# Patient Record
Sex: MALE | Race: WHITE | NOT HISPANIC OR LATINO | URBAN - METROPOLITAN AREA
[De-identification: names, ages, dates, MRNs, and addresses within clinical notes are randomized per-mention and may not be internally consistent; named-entity substitution may affect disease eponyms.]

---

## 2015-03-12 RX ORDER — DIGOXIN 250 MCG
3 TABLET ORAL
Qty: 0 | Refills: 0 | COMMUNITY
Start: 2015-03-12

## 2017-04-04 ENCOUNTER — INPATIENT (INPATIENT)
Facility: HOSPITAL | Age: 82
LOS: 1 days | Discharge: HOME | DRG: 872 | End: 2017-04-06
Attending: INTERNAL MEDICINE | Admitting: INTERNAL MEDICINE
Payer: MEDICARE

## 2017-04-04 VITALS
SYSTOLIC BLOOD PRESSURE: 162 MMHG | RESPIRATION RATE: 16 BRPM | OXYGEN SATURATION: 98 % | HEART RATE: 110 BPM | DIASTOLIC BLOOD PRESSURE: 82 MMHG

## 2017-04-04 DIAGNOSIS — J10.1 INFLUENZA DUE TO OTHER IDENTIFIED INFLUENZA VIRUS WITH OTHER RESPIRATORY MANIFESTATIONS: ICD-10-CM

## 2017-04-04 DIAGNOSIS — Z96.653 PRESENCE OF ARTIFICIAL KNEE JOINT, BILATERAL: Chronic | ICD-10-CM

## 2017-04-04 LAB
ALBUMIN SERPL ELPH-MCNC: 4.3 G/DL — SIGNIFICANT CHANGE UP (ref 3.3–5)
ALP SERPL-CCNC: 80 U/L — SIGNIFICANT CHANGE UP (ref 40–120)
ALT FLD-CCNC: 16 U/L RC — SIGNIFICANT CHANGE UP (ref 10–45)
ANION GAP SERPL CALC-SCNC: 16 MMOL/L — SIGNIFICANT CHANGE UP (ref 5–17)
APPEARANCE UR: CLEAR — SIGNIFICANT CHANGE UP
APTT BLD: 40.9 SEC — HIGH (ref 27.5–37.4)
AST SERPL-CCNC: 18 U/L — SIGNIFICANT CHANGE UP (ref 10–40)
BASOPHILS # BLD AUTO: 0 K/UL — SIGNIFICANT CHANGE UP (ref 0–0.2)
BASOPHILS NFR BLD AUTO: 0.1 % — SIGNIFICANT CHANGE UP (ref 0–2)
BILIRUB SERPL-MCNC: 0.7 MG/DL — SIGNIFICANT CHANGE UP (ref 0.2–1.2)
BILIRUB UR-MCNC: NEGATIVE — SIGNIFICANT CHANGE UP
BUN SERPL-MCNC: 19 MG/DL — SIGNIFICANT CHANGE UP (ref 7–23)
CALCIUM SERPL-MCNC: 9.1 MG/DL — SIGNIFICANT CHANGE UP (ref 8.4–10.5)
CHLORIDE SERPL-SCNC: 98 MMOL/L — SIGNIFICANT CHANGE UP (ref 96–108)
CO2 SERPL-SCNC: 21 MMOL/L — LOW (ref 22–31)
COLOR SPEC: YELLOW — SIGNIFICANT CHANGE UP
CREAT SERPL-MCNC: 1.04 MG/DL — SIGNIFICANT CHANGE UP (ref 0.5–1.3)
DIFF PNL FLD: ABNORMAL
EOSINOPHIL # BLD AUTO: 0.1 K/UL — SIGNIFICANT CHANGE UP (ref 0–0.5)
EOSINOPHIL NFR BLD AUTO: 0.9 % — SIGNIFICANT CHANGE UP (ref 0–6)
FLUBV RNA SPEC QL NAA+PROBE: DETECTED
GAS PNL BLDV: SIGNIFICANT CHANGE UP
GLUCOSE SERPL-MCNC: 144 MG/DL — HIGH (ref 70–99)
GLUCOSE UR QL: 300
HCT VFR BLD CALC: 44.2 % — SIGNIFICANT CHANGE UP (ref 39–50)
HGB BLD-MCNC: 14.7 G/DL — SIGNIFICANT CHANGE UP (ref 13–17)
INR BLD: 2.54 RATIO — HIGH (ref 0.88–1.16)
KETONES UR-MCNC: NEGATIVE — SIGNIFICANT CHANGE UP
LEUKOCYTE ESTERASE UR-ACNC: NEGATIVE — SIGNIFICANT CHANGE UP
LYMPHOCYTES # BLD AUTO: 0.9 K/UL — LOW (ref 1–3.3)
LYMPHOCYTES # BLD AUTO: 8 % — LOW (ref 13–44)
MCHC RBC-ENTMCNC: 29 PG — SIGNIFICANT CHANGE UP (ref 27–34)
MCHC RBC-ENTMCNC: 33.1 GM/DL — SIGNIFICANT CHANGE UP (ref 32–36)
MCV RBC AUTO: 87.6 FL — SIGNIFICANT CHANGE UP (ref 80–100)
MONOCYTES # BLD AUTO: 1.4 K/UL — HIGH (ref 0–0.9)
MONOCYTES NFR BLD AUTO: 12.6 % — SIGNIFICANT CHANGE UP (ref 2–14)
NEUTROPHILS # BLD AUTO: 8.5 K/UL — HIGH (ref 1.8–7.4)
NEUTROPHILS NFR BLD AUTO: 78.4 % — HIGH (ref 43–77)
NITRITE UR-MCNC: NEGATIVE — SIGNIFICANT CHANGE UP
PH UR: 6.5 — SIGNIFICANT CHANGE UP (ref 4.8–8)
PLATELET # BLD AUTO: 203 K/UL — SIGNIFICANT CHANGE UP (ref 150–400)
POTASSIUM SERPL-MCNC: 4.6 MMOL/L — SIGNIFICANT CHANGE UP (ref 3.5–5.3)
POTASSIUM SERPL-SCNC: 4.6 MMOL/L — SIGNIFICANT CHANGE UP (ref 3.5–5.3)
PROT SERPL-MCNC: 7.1 G/DL — SIGNIFICANT CHANGE UP (ref 6–8.3)
PROT UR-MCNC: 100 MG/DL
PROTHROM AB SERPL-ACNC: 28.2 SEC — HIGH (ref 9.8–12.7)
RAPID RVP RESULT: DETECTED
RBC # BLD: 5.05 M/UL — SIGNIFICANT CHANGE UP (ref 4.2–5.8)
RBC # FLD: 13 % — SIGNIFICANT CHANGE UP (ref 10.3–14.5)
SODIUM SERPL-SCNC: 135 MMOL/L — SIGNIFICANT CHANGE UP (ref 135–145)
SP GR SPEC: 1.02 — SIGNIFICANT CHANGE UP (ref 1.01–1.02)
UROBILINOGEN FLD QL: 1
WBC # BLD: 10.9 K/UL — HIGH (ref 3.8–10.5)
WBC # FLD AUTO: 10.9 K/UL — HIGH (ref 3.8–10.5)

## 2017-04-04 PROCEDURE — 99285 EMERGENCY DEPT VISIT HI MDM: CPT | Mod: 25,GC

## 2017-04-04 PROCEDURE — 93010 ELECTROCARDIOGRAM REPORT: CPT

## 2017-04-04 PROCEDURE — 99223 1ST HOSP IP/OBS HIGH 75: CPT

## 2017-04-04 PROCEDURE — 71010: CPT | Mod: 26

## 2017-04-04 RX ORDER — SODIUM CHLORIDE 9 MG/ML
1000 INJECTION INTRAMUSCULAR; INTRAVENOUS; SUBCUTANEOUS ONCE
Qty: 0 | Refills: 0 | Status: COMPLETED | OUTPATIENT
Start: 2017-04-04 | End: 2017-04-04

## 2017-04-04 RX ORDER — SODIUM CHLORIDE 9 MG/ML
3 INJECTION INTRAMUSCULAR; INTRAVENOUS; SUBCUTANEOUS ONCE
Qty: 0 | Refills: 0 | Status: COMPLETED | OUTPATIENT
Start: 2017-04-04 | End: 2017-04-04

## 2017-04-04 RX ORDER — ACETAMINOPHEN 500 MG
975 TABLET ORAL ONCE
Qty: 0 | Refills: 0 | Status: COMPLETED | OUTPATIENT
Start: 2017-04-04 | End: 2017-04-04

## 2017-04-04 RX ADMIN — Medication 30 MILLIGRAM(S): at 20:42

## 2017-04-04 RX ADMIN — SODIUM CHLORIDE 3 MILLILITER(S): 9 INJECTION INTRAMUSCULAR; INTRAVENOUS; SUBCUTANEOUS at 18:33

## 2017-04-04 RX ADMIN — SODIUM CHLORIDE 666.67 MILLILITER(S): 9 INJECTION INTRAMUSCULAR; INTRAVENOUS; SUBCUTANEOUS at 18:33

## 2017-04-04 RX ADMIN — Medication 975 MILLIGRAM(S): at 18:33

## 2017-04-04 NOTE — H&P ADULT. - PSYCHIATRIC
not applicable not examined negative Affect and characteristics of appearance, verbalizations, behaviors are appropriate

## 2017-04-04 NOTE — H&P ADULT. - PROBLEM SELECTOR PLAN 2
- fever, tachy, tachypneic  - s/p NS 1L  - blood and ur cx pending  - cont tamiflu and levaquin empirically  - hold off CT scans for now

## 2017-04-04 NOTE — H&P ADULT. - HISTORY OF PRESENT ILLNESS
89M c hx AF on coumadin, syncope S/P PPM, HTN, HLD, DM2, and macular degeneration, pw chills, coughing, myalgia, fever for 2 days.    Pt is normally very functional.        Pt states that his chest pain started last night at rest when he was sleeping. Pt reports that the pain was not severe, just mild in nature, and he did not need to take any medications to relieve the pain. Pt does not report any exertional or pleuritic component, and is unable to identify any palliating or provoking features. Pt woke up this morning with similar 4/10 chest pain and was concerned since he never experienced chest pain in the past. Pt admits that his chest pain fluctuated in intensity, but never reached more than a 4/10 in severity. Pt had a nuclear stress test several years ago, which was reportedly normal. Pt denies fever, chills, headache, dizziness, visual deficits, pleuritic chest pain, shortness of breath, palpitations, abdominal pain, N/V/D/C, hematochezia, melena, dysuria, hematuria, LOC, syncope, peripheral edema, weight gain/loss. Upon arrival to ED, EKG: V paced at 62 bpm, TWI II, III, AVF. CE x1: Negative. CT angio chest, abdomen, pelvis: No aortic dissection. INR: 4.26. 89M c hx AF on coumadin, syncope S/P PPM, HTN, HLD, DM2, and macular degeneration, pw chills, coughing, myalgia, fever for 2 days.    Pt is normally very functional. >4mets, drives, lives with wife. States that for the past 2 days, having chills, mildly productive cough, myalgia and fevers at home. Denies CP, abd pain, N/V, diarrhea.    VS: Tm 102, P 110, /82, R 22, 98%RA  In the ED, received tamiflu, NS 1L, tylenol.        Pt states that his chest pain started last night at rest when he was sleeping. Pt reports that the pain was not severe, just mild in nature, and he did not need to take any medications to relieve the pain. Pt does not report any exertional or pleuritic component, and is unable to identify any palliating or provoking features. Pt woke up this morning with similar 4/10 chest pain and was concerned since he never experienced chest pain in the past. Pt admits that his chest pain fluctuated in intensity, but never reached more than a 4/10 in severity. Pt had a nuclear stress test several years ago, which was reportedly normal. Pt denies fever, chills, headache, dizziness, visual deficits, pleuritic chest pain, shortness of breath, palpitations, abdominal pain, N/V/D/C, hematochezia, melena, dysuria, hematuria, LOC, syncope, peripheral edema, weight gain/loss. Upon arrival to ED, EKG: V paced at 62 bpm, TWI II, III, AVF. CE x1: Negative. CT angio chest, abdomen, pelvis: No aortic dissection. INR: 4.26. 89M c hx AF on coumadin, syncope S/P PPM, HTN, HLD, DM2, and macular degeneration, pw chills, coughing, myalgia, fever for 2 days.    Pt is normally very functional. >4mets, drives, lives with wife. States that for the past 2 days, having chills, mildly productive cough, myalgia and fevers at home. Denies CP, abd pain, N/V, diarrhea.    VS: Tm 102, P 110, /82, R 22, 98%RA  In the ED, received tamiflu, NS 1L, tylenol.

## 2017-04-04 NOTE — H&P ADULT. - NEGATIVE NEUROLOGICAL SYMPTOMS
no generalized seizures/no headache/no weakness/no confusion/no focal seizures/no loss of consciousness/no hemiparesis/no paresthesias/no transient paralysis/no difficulty walking/no vertigo/no tremors/no loss of sensation/no syncope

## 2017-04-04 NOTE — H&P ADULT. - NEUROLOGICAL DETAILS
responds to verbal commands/normal strength/alert and oriented x 3/cranial nerves intact/sensation intact/responds to pain

## 2017-04-04 NOTE — ED PROVIDER NOTE - ATTENDING CONTRIBUTION TO CARE
I have seen and evaluated this patient with the resident.   I agree with the findings  unless other wise stated.  I have made appropriate changes in documentations where needed, After my face to face bedside evaluation, I am further  noting: Fever likely- undifferentiated: 1) IV Access/IVF/LABS/UA/Cultures 2) CXR 3) IV Abx, held for possible viral illness 4) Admit

## 2017-04-04 NOTE — H&P ADULT. - PROBLEM SELECTOR PLAN 1
- cont tamiflu bid for 5 days  - start levaquin for 5 days for empiric therapy  - supportive care, helen

## 2017-04-04 NOTE — H&P ADULT. - NEGATIVE CARDIOVASCULAR SYMPTOMS
no claudication/no paroxysmal nocturnal dyspnea/no orthopnea/no dyspnea on exertion/no palpitations/no peripheral edema no orthopnea/no palpitations/no peripheral edema/no paroxysmal nocturnal dyspnea/no claudication/no chest pain

## 2017-04-04 NOTE — H&P ADULT. - RS GEN PE MLT RESP DETAILS PC
no wheezes/no rhonchi/no rales/clear to auscultation bilaterally/breath sounds equal/no intercostal retractions/respirations non-labored/no chest wall tenderness/good air movement/airway patent breath sounds equal/good air movement/no intercostal retractions/no chest wall tenderness/rhonchi/airway patent/wheezes

## 2017-04-04 NOTE — ED PROVIDER NOTE - PMH
AF (atrial fibrillation)    BPH (benign prostatic hypertrophy)    DM (diabetes mellitus)    HLD (hyperlipidemia)    HTN (hypertension)    Macular degeneration    Pacemaker

## 2017-04-04 NOTE — H&P ADULT. - NEGATIVE RESPIRATORY AND THORAX SYMPTOMS
no pleuritic chest pain/no dyspnea/no cough/no hemoptysis/no wheezing no hemoptysis/no pleuritic chest pain

## 2017-04-04 NOTE — ED PROVIDER NOTE - OBJECTIVE STATEMENT
hx from pt, daughter and chart,   89yo M hx of afib on coumadin and digoxin, s/p ppm, htn, hld, dmii, bib daughter for chills and cough since yesterday. Pt found to have fever in ED. Denies n/v/dysuria, abd pain. +congestion.   no change in vision, no throat pain, no chest pain, no abdominal pain, no nausea/vomiting,  no dysuria, no joint pain, no rashes, no focal numbness or weakness, no known mental health issues     Dr. Jackman (PCP)  Dr. Denys uSe (cardiologist)

## 2017-04-04 NOTE — ED PROVIDER NOTE - MEDICAL DECISION MAKING DETAILS
Fever likely- undifferentiated: 1) IV Access/IVF/LABS/UA/Cultures 2) CXR 3) IV Abx, held for possible viral illness 4) Admit

## 2017-04-04 NOTE — ED ADULT NURSE REASSESSMENT NOTE - NS ED NURSE REASSESS COMMENT FT1
received report from MOE Mckeon. patient resting comfortably in bed. IV "fell out." VSS. in acute pain or distress.

## 2017-04-04 NOTE — H&P ADULT. - NEGATIVE GASTROINTESTINAL SYMPTOMS
no diarrhea/no hematochezia/no melena/no nausea/no abdominal pain/no change in bowel habits/no vomiting/no constipation/no flatulence

## 2017-04-04 NOTE — ED PROVIDER NOTE - PHYSICAL EXAMINATION
AAOX3, NAD, NCAT, EOMI, PERRL, MMM, Neck Supple, RRR, left rhonchi at base. exam limited by respiratory effort no increased wob. ABD S/NT/ND +BS, No CVAT, EXT warm, well perfused, No Edema, Distal Pulses Intact, No Rash,  MAEx4, Sensation to soft touch grossly intact, normal strength/tone.

## 2017-04-04 NOTE — H&P ADULT. - LAB RESULTS AND INTERPRETATION
Personally reviewed labs. Wbc 10.9, INR 2.54. Glucosuria and microscopic hematuria. otherwise grossly wnl.

## 2017-04-04 NOTE — H&P ADULT. - NEGATIVE GENERAL SYMPTOMS
no sweating/no chills/no malaise/no weight gain/no weight loss/no fever/no anorexia/no fatigue no weight loss/no weight gain/no sweating/no malaise/no anorexia/no fatigue

## 2017-04-04 NOTE — H&P ADULT. - NEGATIVE OPHTHALMOLOGIC SYMPTOMS
no loss of vision R/no blurred vision R/no pain L/no blurred vision L/no diplopia/no pain R/no loss of vision L/no photophobia

## 2017-04-04 NOTE — ED ADULT NURSE NOTE - OBJECTIVE STATEMENT
89 Y/O M via EMS presenting with fever as of today as per daughter. Daughter states the pt's wife noticed that he was "off". Daughter went to his house and noted that he was lethargic and weak. She states he has a cough. Pt denies chest pain, sob, n ,v ,abd pain, dyusria, numbness, tingling. Pt is aaox4. Gross neuro intact. Lungs clear throughout bilat. Productive cough. S1 and S2 heard. Abd soft, nontender, nondistended. + bs in all 4 quadrants. Denies urinary s&s. Skin w.d.i. Safety and comfort measures maintained. Pt and family made aware of droplet precautions and wearing a mask. Family understands and verbalizes teachings.

## 2017-04-04 NOTE — ED ADULT NURSE NOTE - CHPI ED SYMPTOMS NEG
no decreased eating/drinking/no tingling/no chills/no pain/no dizziness/no vomiting/no weakness/no nausea

## 2017-04-04 NOTE — H&P ADULT. - ASSESSMENT
89M c hx AF on coumadin, syncope S/P PPM, HTN, HLD, DM2, and macular degeneration, pw sepsis 2/2 influenza B.

## 2017-04-04 NOTE — H&P ADULT. - PROBLEM SELECTOR PLAN 5
- f/u PMD for repeat u/a within 1 week after discharge  - if persistent, may need urology referral and eval

## 2017-04-05 DIAGNOSIS — J10.1 INFLUENZA DUE TO OTHER IDENTIFIED INFLUENZA VIRUS WITH OTHER RESPIRATORY MANIFESTATIONS: ICD-10-CM

## 2017-04-05 DIAGNOSIS — I48.91 UNSPECIFIED ATRIAL FIBRILLATION: ICD-10-CM

## 2017-04-05 DIAGNOSIS — R31.29 OTHER MICROSCOPIC HEMATURIA: ICD-10-CM

## 2017-04-05 DIAGNOSIS — I10 ESSENTIAL (PRIMARY) HYPERTENSION: ICD-10-CM

## 2017-04-05 DIAGNOSIS — A41.9 SEPSIS, UNSPECIFIED ORGANISM: ICD-10-CM

## 2017-04-05 LAB
ALBUMIN SERPL ELPH-MCNC: 3.2 G/DL — LOW (ref 3.3–5)
ALP SERPL-CCNC: 61 U/L — SIGNIFICANT CHANGE UP (ref 40–120)
ALT FLD-CCNC: 12 U/L RC — SIGNIFICANT CHANGE UP (ref 10–45)
ANION GAP SERPL CALC-SCNC: 12 MMOL/L — SIGNIFICANT CHANGE UP (ref 5–17)
APTT BLD: 38.4 SEC — HIGH (ref 27.5–37.4)
AST SERPL-CCNC: 19 U/L — SIGNIFICANT CHANGE UP (ref 10–40)
BASOPHILS # BLD AUTO: 0 K/UL — SIGNIFICANT CHANGE UP (ref 0–0.2)
BASOPHILS NFR BLD AUTO: 0 % — SIGNIFICANT CHANGE UP (ref 0–2)
BILIRUB SERPL-MCNC: 0.5 MG/DL — SIGNIFICANT CHANGE UP (ref 0.2–1.2)
BUN SERPL-MCNC: 23 MG/DL — SIGNIFICANT CHANGE UP (ref 7–23)
CALCIUM SERPL-MCNC: 8.1 MG/DL — LOW (ref 8.4–10.5)
CHLORIDE SERPL-SCNC: 102 MMOL/L — SIGNIFICANT CHANGE UP (ref 96–108)
CHOLEST SERPL-MCNC: 61 MG/DL — SIGNIFICANT CHANGE UP (ref 10–199)
CK SERPL-CCNC: 102 U/L — SIGNIFICANT CHANGE UP (ref 30–200)
CO2 SERPL-SCNC: 22 MMOL/L — SIGNIFICANT CHANGE UP (ref 22–31)
CREAT SERPL-MCNC: 0.96 MG/DL — SIGNIFICANT CHANGE UP (ref 0.5–1.3)
CULTURE RESULTS: NO GROWTH — SIGNIFICANT CHANGE UP
DIGOXIN SERPL-MCNC: 0.6 NG/ML — LOW (ref 0.8–2)
EOSINOPHIL # BLD AUTO: 0.1 K/UL — SIGNIFICANT CHANGE UP (ref 0–0.5)
EOSINOPHIL NFR BLD AUTO: 1 % — SIGNIFICANT CHANGE UP (ref 0–6)
GLUCOSE SERPL-MCNC: 132 MG/DL — HIGH (ref 70–99)
HBA1C BLD-MCNC: 7.3 % — HIGH (ref 4–5.6)
HCT VFR BLD CALC: 38.6 % — LOW (ref 39–50)
HDLC SERPL-MCNC: 25 MG/DL — LOW (ref 40–125)
HGB BLD-MCNC: 13.1 G/DL — SIGNIFICANT CHANGE UP (ref 13–17)
INR BLD: 2.6 RATIO — HIGH (ref 0.88–1.16)
LACTATE SERPL-SCNC: 0.9 MMOL/L — SIGNIFICANT CHANGE UP (ref 0.7–2)
LIPID PNL WITH DIRECT LDL SERPL: 16 MG/DL — SIGNIFICANT CHANGE UP
LYMPHOCYTES # BLD AUTO: 0.8 K/UL — LOW (ref 1–3.3)
LYMPHOCYTES # BLD AUTO: 12.2 % — LOW (ref 13–44)
MAGNESIUM SERPL-MCNC: 1.9 MG/DL — SIGNIFICANT CHANGE UP (ref 1.6–2.6)
MCHC RBC-ENTMCNC: 30.2 PG — SIGNIFICANT CHANGE UP (ref 27–34)
MCHC RBC-ENTMCNC: 34 GM/DL — SIGNIFICANT CHANGE UP (ref 32–36)
MCV RBC AUTO: 88.8 FL — SIGNIFICANT CHANGE UP (ref 80–100)
MONOCYTES # BLD AUTO: 1.3 K/UL — HIGH (ref 0–0.9)
MONOCYTES NFR BLD AUTO: 18.9 % — HIGH (ref 2–14)
NEUTROPHILS # BLD AUTO: 4.6 K/UL — SIGNIFICANT CHANGE UP (ref 1.8–7.4)
NEUTROPHILS NFR BLD AUTO: 67.9 % — SIGNIFICANT CHANGE UP (ref 43–77)
PHOSPHATE SERPL-MCNC: 2.7 MG/DL — SIGNIFICANT CHANGE UP (ref 2.5–4.5)
PLATELET # BLD AUTO: 153 K/UL — SIGNIFICANT CHANGE UP (ref 150–400)
POTASSIUM SERPL-MCNC: 4.3 MMOL/L — SIGNIFICANT CHANGE UP (ref 3.5–5.3)
POTASSIUM SERPL-SCNC: 4.3 MMOL/L — SIGNIFICANT CHANGE UP (ref 3.5–5.3)
PROT SERPL-MCNC: 5.5 G/DL — LOW (ref 6–8.3)
PROTHROM AB SERPL-ACNC: 28.9 SEC — HIGH (ref 9.8–12.7)
RBC # BLD: 4.34 M/UL — SIGNIFICANT CHANGE UP (ref 4.2–5.8)
RBC # FLD: 13 % — SIGNIFICANT CHANGE UP (ref 10.3–14.5)
SODIUM SERPL-SCNC: 136 MMOL/L — SIGNIFICANT CHANGE UP (ref 135–145)
SPECIMEN SOURCE: SIGNIFICANT CHANGE UP
TOTAL CHOLESTEROL/HDL RATIO MEASUREMENT: 2.4 RATIO — LOW (ref 3.4–9.6)
TRIGL SERPL-MCNC: 100 MG/DL — SIGNIFICANT CHANGE UP (ref 10–149)
TROPONIN T SERPL-MCNC: <0.01 NG/ML — SIGNIFICANT CHANGE UP (ref 0–0.06)
WBC # BLD: 6.8 K/UL — SIGNIFICANT CHANGE UP (ref 3.8–10.5)
WBC # FLD AUTO: 6.8 K/UL — SIGNIFICANT CHANGE UP (ref 3.8–10.5)

## 2017-04-05 PROCEDURE — 99232 SBSQ HOSP IP/OBS MODERATE 35: CPT

## 2017-04-05 RX ORDER — SODIUM CHLORIDE 9 MG/ML
1000 INJECTION INTRAMUSCULAR; INTRAVENOUS; SUBCUTANEOUS
Qty: 0 | Refills: 0 | Status: DISCONTINUED | OUTPATIENT
Start: 2017-04-05 | End: 2017-04-05

## 2017-04-05 RX ORDER — LACTOBACILLUS ACIDOPHILUS 100MM CELL
1 CAPSULE ORAL
Qty: 0 | Refills: 0 | Status: DISCONTINUED | OUTPATIENT
Start: 2017-04-05 | End: 2017-04-06

## 2017-04-05 RX ORDER — DIGOXIN 250 MCG
0.12 TABLET ORAL DAILY
Qty: 0 | Refills: 0 | Status: DISCONTINUED | OUTPATIENT
Start: 2017-04-04 | End: 2017-04-06

## 2017-04-05 RX ORDER — DOCUSATE SODIUM 100 MG
100 CAPSULE ORAL THREE TIMES A DAY
Qty: 0 | Refills: 0 | Status: DISCONTINUED | OUTPATIENT
Start: 2017-04-05 | End: 2017-04-06

## 2017-04-05 RX ORDER — SENNA PLUS 8.6 MG/1
2 TABLET ORAL AT BEDTIME
Qty: 0 | Refills: 0 | Status: DISCONTINUED | OUTPATIENT
Start: 2017-04-05 | End: 2017-04-06

## 2017-04-05 RX ORDER — DOXAZOSIN MESYLATE 4 MG
2 TABLET ORAL AT BEDTIME
Qty: 0 | Refills: 0 | Status: DISCONTINUED | OUTPATIENT
Start: 2017-04-05 | End: 2017-04-06

## 2017-04-05 RX ORDER — METOPROLOL TARTRATE 50 MG
50 TABLET ORAL
Qty: 0 | Refills: 0 | Status: DISCONTINUED | OUTPATIENT
Start: 2017-04-04 | End: 2017-04-06

## 2017-04-05 RX ORDER — WARFARIN SODIUM 2.5 MG/1
5 TABLET ORAL ONCE
Qty: 0 | Refills: 0 | Status: COMPLETED | OUTPATIENT
Start: 2017-04-05 | End: 2017-04-05

## 2017-04-05 RX ORDER — ATORVASTATIN CALCIUM 80 MG/1
40 TABLET, FILM COATED ORAL AT BEDTIME
Qty: 0 | Refills: 0 | Status: DISCONTINUED | OUTPATIENT
Start: 2017-04-04 | End: 2017-04-06

## 2017-04-05 RX ORDER — FOLIC ACID 0.8 MG
1 TABLET ORAL DAILY
Qty: 0 | Refills: 0 | Status: DISCONTINUED | OUTPATIENT
Start: 2017-04-05 | End: 2017-04-06

## 2017-04-05 RX ORDER — HYDRALAZINE HCL 50 MG
10 TABLET ORAL EVERY 6 HOURS
Qty: 0 | Refills: 0 | Status: DISCONTINUED | OUTPATIENT
Start: 2017-04-05 | End: 2017-04-06

## 2017-04-05 RX ORDER — IPRATROPIUM/ALBUTEROL SULFATE 18-103MCG
3 AEROSOL WITH ADAPTER (GRAM) INHALATION EVERY 6 HOURS
Qty: 0 | Refills: 0 | Status: DISCONTINUED | OUTPATIENT
Start: 2017-04-05 | End: 2017-04-06

## 2017-04-05 RX ORDER — SODIUM CHLORIDE 9 MG/ML
1000 INJECTION INTRAMUSCULAR; INTRAVENOUS; SUBCUTANEOUS
Qty: 0 | Refills: 0 | Status: DISCONTINUED | OUTPATIENT
Start: 2017-04-05 | End: 2017-04-06

## 2017-04-05 RX ADMIN — ATORVASTATIN CALCIUM 40 MILLIGRAM(S): 80 TABLET, FILM COATED ORAL at 21:08

## 2017-04-05 RX ADMIN — Medication 3 MILLILITER(S): at 16:57

## 2017-04-05 RX ADMIN — Medication 3 MILLILITER(S): at 23:51

## 2017-04-05 RX ADMIN — Medication 3 MILLILITER(S): at 11:24

## 2017-04-05 RX ADMIN — SODIUM CHLORIDE 75 MILLILITER(S): 9 INJECTION INTRAMUSCULAR; INTRAVENOUS; SUBCUTANEOUS at 23:52

## 2017-04-05 RX ADMIN — WARFARIN SODIUM 5 MILLIGRAM(S): 2.5 TABLET ORAL at 21:09

## 2017-04-05 RX ADMIN — Medication 50 MILLIGRAM(S): at 05:50

## 2017-04-05 RX ADMIN — Medication 1 MILLIGRAM(S): at 11:24

## 2017-04-05 RX ADMIN — Medication 2 MILLIGRAM(S): at 01:56

## 2017-04-05 RX ADMIN — Medication 1 TABLET(S): at 16:57

## 2017-04-05 RX ADMIN — Medication 75 MILLIGRAM(S): at 16:58

## 2017-04-05 RX ADMIN — WARFARIN SODIUM 5 MILLIGRAM(S): 2.5 TABLET ORAL at 01:10

## 2017-04-05 RX ADMIN — Medication 0.12 MILLIGRAM(S): at 05:50

## 2017-04-05 RX ADMIN — SODIUM CHLORIDE 50 MILLILITER(S): 9 INJECTION INTRAMUSCULAR; INTRAVENOUS; SUBCUTANEOUS at 02:00

## 2017-04-05 RX ADMIN — Medication 3 MILLILITER(S): at 01:10

## 2017-04-05 RX ADMIN — Medication 100 MILLIGRAM(S): at 05:55

## 2017-04-05 RX ADMIN — SODIUM CHLORIDE 75 MILLILITER(S): 9 INJECTION INTRAMUSCULAR; INTRAVENOUS; SUBCUTANEOUS at 21:09

## 2017-04-05 RX ADMIN — Medication 1 TABLET(S): at 11:24

## 2017-04-05 RX ADMIN — Medication 75 MILLIGRAM(S): at 05:50

## 2017-04-05 RX ADMIN — SODIUM CHLORIDE 75 MILLILITER(S): 9 INJECTION INTRAMUSCULAR; INTRAVENOUS; SUBCUTANEOUS at 16:57

## 2017-04-05 RX ADMIN — Medication 50 MILLIGRAM(S): at 16:58

## 2017-04-05 RX ADMIN — Medication 100 MILLIGRAM(S): at 21:09

## 2017-04-05 RX ADMIN — Medication 3 MILLILITER(S): at 05:50

## 2017-04-05 RX ADMIN — Medication 1 TABLET(S): at 10:31

## 2017-04-05 RX ADMIN — Medication 2 MILLIGRAM(S): at 21:09

## 2017-04-06 ENCOUNTER — TRANSCRIPTION ENCOUNTER (OUTPATIENT)
Age: 82
End: 2017-04-06

## 2017-04-06 VITALS
RESPIRATION RATE: 20 BRPM | SYSTOLIC BLOOD PRESSURE: 133 MMHG | OXYGEN SATURATION: 94 % | HEART RATE: 72 BPM | DIASTOLIC BLOOD PRESSURE: 68 MMHG | TEMPERATURE: 99 F

## 2017-04-06 LAB
ANION GAP SERPL CALC-SCNC: 11 MMOL/L — SIGNIFICANT CHANGE UP (ref 5–17)
BUN SERPL-MCNC: 18 MG/DL — SIGNIFICANT CHANGE UP (ref 7–23)
CALCIUM SERPL-MCNC: 7.9 MG/DL — LOW (ref 8.4–10.5)
CHLORIDE SERPL-SCNC: 100 MMOL/L — SIGNIFICANT CHANGE UP (ref 96–108)
CO2 SERPL-SCNC: 23 MMOL/L — SIGNIFICANT CHANGE UP (ref 22–31)
CREAT SERPL-MCNC: 0.9 MG/DL — SIGNIFICANT CHANGE UP (ref 0.5–1.3)
GLUCOSE SERPL-MCNC: 126 MG/DL — HIGH (ref 70–99)
HCT VFR BLD CALC: 37.8 % — LOW (ref 39–50)
HGB BLD-MCNC: 12.3 G/DL — LOW (ref 13–17)
INR BLD: 2.31 RATIO — HIGH (ref 0.88–1.16)
MCHC RBC-ENTMCNC: 28.5 PG — SIGNIFICANT CHANGE UP (ref 27–34)
MCHC RBC-ENTMCNC: 32.5 GM/DL — SIGNIFICANT CHANGE UP (ref 32–36)
MCV RBC AUTO: 87.7 FL — SIGNIFICANT CHANGE UP (ref 80–100)
PLATELET # BLD AUTO: 154 K/UL — SIGNIFICANT CHANGE UP (ref 150–400)
POTASSIUM SERPL-MCNC: 3.9 MMOL/L — SIGNIFICANT CHANGE UP (ref 3.5–5.3)
POTASSIUM SERPL-SCNC: 3.9 MMOL/L — SIGNIFICANT CHANGE UP (ref 3.5–5.3)
PROTHROM AB SERPL-ACNC: 26.5 SEC — HIGH (ref 10–13.1)
RBC # BLD: 4.31 M/UL — SIGNIFICANT CHANGE UP (ref 4.2–5.8)
RBC # FLD: 14 % — SIGNIFICANT CHANGE UP (ref 10.3–14.5)
SODIUM SERPL-SCNC: 134 MMOL/L — LOW (ref 135–145)
WBC # BLD: 6.94 K/UL — SIGNIFICANT CHANGE UP (ref 3.8–10.5)
WBC # FLD AUTO: 6.94 K/UL — SIGNIFICANT CHANGE UP (ref 3.8–10.5)

## 2017-04-06 PROCEDURE — 83036 HEMOGLOBIN GLYCOSYLATED A1C: CPT

## 2017-04-06 PROCEDURE — 83605 ASSAY OF LACTIC ACID: CPT

## 2017-04-06 PROCEDURE — 71045 X-RAY EXAM CHEST 1 VIEW: CPT

## 2017-04-06 PROCEDURE — 85730 THROMBOPLASTIN TIME PARTIAL: CPT

## 2017-04-06 PROCEDURE — 81001 URINALYSIS AUTO W/SCOPE: CPT

## 2017-04-06 PROCEDURE — 85014 HEMATOCRIT: CPT

## 2017-04-06 PROCEDURE — 82947 ASSAY GLUCOSE BLOOD QUANT: CPT

## 2017-04-06 PROCEDURE — 87633 RESP VIRUS 12-25 TARGETS: CPT

## 2017-04-06 PROCEDURE — 99285 EMERGENCY DEPT VISIT HI MDM: CPT | Mod: 25

## 2017-04-06 PROCEDURE — 99239 HOSP IP/OBS DSCHRG MGMT >30: CPT

## 2017-04-06 PROCEDURE — 82550 ASSAY OF CK (CPK): CPT

## 2017-04-06 PROCEDURE — 93005 ELECTROCARDIOGRAM TRACING: CPT

## 2017-04-06 PROCEDURE — 94640 AIRWAY INHALATION TREATMENT: CPT

## 2017-04-06 PROCEDURE — 87798 DETECT AGENT NOS DNA AMP: CPT

## 2017-04-06 PROCEDURE — 84132 ASSAY OF SERUM POTASSIUM: CPT

## 2017-04-06 PROCEDURE — 82330 ASSAY OF CALCIUM: CPT

## 2017-04-06 PROCEDURE — 97162 PT EVAL MOD COMPLEX 30 MIN: CPT

## 2017-04-06 PROCEDURE — 83735 ASSAY OF MAGNESIUM: CPT

## 2017-04-06 PROCEDURE — 80048 BASIC METABOLIC PNL TOTAL CA: CPT

## 2017-04-06 PROCEDURE — 80061 LIPID PANEL: CPT

## 2017-04-06 PROCEDURE — 85027 COMPLETE CBC AUTOMATED: CPT

## 2017-04-06 PROCEDURE — 85610 PROTHROMBIN TIME: CPT

## 2017-04-06 PROCEDURE — 87086 URINE CULTURE/COLONY COUNT: CPT

## 2017-04-06 PROCEDURE — 84295 ASSAY OF SERUM SODIUM: CPT

## 2017-04-06 PROCEDURE — 80162 ASSAY OF DIGOXIN TOTAL: CPT

## 2017-04-06 PROCEDURE — 87581 M.PNEUMON DNA AMP PROBE: CPT

## 2017-04-06 PROCEDURE — 87040 BLOOD CULTURE FOR BACTERIA: CPT

## 2017-04-06 PROCEDURE — 80053 COMPREHEN METABOLIC PANEL: CPT

## 2017-04-06 PROCEDURE — 82435 ASSAY OF BLOOD CHLORIDE: CPT

## 2017-04-06 PROCEDURE — 87486 CHLMYD PNEUM DNA AMP PROBE: CPT

## 2017-04-06 PROCEDURE — 84100 ASSAY OF PHOSPHORUS: CPT

## 2017-04-06 PROCEDURE — 82565 ASSAY OF CREATININE: CPT

## 2017-04-06 PROCEDURE — 82803 BLOOD GASES ANY COMBINATION: CPT

## 2017-04-06 PROCEDURE — 84484 ASSAY OF TROPONIN QUANT: CPT

## 2017-04-06 RX ORDER — METOPROLOL TARTRATE 50 MG
1 TABLET ORAL
Qty: 60 | Refills: 0 | OUTPATIENT
Start: 2017-04-06 | End: 2017-05-06

## 2017-04-06 RX ORDER — CIPROFLOXACIN LACTATE 400MG/40ML
1 VIAL (ML) INTRAVENOUS
Qty: 3 | Refills: 0
Start: 2017-04-06 | End: 2017-04-09

## 2017-04-06 RX ORDER — DIGOXIN 250 MCG
2 TABLET ORAL
Qty: 0 | Refills: 0 | DISCHARGE
Start: 2017-04-06

## 2017-04-06 RX ORDER — METOPROLOL TARTRATE 50 MG
1 TABLET ORAL
Qty: 30 | Refills: 0
Start: 2017-04-06 | End: 2017-05-06

## 2017-04-06 RX ADMIN — Medication 1 MILLIGRAM(S): at 11:15

## 2017-04-06 RX ADMIN — Medication 3 MILLILITER(S): at 11:44

## 2017-04-06 RX ADMIN — Medication 0.12 MILLIGRAM(S): at 05:07

## 2017-04-06 RX ADMIN — Medication 1 TABLET(S): at 11:19

## 2017-04-06 RX ADMIN — Medication 100 MILLIGRAM(S): at 05:07

## 2017-04-06 RX ADMIN — SODIUM CHLORIDE 75 MILLILITER(S): 9 INJECTION INTRAMUSCULAR; INTRAVENOUS; SUBCUTANEOUS at 05:08

## 2017-04-06 RX ADMIN — Medication 50 MILLIGRAM(S): at 05:07

## 2017-04-06 RX ADMIN — Medication 1 TABLET(S): at 08:38

## 2017-04-06 RX ADMIN — Medication 3 MILLILITER(S): at 05:07

## 2017-04-06 RX ADMIN — Medication 75 MILLIGRAM(S): at 05:07

## 2017-04-06 NOTE — DISCHARGE NOTE ADULT - PATIENT PORTAL LINK FT
“You can access the [facility] Patient Portal, offered by [facility], by registering with the following website: [email or web address]”

## 2017-04-06 NOTE — DISCHARGE NOTE ADULT - HOME CARE AGENCY
[place] [phone] A nurse will visit the day after discharge to assess your need for home  Physical therapy and reinforce DM teaching.

## 2017-04-06 NOTE — DISCHARGE NOTE ADULT - HOSPITAL COURSE
*******Attending to complete***** 89M c hx AF on coumadin pw chills, coughing, myalgia, fever for 2 days.    Fever and sepsis present on admission. Influenza B positive. Started on Tamiflu. Levaquin added for likely superimposed bacterial bronchitis. IV fluids given for dehydration.     Symptoms improved. D/carina on above meds.     Diagnoses: Influenza B; Bacterial bronchitis; Sepsis; Dehydration; Chronic atrial fibrillation; Therapeutic drug monitoring; HTN 89M c hx AF on coumadin pw chills, coughing, myalgia, fever for 2 days.    Fever and sepsis present on admission. Influenza B positive. Started on Tamiflu. Levaquin added for likely superimposed bacterial bronchitis. IV fluids given for dehydration.     Symptoms improved. D/carina on above meds.     Diagnoses: Influenza B; Bacterial bronchitis; Sepsis due to influenza; Dehydration; Chronic atrial fibrillation; Therapeutic drug monitoring; HTN

## 2017-04-06 NOTE — DISCHARGE NOTE ADULT - PLAN OF CARE
HgA1C this admission 7.3  4/5/17   Make sure you get your HgA1c checked every three months.  If you take oral diabetes medications, check your blood glucose two times a day.  If you take insulin, check your blood glucose before meals and at bedtime.  It's important not to skip any meals.  Keep a log of your blood glucose results and always take it with you to your doctor appointments.  Keep a list of your current medications including injectables and over the counter medications and bring this medication list with you to all your doctor appointments.  If you have not seen your ophthalmologist this year call for appointment.  Check your feet daily for redness, sores, or openings. Do not self treat. If no improvement in two days call your primary care physician for an appointment.  Low blood sugar (hypoglycemia) is a blood sugar below 70mg/dl. Check your blood sugar if you feel signs/symptoms of hypoglycemia. If your blood sugar is below 70 take 15 grams of carbohydrates (ex 4 oz of apple juice, 3-4 glucose tablets, or 4-6 oz of regular soda) wait 15 minutes and repeat blood sugar to make sure it comes up above 70.  If your blood sugar is above 70 and you are due for a meal, have a meal.  If you are not due for a meal have a snack.  This snack helps keeps your blood sugar at a safe range. Follow with your Cardiologist  INR monitoring and Coumadin dosing   Atrial fibrillation is the most common heart rhythm problem.  The condition puts you at risk for has stroke and heart attack  It helps if you control your blood pressure, not drink more than 1-2 alcohol drinks per day, cut down on caffeine, getting treatment for over active thyroid gland, and get regular exercise  Call your doctor if you feel your heart racing or beating unusually, chest tightness or pain, lightheaded, faint, shortness of breath especially with exercise  It is important to take your heart medication as prescribed  You may be on anticoagulation which is very important to take as directed - you may need blood work to monitor drug levels continue coumadin therapy as prescribed today 4/6/17 INR 2.31 Low salt diet  Activity as tolerated.  Take all medication as prescribed.  Follow up with your medical doctor for routine blood pressure monitoring at your next visit.  Notify your doctor if you have any of the following symptoms:   Dizziness, Lightheadedness, Blurry vision, Headache, Chest pain, Shortness of breath symptoms improved: continue medications as prescribed Follow-up with your primary care provider next week--call for appointment Stable: continue medication as prescribed

## 2017-04-06 NOTE — DISCHARGE NOTE ADULT - MEDICATION SUMMARY - MEDICATIONS TO CHANGE
I will SWITCH the dose or number of times a day I take the medications listed below when I get home from the hospital:  None I will SWITCH the dose or number of times a day I take the medications listed below when I get home from the hospital:    metoprolol succinate 100 mg oral tablet, extended release  -- 1 tab(s) by mouth once a day    digoxin 125 mcg (0.125 mg) oral tablet  -- 3 tab(s) by mouth once a day

## 2017-04-06 NOTE — DISCHARGE NOTE ADULT - ADDITIONAL INSTRUCTIONS
Follow-up with your Cardiologist Dr. Villalpando for further  monitoring.  Follow-up with your Primary care provider ---call for appointment

## 2017-04-06 NOTE — PHYSICAL THERAPY INITIAL EVALUATION ADULT - LEVEL OF INDEPENDENCE: GAIT, REHAB EVAL
20ft with no AD with close supervision, unsteadiness note. Supervision for 50ftx2 with RW, more steady (no signs of LOB/buckling)/supervision

## 2017-04-06 NOTE — PHYSICAL THERAPY INITIAL EVALUATION ADULT - PERTINENT HX OF CURRENT PROBLEM, REHAB EVAL
89yoM, pmhx below. P/w chills, coughing, myalgia, fever for 2 days. Denies CP, abd pain, N/V, diarrhea. CXR low lung volumes. Pt found to have the flu.

## 2017-04-06 NOTE — DISCHARGE NOTE ADULT - CARE PLAN
Principal Discharge DX:	Influenza B  Goal:	symptoms improved: continue medications as prescribed  Instructions for follow-up, activity and diet:	Follow-up with your primary care provider next week--call for appointment  Secondary Diagnosis:	DM (diabetes mellitus)  Instructions for follow-up, activity and diet:	HgA1C this admission 7.3  4/5/17   Make sure you get your HgA1c checked every three months.  If you take oral diabetes medications, check your blood glucose two times a day.  If you take insulin, check your blood glucose before meals and at bedtime.  It's important not to skip any meals.  Keep a log of your blood glucose results and always take it with you to your doctor appointments.  Keep a list of your current medications including injectables and over the counter medications and bring this medication list with you to all your doctor appointments.  If you have not seen your ophthalmologist this year call for appointment.  Check your feet daily for redness, sores, or openings. Do not self treat. If no improvement in two days call your primary care physician for an appointment.  Low blood sugar (hypoglycemia) is a blood sugar below 70mg/dl. Check your blood sugar if you feel signs/symptoms of hypoglycemia. If your blood sugar is below 70 take 15 grams of carbohydrates (ex 4 oz of apple juice, 3-4 glucose tablets, or 4-6 oz of regular soda) wait 15 minutes and repeat blood sugar to make sure it comes up above 70.  If your blood sugar is above 70 and you are due for a meal, have a meal.  If you are not due for a meal have a snack.  This snack helps keeps your blood sugar at a safe range.  Secondary Diagnosis:	AF (atrial fibrillation)  Goal:	continue coumadin therapy as prescribed today 4/6/17 INR 2.31  Instructions for follow-up, activity and diet:	Follow with your Cardiologist  INR monitoring and Coumadin dosing   Atrial fibrillation is the most common heart rhythm problem.  The condition puts you at risk for has stroke and heart attack  It helps if you control your blood pressure, not drink more than 1-2 alcohol drinks per day, cut down on caffeine, getting treatment for over active thyroid gland, and get regular exercise  Call your doctor if you feel your heart racing or beating unusually, chest tightness or pain, lightheaded, faint, shortness of breath especially with exercise  It is important to take your heart medication as prescribed  You may be on anticoagulation which is very important to take as directed - you may need blood work to monitor drug levels  Secondary Diagnosis:	HTN (hypertension)  Goal:	Stable: continue medication as prescribed  Instructions for follow-up, activity and diet:	Low salt diet  Activity as tolerated.  Take all medication as prescribed.  Follow up with your medical doctor for routine blood pressure monitoring at your next visit.  Notify your doctor if you have any of the following symptoms:   Dizziness, Lightheadedness, Blurry vision, Headache, Chest pain, Shortness of breath

## 2017-04-06 NOTE — PHYSICAL THERAPY INITIAL EVALUATION ADULT - ADDITIONAL COMMENTS
Pt lives with wife in an apt, no steps to enter building, + elevator access. PTA, pt independent with all ADLs and functional activities with no use of assistive device. Wife available to assist when needed. Pt was driving PTA.

## 2017-04-06 NOTE — DISCHARGE NOTE ADULT - MEDICATION SUMMARY - MEDICATIONS TO TAKE
I will START or STAY ON the medications listed below when I get home from the hospital:    enalapril 5 mg oral tablet  -- 1 tab(s) by mouth once a day  -- Indication: For htn    Rapaflo 4 mg oral capsule  -- 1 cap(s) by mouth once a day  -- Indication: For bph     digoxin 125 mcg (0.125 mg) oral tablet  -- 2 tab(s) by mouth once a day  -- Indication: For Afib    Coumadin 5 mg oral tablet  -- 1 tab(s) by mouth once a day  -- Indication: For Afib     metFORMIN 500 mg oral tablet  -- 1 tab(s) by mouth 2 times a day  -- Indication: For dm2     Januvia 25 mg oral tablet  -- tab(s) by mouth once a day  -- Indication: For dm2    fenofibrate 145 mg oral tablet  -- 1 tab(s) by mouth once a day  -- Indication: For hyperlipidemia     Zetia 10 mg oral tablet  -- 1 tab(s) by mouth once a day  -- Indication: For hyperlipidemia     Crestor 10 mg oral tablet  -- 1 tab(s) by mouth once a day (at bedtime)  -- Indication: For hyperlipidemia     oseltamivir 75 mg oral capsule  -- 1 cap(s) by mouth 2 times a day  -- Indication: For Influenza B    metoprolol tartrate 50 mg oral tablet  -- 1 tab(s) by mouth 2 times a day  -- Indication: For htn    Flax Seed Oil oral capsule  --  by mouth once a day  -- Indication: For Supplement     Levaquin 500 mg oral tablet  -- 1 tab(s) by mouth once a day  -- Avoid prolonged or excessive exposure to direct and/or artificial sunlight while taking this medication.  Do not take dairy products, antacids, or iron preparations within one hour of this medication.  Finish all this medication unless otherwise directed by prescriber.  May cause drowsiness or dizziness.  Medication should be taken with plenty of water.    -- Indication: For Influenza B    Myrbetriq 25 mg oral tablet, extended release  -- 1 tab(s) by mouth once a day  -- Indication: For overactive bladder     PreserVision oral tablet  -- 1 tab(s) by mouth once a day  -- Indication: For Macular degeneration     folic acid 1 mg oral tablet  -- 1 tab(s) by mouth once a day  -- Indication: For Supplement I will START or STAY ON the medications listed below when I get home from the hospital:    enalapril 5 mg oral tablet  -- 1 tab(s) by mouth once a day  -- Indication: For htn    Rapaflo 4 mg oral capsule  -- 1 cap(s) by mouth once a day  -- Indication: For bph     digoxin 125 mcg (0.125 mg) oral tablet  -- 2 tab(s) by mouth once a day  -- Indication: For Afib    Coumadin 5 mg oral tablet  -- 1 tab(s) by mouth once a day  -- Indication: For Afib     metFORMIN 500 mg oral tablet  -- 1 tab(s) by mouth 2 times a day  -- Indication: For dm2     Januvia 25 mg oral tablet  -- tab(s) by mouth once a day  -- Indication: For dm2    fenofibrate 145 mg oral tablet  -- 1 tab(s) by mouth once a day  -- Indication: For hyperlipidemia     Zetia 10 mg oral tablet  -- 1 tab(s) by mouth once a day  -- Indication: For hyperlipidemia     Crestor 10 mg oral tablet  -- 1 tab(s) by mouth once a day (at bedtime)  -- Indication: For hyperlipidemia     oseltamivir 75 mg oral capsule  -- 1 cap(s) by mouth 2 times a day  -- Indication: For Influenza B    Metoprolol Succinate ER 50 mg oral tablet, extended release  -- 1 tab(s) by mouth once a day  -- It is very important that you take or use this exactly as directed.  Do not skip doses or discontinue unless directed by your doctor.  May cause drowsiness.  Alcohol may intensify this effect.  Use care when operating dangerous machinery.  Some non-prescription drugs may aggravate your condition.  Read all labels carefully.  If a warning appears, check with your doctor before taking.  Swallow whole.  Do not crush.  Take with food or milk.  This drug may impair the ability to drive or operate machinery.  Use care until you become familiar with its effects.    -- Indication: For HTN (hypertension)    Flax Seed Oil oral capsule  --  by mouth once a day  -- Indication: For Supplement     Levaquin 500 mg oral tablet  -- 1 tab(s) by mouth once a day  -- Avoid prolonged or excessive exposure to direct and/or artificial sunlight while taking this medication.  Do not take dairy products, antacids, or iron preparations within one hour of this medication.  Finish all this medication unless otherwise directed by prescriber.  May cause drowsiness or dizziness.  Medication should be taken with plenty of water.    -- Indication: For Influenza B    Myrbetriq 25 mg oral tablet, extended release  -- 1 tab(s) by mouth once a day  -- Indication: For overactive bladder     PreserVision oral tablet  -- 1 tab(s) by mouth once a day  -- Indication: For Macular degeneration     folic acid 1 mg oral tablet  -- 1 tab(s) by mouth once a day  -- Indication: For Supplement

## 2017-04-10 LAB
CULTURE RESULTS: SIGNIFICANT CHANGE UP
CULTURE RESULTS: SIGNIFICANT CHANGE UP
SPECIMEN SOURCE: SIGNIFICANT CHANGE UP
SPECIMEN SOURCE: SIGNIFICANT CHANGE UP

## 2017-04-27 NOTE — DISCHARGE NOTE ADULT - NS AS DC VTE INSTRUCTION
The patient is a 22y Female complaining of abdominal pain. Coumadin/Warfarin - Follow-up monitoring.../Coumadin/Warfarin - Compliance.../Coumadin/Warfarin - Dietary Advice.../Coumadin/Warfarin - Potential for adverse drug reactions and interactions

## 2018-08-15 NOTE — H&P ADULT. - WHEEZES
Date of Service: 8/15/2018    PROCEDURE: Esophagogastroduodenoscopy    PREPROCEDURE DIAGNOSIS:   Dysphagia, weight loss, abdominal pain    POSTPROCEDURE DIAGNOSIS:   Small fundic gland polyps, small hiatal hernia otherwise normal upper GI endoscopy    ANESTHESIA:   Monitored anesthesia care     Scope Events:   Scope In: 0910        Scope Out: 0914              BRIEF CLINICAL HISTORY AND OPERATIVE PROCEDURE:     Chava Jewell is a 79 year old male with history of abdominal pain, dysphagia especially to liquids and weight loss here for an upper GI endoscopy for further assessment.    Operative procedure, diagnostic and therapeutic limitations, sedation, risks, benefits, alternatives were explained to the patient and informed consent was obtained. Possibility of missing a neoplastic process was explained as well.     Esophagus was intubated and the scope was advanced examining the esophageal mucosa, esophageal mucosa appeared unremarkable. Squamocolumnar junction was normal and was noted approximately around 38 cm. Hiatus was noted at 40 cm. There were a few fundic gland polyps seen otherwise gastric mucosa appeared unremarkable. Mucosa in the bulb, second portion the duodenum was normal. No endoscopic evidence for celiac sprue. Retroflexion the stomach revealed normal angularis, gastric cardia and fundus. A small hiatal hernia was seen. Scope was straightened and withdrawn back.    The patient tolerated the procedure well. No immediate postprocedural complications.     DISPOSITION:   To recover per routine postprocedure protocol.     RECOMMENDATIONS:  Follow up in the office in 6-8 weeks  Given patient's dysphagia, difficulty with swallowing liquids will obtain a video swallow study along with speech therapy.    Repeat examination as needed    Tatyana Christensen MD, LINH GRAJEDA       lower R/lower L

## 2019-02-11 ENCOUNTER — EMERGENCY (EMERGENCY)
Facility: HOSPITAL | Age: 84
LOS: 1 days | End: 2019-02-11
Attending: EMERGENCY MEDICINE
Payer: MEDICARE

## 2019-02-11 VITALS
HEIGHT: 68 IN | SYSTOLIC BLOOD PRESSURE: 164 MMHG | RESPIRATION RATE: 16 BRPM | WEIGHT: 169.98 LBS | TEMPERATURE: 98 F | DIASTOLIC BLOOD PRESSURE: 73 MMHG | OXYGEN SATURATION: 96 % | HEART RATE: 118 BPM

## 2019-02-11 VITALS
DIASTOLIC BLOOD PRESSURE: 83 MMHG | TEMPERATURE: 99 F | OXYGEN SATURATION: 98 % | SYSTOLIC BLOOD PRESSURE: 130 MMHG | RESPIRATION RATE: 17 BRPM | HEART RATE: 94 BPM

## 2019-02-11 DIAGNOSIS — Z96.653 PRESENCE OF ARTIFICIAL KNEE JOINT, BILATERAL: Chronic | ICD-10-CM

## 2019-02-11 LAB
ALBUMIN SERPL ELPH-MCNC: 4.4 G/DL — SIGNIFICANT CHANGE UP (ref 3.3–5)
ALP SERPL-CCNC: 39 U/L — LOW (ref 40–120)
ALT FLD-CCNC: 9 U/L — LOW (ref 10–45)
ANION GAP SERPL CALC-SCNC: 13 MMOL/L — SIGNIFICANT CHANGE UP (ref 5–17)
APTT BLD: 39.7 SEC — HIGH (ref 27.5–36.3)
AST SERPL-CCNC: 12 U/L — SIGNIFICANT CHANGE UP (ref 10–40)
BASOPHILS # BLD AUTO: 0 K/UL — SIGNIFICANT CHANGE UP (ref 0–0.2)
BASOPHILS NFR BLD AUTO: 0 % — SIGNIFICANT CHANGE UP (ref 0–2)
BILIRUB SERPL-MCNC: 0.4 MG/DL — SIGNIFICANT CHANGE UP (ref 0.2–1.2)
BUN SERPL-MCNC: 27 MG/DL — HIGH (ref 7–23)
CALCIUM SERPL-MCNC: 9.1 MG/DL — SIGNIFICANT CHANGE UP (ref 8.4–10.5)
CHLORIDE SERPL-SCNC: 102 MMOL/L — SIGNIFICANT CHANGE UP (ref 96–108)
CO2 SERPL-SCNC: 23 MMOL/L — SIGNIFICANT CHANGE UP (ref 22–31)
CREAT SERPL-MCNC: 1.17 MG/DL — SIGNIFICANT CHANGE UP (ref 0.5–1.3)
EOSINOPHIL # BLD AUTO: 0.1 K/UL — SIGNIFICANT CHANGE UP (ref 0–0.5)
EOSINOPHIL NFR BLD AUTO: 0.6 % — SIGNIFICANT CHANGE UP (ref 0–6)
GLUCOSE SERPL-MCNC: 163 MG/DL — HIGH (ref 70–99)
HCT VFR BLD CALC: 46.4 % — SIGNIFICANT CHANGE UP (ref 39–50)
HGB BLD-MCNC: 15.7 G/DL — SIGNIFICANT CHANGE UP (ref 13–17)
INR BLD: 3.37 RATIO — HIGH (ref 0.88–1.16)
LYMPHOCYTES # BLD AUTO: 0.6 K/UL — LOW (ref 1–3.3)
LYMPHOCYTES # BLD AUTO: 5.6 % — LOW (ref 13–44)
MCHC RBC-ENTMCNC: 29.5 PG — SIGNIFICANT CHANGE UP (ref 27–34)
MCHC RBC-ENTMCNC: 33.7 GM/DL — SIGNIFICANT CHANGE UP (ref 32–36)
MCV RBC AUTO: 87.3 FL — SIGNIFICANT CHANGE UP (ref 80–100)
MONOCYTES # BLD AUTO: 0.7 K/UL — SIGNIFICANT CHANGE UP (ref 0–0.9)
MONOCYTES NFR BLD AUTO: 6.4 % — SIGNIFICANT CHANGE UP (ref 2–14)
NEUTROPHILS # BLD AUTO: 9.8 K/UL — HIGH (ref 1.8–7.4)
NEUTROPHILS NFR BLD AUTO: 87.4 % — HIGH (ref 43–77)
PLATELET # BLD AUTO: 205 K/UL — SIGNIFICANT CHANGE UP (ref 150–400)
POTASSIUM SERPL-MCNC: 4.6 MMOL/L — SIGNIFICANT CHANGE UP (ref 3.5–5.3)
POTASSIUM SERPL-SCNC: 4.6 MMOL/L — SIGNIFICANT CHANGE UP (ref 3.5–5.3)
PROT SERPL-MCNC: 6.7 G/DL — SIGNIFICANT CHANGE UP (ref 6–8.3)
PROTHROM AB SERPL-ACNC: 39.9 SEC — HIGH (ref 10–12.9)
RBC # BLD: 5.31 M/UL — SIGNIFICANT CHANGE UP (ref 4.2–5.8)
RBC # FLD: 13.1 % — SIGNIFICANT CHANGE UP (ref 10.3–14.5)
SODIUM SERPL-SCNC: 138 MMOL/L — SIGNIFICANT CHANGE UP (ref 135–145)
WBC # BLD: 11.2 K/UL — HIGH (ref 3.8–10.5)
WBC # FLD AUTO: 11.2 K/UL — HIGH (ref 3.8–10.5)

## 2019-02-11 PROCEDURE — 99284 EMERGENCY DEPT VISIT MOD MDM: CPT | Mod: 25

## 2019-02-11 PROCEDURE — 80053 COMPREHEN METABOLIC PANEL: CPT

## 2019-02-11 PROCEDURE — 74176 CT ABD & PELVIS W/O CONTRAST: CPT

## 2019-02-11 PROCEDURE — 70450 CT HEAD/BRAIN W/O DYE: CPT

## 2019-02-11 PROCEDURE — 85027 COMPLETE CBC AUTOMATED: CPT

## 2019-02-11 PROCEDURE — 85730 THROMBOPLASTIN TIME PARTIAL: CPT

## 2019-02-11 PROCEDURE — 71250 CT THORAX DX C-: CPT

## 2019-02-11 PROCEDURE — 99284 EMERGENCY DEPT VISIT MOD MDM: CPT | Mod: GC

## 2019-02-11 PROCEDURE — 71250 CT THORAX DX C-: CPT | Mod: 26

## 2019-02-11 PROCEDURE — 85610 PROTHROMBIN TIME: CPT

## 2019-02-11 PROCEDURE — 72125 CT NECK SPINE W/O DYE: CPT | Mod: 26

## 2019-02-11 PROCEDURE — 70450 CT HEAD/BRAIN W/O DYE: CPT | Mod: 26

## 2019-02-11 PROCEDURE — 74176 CT ABD & PELVIS W/O CONTRAST: CPT | Mod: 26

## 2019-02-11 PROCEDURE — 72125 CT NECK SPINE W/O DYE: CPT

## 2019-02-11 NOTE — ED STATDOCS - MEDICAL DECISION MAKING DETAILS
89 yo male sp mechanical fall today with head injury, on coumadin, also with right flank pain and right chest wall pain since fall. Labs, cts ordered, send to main ED for further management.

## 2019-02-11 NOTE — ED STATDOCS - OBJECTIVE STATEMENT
89 y/o male with pmhx of AF, BPM, DM, HLD, and HTN c/o head injury s/p fall today. +right-sided back pain, chills s/p fall, and HA. Pt is on coumadin. Pt states he was getting up from the dining table, lost his balance and fell over. Pt remembers falling. Denies LOC, weakness, numbness, tingling, or abd pain. Allergy to procainamide.

## 2019-02-12 NOTE — ED PROVIDER NOTE - PHYSICAL EXAMINATION
tenderness on palpation of the right sided chest wall, no erythema, no ecchymosis , normal breath sounds bilaterally

## 2019-02-12 NOTE — ED ADULT NURSE NOTE - OBJECTIVE STATEMENT
89 y/o male A&Ox4, PMH AF, BPM, DM, HLD, and HTN, presents to ED s/p fall. Pt states he fell upon getting up from dining room table. Pt states he fell on to his right side, hit head and c/o pain, denies LOC. Upon further assessment, airway patent and intact, denies chest pain/SOB. ABD soft nontender, denies n/v/d. Denies blood in urine and/or stool. Skin intact. Peripheral pulses strong and normal baseline sensation present x4. Safety and comfort measures maintained.

## 2019-02-12 NOTE — ED PROVIDER NOTE - OBJECTIVE STATEMENT
89yo M w/ pmhx of IRVING quinonez on Coumadin, present s/p mechanical fall today, (+) head injury. Pt stumbled getting off the chair. denies any LOC, no nausea, vomiting, lightheadedness, headache. Pt is c/o right sided lower chest wall pain. Denies any other symptoms.

## 2019-02-12 NOTE — ED PROVIDER NOTE - PROGRESS NOTE DETAILS
CT was remarkable for right 10th lateral rib fracture, no ptx. Pt is satting at 98% on room air. Pt is able to ambulate, will be discharged with incentive spirometer

## 2019-02-12 NOTE — ED ADULT NURSE NOTE - NSIMPLEMENTINTERV_GEN_ALL_ED
Implemented All Fall with Harm Risk Interventions:  [place] to call system. Call bell, personal items and telephone within reach. Instruct patient to call for assistance. Room bathroom lighting operational. Non-slip footwear when patient is off stretcher. Physically safe environment: no spills, clutter or unnecessary equipment. Stretcher in lowest position, wheels locked, appropriate side rails in place. Provide visual cue, wrist band, yellow gown, etc. Monitor gait and stability. Monitor for mental status changes and reorient to person, place, and time. Review medications for side effects contributing to fall risk. Reinforce activity limits and safety measures with patient and family. Provide visual clues: red socks.

## 2019-12-02 ENCOUNTER — INPATIENT (INPATIENT)
Facility: HOSPITAL | Age: 84
LOS: 1 days | Discharge: HOME | DRG: 603 | End: 2019-12-04
Attending: INTERNAL MEDICINE | Admitting: INTERNAL MEDICINE
Payer: MEDICARE

## 2019-12-02 VITALS
HEIGHT: 68 IN | SYSTOLIC BLOOD PRESSURE: 124 MMHG | WEIGHT: 169.98 LBS | OXYGEN SATURATION: 94 % | HEART RATE: 88 BPM | DIASTOLIC BLOOD PRESSURE: 70 MMHG | TEMPERATURE: 98 F | RESPIRATION RATE: 18 BRPM

## 2019-12-02 DIAGNOSIS — L03.113 CELLULITIS OF RIGHT UPPER LIMB: ICD-10-CM

## 2019-12-02 DIAGNOSIS — Z96.653 PRESENCE OF ARTIFICIAL KNEE JOINT, BILATERAL: Chronic | ICD-10-CM

## 2019-12-02 LAB
ALBUMIN SERPL ELPH-MCNC: 4.4 G/DL — SIGNIFICANT CHANGE UP (ref 3.3–5)
ALP SERPL-CCNC: 44 U/L — SIGNIFICANT CHANGE UP (ref 40–120)
ALT FLD-CCNC: 10 U/L — SIGNIFICANT CHANGE UP (ref 10–45)
ANION GAP SERPL CALC-SCNC: 13 MMOL/L — SIGNIFICANT CHANGE UP (ref 5–17)
APTT BLD: 40.5 SEC — HIGH (ref 27.5–36.3)
AST SERPL-CCNC: 16 U/L — SIGNIFICANT CHANGE UP (ref 10–40)
BASOPHILS # BLD AUTO: 0 K/UL — SIGNIFICANT CHANGE UP (ref 0–0.2)
BASOPHILS NFR BLD AUTO: 0 % — SIGNIFICANT CHANGE UP (ref 0–2)
BILIRUB SERPL-MCNC: 0.6 MG/DL — SIGNIFICANT CHANGE UP (ref 0.2–1.2)
BUN SERPL-MCNC: 28 MG/DL — HIGH (ref 7–23)
CALCIUM SERPL-MCNC: 9.7 MG/DL — SIGNIFICANT CHANGE UP (ref 8.4–10.5)
CHLORIDE SERPL-SCNC: 100 MMOL/L — SIGNIFICANT CHANGE UP (ref 96–108)
CO2 SERPL-SCNC: 23 MMOL/L — SIGNIFICANT CHANGE UP (ref 22–31)
CREAT SERPL-MCNC: 1.23 MG/DL — SIGNIFICANT CHANGE UP (ref 0.5–1.3)
EOSINOPHIL # BLD AUTO: 0 K/UL — SIGNIFICANT CHANGE UP (ref 0–0.5)
EOSINOPHIL NFR BLD AUTO: 0 % — SIGNIFICANT CHANGE UP (ref 0–6)
GLUCOSE BLDC GLUCOMTR-MCNC: 176 MG/DL — HIGH (ref 70–99)
GLUCOSE SERPL-MCNC: 155 MG/DL — HIGH (ref 70–99)
HCT VFR BLD CALC: 44.7 % — SIGNIFICANT CHANGE UP (ref 39–50)
HGB BLD-MCNC: 14.9 G/DL — SIGNIFICANT CHANGE UP (ref 13–17)
INR BLD: 1.93 RATIO — HIGH (ref 0.88–1.16)
LYMPHOCYTES # BLD AUTO: 2.6 K/UL — SIGNIFICANT CHANGE UP (ref 1–3.3)
LYMPHOCYTES # BLD AUTO: 22.6 % — SIGNIFICANT CHANGE UP (ref 13–44)
MCHC RBC-ENTMCNC: 29.3 PG — SIGNIFICANT CHANGE UP (ref 27–34)
MCHC RBC-ENTMCNC: 33.3 GM/DL — SIGNIFICANT CHANGE UP (ref 32–36)
MCV RBC AUTO: 87.8 FL — SIGNIFICANT CHANGE UP (ref 80–100)
MONOCYTES # BLD AUTO: 2.3 K/UL — HIGH (ref 0–0.9)
MONOCYTES NFR BLD AUTO: 20 % — HIGH (ref 2–14)
NEUTROPHILS # BLD AUTO: 6.61 K/UL — SIGNIFICANT CHANGE UP (ref 1.8–7.4)
NEUTROPHILS NFR BLD AUTO: 57.4 % — SIGNIFICANT CHANGE UP (ref 43–77)
PLATELET # BLD AUTO: 219 K/UL — SIGNIFICANT CHANGE UP (ref 150–400)
POTASSIUM SERPL-MCNC: 4.5 MMOL/L — SIGNIFICANT CHANGE UP (ref 3.5–5.3)
POTASSIUM SERPL-SCNC: 4.5 MMOL/L — SIGNIFICANT CHANGE UP (ref 3.5–5.3)
PROT SERPL-MCNC: 7.5 G/DL — SIGNIFICANT CHANGE UP (ref 6–8.3)
PROTHROM AB SERPL-ACNC: 22.7 SEC — HIGH (ref 10–12.9)
RBC # BLD: 5.09 M/UL — SIGNIFICANT CHANGE UP (ref 4.2–5.8)
RBC # FLD: 13.5 % — SIGNIFICANT CHANGE UP (ref 10.3–14.5)
SODIUM SERPL-SCNC: 136 MMOL/L — SIGNIFICANT CHANGE UP (ref 135–145)
WBC # BLD: 11.52 K/UL — HIGH (ref 3.8–10.5)
WBC # FLD AUTO: 11.52 K/UL — HIGH (ref 3.8–10.5)

## 2019-12-02 PROCEDURE — 71045 X-RAY EXAM CHEST 1 VIEW: CPT | Mod: 26

## 2019-12-02 PROCEDURE — 76377 3D RENDER W/INTRP POSTPROCES: CPT | Mod: 26

## 2019-12-02 PROCEDURE — 72192 CT PELVIS W/O DYE: CPT | Mod: 26

## 2019-12-02 PROCEDURE — 70450 CT HEAD/BRAIN W/O DYE: CPT | Mod: 26

## 2019-12-02 PROCEDURE — 99284 EMERGENCY DEPT VISIT MOD MDM: CPT | Mod: GC

## 2019-12-02 PROCEDURE — 72170 X-RAY EXAM OF PELVIS: CPT | Mod: 26

## 2019-12-02 PROCEDURE — 72125 CT NECK SPINE W/O DYE: CPT | Mod: 26

## 2019-12-02 RX ORDER — APIXABAN 2.5 MG/1
5 TABLET, FILM COATED ORAL
Refills: 0 | Status: DISCONTINUED | OUTPATIENT
Start: 2019-12-02 | End: 2019-12-04

## 2019-12-02 RX ORDER — METOPROLOL TARTRATE 50 MG
50 TABLET ORAL DAILY
Refills: 0 | Status: DISCONTINUED | OUTPATIENT
Start: 2019-12-02 | End: 2019-12-04

## 2019-12-02 RX ORDER — SODIUM CHLORIDE 9 MG/ML
1000 INJECTION, SOLUTION INTRAVENOUS
Refills: 0 | Status: DISCONTINUED | OUTPATIENT
Start: 2019-12-02 | End: 2019-12-04

## 2019-12-02 RX ORDER — DEXTROSE 50 % IN WATER 50 %
25 SYRINGE (ML) INTRAVENOUS ONCE
Refills: 0 | Status: DISCONTINUED | OUTPATIENT
Start: 2019-12-02 | End: 2019-12-04

## 2019-12-02 RX ORDER — FOLIC ACID 0.8 MG
1 TABLET ORAL DAILY
Refills: 0 | Status: DISCONTINUED | OUTPATIENT
Start: 2019-12-02 | End: 2019-12-04

## 2019-12-02 RX ORDER — WARFARIN SODIUM 2.5 MG/1
1 TABLET ORAL
Qty: 0 | Refills: 0 | DISCHARGE

## 2019-12-02 RX ORDER — GLUCAGON INJECTION, SOLUTION 0.5 MG/.1ML
1 INJECTION, SOLUTION SUBCUTANEOUS ONCE
Refills: 0 | Status: DISCONTINUED | OUTPATIENT
Start: 2019-12-02 | End: 2019-12-04

## 2019-12-02 RX ORDER — CEFAZOLIN SODIUM 1 G
1000 VIAL (EA) INJECTION EVERY 8 HOURS
Refills: 0 | Status: DISCONTINUED | OUTPATIENT
Start: 2019-12-02 | End: 2019-12-04

## 2019-12-02 RX ORDER — DEXTROSE 50 % IN WATER 50 %
12.5 SYRINGE (ML) INTRAVENOUS ONCE
Refills: 0 | Status: DISCONTINUED | OUTPATIENT
Start: 2019-12-02 | End: 2019-12-04

## 2019-12-02 RX ORDER — DEXTROSE 50 % IN WATER 50 %
15 SYRINGE (ML) INTRAVENOUS ONCE
Refills: 0 | Status: DISCONTINUED | OUTPATIENT
Start: 2019-12-02 | End: 2019-12-04

## 2019-12-02 RX ORDER — INSULIN LISPRO 100/ML
VIAL (ML) SUBCUTANEOUS
Refills: 0 | Status: DISCONTINUED | OUTPATIENT
Start: 2019-12-02 | End: 2019-12-04

## 2019-12-02 RX ORDER — ATORVASTATIN CALCIUM 80 MG/1
80 TABLET, FILM COATED ORAL AT BEDTIME
Refills: 0 | Status: DISCONTINUED | OUTPATIENT
Start: 2019-12-02 | End: 2019-12-04

## 2019-12-02 RX ORDER — DIGOXIN 250 MCG
0.12 TABLET ORAL DAILY
Refills: 0 | Status: DISCONTINUED | OUTPATIENT
Start: 2019-12-02 | End: 2019-12-04

## 2019-12-02 RX ORDER — MORPHINE SULFATE 50 MG/1
4 CAPSULE, EXTENDED RELEASE ORAL ONCE
Refills: 0 | Status: DISCONTINUED | OUTPATIENT
Start: 2019-12-02 | End: 2019-12-02

## 2019-12-02 RX ORDER — TETANUS TOXOID, REDUCED DIPHTHERIA TOXOID AND ACELLULAR PERTUSSIS VACCINE, ADSORBED 5; 2.5; 8; 8; 2.5 [IU]/.5ML; [IU]/.5ML; UG/.5ML; UG/.5ML; UG/.5ML
0.5 SUSPENSION INTRAMUSCULAR ONCE
Refills: 0 | Status: COMPLETED | OUTPATIENT
Start: 2019-12-02 | End: 2019-12-02

## 2019-12-02 RX ORDER — SITAGLIPTIN 50 MG/1
0 TABLET, FILM COATED ORAL
Qty: 0 | Refills: 0 | DISCHARGE

## 2019-12-02 RX ORDER — FENOFIBRATE,MICRONIZED 130 MG
145 CAPSULE ORAL DAILY
Refills: 0 | Status: DISCONTINUED | OUTPATIENT
Start: 2019-12-02 | End: 2019-12-04

## 2019-12-02 RX ORDER — LATANOPROST 0.05 MG/ML
1 SOLUTION/ DROPS OPHTHALMIC; TOPICAL AT BEDTIME
Refills: 0 | Status: DISCONTINUED | OUTPATIENT
Start: 2019-12-02 | End: 2019-12-04

## 2019-12-02 RX ADMIN — Medication 100 MILLIGRAM(S): at 22:59

## 2019-12-02 RX ADMIN — TETANUS TOXOID, REDUCED DIPHTHERIA TOXOID AND ACELLULAR PERTUSSIS VACCINE, ADSORBED 0.5 MILLILITER(S): 5; 2.5; 8; 8; 2.5 SUSPENSION INTRAMUSCULAR at 13:48

## 2019-12-02 RX ADMIN — LATANOPROST 1 DROP(S): 0.05 SOLUTION/ DROPS OPHTHALMIC; TOPICAL at 23:00

## 2019-12-02 RX ADMIN — ATORVASTATIN CALCIUM 80 MILLIGRAM(S): 80 TABLET, FILM COATED ORAL at 22:59

## 2019-12-02 RX ADMIN — Medication 900 MILLIGRAM(S): at 14:30

## 2019-12-02 RX ADMIN — APIXABAN 5 MILLIGRAM(S): 2.5 TABLET, FILM COATED ORAL at 22:59

## 2019-12-02 RX ADMIN — Medication 100 MILLIGRAM(S): at 13:47

## 2019-12-02 NOTE — H&P ADULT - HISTORY OF PRESENT ILLNESS
89M w/ afib on eliquis, BPH, DM, HLD, HTN presents s/p fall yesterday. States he got up from a chair and fell landing on his butt. Denies fevers, chills, LOC, dizziness, numbness, weakness. States he has severe pain in the R hip/coccyx. States he has been able to stand. Denies chest pain, SOB, palpitations. 89M w/ afib on eliquis, BPH, DM, HLD, HTN presents s/p fall yesterday. States he got up from a chair and fell landing on his butt. Denies fevers, chills, LOC, dizziness, numbness, weakness. States he has severe pain in the R hip/coccyx. States he has been able to stand. Denies chest pain, SOB, palpitations. Ptn also c/p redness on right arm w pain prior to the fall and still ongoing, denies trauma to the arm , denies insect bite/animal bit/scratch

## 2019-12-02 NOTE — H&P ADULT - NSHPPHYSICALEXAM_GEN_ALL_CORE
T(F): 98.3 (12-02-19 @ 20:13), Max: 98.3 (12-02-19 @ 17:04)  HR: 100 (12-02-19 @ 20:13) (79 - 100)  BP: 128/76 (12-02-19 @ 20:13) (124/70 - 173/86)  RR: 18 (12-02-19 @ 20:13) (18 - 18)  SpO2: 96% (12-02-19 @ 20:13) (94% - 98%)    GENERAL: NAD, well-developed  HEAD:  Atraumatic, Normocephalic  EYES: EOMI, PERRLA, conjunctiva and sclera clear  NECK: Supple, No JVD  CHEST/LUNG: Clear to auscultation bilaterally; No wheeze  HEART: Regular rate and rhythm; No murmurs, rubs, or gallops  ABDOMEN: Soft, Nontender, Nondistended; Bowel sounds present  EXTREMITIES:  2+ Peripheral Pulses, No clubbing, cyanosis, or edema  PSYCH: AAOx3  NEUROLOGY: non-focal  SKIN: No rashes or lesions, abrasion right buttock, erythema/edema/warmth RUE at antecubital area

## 2019-12-02 NOTE — ED PROVIDER NOTE - PHYSICAL EXAMINATION
CONSTITUTIONAL: NAD, awake, alert  HEAD: Normocephalic; atraumatic  ENMT: External appears normal, MMM  NECK: no tenderness, FROM  CARD: Normal Sl, S2; no audible murmurs  RESP: normal wob, lungs ctab  ABD: soft, non-distended; non-tender  MSK: + area of erythema and edema to medial R forearm, normal ROM in all four extremities, + TTP to R low back and R gluteal area, + abrasion   SKIN: Warm, dry, + erythema to R medial elbow  NEURO: aaox3, moving all extremities spontaneously, strength and sensation grossly intact

## 2019-12-02 NOTE — CHART NOTE - NSCHARTNOTEFT_GEN_A_CORE
CHERY AMEZCUA  89y Male  Event Summary:  Consulted Ortho Resident-Rajni, regarding CT findings of nondisplaced fracture of the far posterior ilium bilaterally, no acute orthopedic intervention, recommends WBAT when working with PT.    Nathaniel Saintus [facility]  #[phone]

## 2019-12-02 NOTE — ED PROVIDER NOTE - OBJECTIVE STATEMENT
89M w/ afib on eliquis, BPH, DM, HLD, HTN presents s/p fall yesterday. States he got up from a chair and fell landing on his butt. Denies fevers, chills, LOC, dizziness, numbness, weakness. States he has severe pain in the R hip/coccyx. States he has been able to stand. Denies chest pain, SOB, palpitations.

## 2019-12-02 NOTE — ED ADULT TRIAGE NOTE - CHIEF COMPLAINT QUOTE
unwitnessed fall yesterday, states "tripped over a chair". states "I did not hit my head". was able to get up after fall and went to bed. reports coccyx pain. also c/o R elbow swelling x 3 days. on eliquiz.

## 2019-12-02 NOTE — ED ADULT NURSE NOTE - OBJECTIVE STATEMENT
1300 89 yr old WM brought to ER by aide for further eval and tx of pain coocyx. MAEx4. No LOC. Fall risk precautions maintained. A&Ox4 1300 89 yr old WM brought to ER by aide for further eval and tx of pain coocyx. MAEx4. No LOC. Fall risk precautions maintained. A&Ox4.s/p tripped and fell while bending over yesterday. A&Ox4. color pink. skin W&D. lungs clear. abd soft. Right arm, red, swollen, hot to touch. Denies fever or chills. Full ROM. Pain in lower back at coccyx when lifting legs.

## 2019-12-02 NOTE — ED PROVIDER NOTE - NS ED ROS FT
General: denies fever, chills  HENT: denies nasal congestion, rhinorrhea  CV: denies chest pain, palpitations  Resp: denies difficulty breathing, cough  Abdominal: denies nausea, vomiting, abdominal pain  MSK: denies muscle aches, leg swelling  Neuro: denies headaches, numbness, tingling, + back pain  Skin: denies rashes, bruises

## 2019-12-02 NOTE — H&P ADULT - NSICDXPASTMEDICALHX_GEN_ALL_CORE_FT
PAST MEDICAL HISTORY:  AF (atrial fibrillation)     BPH (benign prostatic hypertrophy)     DM (diabetes mellitus)     HLD (hyperlipidemia)     HTN (hypertension)     Macular degeneration     Pacemaker

## 2019-12-02 NOTE — H&P ADULT - ASSESSMENT
89M w/ afib on eliquis, BPH, DM, HLD, HTN presents s/p mechanical fall yesterday, also notes to have RUE redness w pain, started prior to the fall  CT of pelvis shows bilateral nondisplaced illium Fx, pain well controlled  ptn also w questionable allergi rxn vs cellulitis to RUE  will cont ABx , start ANCEF, ID consult ordered  cont outptn meds  PT eval

## 2019-12-02 NOTE — ED PROVIDER NOTE - CLINICAL SUMMARY MEDICAL DECISION MAKING FREE TEXT BOX
joon rt arm redness and swelluing from of elbow concern for cellulitis - need iv abx,, pt fell 2/2 weakness no focal deicit co l hip and pelvis pain no shortening or ext rotation hit head denies loc ox3 - gcs 15 - with distracting injury will ct head and cspine and likely admit for cellulitis

## 2019-12-03 ENCOUNTER — TRANSCRIPTION ENCOUNTER (OUTPATIENT)
Age: 84
End: 2019-12-03

## 2019-12-03 LAB
APPEARANCE UR: CLEAR — SIGNIFICANT CHANGE UP
BILIRUB UR-MCNC: NEGATIVE — SIGNIFICANT CHANGE UP
COLOR SPEC: YELLOW — SIGNIFICANT CHANGE UP
DIFF PNL FLD: NEGATIVE — SIGNIFICANT CHANGE UP
GLUCOSE BLDC GLUCOMTR-MCNC: 112 MG/DL — HIGH (ref 70–99)
GLUCOSE BLDC GLUCOMTR-MCNC: 166 MG/DL — HIGH (ref 70–99)
GLUCOSE BLDC GLUCOMTR-MCNC: 178 MG/DL — HIGH (ref 70–99)
GLUCOSE BLDC GLUCOMTR-MCNC: 217 MG/DL — HIGH (ref 70–99)
GLUCOSE UR QL: ABNORMAL
HBA1C BLD-MCNC: 6.2 % — HIGH (ref 4–5.6)
KETONES UR-MCNC: NEGATIVE — SIGNIFICANT CHANGE UP
LEUKOCYTE ESTERASE UR-ACNC: NEGATIVE — SIGNIFICANT CHANGE UP
NITRITE UR-MCNC: NEGATIVE — SIGNIFICANT CHANGE UP
PH UR: 6 — SIGNIFICANT CHANGE UP (ref 5–8)
PROT UR-MCNC: SIGNIFICANT CHANGE UP
SP GR SPEC: 1.02 — SIGNIFICANT CHANGE UP (ref 1.01–1.02)
UROBILINOGEN FLD QL: SIGNIFICANT CHANGE UP

## 2019-12-03 RX ADMIN — Medication 145 MILLIGRAM(S): at 11:36

## 2019-12-03 RX ADMIN — Medication 100 MILLIGRAM(S): at 05:15

## 2019-12-03 RX ADMIN — Medication 2: at 13:06

## 2019-12-03 RX ADMIN — ATORVASTATIN CALCIUM 80 MILLIGRAM(S): 80 TABLET, FILM COATED ORAL at 21:49

## 2019-12-03 RX ADMIN — APIXABAN 5 MILLIGRAM(S): 2.5 TABLET, FILM COATED ORAL at 21:49

## 2019-12-03 RX ADMIN — Medication 100 MILLIGRAM(S): at 13:10

## 2019-12-03 RX ADMIN — APIXABAN 5 MILLIGRAM(S): 2.5 TABLET, FILM COATED ORAL at 11:35

## 2019-12-03 RX ADMIN — LATANOPROST 1 DROP(S): 0.05 SOLUTION/ DROPS OPHTHALMIC; TOPICAL at 21:49

## 2019-12-03 RX ADMIN — Medication 50 MILLIGRAM(S): at 05:15

## 2019-12-03 RX ADMIN — Medication 1 MILLIGRAM(S): at 11:36

## 2019-12-03 RX ADMIN — Medication 1: at 17:29

## 2019-12-03 RX ADMIN — Medication 0.12 MILLIGRAM(S): at 05:15

## 2019-12-03 RX ADMIN — Medication 5 MILLIGRAM(S): at 05:15

## 2019-12-03 RX ADMIN — Medication 100 MILLIGRAM(S): at 21:48

## 2019-12-03 NOTE — CONSULT NOTE ADULT - ASSESSMENT
89M w/ afib on eliquis, BPH, DM, HLD, HTN presents s/p fall yesterday. States he got up from a chair and fell landing on his buttocks. Denies fevers, chills, LOC, dizziness, numbness, weakness. States he has severe pain in the R hip/coccyx. States he has been able to stand. Denies chest pain, SOB, palpitations. Ptn also c/p redness on right arm w pain prior to the fall and still ongoing, denies trauma to the arm , denies insect bite/animal bit/scratch (02 Dec 2019 19:35)    ER vss, afebrile.  WBC 11.5.  UA (-) nit/LE.  Cxr clear.  s/p CT head, pelvis, cervical spine.  Pt started on cefazolin for cellulitis of RUE.  ID consult called for further abx managment.     RUE cellulitis:    - Pt without fever or sepsis/sirs presentation.  No open skin wounds.  Pt states redness and pain have improved during hospitalization.  Pt denies fever/chills/rigors.  Agree with continuing cefazolin to cover strep/staph.      - Monitor temp curve, WBC.  Monitor for clinical response.      - If pt spikes fever, send blood cx x 2.         Will follow,    Abbi Avalos  [phone]

## 2019-12-03 NOTE — DISCHARGE NOTE NURSING/CASE MANAGEMENT/SOCIAL WORK - PATIENT PORTAL LINK FT
You can access the FollowMyHealth Patient Portal offered by [facility] by registering at the following website: [email or web address]. By joining [email or web address] FollowMyHealth portal, you will also be able to view your health information using other applications (apps) compatible with our system.

## 2019-12-03 NOTE — PHARMACOTHERAPY INTERVENTION NOTE - COMMENTS
Counseled patient about inpatient medication indication, direction, and possible side effects. Informed patient of formulary changes (Rosuvastatin to Atorvastatin), antibiotic use for infection, and other changes made to medications (holding of diabetic and other medications) . Answered patient's questions about medications. Inpatient medication list provided.    Keeley Buck, MarshaD  PGY1 Pharmacy Practice Resident  [phone]

## 2019-12-03 NOTE — CONSULT NOTE ADULT - SUBJECTIVE AND OBJECTIVE BOX
Patient is a 89y old  Male who presents with a chief complaint of fall, pelvic fractures (02 Dec 2019 19:35)      HPI:    89M w/ afib on eliquis, BPH, DM, HLD, HTN presents s/p fall yesterday. States he got up from a chair and fell landing on his buttocks. Denies fevers, chills, LOC, dizziness, numbness, weakness. States he has severe pain in the R hip/coccyx. States he has been able to stand. Denies chest pain, SOB, palpitations. Ptn also c/p redness on right arm w pain prior to the fall and still ongoing, denies trauma to the arm , denies insect bite/animal bit/scratch (02 Dec 2019 19:35)    ER vss, afebrile.  WBC 11.5.  UA (-) nit/LE.  Cxr clear.  s/p CT head, pelvis, cervical spine.  Pt started on cefazolin for cellulitis of RUE.  ID consult called for further abx managment.     REVIEW OF SYSTEMS:    CONSTITUTIONAL: No fever, weight loss, or fatigue  EYES: No eye pain, visual disturbances, or discharge  ENMT:  No sore throat  NECK: No pain or stiffness  RESPIRATORY: No cough, wheezing, chills or hemoptysis; No shortness of breath  CARDIOVASCULAR: No chest pain, palpitations, dizziness, or leg swelling  GASTROINTESTINAL: No abdominal or epigastric pain. No nausea, vomiting, or hematemesis; No diarrhea or constipation. No melena or hematochezia.  GENITOURINARY: No dysuria, frequency, hematuria, or incontinence  NEUROLOGICAL: No headaches, memory loss, loss of strength, numbness, or tremors  SKIN: No itching, burning, rashes, or lesions   LYMPH NODES: No enlarged glands  MUSCULOSKELETAL: No joint pain or swelling; No muscle, back, or extremity pain      PAST MEDICAL & SURGICAL HISTORY:  BPH (benign prostatic hypertrophy)  Macular degeneration  DM (diabetes mellitus)  HTN (hypertension)  AF (atrial fibrillation)  Pacemaker  HLD (hyperlipidemia)  S/P knee replacement, bilateral      Allergies    procainamide (Other (Severe))    Intolerances        FAMILY HISTORY:  No pertinent family history in first degree relatives      SOCIAL HISTORY:        MEDICATIONS  (STANDING):  apixaban 5 milliGRAM(s) Oral two times a day  atorvastatin 80 milliGRAM(s) Oral at bedtime  ceFAZolin   IVPB 1000 milliGRAM(s) IV Intermittent every 8 hours  dextrose 5%. 1000 milliLiter(s) (50 mL/Hr) IV Continuous <Continuous>  dextrose 50% Injectable 12.5 Gram(s) IV Push once  dextrose 50% Injectable 25 Gram(s) IV Push once  dextrose 50% Injectable 25 Gram(s) IV Push once  digoxin     Tablet 0.125 milliGRAM(s) Oral daily  enalapril 5 milliGRAM(s) Oral daily  fenofibrate Tablet 145 milliGRAM(s) Oral daily  folic acid 1 milliGRAM(s) Oral daily  insulin lispro (HumaLOG) corrective regimen sliding scale   SubCutaneous three times a day before meals  latanoprost 0.005% Ophthalmic Solution 1 Drop(s) Both EYES at bedtime  metoprolol succinate ER 50 milliGRAM(s) Oral daily    MEDICATIONS  (PRN):  dextrose 40% Gel 15 Gram(s) Oral once PRN Blood Glucose LESS THAN 70 milliGRAM(s)/deciliter  glucagon  Injectable 1 milliGRAM(s) IntraMuscular once PRN Glucose LESS THAN 70 milligrams/deciliter      Vital Signs Last 24 Hrs  T(C): 36.4 (03 Dec 2019 11:43), Max: 37.3 (03 Dec 2019 04:16)  T(F): 97.6 (03 Dec 2019 11:43), Max: 99.1 (03 Dec 2019 04:16)  HR: 88 (03 Dec 2019 11:43) (79 - 100)  BP: 132/69 (03 Dec 2019 11:43) (125/68 - 173/86)  BP(mean): --  RR: 17 (03 Dec 2019 11:43) (17 - 18)  SpO2: 96% (03 Dec 2019 11:43) (95% - 98%)    PHYSICAL EXAM:    GENERAL: NAD, well-groomed  HEAD:  Atraumatic, Normocephalic  EYES: EOMI, PERRLA, conjunctiva and sclera clear  ENMT: No tonsillar erythema, exudates, or enlargement; Moist mucous membranes  NECK: Supple, No JVD  CHEST/LUNG: Clear to percussion bilaterally; No rales, rhonchi, wheezing, or rubs  HEART: Regular rate and rhythm; No murmurs, rubs, or gallops  ABDOMEN: Soft, Nontender, Nondistended; Bowel sounds present  EXTREMITIES:  2+ Peripheral Pulses, No clubbing, cyanosis, or edema  LYMPH: No lymphadenopathy noted  SKIN: No rashes or lesions    LABS:  CBC Full  -  ( 02 Dec 2019 13:51 )  WBC Count : 11.52 K/uL  RBC Count : 5.09 M/uL  Hemoglobin : 14.9 g/dL  Hematocrit : 44.7 %  Platelet Count - Automated : 219 K/uL  Mean Cell Volume : 87.8 fl  Mean Cell Hemoglobin : 29.3 pg  Mean Cell Hemoglobin Concentration : 33.3 gm/dL  Auto Neutrophil # : 6.61 K/uL  Auto Lymphocyte # : 2.60 K/uL  Auto Monocyte # : 2.30 K/uL  Auto Eosinophil # : 0.00 K/uL  Auto Basophil # : 0.00 K/uL  Auto Neutrophil % : 57.4 %  Auto Lymphocyte % : 22.6 %  Auto Monocyte % : 20.0 %  Auto Eosinophil % : 0.0 %  Auto Basophil % : 0.0 %          136  |  100  |  28<H>  ----------------------------<  155<H>  4.5   |  23  |  1.23    Ca    9.7      02 Dec 2019 13:51    TPro  7.5  /  Alb  4.4  /  TBili  0.6  /  DBili  x   /  AST  16  /  ALT  10  /  AlkPhos  44  1202      LIVER FUNCTIONS - ( 02 Dec 2019 13:51 )  Alb: 4.4 g/dL / Pro: 7.5 g/dL / ALK PHOS: 44 U/L / ALT: 10 U/L / AST: 16 U/L / GGT: x                               MICROBIOLOGY:        Urinalysis Basic - ( 03 Dec 2019 08:51 )    Color: Yellow / Appearance: Clear / S.022 / pH: x  Gluc: x / Ketone: Negative  / Bili: Negative / Urobili: <2 mg/dL   Blood: x / Protein: Trace / Nitrite: Negative   Leuk Esterase: Negative / RBC: x / WBC x   Sq Epi: x / Non Sq Epi: x / Bacteria: x                  RADIOLOGY:      < from: CT Pelvis No Cont (19 @ 14:41) >    IMPRESSION:    Nondisplaced fracture of the far posterior ilium bilaterally.     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    Notable prostate enlargement with mild bladder distention.    < end of copied text >            < from: CT Pelvis No Cont (19 @ 14:41) >    EXAM:  CT PELVIS ONLY                          EXAM:  CT 3D RECONSTRUCT W LOLITA                            PROCEDURE DATE:  2019            INTERPRETATION:  CT pelvis without contrast    Clinical indications: Posterior pelvic pain, fall    Comparison: Plain film from 2019    Technique: Contiguous axial images through the pelvis without contrast.   Sagittal and coronal 2-D reconstructions. Additional 3-D reconstructions   by the technologist at an independent workstation.    Findings: There are nondisplaced fractures of the far posterior ilium   bilaterally (series 301 image 88). There is moderate degenerative   narrowing of both hip joints. Slight outward convexity at the anterior   femoral head neck junction and both hips with rounded lucent lesions at   both locations consistent with cam-type femoral acetabular impingement.   There are also prominent geodes within the superior left femoral head and   lateral left acetabular margin. Mild degeneration of bilateral SI joints.   Advanced multilevel disc disease in the imaged portion of the lower   lumbar spine.    Bowel is normal caliber with numerous sigmoid diverticula. Moderate   atherosclerosis of the abdominal aorta and iliac vessels. Prostate is   noticeably enlarged measuring 5.4 x 6.8 x 7 cm, with mild bladder   distention. Fat-containing left inguinal hernia noted.      IMPRESSION:    Nondisplaced fracture of the far posterior ilium bilaterally.     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    Notable prostate enlargement with mild bladder distention.    < end of copied text >        < from: CT Cervical Spine No Cont (19 @ 14:39) >  IMPRESSION:     CT brain:  Similar mild ventricular dilatation which may be due to age-appropriate   atrophy. Normal pressure hydrocephalus is not excluded.  No acute intracranial hemorrhage or displaced calvarial fracture. No   large scalp hematoma.    CT cervical spine:  No acute fracture to maxillofacial. No prevertebral soft tissue swelling.   Degenerative changes.    < end of copied text >          < from: Xray Chest 1 View AP/PA (19 @ 14:01) >  Impression:    The heart is normal in size. The lungs appear to be clear. Calcified   aortic knob. A pacer is in good position. No pleural effusion.   Degenerative changes of the thoracic spine.    < end of copied text > Patient is a 89y old  Male who presents with a chief complaint of fall, pelvic fractures (02 Dec 2019 19:35)      HPI:    89M w/ afib on eliquis, BPH, DM, HLD, HTN presents s/p fall yesterday. States he got up from a chair and fell landing on his buttocks. Denies fevers, chills, LOC, dizziness, numbness, weakness. States he has severe pain in the R hip/coccyx. States he has been able to stand. Denies chest pain, SOB, palpitations. Ptn also c/p redness on right arm w pain prior to the fall and still ongoing, denies trauma to the arm , denies insect bite/animal bit/scratch (02 Dec 2019 19:35)    ER vss, afebrile.  WBC 11.5.  UA (-) nit/LE.  Cxr clear.  s/p CT head, pelvis, cervical spine.  Pt started on cefazolin for cellulitis of RUE.  ID consult called for further abx managment.     REVIEW OF SYSTEMS:    CONSTITUTIONAL: No fever, weight loss, or fatigue  EYES: No eye pain, visual disturbances, or discharge  ENMT:  No sore throat  NECK: No pain or stiffness  RESPIRATORY: No cough, wheezing, chills or hemoptysis; No shortness of breath  CARDIOVASCULAR: No chest pain, palpitations, dizziness, or leg swelling  GASTROINTESTINAL: No abdominal or epigastric pain. No nausea, vomiting, or hematemesis; No diarrhea or constipation. No melena or hematochezia.  GENITOURINARY: No dysuria, frequency, hematuria, or incontinence  NEUROLOGICAL: No headaches, memory loss, loss of strength, numbness, or tremors  SKIN: No itching, burning, rashes, or lesions   LYMPH NODES: No enlarged glands  MUSCULOSKELETAL: No joint pain or swelling; No muscle, back, or extremity pain      PAST MEDICAL & SURGICAL HISTORY:  BPH (benign prostatic hypertrophy)  Macular degeneration  DM (diabetes mellitus)  HTN (hypertension)  AF (atrial fibrillation)  Pacemaker  HLD (hyperlipidemia)  S/P knee replacement, bilateral      Allergies    procainamide (Other (Severe))    Intolerances        FAMILY HISTORY:  No pertinent family history in first degree relatives      SOCIAL HISTORY:    No reported smoking, ivdu, etoh.  Has aide during the day    MEDICATIONS  (STANDING):  apixaban 5 milliGRAM(s) Oral two times a day  atorvastatin 80 milliGRAM(s) Oral at bedtime  ceFAZolin   IVPB 1000 milliGRAM(s) IV Intermittent every 8 hours  dextrose 5%. 1000 milliLiter(s) (50 mL/Hr) IV Continuous <Continuous>  dextrose 50% Injectable 12.5 Gram(s) IV Push once  dextrose 50% Injectable 25 Gram(s) IV Push once  dextrose 50% Injectable 25 Gram(s) IV Push once  digoxin     Tablet 0.125 milliGRAM(s) Oral daily  enalapril 5 milliGRAM(s) Oral daily  fenofibrate Tablet 145 milliGRAM(s) Oral daily  folic acid 1 milliGRAM(s) Oral daily  insulin lispro (HumaLOG) corrective regimen sliding scale   SubCutaneous three times a day before meals  latanoprost 0.005% Ophthalmic Solution 1 Drop(s) Both EYES at bedtime  metoprolol succinate ER 50 milliGRAM(s) Oral daily    MEDICATIONS  (PRN):  dextrose 40% Gel 15 Gram(s) Oral once PRN Blood Glucose LESS THAN 70 milliGRAM(s)/deciliter  glucagon  Injectable 1 milliGRAM(s) IntraMuscular once PRN Glucose LESS THAN 70 milligrams/deciliter      Vital Signs Last 24 Hrs  T(C): 36.4 (03 Dec 2019 11:43), Max: 37.3 (03 Dec 2019 04:16)  T(F): 97.6 (03 Dec 2019 11:43), Max: 99.1 (03 Dec 2019 04:16)  HR: 88 (03 Dec 2019 11:43) (79 - 100)  BP: 132/69 (03 Dec 2019 11:43) (125/68 - 173/86)  BP(mean): --  RR: 17 (03 Dec 2019 11:43) (17 - 18)  SpO2: 96% (03 Dec 2019 11:43) (95% - 98%)    PHYSICAL EXAM:    GENERAL: NAD, well-groomed  HEAD:  Atraumatic, Normocephalic  EYES: EOMI, PERRLA, conjunctiva and sclera clear  ENMT: No tonsillar erythema, exudates, or enlargement; Moist mucous membranes  NECK: Supple, No JVD  CHEST/LUNG: Clear to percussion bilaterally; No rales, rhonchi, wheezing, or rubs  HEART: Regular rate and rhythm; No murmurs, rubs, or gallops  ABDOMEN: Soft, Nontender, Nondistended; Bowel sounds present  EXTREMITIES:  2+ Peripheral Pulses, No clubbing, cyanosis, or edema  LYMPH: No lymphadenopathy noted  SKIN: RUE inner brachial area with faint erythema.  No TTP, no open wounds    LABS:  CBC Full  -  ( 02 Dec 2019 13:51 )  WBC Count : 11.52 K/uL  RBC Count : 5.09 M/uL  Hemoglobin : 14.9 g/dL  Hematocrit : 44.7 %  Platelet Count - Automated : 219 K/uL  Mean Cell Volume : 87.8 fl  Mean Cell Hemoglobin : 29.3 pg  Mean Cell Hemoglobin Concentration : 33.3 gm/dL  Auto Neutrophil # : 6.61 K/uL  Auto Lymphocyte # : 2.60 K/uL  Auto Monocyte # : 2.30 K/uL  Auto Eosinophil # : 0.00 K/uL  Auto Basophil # : 0.00 K/uL  Auto Neutrophil % : 57.4 %  Auto Lymphocyte % : 22.6 %  Auto Monocyte % : 20.0 %  Auto Eosinophil % : 0.0 %  Auto Basophil % : 0.0 %      12    136  |  100  |  28<H>  ----------------------------<  155<H>  4.5   |  23  |  1.23    Ca    9.7      02 Dec 2019 13:51    TPro  7.5  /  Alb  4.4  /  TBili  0.6  /  DBili  x   /  AST  16  /  ALT  10  /  AlkPhos  44  12-02      LIVER FUNCTIONS - ( 02 Dec 2019 13:51 )  Alb: 4.4 g/dL / Pro: 7.5 g/dL / ALK PHOS: 44 U/L / ALT: 10 U/L / AST: 16 U/L / GGT: x                               MICROBIOLOGY:        Urinalysis Basic - ( 03 Dec 2019 08:51 )    Color: Yellow / Appearance: Clear / S.022 / pH: x  Gluc: x / Ketone: Negative  / Bili: Negative / Urobili: <2 mg/dL   Blood: x / Protein: Trace / Nitrite: Negative   Leuk Esterase: Negative / RBC: x / WBC x   Sq Epi: x / Non Sq Epi: x / Bacteria: x                  RADIOLOGY:      < from: CT Pelvis No Cont (19 @ 14:41) >    IMPRESSION:    Nondisplaced fracture of the far posterior ilium bilaterally.     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    Notable prostate enlargement with mild bladder distention.    < end of copied text >            < from: CT Pelvis No Cont (19 @ 14:41) >    EXAM:  CT PELVIS ONLY                          EXAM:  CT 3D RECONSTRUCT W LOLITA                            PROCEDURE DATE:  2019            INTERPRETATION:  CT pelvis without contrast    Clinical indications: Posterior pelvic pain, fall    Comparison: Plain film from 2019    Technique: Contiguous axial images through the pelvis without contrast.   Sagittal and coronal 2-D reconstructions. Additional 3-D reconstructions   by the technologist at an independent workstation.    Findings: There are nondisplaced fractures of the far posterior ilium   bilaterally (series 301 image 88). There is moderate degenerative   narrowing of both hip joints. Slight outward convexity at the anterior   femoral head neck junction and both hips with rounded lucent lesions at   both locations consistent with cam-type femoral acetabular impingement.   There are also prominent geodes within the superior left femoral head and   lateral left acetabular margin. Mild degeneration of bilateral SI joints.   Advanced multilevel disc disease in the imaged portion of the lower   lumbar spine.    Bowel is normal caliber with numerous sigmoid diverticula. Moderate   atherosclerosis of the abdominal aorta and iliac vessels. Prostate is   noticeably enlarged measuring 5.4 x 6.8 x 7 cm, with mild bladder   distention. Fat-containing left inguinal hernia noted.      IMPRESSION:    Nondisplaced fracture of the far posterior ilium bilaterally.     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    Notable prostate enlargement with mild bladder distention.    < end of copied text >        < from: CT Cervical Spine No Cont (19 @ 14:39) >  IMPRESSION:     CT brain:  Similar mild ventricular dilatation which may be due to age-appropriate   atrophy. Normal pressure hydrocephalus is not excluded.  No acute intracranial hemorrhage or displaced calvarial fracture. No   large scalp hematoma.    CT cervical spine:  No acute fracture to maxillofacial. No prevertebral soft tissue swelling.   Degenerative changes.    < end of copied text >          < from: Xray Chest 1 View AP/PA (19 @ 14:01) >  Impression:    The heart is normal in size. The lungs appear to be clear. Calcified   aortic knob. A pacer is in good position. No pleural effusion.   Degenerative changes of the thoracic spine.    < end of copied text >

## 2019-12-03 NOTE — PROGRESS NOTE ADULT - SUBJECTIVE AND OBJECTIVE BOX
Patient is a 89y old  Male who presents with a chief complaint of fall, pelvic fractures (03 Dec 2019 12:47)      SUBJECTIVE / OVERNIGHT EVENTS: no new c/o, ambulated alone w walker, still awaiting PT eval, denies pain, RUE cellulitis is improving     MEDICATIONS  (STANDING):  apixaban 5 milliGRAM(s) Oral two times a day  atorvastatin 80 milliGRAM(s) Oral at bedtime  ceFAZolin   IVPB 1000 milliGRAM(s) IV Intermittent every 8 hours  dextrose 5%. 1000 milliLiter(s) (50 mL/Hr) IV Continuous <Continuous>  dextrose 50% Injectable 12.5 Gram(s) IV Push once  dextrose 50% Injectable 25 Gram(s) IV Push once  dextrose 50% Injectable 25 Gram(s) IV Push once  digoxin     Tablet 0.125 milliGRAM(s) Oral daily  enalapril 5 milliGRAM(s) Oral daily  fenofibrate Tablet 145 milliGRAM(s) Oral daily  folic acid 1 milliGRAM(s) Oral daily  insulin lispro (HumaLOG) corrective regimen sliding scale   SubCutaneous three times a day before meals  latanoprost 0.005% Ophthalmic Solution 1 Drop(s) Both EYES at bedtime  metoprolol succinate ER 50 milliGRAM(s) Oral daily    MEDICATIONS  (PRN):  dextrose 40% Gel 15 Gram(s) Oral once PRN Blood Glucose LESS THAN 70 milliGRAM(s)/deciliter  glucagon  Injectable 1 milliGRAM(s) IntraMuscular once PRN Glucose LESS THAN 70 milligrams/deciliter      Vital Signs Last 24 Hrs  T(F): 98.7 (19 @ 20:26), Max: 99.1 (19 @ 04:16)  HR: 94 (19 @ 20:26) (85 - 94)  BP: 165/75 (19 @ 20:26) (125/68 - 170/83)  RR: 18 (19 @ 20:26) (17 - 18)  SpO2: 95% (19 @ 20:26) (95% - 96%)  Telemetry:   CAPILLARY BLOOD GLUCOSE      POCT Blood Glucose.: 178 mg/dL (03 Dec 2019 21:39)  POCT Blood Glucose.: 166 mg/dL (03 Dec 2019 17:17)  POCT Blood Glucose.: 217 mg/dL (03 Dec 2019 12:36)  POCT Blood Glucose.: 112 mg/dL (03 Dec 2019 08:27)    I&O's Summary    02 Dec 2019 07:01  -  03 Dec 2019 07:00  --------------------------------------------------------  IN: 390 mL / OUT: 300 mL / NET: 90 mL    03 Dec 2019 07:01  -  03 Dec 2019 22:35  --------------------------------------------------------  IN: 530 mL / OUT: 500 mL / NET: 30 mL        PHYSICAL EXAM:  GENERAL: NAD, well-developed  HEAD:  Atraumatic, Normocephalic  EYES: EOMI, PERRLA, conjunctiva and sclera clear  NECK: Supple, No JVD  CHEST/LUNG: Clear to auscultation bilaterally; No wheeze  HEART: Regular rate and rhythm; No murmurs, rubs, or gallops  ABDOMEN: Soft, Nontender, Nondistended; Bowel sounds present  EXTREMITIES:  2+ Peripheral Pulses, No clubbing, cyanosis, or edema  PSYCH: AAOx3  NEUROLOGY: non-focal  SKIN: No rashes or lesions    LABS:                        14.9   11.52 )-----------( 219      ( 02 Dec 2019 13:51 )             44.7     12-    136  |  100  |  28<H>  ----------------------------<  155<H>  4.5   |  23  |  1.23    Ca    9.7      02 Dec 2019 13:51    TPro  7.5  /  Alb  4.4  /  TBili  0.6  /  DBili  x   /  AST  16  /  ALT  10  /  AlkPhos  44  12-02    PT/INR - ( 02 Dec 2019 13:51 )   PT: 22.7 sec;   INR: 1.93 ratio         PTT - ( 02 Dec 2019 13:51 )  PTT:40.5 sec      Urinalysis Basic - ( 03 Dec 2019 08:51 )    Color: Yellow / Appearance: Clear / S.022 / pH: x  Gluc: x / Ketone: Negative  / Bili: Negative / Urobili: <2 mg/dL   Blood: x / Protein: Trace / Nitrite: Negative   Leuk Esterase: Negative / RBC: x / WBC x   Sq Epi: x / Non Sq Epi: x / Bacteria: x        RADIOLOGY & ADDITIONAL TESTS:    Imaging Personally Reviewed:    Consultant(s) Notes Reviewed:      Care Discussed with Consultants/Other Providers:

## 2019-12-03 NOTE — DISCHARGE NOTE NURSING/CASE MANAGEMENT/SOCIAL WORK - NSPROEXTENSIONSOFSELF_GEN_A_NUR
eyeglasses/pair of brown shoes, wedding band, cellphone/dentures pair of brown shoes, wedding band, cellphone/dentures/eyeglasses/none

## 2019-12-03 NOTE — DISCHARGE NOTE NURSING/CASE MANAGEMENT/SOCIAL WORK - NSDCPEWEB_GEN_ALL_CORE
[facility] for Tobacco Control website --- [email or web address] --- [email or web address]

## 2019-12-04 ENCOUNTER — TRANSCRIPTION ENCOUNTER (OUTPATIENT)
Age: 84
End: 2019-12-04

## 2019-12-04 VITALS
HEART RATE: 90 BPM | DIASTOLIC BLOOD PRESSURE: 78 MMHG | OXYGEN SATURATION: 95 % | SYSTOLIC BLOOD PRESSURE: 150 MMHG | RESPIRATION RATE: 18 BRPM

## 2019-12-04 LAB
ANION GAP SERPL CALC-SCNC: 10 MMOL/L — SIGNIFICANT CHANGE UP (ref 5–17)
BASOPHILS # BLD AUTO: 0.06 K/UL — SIGNIFICANT CHANGE UP (ref 0–0.2)
BASOPHILS NFR BLD AUTO: 0.6 % — SIGNIFICANT CHANGE UP (ref 0–2)
BUN SERPL-MCNC: 26 MG/DL — HIGH (ref 7–23)
CALCIUM SERPL-MCNC: 8.9 MG/DL — SIGNIFICANT CHANGE UP (ref 8.4–10.5)
CHLORIDE SERPL-SCNC: 101 MMOL/L — SIGNIFICANT CHANGE UP (ref 96–108)
CO2 SERPL-SCNC: 24 MMOL/L — SIGNIFICANT CHANGE UP (ref 22–31)
CREAT SERPL-MCNC: 1.11 MG/DL — SIGNIFICANT CHANGE UP (ref 0.5–1.3)
EOSINOPHIL # BLD AUTO: 0.27 K/UL — SIGNIFICANT CHANGE UP (ref 0–0.5)
EOSINOPHIL NFR BLD AUTO: 2.6 % — SIGNIFICANT CHANGE UP (ref 0–6)
GLUCOSE BLDC GLUCOMTR-MCNC: 131 MG/DL — HIGH (ref 70–99)
GLUCOSE SERPL-MCNC: 139 MG/DL — HIGH (ref 70–99)
HCT VFR BLD CALC: 42.6 % — SIGNIFICANT CHANGE UP (ref 39–50)
HGB BLD-MCNC: 14.2 G/DL — SIGNIFICANT CHANGE UP (ref 13–17)
IMM GRANULOCYTES NFR BLD AUTO: 0.9 % — SIGNIFICANT CHANGE UP (ref 0–1.5)
LYMPHOCYTES # BLD AUTO: 1.84 K/UL — SIGNIFICANT CHANGE UP (ref 1–3.3)
LYMPHOCYTES # BLD AUTO: 17.8 % — SIGNIFICANT CHANGE UP (ref 13–44)
MCHC RBC-ENTMCNC: 29.5 PG — SIGNIFICANT CHANGE UP (ref 27–34)
MCHC RBC-ENTMCNC: 33.3 GM/DL — SIGNIFICANT CHANGE UP (ref 32–36)
MCV RBC AUTO: 88.4 FL — SIGNIFICANT CHANGE UP (ref 80–100)
MONOCYTES # BLD AUTO: 1.53 K/UL — HIGH (ref 0–0.9)
MONOCYTES NFR BLD AUTO: 14.8 % — HIGH (ref 2–14)
NEUTROPHILS # BLD AUTO: 6.55 K/UL — SIGNIFICANT CHANGE UP (ref 1.8–7.4)
NEUTROPHILS NFR BLD AUTO: 63.3 % — SIGNIFICANT CHANGE UP (ref 43–77)
PLATELET # BLD AUTO: 245 K/UL — SIGNIFICANT CHANGE UP (ref 150–400)
POTASSIUM SERPL-MCNC: 4.1 MMOL/L — SIGNIFICANT CHANGE UP (ref 3.5–5.3)
POTASSIUM SERPL-SCNC: 4.1 MMOL/L — SIGNIFICANT CHANGE UP (ref 3.5–5.3)
RBC # BLD: 4.82 M/UL — SIGNIFICANT CHANGE UP (ref 4.2–5.8)
RBC # FLD: 13.7 % — SIGNIFICANT CHANGE UP (ref 10.3–14.5)
SODIUM SERPL-SCNC: 135 MMOL/L — SIGNIFICANT CHANGE UP (ref 135–145)
WBC # BLD: 10.34 K/UL — SIGNIFICANT CHANGE UP (ref 3.8–10.5)
WBC # FLD AUTO: 10.34 K/UL — SIGNIFICANT CHANGE UP (ref 3.8–10.5)

## 2019-12-04 PROCEDURE — 90471 IMMUNIZATION ADMIN: CPT

## 2019-12-04 PROCEDURE — 72125 CT NECK SPINE W/O DYE: CPT

## 2019-12-04 PROCEDURE — 80048 BASIC METABOLIC PNL TOTAL CA: CPT

## 2019-12-04 PROCEDURE — 85730 THROMBOPLASTIN TIME PARTIAL: CPT

## 2019-12-04 PROCEDURE — 96365 THER/PROPH/DIAG IV INF INIT: CPT

## 2019-12-04 PROCEDURE — 85027 COMPLETE CBC AUTOMATED: CPT

## 2019-12-04 PROCEDURE — 81003 URINALYSIS AUTO W/O SCOPE: CPT

## 2019-12-04 PROCEDURE — 83036 HEMOGLOBIN GLYCOSYLATED A1C: CPT

## 2019-12-04 PROCEDURE — 99285 EMERGENCY DEPT VISIT HI MDM: CPT | Mod: 25

## 2019-12-04 PROCEDURE — 80053 COMPREHEN METABOLIC PANEL: CPT

## 2019-12-04 PROCEDURE — 71045 X-RAY EXAM CHEST 1 VIEW: CPT

## 2019-12-04 PROCEDURE — 72192 CT PELVIS W/O DYE: CPT

## 2019-12-04 PROCEDURE — 85610 PROTHROMBIN TIME: CPT

## 2019-12-04 PROCEDURE — 72170 X-RAY EXAM OF PELVIS: CPT

## 2019-12-04 PROCEDURE — 90715 TDAP VACCINE 7 YRS/> IM: CPT

## 2019-12-04 PROCEDURE — 76377 3D RENDER W/INTRP POSTPROCES: CPT

## 2019-12-04 PROCEDURE — 82962 GLUCOSE BLOOD TEST: CPT

## 2019-12-04 PROCEDURE — 70450 CT HEAD/BRAIN W/O DYE: CPT

## 2019-12-04 RX ORDER — CEPHALEXIN 500 MG
1 CAPSULE ORAL
Qty: 20 | Refills: 0
Start: 2019-12-04 | End: 2019-12-08

## 2019-12-04 RX ADMIN — APIXABAN 5 MILLIGRAM(S): 2.5 TABLET, FILM COATED ORAL at 10:10

## 2019-12-04 RX ADMIN — Medication 5 MILLIGRAM(S): at 06:23

## 2019-12-04 RX ADMIN — Medication 100 MILLIGRAM(S): at 06:23

## 2019-12-04 RX ADMIN — Medication 50 MILLIGRAM(S): at 06:23

## 2019-12-04 RX ADMIN — Medication 0.12 MILLIGRAM(S): at 06:23

## 2019-12-04 NOTE — PHYSICAL THERAPY INITIAL EVALUATION ADULT - GENERAL OBSERVATIONS, REHAB EVAL
pt received in bed nad top rails up and 1 siderail + bed alarm active ; private HHA present at b/s who is with pt at home ; R ue mild pink resolving cellulitis , pink patch on ball of L foot blanchable and following amb reassess and no longer there julissa montoya

## 2019-12-04 NOTE — PHYSICAL THERAPY INITIAL EVALUATION ADULT - GAIT DISTANCE, PT EVAL
70 ft with steady gait with rolling walker supervision  with stand rest periods x 2  , pt deconditioned ; amb w/o AD with cg to min of 1 intermittent min unsteady pt and HHA Osha understood to have pt use RW when amb

## 2019-12-04 NOTE — DISCHARGE NOTE PROVIDER - CARE PROVIDER_API CALL
Artis Monterroso)  Cardiovascular Disease; Internal Medicine  [address] [place] [address]  Phone: [phone]  Fax: [phone]  Follow Up Time: 2 weeks

## 2019-12-04 NOTE — PHYSICAL THERAPY INITIAL EVALUATION ADULT - IMPAIRMENTS CONTRIBUTING TO GAIT DEVIATIONS, PT EVAL
impaired balance/decreased endurance , intermittent min unsteady but frequent w/o AD and assist , pt steady gait with rolling walker with close supervision endurance improving/impaired postural control/decreased strength

## 2019-12-04 NOTE — DISCHARGE NOTE PROVIDER - HOSPITAL COURSE
89M w/ afib on eliquis, BPH, DM, HLD, HTN presents s/p mechanical fall yesterday, also notes to have RUE redness w pain which started prior to the fall    CT of pelvis shows bilateral nondisplaced illium Fx, pain well controlled. No acute orthopedic intervention. Cellulitis treated with Ancef per ID with improvement will do Keflex PO x5 more days.     Physical therapy recommendign home PT.        Paln and medications discussed with Dr. Crandall and pt cleared for DC home

## 2019-12-04 NOTE — PHYSICAL THERAPY INITIAL EVALUATION ADULT - ADDITIONAL COMMENTS
lives in [facility] alone has a private HHA 10 am to 7 pm daily + elevator , amb no AD PTA , dtr Mary ID as caregiver [phone] lives in apt alone has a private HHA 10 am to 7 pm daily + elevator , amb no AD PTA , guzman Hernandez ID as caregiver [phone]; pt private HHA present for session and understood all ; pt agreeable to increasing HHA hrs if need ; pt has a gym in building and works with  2 days a week tues / thurs for 30 min each time;pt owns a cane , w/c , rolling walker , std walker , grab bars in shower ; PT called Guzman Hernandez and left message re d/c planning and left number for PT DEPT if need

## 2019-12-04 NOTE — PHYSICAL THERAPY INITIAL EVALUATION ADULT - GAIT DEVIATIONS NOTED, PT EVAL
decreased step length/increased time in double stance/decreased saumya/decreased stride length/decreased weight-shifting ability

## 2019-12-04 NOTE — PHYSICAL THERAPY INITIAL EVALUATION ADULT - REFERRING PHYSICIAN, REHAB EVAL
Alicia Stewart MD ORDER PT EVAL AND TREAT ;WBAT Alicia Stewart MD ORDER PT EVAL AND TREAT ;WBAT le's per Ortho

## 2019-12-04 NOTE — DISCHARGE NOTE PROVIDER - NSDCMRMEDTOKEN_GEN_ALL_CORE_FT
Crestor 20 mg oral tablet: 1 tab(s) orally once a day  digoxin 125 mcg (0.125 mg) oral tablet: 1 tab(s) orally once a day  Eliquis 5 mg oral tablet: 1 tab(s) orally 2 times a day  enalapril 5 mg oral tablet: 1 tab(s) orally once a day  fenofibrate 145 mg oral tablet: 1 tab(s) orally once a day  Flax Seed Oil oral capsule:  orally once a day  folic acid 1 mg oral tablet: 1 tab(s) orally once a day  Januvia 50 mg oral tablet: 1 tab(s) orally once a day  Keflex 500 mg oral capsule: 1 cap(s) orally every 6 hours   Lumigan 0.01% ophthalmic solution: 1 drop(s) to both eyes once a day (in the evening)  metFORMIN 750 mg oral tablet, extended release: 1 tab(s) orally 2 times a day  Metoprolol Succinate ER 50 mg oral tablet, extended release: 1 tab(s) orally once a day  Myrbetriq 25 mg oral tablet, extended release: 1 tab(s) orally once a day  Rapaflo 8 mg oral capsule: 1 cap(s) orally once a day  sildenafil 50 mg oral tablet: 1 tab(s) orally once a day, As Needed

## 2019-12-04 NOTE — DISCHARGE NOTE PROVIDER - NSDCCPCAREPLAN_GEN_ALL_CORE_FT
PRINCIPAL DISCHARGE DIAGNOSIS  Diagnosis: Fall at home  Assessment and Plan of Treatment: CT of pelvis shows bilateral nondisplaced illium fracture  No orthopedic intervention  Pain well controlled  Physical therapy      SECONDARY DISCHARGE DIAGNOSES  Diagnosis: Cellulitis of right upper extremity  Assessment and Plan of Treatment: Complete antibiotics as prescribed.  Right arm swelling and redness improved

## 2019-12-04 NOTE — PHYSICAL THERAPY INITIAL EVALUATION ADULT - SIT-TO-STAND BALANCE
fair minus/to fair w/o AD cg of 1; at RW close supervision steady vc for proper hand [placment with transfer pt self correct

## 2019-12-04 NOTE — PHYSICAL THERAPY INITIAL EVALUATION ADULT - ACTIVE RANGE OF MOTION EXAMINATION, REHAB EVAL
bilateral upper extremity Active ROM was WFL (within functional limits)/bilateral  lower extremity Active ROM was WFL (within functional limits)/except L shoulder arom to 90 degrees limited by arthritis per pt

## 2019-12-04 NOTE — PHYSICAL THERAPY INITIAL EVALUATION ADULT - DISCHARGE DISPOSITION, PT EVAL
home w/ assist/home w/ home PT/spoke with pt kevinr Mary who called PT back and plan explained and agreeabe to as well ; Home PT to increase fxl mobility , strength, balance, endurance , fall prevention education ; initially pt is going to have 24/7 HHA and then resume 10 am to 7 pm pvt HHA when ready

## 2019-12-04 NOTE — PHYSICAL THERAPY INITIAL EVALUATION ADULT - ASSISTIVE DEVICE FOR TRANSFER: SIT/STAND, REHAB EVAL
tried x 2 w/o AD need close supervision , at rolling walker supervision x 2 steady ; pt understood and HHA for pt to transfer at [facility]

## 2019-12-04 NOTE — PHYSICAL THERAPY INITIAL EVALUATION ADULT - PERTINENT HX OF CURRENT PROBLEM, REHAB EVAL
89M w/ afib on eliquis, BPH, DM, HLD, HTN presents s/p fall yesterday. States he got up from a chair and fell landing on his butt. Denies fevers, chills, LOC, dizziness, numbness, weakness. States he has severe pain in the R hip/coccyx. States he has been able to stand. Denies chest pain, SOB, palpitations. Ptn also c/p redness on right arm w pain prior to the fall and still ongoing, denies trauma to the arm , denies insect bite/animal bit/scratch

## 2019-12-04 NOTE — PHYSICAL THERAPY INITIAL EVALUATION ADULT - PRECAUTIONS/LIMITATIONS, REHAB EVAL
fall precautions/CT brain:Similar mild ventricular dilatation which may be due to age-appropriate atrophy. Normal pressure hydrocephalus is not excluded.No acute intracranial hemorrhage or displaced calvarial fracture. No large scalp hematoma.CT cervical spine:No acute fracture to maxillofacial. No prevertebral soft tissue swelling. fall precautions/CT brain:Similar mild ventricular dilatation which may be due to age-appropriate atrophy. Normal pressure hydrocephalus is not excluded.No acute intracranial hemorrhage or displaced calvarial fracture. No large scalp hematoma.CT cervical spine:No acute fracture to maxillofacial. No prevertebral soft tissue swelling.; CT PELVIS : non dis[placed fx far posterior Ilium B, OA B Hips fall precautions/CT brain: Similar mild ventricular dilatation which may be due to age-appropriate atrophy. Normal pressure hydrocephalus is not excluded.No acute intracranial hemorrhage or displaced calvarial fracture. No large scalp hematoma.CT cervical spine:No acute fracture to maxillofacial. No prevertebral soft tissue swelling.; CT PELVIS : non dis[placed fx far posterior Ilium B, OA B Hips;skin intact except mild pink RUE (cellulitis resolving ) fall precautions/CT brain: Similar mild ventricular dilatation which may be due to age-appropriate atrophy. Normal pressure hydrocephalus is not excluded.No acute intracranial hemorrhage or displaced calvarial fracture. No large scalp hematoma.CT cervical spine:No acute fracture to maxillofacial. No prevertebral soft tissue swelling.; CT PELVIS : non dis[placed fx far posterior Ilium B, OA B Hips;skin intact except mild pink RUE (cellulitis resolving )/vision precautions

## 2019-12-11 NOTE — DISCHARGE NOTE PROVIDER - NS AS DC PROVIDER CONTACT Y/N MULTI
Comment: Patient glucose level had been uncontrolled. Patient instructed to return to his PCP to monitor glucose levels. Detail Level: Simple Yes

## 2020-01-03 ENCOUNTER — OUTPATIENT (OUTPATIENT)
Dept: OUTPATIENT SERVICES | Facility: HOSPITAL | Age: 85
LOS: 1 days | End: 2020-01-03
Payer: MEDICARE

## 2020-01-03 ENCOUNTER — APPOINTMENT (OUTPATIENT)
Dept: ULTRASOUND IMAGING | Facility: CLINIC | Age: 85
End: 2020-01-03
Payer: MEDICARE

## 2020-01-03 DIAGNOSIS — E66.3 OVERWEIGHT: ICD-10-CM

## 2020-01-03 DIAGNOSIS — R14.0 ABDOMINAL DISTENSION (GASEOUS): ICD-10-CM

## 2020-01-03 DIAGNOSIS — Z96.653 PRESENCE OF ARTIFICIAL KNEE JOINT, BILATERAL: Chronic | ICD-10-CM

## 2020-01-03 PROCEDURE — 76700 US EXAM ABDOM COMPLETE: CPT | Mod: 26

## 2020-01-03 PROCEDURE — 76700 US EXAM ABDOM COMPLETE: CPT

## 2020-03-09 NOTE — ED ADULT NURSE NOTE - CHIEF COMPLAINT
"Pt LVM stating that she's having swelling in her feet and legs. C/o left arm tingling.   #[phone]      Called pt and she denied CP, states her left arm is numb and tingly. State \"it's like worms are crawling up and down it.\" States that she's had swelling in her feet and legs x 2 weeks, states her PCP started her on Furosemide 20mg PRN, states she's taken it twice.     I advised pt to take rx PRN, like directed, so she should take it today if she's swelling. Stated that if she takes it and the swelling doesn't go down, to call us. Also advised her to check her weight in the am, after urinating, before eating or drinking anything to see about fluid retention overnight. Pt stated that the lasix keeps her up all night urinating. I advised pt to take it in the am, so she's ot up at night urinating, she verbalized understanding.   " The patient is a 89y Male complaining of fever.

## 2020-09-24 ENCOUNTER — APPOINTMENT (OUTPATIENT)
Dept: ORTHOPEDIC SURGERY | Facility: CLINIC | Age: 85
End: 2020-09-24
Payer: MEDICARE

## 2020-09-24 VITALS — TEMPERATURE: 98.3 F

## 2020-09-24 VITALS — DIASTOLIC BLOOD PRESSURE: 38 MMHG | HEART RATE: 74 BPM | SYSTOLIC BLOOD PRESSURE: 135 MMHG

## 2020-09-24 DIAGNOSIS — M47.816 SPONDYLOSIS W/OUT MYELOPATHY OR RADICULOPATHY, LUMBAR REGION: ICD-10-CM

## 2020-09-24 DIAGNOSIS — M19.012 PRIMARY OSTEOARTHRITIS, LEFT SHOULDER: ICD-10-CM

## 2020-09-24 DIAGNOSIS — S79.911A UNSPECIFIED INJURY OF RIGHT HIP, INITIAL ENCOUNTER: ICD-10-CM

## 2020-09-24 PROCEDURE — 72100 X-RAY EXAM L-S SPINE 2/3 VWS: CPT

## 2020-09-24 PROCEDURE — 73030 X-RAY EXAM OF SHOULDER: CPT | Mod: LT

## 2020-09-24 PROCEDURE — 73502 X-RAY EXAM HIP UNI 2-3 VIEWS: CPT | Mod: RT

## 2020-09-24 PROCEDURE — 20610 DRAIN/INJ JOINT/BURSA W/O US: CPT | Mod: LT

## 2020-09-24 PROCEDURE — 99204 OFFICE O/P NEW MOD 45 MIN: CPT | Mod: 25

## 2020-09-24 RX ORDER — METOPROLOL SUCCINATE 50 MG/1
50 TABLET, EXTENDED RELEASE ORAL
Qty: 90 | Refills: 0 | Status: ACTIVE | COMMUNITY
Start: 2019-08-05

## 2020-09-24 RX ORDER — DIGOXIN 125 UG/1
125 TABLET ORAL
Qty: 90 | Refills: 0 | Status: ACTIVE | COMMUNITY
Start: 2020-06-18

## 2020-09-24 RX ORDER — METFORMIN ER 750 MG 750 MG/1
750 TABLET ORAL
Qty: 180 | Refills: 0 | Status: ACTIVE | COMMUNITY
Start: 2020-03-02

## 2020-09-24 RX ORDER — ROSUVASTATIN CALCIUM 20 MG/1
20 TABLET, FILM COATED ORAL
Qty: 90 | Refills: 0 | Status: ACTIVE | COMMUNITY
Start: 2020-09-10

## 2020-09-24 RX ORDER — ENALAPRIL MALEATE 5 MG/1
5 TABLET ORAL
Qty: 90 | Refills: 0 | Status: ACTIVE | COMMUNITY
Start: 2020-02-07

## 2020-09-24 RX ORDER — BRINZOLAMIDE 10 MG/ML
1 SUSPENSION/ DROPS OPHTHALMIC
Qty: 10 | Refills: 0 | Status: ACTIVE | COMMUNITY
Start: 2020-04-15

## 2020-09-24 RX ORDER — APIXABAN 5 MG/1
5 TABLET, FILM COATED ORAL
Qty: 180 | Refills: 0 | Status: ACTIVE | COMMUNITY
Start: 2020-09-10

## 2020-09-24 RX ORDER — BIMATOPROST 0.1 MG/ML
0.01 SOLUTION/ DROPS OPHTHALMIC
Qty: 8 | Refills: 0 | Status: ACTIVE | COMMUNITY
Start: 2020-04-15

## 2020-09-24 NOTE — PHYSICAL EXAM
[Slightly Antalgic] : slightly antalgic [Cane] : ambulates with cane [LE] : Sensory: Intact in bilateral lower extremities [Knee] : patellar 2+ and symmetric bilaterally [DP] : dorsalis pedis 2+ and symmetric bilaterally [PT] : posterior tibial 2+ and symmetric bilaterally [Rad] : radial 2+ and symmetric bilaterally [Normal] : Alert and in no acute distress [Poor Appearance] : well-appearing [Acute Distress] : not in acute distress [Obese] : not obese [de-identified] : The patient has no respiratory distress. Mood and affect are normal. The patient is alert and oriented to person, place and time.\par Examination of the cervical spine demonstrates no tenderness, no deformity and no muscle spasm. Cervical spine rotation is 60° to the right, 60° to the left, 75° of extension and 45° of flexion. Neurologic exam of the upper extremities reveals intact sensation to light touch. Motor function is 5 over 5 in all groups. Deep tendon reflexes are 2+ and equal at the biceps, triceps and brachioradialis.\par Examination of the left shoulder demonstrates no deformity. The skin is intact. There is no erythema. There is tenderness anteriorly. Impingement sign is positive There is no instability. Drop arm test is negative. Empty can test is negative. Liftoff test is negative. Evangeline test is negative.  He has elevation of 80 degrees, external rotation of 30 degrees and has difficulty reaching behind his back with the left upper extremity.  The elbow is stable and nontender.  There is no deformity.  The skin is intact.  There is no lymphedema.\par The patient has an area of ecchymosis at the lumbosacral junction.  This is nontender.  There is no pain with rotation of the hips.  There is minimal pain with lumbar flexion.  The lower extremity skin is intact.  There is no lymphedema. [de-identified] : AP and lateral x-rays of the lumbar spine demonstrate moderate to severe degenerative changes.  He has vascular calcifications in the abdomen.  AP and lateral x-rays of the right hip with AP of the pelvis demonstrate no fracture or dislocation.  There are mild degenerative changes of both hips.  AP, transscapular and axillary x-rays of the left shoulder demonstrate severe glenohumeral arthritis with bony fragmentation.

## 2020-09-24 NOTE — DISCUSSION/SUMMARY
[de-identified] : The patient has sustained a contusion to his lumbar and pelvic region.  He has no pain and has no fracture on x-ray.  He has severe osteoarthritis of the left shoulder.  I have discussed the pathology, natural history and treatment options with him.  He has opted to receive a steroid injection.\par Informed consent is obtained. Site and procedure were confirmed with the patient.  Following a sterile prep with alcohol an injection is placed into the subacromial space and glenohumeral joint of the left shoulder. The injection consists of 1 cc of Depo-Medrol and 8 cc of 1% Xylocaine. The injection was well tolerated. Instructions were given.\par He will return as needed.

## 2020-09-24 NOTE — HISTORY OF PRESENT ILLNESS
[de-identified] : 89 year old male brought in by his HHA presents with chronic left shoulder pain. Denies injury or trauma. His aide reports that he has difficulty using his arm for ADLs. He reports limited ROM. He takes Tylenol as needed which helps to an extent. \par He also requests evaluation of his lower back and right hip. He had two falls last week landing on his buttocks. He complains of ecchymosis over the right lower back. He does not have any pain today. Denies history of prior lower back or hip pain. \par

## 2020-10-06 ENCOUNTER — INPATIENT (INPATIENT)
Facility: HOSPITAL | Age: 85
LOS: 3 days | Discharge: HOME | DRG: 871 | End: 2020-10-10
Attending: INTERNAL MEDICINE | Admitting: INTERNAL MEDICINE
Payer: MEDICARE

## 2020-10-06 VITALS
OXYGEN SATURATION: 94 % | SYSTOLIC BLOOD PRESSURE: 177 MMHG | TEMPERATURE: 99 F | DIASTOLIC BLOOD PRESSURE: 81 MMHG | WEIGHT: 110.01 LBS | RESPIRATION RATE: 24 BRPM | HEIGHT: 68 IN | HEART RATE: 123 BPM

## 2020-10-06 DIAGNOSIS — N39.0 URINARY TRACT INFECTION, SITE NOT SPECIFIED: ICD-10-CM

## 2020-10-06 DIAGNOSIS — Z96.653 PRESENCE OF ARTIFICIAL KNEE JOINT, BILATERAL: Chronic | ICD-10-CM

## 2020-10-06 LAB
ALBUMIN SERPL ELPH-MCNC: 4.4 G/DL — SIGNIFICANT CHANGE UP (ref 3.3–5)
ALP SERPL-CCNC: 50 U/L — SIGNIFICANT CHANGE UP (ref 40–120)
ALT FLD-CCNC: 10 U/L — SIGNIFICANT CHANGE UP (ref 10–45)
ANION GAP SERPL CALC-SCNC: 14 MMOL/L — SIGNIFICANT CHANGE UP (ref 5–17)
APPEARANCE UR: ABNORMAL
APTT BLD: 36.9 SEC — HIGH (ref 27.5–35.5)
AST SERPL-CCNC: 16 U/L — SIGNIFICANT CHANGE UP (ref 10–40)
BACTERIA # UR AUTO: ABNORMAL
BASE EXCESS BLDV CALC-SCNC: -1.2 MMOL/L — SIGNIFICANT CHANGE UP (ref -2–2)
BASE EXCESS BLDV CALC-SCNC: 0 MMOL/L — SIGNIFICANT CHANGE UP (ref -2–2)
BASOPHILS # BLD AUTO: 0 K/UL — SIGNIFICANT CHANGE UP (ref 0–0.2)
BASOPHILS NFR BLD AUTO: 0 % — SIGNIFICANT CHANGE UP (ref 0–2)
BILIRUB SERPL-MCNC: 0.7 MG/DL — SIGNIFICANT CHANGE UP (ref 0.2–1.2)
BILIRUB UR-MCNC: NEGATIVE — SIGNIFICANT CHANGE UP
BUN SERPL-MCNC: 20 MG/DL — SIGNIFICANT CHANGE UP (ref 7–23)
CA-I SERPL-SCNC: 1.21 MMOL/L — SIGNIFICANT CHANGE UP (ref 1.12–1.3)
CA-I SERPL-SCNC: 1.26 MMOL/L — SIGNIFICANT CHANGE UP (ref 1.12–1.3)
CALCIUM SERPL-MCNC: 9.7 MG/DL — SIGNIFICANT CHANGE UP (ref 8.4–10.5)
CHLORIDE BLDV-SCNC: 102 MMOL/L — SIGNIFICANT CHANGE UP (ref 96–108)
CHLORIDE BLDV-SCNC: 104 MMOL/L — SIGNIFICANT CHANGE UP (ref 96–108)
CHLORIDE SERPL-SCNC: 99 MMOL/L — SIGNIFICANT CHANGE UP (ref 96–108)
CO2 BLDV-SCNC: 23 MMOL/L — SIGNIFICANT CHANGE UP (ref 22–30)
CO2 BLDV-SCNC: 25 MMOL/L — SIGNIFICANT CHANGE UP (ref 22–30)
CO2 SERPL-SCNC: 20 MMOL/L — LOW (ref 22–31)
COLOR SPEC: YELLOW — SIGNIFICANT CHANGE UP
CREAT SERPL-MCNC: 1.08 MG/DL — SIGNIFICANT CHANGE UP (ref 0.5–1.3)
DIFF PNL FLD: ABNORMAL
EOSINOPHIL # BLD AUTO: 0 K/UL — SIGNIFICANT CHANGE UP (ref 0–0.5)
EOSINOPHIL NFR BLD AUTO: 0 % — SIGNIFICANT CHANGE UP (ref 0–6)
EPI CELLS # UR: 0 /HPF — SIGNIFICANT CHANGE UP
GAS PNL BLDV: 134 MMOL/L — LOW (ref 135–145)
GAS PNL BLDV: 136 MMOL/L — SIGNIFICANT CHANGE UP (ref 135–145)
GAS PNL BLDV: SIGNIFICANT CHANGE UP
GLUCOSE BLDV-MCNC: 184 MG/DL — HIGH (ref 70–99)
GLUCOSE BLDV-MCNC: 214 MG/DL — HIGH (ref 70–99)
GLUCOSE SERPL-MCNC: 221 MG/DL — HIGH (ref 70–99)
GLUCOSE UR QL: ABNORMAL
HCO3 BLDV-SCNC: 22 MMOL/L — SIGNIFICANT CHANGE UP (ref 21–29)
HCO3 BLDV-SCNC: 24 MMOL/L — SIGNIFICANT CHANGE UP (ref 21–29)
HCT VFR BLD CALC: 41 % — SIGNIFICANT CHANGE UP (ref 39–50)
HCT VFR BLDA CALC: 40 % — SIGNIFICANT CHANGE UP (ref 39–50)
HCT VFR BLDA CALC: 42 % — SIGNIFICANT CHANGE UP (ref 39–50)
HGB BLD CALC-MCNC: 12.9 G/DL — LOW (ref 13–17)
HGB BLD CALC-MCNC: 13.8 G/DL — SIGNIFICANT CHANGE UP (ref 13–17)
HGB BLD-MCNC: 13 G/DL — SIGNIFICANT CHANGE UP (ref 13–17)
HYALINE CASTS # UR AUTO: 2 /LPF — SIGNIFICANT CHANGE UP (ref 0–2)
INR BLD: 1.75 RATIO — HIGH (ref 0.88–1.16)
KETONES UR-MCNC: SIGNIFICANT CHANGE UP
LACTATE BLDV-MCNC: 1.6 MMOL/L — SIGNIFICANT CHANGE UP (ref 0.7–2)
LACTATE BLDV-MCNC: 2.2 MMOL/L — HIGH (ref 0.7–2)
LEUKOCYTE ESTERASE UR-ACNC: ABNORMAL
LYMPHOCYTES # BLD AUTO: 0.19 K/UL — LOW (ref 1–3.3)
LYMPHOCYTES # BLD AUTO: 0.9 % — LOW (ref 13–44)
MANUAL SMEAR VERIFICATION: SIGNIFICANT CHANGE UP
MCHC RBC-ENTMCNC: 27.8 PG — SIGNIFICANT CHANGE UP (ref 27–34)
MCHC RBC-ENTMCNC: 31.7 GM/DL — LOW (ref 32–36)
MCV RBC AUTO: 87.8 FL — SIGNIFICANT CHANGE UP (ref 80–100)
MONOCYTES # BLD AUTO: 1.63 K/UL — HIGH (ref 0–0.9)
MONOCYTES NFR BLD AUTO: 7.8 % — SIGNIFICANT CHANGE UP (ref 2–14)
NEUTROPHILS # BLD AUTO: 19.11 K/UL — HIGH (ref 1.8–7.4)
NEUTROPHILS NFR BLD AUTO: 87 % — HIGH (ref 43–77)
NEUTS BAND # BLD: 4.3 % — SIGNIFICANT CHANGE UP (ref 0–8)
NITRITE UR-MCNC: POSITIVE
PCO2 BLDV: 33 MMHG — LOW (ref 35–50)
PCO2 BLDV: 37 MMHG — SIGNIFICANT CHANGE UP (ref 35–50)
PH BLDV: 7.42 — SIGNIFICANT CHANGE UP (ref 7.35–7.45)
PH BLDV: 7.44 — SIGNIFICANT CHANGE UP (ref 7.35–7.45)
PH UR: 7 — SIGNIFICANT CHANGE UP (ref 5–8)
PLAT MORPH BLD: NORMAL — SIGNIFICANT CHANGE UP
PLATELET # BLD AUTO: 286 K/UL — SIGNIFICANT CHANGE UP (ref 150–400)
PO2 BLDV: 41 MMHG — SIGNIFICANT CHANGE UP (ref 25–45)
PO2 BLDV: 50 MMHG — HIGH (ref 25–45)
POTASSIUM BLDV-SCNC: 4 MMOL/L — SIGNIFICANT CHANGE UP (ref 3.5–5.3)
POTASSIUM BLDV-SCNC: 4.1 MMOL/L — SIGNIFICANT CHANGE UP (ref 3.5–5.3)
POTASSIUM SERPL-MCNC: 4.2 MMOL/L — SIGNIFICANT CHANGE UP (ref 3.5–5.3)
POTASSIUM SERPL-SCNC: 4.2 MMOL/L — SIGNIFICANT CHANGE UP (ref 3.5–5.3)
PROT SERPL-MCNC: 7.2 G/DL — SIGNIFICANT CHANGE UP (ref 6–8.3)
PROT UR-MCNC: ABNORMAL
PROTHROM AB SERPL-ACNC: 20.4 SEC — HIGH (ref 10.6–13.6)
RAPID RVP RESULT: SIGNIFICANT CHANGE UP
RBC # BLD: 4.67 M/UL — SIGNIFICANT CHANGE UP (ref 4.2–5.8)
RBC # FLD: 14.2 % — SIGNIFICANT CHANGE UP (ref 10.3–14.5)
RBC BLD AUTO: SIGNIFICANT CHANGE UP
RBC CASTS # UR COMP ASSIST: 16 /HPF — HIGH (ref 0–4)
SAO2 % BLDV: 74 % — SIGNIFICANT CHANGE UP (ref 67–88)
SAO2 % BLDV: 84 % — SIGNIFICANT CHANGE UP (ref 67–88)
SARS-COV-2 RNA SPEC QL NAA+PROBE: SIGNIFICANT CHANGE UP
SODIUM SERPL-SCNC: 133 MMOL/L — LOW (ref 135–145)
SP GR SPEC: 1.03 — HIGH (ref 1.01–1.02)
UROBILINOGEN FLD QL: NEGATIVE — SIGNIFICANT CHANGE UP
WBC # BLD: 20.93 K/UL — HIGH (ref 3.8–10.5)
WBC # FLD AUTO: 20.93 K/UL — HIGH (ref 3.8–10.5)
WBC UR QL: 201 /HPF — HIGH (ref 0–5)

## 2020-10-06 PROCEDURE — 93010 ELECTROCARDIOGRAM REPORT: CPT

## 2020-10-06 PROCEDURE — 71045 X-RAY EXAM CHEST 1 VIEW: CPT | Mod: 26

## 2020-10-06 PROCEDURE — 99285 EMERGENCY DEPT VISIT HI MDM: CPT

## 2020-10-06 PROCEDURE — 99223 1ST HOSP IP/OBS HIGH 75: CPT

## 2020-10-06 RX ORDER — CEFTRIAXONE 500 MG/1
1000 INJECTION, POWDER, FOR SOLUTION INTRAMUSCULAR; INTRAVENOUS ONCE
Refills: 0 | Status: COMPLETED | OUTPATIENT
Start: 2020-10-06 | End: 2020-10-06

## 2020-10-06 RX ORDER — SODIUM CHLORIDE 9 MG/ML
1550 INJECTION, SOLUTION INTRAVENOUS ONCE
Refills: 0 | Status: COMPLETED | OUTPATIENT
Start: 2020-10-06 | End: 2020-10-06

## 2020-10-06 RX ORDER — PIPERACILLIN AND TAZOBACTAM 4; .5 G/20ML; G/20ML
3.38 INJECTION, POWDER, LYOPHILIZED, FOR SOLUTION INTRAVENOUS ONCE
Refills: 0 | Status: COMPLETED | OUTPATIENT
Start: 2020-10-06 | End: 2020-10-06

## 2020-10-06 RX ORDER — PIPERACILLIN AND TAZOBACTAM 4; .5 G/20ML; G/20ML
3.38 INJECTION, POWDER, LYOPHILIZED, FOR SOLUTION INTRAVENOUS EVERY 8 HOURS
Refills: 0 | Status: DISCONTINUED | OUTPATIENT
Start: 2020-10-06 | End: 2020-10-10

## 2020-10-06 RX ORDER — ACETAMINOPHEN 500 MG
650 TABLET ORAL ONCE
Refills: 0 | Status: COMPLETED | OUTPATIENT
Start: 2020-10-06 | End: 2020-10-06

## 2020-10-06 RX ADMIN — Medication 650 MILLIGRAM(S): at 23:59

## 2020-10-06 RX ADMIN — SODIUM CHLORIDE 1550 MILLILITER(S): 9 INJECTION, SOLUTION INTRAVENOUS at 22:05

## 2020-10-06 RX ADMIN — Medication 650 MILLIGRAM(S): at 20:42

## 2020-10-06 RX ADMIN — SODIUM CHLORIDE 1550 MILLILITER(S): 9 INJECTION, SOLUTION INTRAVENOUS at 20:42

## 2020-10-06 RX ADMIN — CEFTRIAXONE 100 MILLIGRAM(S): 500 INJECTION, POWDER, FOR SOLUTION INTRAMUSCULAR; INTRAVENOUS at 21:53

## 2020-10-06 RX ADMIN — PIPERACILLIN AND TAZOBACTAM 200 GRAM(S): 4; .5 INJECTION, POWDER, LYOPHILIZED, FOR SOLUTION INTRAVENOUS at 23:58

## 2020-10-06 NOTE — H&P ADULT - PROBLEM SELECTOR PLAN 1
- UA+ w/ sepsis  - zosyn until urine/blood culture return with organism  - IV fluid given in ED appears sufficient at time of medicine exam. will need further dosing of IV fluid based upon SBP/clinical exam  - lactic acidosis resolving with treatment as above

## 2020-10-06 NOTE — H&P ADULT - PROBLEM SELECTOR PLAN 4
cxr does not demonstrate lobar opacification  suspect drive from urosepsis process  treatment as above  NC as needed

## 2020-10-06 NOTE — ED PROVIDER NOTE - ATTENDING CONTRIBUTION TO CARE
Attending MD Geronimo:   I personally have seen and examined this patient.  ACP, Resident, medical student note reviewed and agree on plan of care and except where noted.     89y M PMH BPH, DM, HTN, Afib, HLD, bilateral knee replacements presents for fever, chills x 2 days. Found to have UTI at urgent care, started on antibiotic but not sure of name. This evening was sitting on toilet, too weak to get up. Has been taking tylenol for fever.    On exam patient is well appearing, tachycardic, tachypneic, tachy irregular blowing murmur, lungs clear, abdomen soft, ntnd skin warm and well perfused, palpable peripheral pulses, no peripheral pitting edema, A+Ox2, fluid speech, following commands, [employer] [place].    Differential includes but is not limited to pyelo vs other source of sepsis. Will obtain labs, ekg, cxr, urine, start fluids and antibiotics, admit.

## 2020-10-06 NOTE — H&P ADULT - PROBLEM SELECTOR PLAN 3
c/w digoxin only for now given sepsis  - rate improved significantly with treatment of sepsis. If sustained afib w/ rvr again would have to at that time determine if safe to use metoprolol over this initial part of hospitalization. as patient improves with antibiotics it will be reasonable to restart lower dosing of metoprolol tartrate and titrate up as tolerated  - will dose reduce home eliquis dosing given age and body weight

## 2020-10-06 NOTE — ED PROVIDER NOTE - NS ED ROS FT
Constitutional: +fever +chills  Eyes: No visual changes  CV: No chest pain or lower extremity edema  Resp: No SOB no cough  GI: No abd pain. No nausea or vomiting.   : No dysuria, hematuria.   MSK: +chronic L shoulder pain   Skin: No rash  Psych: No complaints   Neuro: No headache. No numbness or tingling. No weakness.  +UTI

## 2020-10-06 NOTE — ED ADULT TRIAGE NOTE - BP NONINVASIVE SYSTOLIC (MM HG)
Relevant Problems   No relevant active problems       Anesthetic History   No history of anesthetic complications            Review of Systems / Medical History  Patient summary reviewed and pertinent labs reviewed    Pulmonary  Within defined limits                 Neuro/Psych   Within defined limits           Cardiovascular  Within defined limits                Exercise tolerance: >4 METS     GI/Hepatic/Renal     GERD      Liver disease     Endo/Other    Diabetes         Other Findings              Physical Exam    Airway  Mallampati: II  TM Distance: 4 - 6 cm  Neck ROM: normal range of motion   Mouth opening: Normal     Cardiovascular  Regular rate and rhythm,  S1 and S2 normal,  no murmur, click, rub, or gallop  Rhythm: regular  Rate: normal         Dental    Dentition: Lower dentition intact and Upper dentition intact     Pulmonary  Breath sounds clear to auscultation               Abdominal  GI exam deferred       Other Findings            Anesthetic Plan    ASA: 3  Anesthesia type: MAC          Induction: Intravenous  Anesthetic plan and risks discussed with: Patient
[phone]

## 2020-10-06 NOTE — H&P ADULT - PROBLEM SELECTOR PLAN 6
insulin sliding scale with finger stick monitoring while inpatient. will need daily titration of insulin  - hold outpatient oral medications

## 2020-10-06 NOTE — H&P ADULT - HISTORY OF PRESENT ILLNESS
chart reviewed, urosepsis  full h& p to follow History collected from the chart and from the patient's daughter Ms. Mary Jade given patient's mental status    89M w/ dementia (family reports memory loss), afib on eliquis, HTN, DM2, HLD present to [facility] for evaluation of fever and UTI. The patient is reported to be found to have fever this morning and upon evaluation by urgent care he was diagnosed with UTI and given bactrim which he took 1 dose in late evening but demonstrated continued fatigue, fever, chills, and general sick appearance. The patient cannot remember why he came to the hospital and therefore there is limited history. Medication rec obtained from Ms. Jade. The patient is noted to live alone and has HHA who assists with adls. The patient is reported to at baseline have short-term memory problems but no formal evaluation for dementia reported.    In the ED, , 177/84, 120, 25 93%RA  s/p acetaminophen 650x1, [id number](21:53), LR 1.55Lx1

## 2020-10-06 NOTE — ED ADULT NURSE NOTE - OBJECTIVE STATEMENT
Pt 89 y/o male, AxOx3, presents to ED from home via EMS for weakness. EMS states that pt has been complaining of fevers and chills x 2 days, seen at urgent care today and dx w/ UTI placed on PO abx- unknown which one. Pt arrived on 2L NC by EMS, found to have RA oxygen saturation >92%. Pt is well appearing, speaking full sentences without difficulty. Breathing spontaneous and unlabored. Pt 89 y/o male, AxOx3, presents to ED from home via EMS for weakness. EMS states that pt has been complaining of fevers and chills x 2 days, seen at urgent care today and dx w/ UTI placed on PO abx- unknown which one. Pt arrived on 2L NC by EMS, found to have RA oxygen saturation >92%. Pt is well appearing, speaking full sentences without difficulty. Breathing spontaneous and tachypneic. Upon assessment, abdomen distended and nontender, +strong peripheral pulses, moving all extremities without difficulty, lungs clear, blanchable redness to sacral area, moderate bruising noted to right hip from fall 3 weeks ago as per ASAF Painter (obtained from family). .Pt denies pain at this time. Pt placed on continuous pulse ox and cardiac monitor, afib noted. Code sepsis called. Pt straight cathed using sterile technique.  2 RNs present.  Pt tolerated procedure well. 250cc of Sterile specimen collected. urine dark and cloudy. UA and Culture sent. Pt denies CP, SOB, HA, vision changes, n/v/d, fevers chills, abdominal pain. Safety and comfort measures initiated- bed placed in lowest position and side rails raised. Pt oriented to call bell system.

## 2020-10-06 NOTE — ED PROVIDER NOTE - CARE PLAN
Principal Discharge DX:	UTI (urinary tract infection)  Secondary Diagnosis:	Sepsis  Secondary Diagnosis:	Hypoxia

## 2020-10-06 NOTE — ED PROVIDER NOTE - PHYSICAL EXAMINATION
GEN: Well Appearing, Nontoxic, NAD  HEENT: NC/AT, Symm Facies. EOMI, no midline cervical tenderness   CV: No JVD/Bruits or stridor;  +S1S2, RRR w/o m/g/r  RESP: CTAB w/o w/r/r  ABD: Soft, nt/nd, +BS. No guarding/rebound. No RUQ tender  EXT/MSK: No lower extremity edema or calf tenderness.   L SHOULDER: Pain with movement, nontender to palpation, per aide chronic otherwise FROM of extremities   SKIN:+ Right hip pain with bruising, nontender, able to roll himself over in bed without pain, per aide from fall 3 weeks ago (had outpatient followup)  Neuro: No focal deficits, AOX2 with normal speech

## 2020-10-06 NOTE — H&P ADULT - ASSESSMENT
89M w/ dementia (family reports memory loss), afib on eliquis, HTN, DM2, HLD present to [facility] for evaluation of fever and UTI admit for severe sepsis w/ respiratory failure due to acute cystitis.

## 2020-10-06 NOTE — ED ADULT TRIAGE NOTE - CHIEF COMPLAINT QUOTE
fever/chills and weakness diagnosed with UTI this am. Per EMS, pt was 90% on RA placed on 2L NC with improvement to 94%

## 2020-10-06 NOTE — H&P ADULT - ATTENDING COMMENTS
Patient assigned to me by night hospitalist in charge for management and care for patient for this evening only. Care to be resumed by day hospitalist Dr. Stewart at 08:00 in the morning and thereafter.     Dominick Vincent MD  Medicine Attending  Department of Hospital Medicine  pager: [phone] (available from 20:00 to 08:00)

## 2020-10-06 NOTE — H&P ADULT - PROBLEM SELECTOR PLAN 9
suspect represents strain from afib w/ rvr  no cp reported by patient  repeat troponin in am suspect represents strain from afib w/ rvr  no cp reported by patient  delta troponin not c/w acute myocardial infarction

## 2020-10-06 NOTE — ED PROVIDER NOTE - PROGRESS NOTE DETAILS
Trop 55, will repeat 3 hours post draw. Patient with no complaints of chest pain. O2 sat low 90s. Placed on 2L NC. Dr. Geronimo spoke with Dr. Monterroso, states to admit patient to Dr. Crandall.- Madina Romo PA-C Pt accepted by Dr. Crandall. - Madina Romo PA-C

## 2020-10-06 NOTE — ED PROVIDER NOTE - OBJECTIVE STATEMENT
89 y/o M with PMH BPH, DM, HTN, Afib, HLD, b/l knee replacements presents to ED via EMS for 2 day of chills and fever. Per aid, patient had chills last night, leading into this morning. Went to  and was told patient had a UTI and was started on abx, unknown medication. States that this evening patient was unable to ambulate at baseline. Sat on toilet and was unable to get up from a seated position.  10am and 3pm received 500mg Tylenol. Patient currently has no complaints. Patient A+O to self and place, unsure of the year and month.  Per aid, at baseline sometimes forgetful but no acute changes in the last week. Fell after trying to  a peanut three weeks ago and has bruising to right hip. When to outpatient orthopedic, had an xray and was told it was a bruise. At baseline patient with R shoulder pain s/p multiple injections and being followed outpatient.   Aid Abril [phone].  Per daughter, Mary [phone]. Patient on Eliquis. Allergic to Procainamide.  PCP/Cardiologist: Artis Monterroso 89 y/o M with PMH BPH, DM, HTN, Afib, HLD, b/l knee replacements presents to ED via EMS for 2 day of chills and fever. Per aid, patient had chills last night, leading into this morning. Went to  and was told patient had a UTI and was started on abx, unknown medication. States that this evening patient was unable to ambulate at baseline. Sat on toilet and was unable to get up from a seated position.  10am and 3pm received 500mg Tylenol. Patient currently has no complaints. Patient A+O to self and place, unsure of the year and month.  Per aid, at baseline sometimes forgetful but no acute changes in the last week. Fell after trying to  a peanut three weeks ago and has bruising to right hip. When to outpatient orthopedic, had an xray and was told it was a bruise. At baseline patient with L shoulder pain s/p multiple injections and being followed outpatient.   Aid Abril [phone].  Per daughter, Mary [phone]. Patient on Eliquis. Allergic to Procainamide.  PCP/Cardiologist: Artis Monterroso

## 2020-10-06 NOTE — H&P ADULT - NSHPREVIEWOFSYSTEMS_GEN_ALL_CORE
likely limited as family reports that patient has baseline cognitive impairment however he answers as follows  CONSTITUTIONAL: No fever, no chills  EYES: No eye pain, no acute blindness  Mouth: no pain in mouth, no cuts  RESPIRATORY: No cough, No sob  CARDIOVASCULAR: No CP, no palpitations  GASTROINTESTINAL: no abdominal pain, no n/v/d  GENITOURINARY: No dysuria, no hematuria  Heme: No easy bruising, no swelling of neck  NEUROLOGICAL: No seizure, No acute paralysis  SKIN: No itching, no rashes  MUSCULOSKELETAL: No acute joint pain, no joint swelling

## 2020-10-06 NOTE — H&P ADULT - NSHPPHYSICALEXAM_GEN_ALL_CORE
Vital Signs Last 24 Hrs  T(C): 38.1 (06 Oct 2020 23:50), Max: 38.9 (06 Oct 2020 20:00)  T(F): 100.5 (06 Oct 2020 23:50), Max: 102 (06 Oct 2020 20:00)  HR: 79 (06 Oct 2020 23:50) (79 - 123)  BP: 140/84 (06 Oct 2020 23:50) (129/55 - 177/84)  BP(mean): 78 (06 Oct 2020 22:54) (70 - 98)  RR: 23 (06 Oct 2020 23:50) (23 - 35)  SpO2: 95% (06 Oct 2020 23:50) (93% - 96%) on 2LNC    GENERAL: NAD, well-developed  HEAD:  Atraumatic, Normocephalic  EYES: EOMI, PERRL, conjunctiva and sclera clear  Mouth: MMM, no lesions  NECK: Supple, no appreciable masses  Lung: normal work of breathing, cta b/l  Chest: S1&S2+, rrr, no m/r/g appreciated  ABDOMEN: bs+, soft, nt, nd, no appreciable masses  : No patel catheter, no CVA tenderness  EXTREMITIES:  radial pulse present b/l, PT present b/l, no pitting edema present  Neuro: A&Ox3 (name, hospital, year is 2020; but does not know why he came to hospital), no flaccid paralysis in extremities appreciated  SKIN: warm and dry, no visible purulence in exposed areas

## 2020-10-06 NOTE — H&P ADULT - PROBLEM SELECTOR PLAN 5
likely has dementia at baseline per family  A&Ox3 at time of medicine exam but cannot detail course prior to hospitalization

## 2020-10-06 NOTE — ED ADULT NURSE REASSESSMENT NOTE - NS ED NURSE REASSESS COMMENT FT1
Pt RTM tele, maintained on cont cardiac monitor and pulse ox. Pt on 2L NC due to drop in oxygen saturation to 90% while sleeping. Pt updated on plan of care and awaiting bed assignment. No complaints at this time. Safety and comfort measures initiated- bed placed in lowest position and side rails raised. Pt oriented to call bell system.

## 2020-10-06 NOTE — H&P ADULT - NSHPLABSRESULTS_GEN_ALL_CORE
Personally reviewed available labs, imaging and ekg  CBC Full  -  ( 06 Oct 2020 20:59 )  WBC Count : 20.93 K/uL  RBC Count : 4.67 M/uL  Hemoglobin : 13.0 g/dL  Hematocrit : 41.0 %  Platelet Count - Automated : 286 K/uL  Mean Cell Volume : 87.8 fl  Mean Cell Hemoglobin : 27.8 pg  Mean Cell Hemoglobin Concentration : 31.7 gm/dL  Auto Neutrophil # : 19.11 K/uL  Auto Lymphocyte # : 0.19 K/uL  Auto Monocyte # : 1.63 K/uL  Auto Eosinophil # : 0.00 K/uL  Auto Basophil # : 0.00 K/uL  Auto Neutrophil % : 87.0 %  Auto Lymphocyte % : 0.9 %  Auto Monocyte % : 7.8 %  Auto Eosinophil % : 0.0 %  Auto Basophil % : 0.0 %  10-06  133<L>  |  99  |  20  ----------------------------<  221<H>  4.2   |  20<L>  |  1.08  Ca    9.7      06 Oct 2020 20:59  TPro  7.2  /  Alb  4.4  /  TBili  0.7  /  DBili  x   /  AST  16  /  ALT  10  /  AlkPhos  50  10-06  PT/INR - ( 06 Oct 2020 20:59 )   PT: 20.4 sec;   INR: 1.75 ratio    PTT - ( 06 Oct 2020 20:59 )  PTT:36.9 sec  Urinalysis (10.06.20 @ 20:59)    pH Urine: 7.0    Glucose Qualitative, Urine: 100 mg/dL    Blood, Urine: Small    Color: Yellow    Urine Appearance: Slightly Turbid    Bilirubin: Negative    Ketone - Urine: Trace    Specific Gravity: 1.032    Protein, Urine: 300 mg/dL    Urobilinogen: Negative    Nitrite: Positive    Leukocyte Esterase Concentration: Large  Troponin T, High Sensitivity Result: 55: Rapid upward or downward changes in high-sensitivity troponin levels  suggest acute myocardial injury. Renal impairment may cause sustained  troponin elevations.  Normal: <6 - 14 ng/L  Indeterminate: 15-51 ng/L  Elevated: > 51 ng/L  See http://labs/test/[email or web address] on the [facility] intranet for more  information ng/L (10.06.20 @ 20:59)  Serum Pro-Brain Natriuretic Peptide: 2001 pg/mL (10.06.20 @ 20:59)  Imaging:  CXR demonstrates left-sided PPM, no lobar opacification present    EKG: afib with rvr 111, no ramon c/w stemi appreciated Personally reviewed available labs, imaging and ekg  CBC Full  -  ( 06 Oct 2020 20:59 )  WBC Count : 20.93 K/uL  RBC Count : 4.67 M/uL  Hemoglobin : 13.0 g/dL  Hematocrit : 41.0 %  Platelet Count - Automated : 286 K/uL  Mean Cell Volume : 87.8 fl  Mean Cell Hemoglobin : 27.8 pg  Mean Cell Hemoglobin Concentration : 31.7 gm/dL  Auto Neutrophil # : 19.11 K/uL  Auto Lymphocyte # : 0.19 K/uL  Auto Monocyte # : 1.63 K/uL  Auto Eosinophil # : 0.00 K/uL  Auto Basophil # : 0.00 K/uL  Auto Neutrophil % : 87.0 %  Auto Lymphocyte % : 0.9 %  Auto Monocyte % : 7.8 %  Auto Eosinophil % : 0.0 %  Auto Basophil % : 0.0 %  10-06  133<L>  |  99  |  20  ----------------------------<  221<H>  4.2   |  20<L>  |  1.08  Ca    9.7      06 Oct 2020 20:59  TPro  7.2  /  Alb  4.4  /  TBili  0.7  /  DBili  x   /  AST  16  /  ALT  10  /  AlkPhos  50  10-06  PT/INR - ( 06 Oct 2020 20:59 )   PT: 20.4 sec;   INR: 1.75 ratio    PTT - ( 06 Oct 2020 20:59 )  PTT:36.9 sec  Urinalysis (10.06.20 @ 20:59)    pH Urine: 7.0    Glucose Qualitative, Urine: 100 mg/dL    Blood, Urine: Small    Color: Yellow    Urine Appearance: Slightly Turbid    Bilirubin: Negative    Ketone - Urine: Trace    Specific Gravity: 1.032    Protein, Urine: 300 mg/dL    Urobilinogen: Negative    Nitrite: Positive    Leukocyte Esterase Concentration: Large  Troponin T, High Sensitivity Result: 55: Rapid upward or downward changes in high-sensitivity troponin levels  suggest acute myocardial injury. Renal impairment may cause sustained  troponin elevations.  Normal: <6 - 14 ng/L  Indeterminate: 15-51 ng/L  Elevated: > 51 ng/L  See http://labs/test/[email or web address] on the [facility] intranet for more  information ng/L (10.06.20 @ 20:59)  Troponin T, High Sensitivity Result: 59: Rapid upward or downward changes in high-sensitivity troponin levels  suggest acute myocardial injury. Renal impairment may cause sustained  troponin elevations.  Normal: <6 - 14 ng/L  Indeterminate: 15-51 ng/L  Elevated: > 51 ng/L  See http://labs/test/[email or web address] on the [facility] intranet for more  information ng/L (10.07.20 @ 00:11)    Serum Pro-Brain Natriuretic Peptide: 2001 pg/mL (10.06.20 @ 20:59)  Imaging:  CXR demonstrates left-sided PPM, no lobar opacification present    EKG: afib with rvr 111, no ramon c/w stemi appreciated

## 2020-10-06 NOTE — H&P ADULT - PROBLEM SELECTOR PLAN 2
- leukocytosis, tachycardia, fever, respiratory failure/lactic acidosis now improving  - driven by sepsis process rather than suspect pulmonary pathology  - treatment as above  - hold antihypertensives and av lavelle blocker ovenright

## 2020-10-07 DIAGNOSIS — N30.00 ACUTE CYSTITIS WITHOUT HEMATURIA: ICD-10-CM

## 2020-10-07 DIAGNOSIS — E11.65 TYPE 2 DIABETES MELLITUS WITH HYPERGLYCEMIA: ICD-10-CM

## 2020-10-07 DIAGNOSIS — J96.01 ACUTE RESPIRATORY FAILURE WITH HYPOXIA: ICD-10-CM

## 2020-10-07 DIAGNOSIS — A41.9 SEPSIS, UNSPECIFIED ORGANISM: ICD-10-CM

## 2020-10-07 DIAGNOSIS — R77.8 OTHER SPECIFIED ABNORMALITIES OF PLASMA PROTEINS: ICD-10-CM

## 2020-10-07 DIAGNOSIS — E87.1 HYPO-OSMOLALITY AND HYPONATREMIA: ICD-10-CM

## 2020-10-07 DIAGNOSIS — Z29.9 ENCOUNTER FOR PROPHYLACTIC MEASURES, UNSPECIFIED: ICD-10-CM

## 2020-10-07 DIAGNOSIS — I48.91 UNSPECIFIED ATRIAL FIBRILLATION: ICD-10-CM

## 2020-10-07 DIAGNOSIS — R41.89 OTHER SYMPTOMS AND SIGNS INVOLVING COGNITIVE FUNCTIONS AND AWARENESS: ICD-10-CM

## 2020-10-07 DIAGNOSIS — I10 ESSENTIAL (PRIMARY) HYPERTENSION: ICD-10-CM

## 2020-10-07 LAB
A1C WITH ESTIMATED AVERAGE GLUCOSE RESULT: 6.2 % — HIGH (ref 4–5.6)
ALBUMIN SERPL ELPH-MCNC: 4.2 G/DL — SIGNIFICANT CHANGE UP (ref 3.3–5)
ALP SERPL-CCNC: 50 U/L — SIGNIFICANT CHANGE UP (ref 40–120)
ALT FLD-CCNC: 12 U/L — SIGNIFICANT CHANGE UP (ref 10–45)
ANION GAP SERPL CALC-SCNC: 13 MMOL/L — SIGNIFICANT CHANGE UP (ref 5–17)
APTT BLD: 39.5 SEC — HIGH (ref 27.5–35.5)
AST SERPL-CCNC: 23 U/L — SIGNIFICANT CHANGE UP (ref 10–40)
BASOPHILS # BLD AUTO: 0.06 K/UL — SIGNIFICANT CHANGE UP (ref 0–0.2)
BASOPHILS NFR BLD AUTO: 0.3 % — SIGNIFICANT CHANGE UP (ref 0–2)
BILIRUB SERPL-MCNC: 0.6 MG/DL — SIGNIFICANT CHANGE UP (ref 0.2–1.2)
BLD GP AB SCN SERPL QL: NEGATIVE — SIGNIFICANT CHANGE UP
BUN SERPL-MCNC: 16 MG/DL — SIGNIFICANT CHANGE UP (ref 7–23)
CALCIUM SERPL-MCNC: 9.4 MG/DL — SIGNIFICANT CHANGE UP (ref 8.4–10.5)
CHLORIDE SERPL-SCNC: 101 MMOL/L — SIGNIFICANT CHANGE UP (ref 96–108)
CO2 SERPL-SCNC: 21 MMOL/L — LOW (ref 22–31)
CREAT SERPL-MCNC: 0.95 MG/DL — SIGNIFICANT CHANGE UP (ref 0.5–1.3)
DIGOXIN SERPL-MCNC: 0.6 NG/ML — LOW (ref 0.8–2)
EOSINOPHIL # BLD AUTO: 0.03 K/UL — SIGNIFICANT CHANGE UP (ref 0–0.5)
EOSINOPHIL NFR BLD AUTO: 0.1 % — SIGNIFICANT CHANGE UP (ref 0–6)
ESTIMATED AVERAGE GLUCOSE: 131 MG/DL — HIGH (ref 68–114)
GLUCOSE BLDC GLUCOMTR-MCNC: 124 MG/DL — HIGH (ref 70–99)
GLUCOSE BLDC GLUCOMTR-MCNC: 137 MG/DL — HIGH (ref 70–99)
GLUCOSE BLDC GLUCOMTR-MCNC: 148 MG/DL — HIGH (ref 70–99)
GLUCOSE BLDC GLUCOMTR-MCNC: 164 MG/DL — HIGH (ref 70–99)
GLUCOSE BLDC GLUCOMTR-MCNC: 177 MG/DL — HIGH (ref 70–99)
GLUCOSE BLDC GLUCOMTR-MCNC: 202 MG/DL — HIGH (ref 70–99)
GLUCOSE SERPL-MCNC: 151 MG/DL — HIGH (ref 70–99)
HCT VFR BLD CALC: 44.3 % — SIGNIFICANT CHANGE UP (ref 39–50)
HGB BLD-MCNC: 13.9 G/DL — SIGNIFICANT CHANGE UP (ref 13–17)
IMM GRANULOCYTES NFR BLD AUTO: 1 % — SIGNIFICANT CHANGE UP (ref 0–1.5)
INR BLD: 1.57 RATIO — HIGH (ref 0.88–1.16)
LACTATE SERPL-SCNC: 2.6 MMOL/L — HIGH (ref 0.7–2)
LYMPHOCYTES # BLD AUTO: 1.88 K/UL — SIGNIFICANT CHANGE UP (ref 1–3.3)
LYMPHOCYTES # BLD AUTO: 8.2 % — LOW (ref 13–44)
MAGNESIUM SERPL-MCNC: 1.8 MG/DL — SIGNIFICANT CHANGE UP (ref 1.6–2.6)
MCHC RBC-ENTMCNC: 28.3 PG — SIGNIFICANT CHANGE UP (ref 27–34)
MCHC RBC-ENTMCNC: 31.4 GM/DL — LOW (ref 32–36)
MCV RBC AUTO: 90.2 FL — SIGNIFICANT CHANGE UP (ref 80–100)
MONOCYTES # BLD AUTO: 2.35 K/UL — HIGH (ref 0–0.9)
MONOCYTES NFR BLD AUTO: 10.2 % — SIGNIFICANT CHANGE UP (ref 2–14)
NEUTROPHILS # BLD AUTO: 18.51 K/UL — HIGH (ref 1.8–7.4)
NEUTROPHILS NFR BLD AUTO: 80.2 % — HIGH (ref 43–77)
NRBC # BLD: 0 /100 WBCS — SIGNIFICANT CHANGE UP (ref 0–0)
PHOSPHATE SERPL-MCNC: 3 MG/DL — SIGNIFICANT CHANGE UP (ref 2.5–4.5)
PLATELET # BLD AUTO: 263 K/UL — SIGNIFICANT CHANGE UP (ref 150–400)
POTASSIUM SERPL-MCNC: 3.9 MMOL/L — SIGNIFICANT CHANGE UP (ref 3.5–5.3)
POTASSIUM SERPL-SCNC: 3.9 MMOL/L — SIGNIFICANT CHANGE UP (ref 3.5–5.3)
PROT SERPL-MCNC: 7 G/DL — SIGNIFICANT CHANGE UP (ref 6–8.3)
PROTHROM AB SERPL-ACNC: 18.4 SEC — HIGH (ref 10.6–13.6)
RBC # BLD: 4.91 M/UL — SIGNIFICANT CHANGE UP (ref 4.2–5.8)
RBC # FLD: 14.4 % — SIGNIFICANT CHANGE UP (ref 10.3–14.5)
RH IG SCN BLD-IMP: POSITIVE — SIGNIFICANT CHANGE UP
SARS-COV-2 IGG SERPL QL IA: NEGATIVE — SIGNIFICANT CHANGE UP
SARS-COV-2 IGM SERPL IA-ACNC: 0.08 INDEX — SIGNIFICANT CHANGE UP
SODIUM SERPL-SCNC: 135 MMOL/L — SIGNIFICANT CHANGE UP (ref 135–145)
TROPONIN T, HIGH SENSITIVITY RESULT: 59 NG/L — HIGH (ref 0–51)
WBC # BLD: 23.06 K/UL — HIGH (ref 3.8–10.5)
WBC # FLD AUTO: 23.06 K/UL — HIGH (ref 3.8–10.5)

## 2020-10-07 PROCEDURE — 74177 CT ABD & PELVIS W/CONTRAST: CPT | Mod: 26

## 2020-10-07 RX ORDER — ACETAMINOPHEN 500 MG
1000 TABLET ORAL ONCE
Refills: 0 | Status: COMPLETED | OUTPATIENT
Start: 2020-10-07 | End: 2020-10-07

## 2020-10-07 RX ORDER — IPRATROPIUM/ALBUTEROL SULFATE 18-103MCG
3 AEROSOL WITH ADAPTER (GRAM) INHALATION ONCE
Refills: 0 | Status: COMPLETED | OUTPATIENT
Start: 2020-10-07 | End: 2020-10-07

## 2020-10-07 RX ORDER — DEXTROSE 50 % IN WATER 50 %
12.5 SYRINGE (ML) INTRAVENOUS ONCE
Refills: 0 | Status: DISCONTINUED | OUTPATIENT
Start: 2020-10-07 | End: 2020-10-10

## 2020-10-07 RX ORDER — HALOPERIDOL DECANOATE 100 MG/ML
1 INJECTION INTRAMUSCULAR ONCE
Refills: 0 | Status: COMPLETED | OUTPATIENT
Start: 2020-10-07 | End: 2020-10-08

## 2020-10-07 RX ORDER — DIGOXIN 250 MCG
1 TABLET ORAL
Qty: 0 | Refills: 0 | DISCHARGE

## 2020-10-07 RX ORDER — ATORVASTATIN CALCIUM 80 MG/1
80 TABLET, FILM COATED ORAL AT BEDTIME
Refills: 0 | Status: DISCONTINUED | OUTPATIENT
Start: 2020-10-07 | End: 2020-10-10

## 2020-10-07 RX ORDER — DEXTROSE 50 % IN WATER 50 %
25 SYRINGE (ML) INTRAVENOUS ONCE
Refills: 0 | Status: DISCONTINUED | OUTPATIENT
Start: 2020-10-07 | End: 2020-10-10

## 2020-10-07 RX ORDER — DIGOXIN 250 MCG
0.12 TABLET ORAL DAILY
Refills: 0 | Status: DISCONTINUED | OUTPATIENT
Start: 2020-10-07 | End: 2020-10-10

## 2020-10-07 RX ORDER — DEXTROSE 50 % IN WATER 50 %
25 SYRINGE (ML) INTRAVENOUS ONCE
Refills: 0 | Status: DISCONTINUED | OUTPATIENT
Start: 2020-10-07 | End: 2020-10-07

## 2020-10-07 RX ORDER — FENOFIBRATE,MICRONIZED 130 MG
1 CAPSULE ORAL
Qty: 0 | Refills: 0 | DISCHARGE

## 2020-10-07 RX ORDER — APIXABAN 2.5 MG/1
2.5 TABLET, FILM COATED ORAL EVERY 12 HOURS
Refills: 0 | Status: DISCONTINUED | OUTPATIENT
Start: 2020-10-07 | End: 2020-10-07

## 2020-10-07 RX ORDER — INSULIN LISPRO 100/ML
VIAL (ML) SUBCUTANEOUS AT BEDTIME
Refills: 0 | Status: DISCONTINUED | OUTPATIENT
Start: 2020-10-07 | End: 2020-10-07

## 2020-10-07 RX ORDER — GLUCAGON INJECTION, SOLUTION 0.5 MG/.1ML
1 INJECTION, SOLUTION SUBCUTANEOUS ONCE
Refills: 0 | Status: DISCONTINUED | OUTPATIENT
Start: 2020-10-07 | End: 2020-10-10

## 2020-10-07 RX ORDER — BIMATOPROST 0.3 MG/ML
1 SOLUTION/ DROPS OPHTHALMIC
Qty: 0 | Refills: 0 | DISCHARGE

## 2020-10-07 RX ORDER — SODIUM CHLORIDE 9 MG/ML
1000 INJECTION, SOLUTION INTRAVENOUS
Refills: 0 | Status: DISCONTINUED | OUTPATIENT
Start: 2020-10-07 | End: 2020-10-07

## 2020-10-07 RX ORDER — DEXTROSE 50 % IN WATER 50 %
12.5 SYRINGE (ML) INTRAVENOUS ONCE
Refills: 0 | Status: DISCONTINUED | OUTPATIENT
Start: 2020-10-07 | End: 2020-10-07

## 2020-10-07 RX ORDER — DEXTROSE 50 % IN WATER 50 %
15 SYRINGE (ML) INTRAVENOUS ONCE
Refills: 0 | Status: DISCONTINUED | OUTPATIENT
Start: 2020-10-07 | End: 2020-10-07

## 2020-10-07 RX ORDER — FENOFIBRATE,MICRONIZED 130 MG
145 CAPSULE ORAL DAILY
Refills: 0 | Status: DISCONTINUED | OUTPATIENT
Start: 2020-10-07 | End: 2020-10-10

## 2020-10-07 RX ORDER — APIXABAN 2.5 MG/1
1 TABLET, FILM COATED ORAL
Qty: 0 | Refills: 0 | DISCHARGE

## 2020-10-07 RX ORDER — SODIUM CHLORIDE 9 MG/ML
500 INJECTION, SOLUTION INTRAVENOUS ONCE
Refills: 0 | Status: COMPLETED | OUTPATIENT
Start: 2020-10-07 | End: 2020-10-07

## 2020-10-07 RX ORDER — DEXTROSE 50 % IN WATER 50 %
15 SYRINGE (ML) INTRAVENOUS ONCE
Refills: 0 | Status: DISCONTINUED | OUTPATIENT
Start: 2020-10-07 | End: 2020-10-10

## 2020-10-07 RX ORDER — INSULIN LISPRO 100/ML
VIAL (ML) SUBCUTANEOUS EVERY 6 HOURS
Refills: 0 | Status: DISCONTINUED | OUTPATIENT
Start: 2020-10-07 | End: 2020-10-10

## 2020-10-07 RX ORDER — METFORMIN HYDROCHLORIDE 850 MG/1
1 TABLET ORAL
Qty: 0 | Refills: 0 | DISCHARGE

## 2020-10-07 RX ORDER — LATANOPROST 0.05 MG/ML
1 SOLUTION/ DROPS OPHTHALMIC; TOPICAL AT BEDTIME
Refills: 0 | Status: DISCONTINUED | OUTPATIENT
Start: 2020-10-07 | End: 2020-10-10

## 2020-10-07 RX ORDER — ROSUVASTATIN CALCIUM 5 MG/1
1 TABLET ORAL
Qty: 0 | Refills: 0 | DISCHARGE

## 2020-10-07 RX ORDER — GLUCAGON INJECTION, SOLUTION 0.5 MG/.1ML
1 INJECTION, SOLUTION SUBCUTANEOUS ONCE
Refills: 0 | Status: DISCONTINUED | OUTPATIENT
Start: 2020-10-07 | End: 2020-10-07

## 2020-10-07 RX ORDER — SODIUM CHLORIDE 9 MG/ML
1000 INJECTION, SOLUTION INTRAVENOUS ONCE
Refills: 0 | Status: COMPLETED | OUTPATIENT
Start: 2020-10-07 | End: 2020-10-07

## 2020-10-07 RX ORDER — METOPROLOL TARTRATE 50 MG
25 TABLET ORAL
Refills: 0 | Status: DISCONTINUED | OUTPATIENT
Start: 2020-10-07 | End: 2020-10-08

## 2020-10-07 RX ORDER — MIRABEGRON 50 MG/1
1 TABLET, EXTENDED RELEASE ORAL
Qty: 0 | Refills: 0 | DISCHARGE

## 2020-10-07 RX ORDER — METOPROLOL TARTRATE 50 MG
5 TABLET ORAL ONCE
Refills: 0 | Status: COMPLETED | OUTPATIENT
Start: 2020-10-07 | End: 2020-10-07

## 2020-10-07 RX ORDER — SITAGLIPTIN 50 MG/1
1 TABLET, FILM COATED ORAL
Qty: 0 | Refills: 0 | DISCHARGE

## 2020-10-07 RX ORDER — SODIUM CHLORIDE 9 MG/ML
1000 INJECTION, SOLUTION INTRAVENOUS
Refills: 0 | Status: COMPLETED | OUTPATIENT
Start: 2020-10-07 | End: 2020-10-07

## 2020-10-07 RX ORDER — INSULIN LISPRO 100/ML
VIAL (ML) SUBCUTANEOUS
Refills: 0 | Status: DISCONTINUED | OUTPATIENT
Start: 2020-10-07 | End: 2020-10-07

## 2020-10-07 RX ORDER — SILODOSIN 4 MG/1
1 CAPSULE ORAL
Qty: 0 | Refills: 0 | DISCHARGE

## 2020-10-07 RX ORDER — SODIUM CHLORIDE 9 MG/ML
1000 INJECTION, SOLUTION INTRAVENOUS
Refills: 0 | Status: DISCONTINUED | OUTPATIENT
Start: 2020-10-07 | End: 2020-10-10

## 2020-10-07 RX ADMIN — Medication 145 MILLIGRAM(S): at 21:41

## 2020-10-07 RX ADMIN — Medication 400 MILLIGRAM(S): at 02:12

## 2020-10-07 RX ADMIN — Medication 25 MILLIGRAM(S): at 13:04

## 2020-10-07 RX ADMIN — Medication 1: at 14:13

## 2020-10-07 RX ADMIN — SODIUM CHLORIDE 1500 MILLILITER(S): 9 INJECTION, SOLUTION INTRAVENOUS at 07:06

## 2020-10-07 RX ADMIN — Medication 3 MILLILITER(S): at 02:40

## 2020-10-07 RX ADMIN — PIPERACILLIN AND TAZOBACTAM 25 GRAM(S): 4; .5 INJECTION, POWDER, LYOPHILIZED, FOR SOLUTION INTRAVENOUS at 21:41

## 2020-10-07 RX ADMIN — PIPERACILLIN AND TAZOBACTAM 25 GRAM(S): 4; .5 INJECTION, POWDER, LYOPHILIZED, FOR SOLUTION INTRAVENOUS at 16:41

## 2020-10-07 RX ADMIN — Medication 3 MILLILITER(S): at 06:45

## 2020-10-07 RX ADMIN — ATORVASTATIN CALCIUM 80 MILLIGRAM(S): 80 TABLET, FILM COATED ORAL at 21:41

## 2020-10-07 RX ADMIN — LATANOPROST 1 DROP(S): 0.05 SOLUTION/ DROPS OPHTHALMIC; TOPICAL at 21:41

## 2020-10-07 RX ADMIN — Medication 0.12 MILLIGRAM(S): at 07:05

## 2020-10-07 RX ADMIN — SODIUM CHLORIDE 100 MILLILITER(S): 9 INJECTION, SOLUTION INTRAVENOUS at 18:36

## 2020-10-07 RX ADMIN — Medication 3 MILLILITER(S): at 02:12

## 2020-10-07 RX ADMIN — Medication 5 MILLIGRAM(S): at 18:28

## 2020-10-07 RX ADMIN — PIPERACILLIN AND TAZOBACTAM 25 GRAM(S): 4; .5 INJECTION, POWDER, LYOPHILIZED, FOR SOLUTION INTRAVENOUS at 07:06

## 2020-10-07 NOTE — CHART NOTE - NSCHARTNOTEFT_GEN_A_CORE
Walter Monterroso  Episodic Note  Notified  by RN that patient noted with hematuria . Straight cath earlier for urine retention. Also reported with  fever 101.5 F and audible expiratory wheezing.   Patient seen and evaluated at bedside in mild distress due to  fever and chills. Alert and oriented x2      For use in patients that have 2 sepsis criteria and new organ dysfunction   •	New or increased oxygen requirement  •	Creatinine >2mg/dL  •	Bilirubin>2mg/dL  •	Platelet <100,00/mm3  •	INR >1.5, PTT>60  •	Lactate >2    If patient persistent hypotension (SBP<90) or any lactate >4 then provider evaluation (Physician/PA/NP) within 30 minutes of bolus completion is required.    Vital Signs Last 24 Hrs  T(C): 38.6 (07 Oct 2020 01:54), Max: 38.9 (06 Oct 2020 20:00)  T(F): 101.5 (07 Oct 2020 01:54), Max: 102 (06 Oct 2020 20:00)  HR: 102 (07 Oct 2020 01:54) (79 - 123)  BP: 140/84 (06 Oct 2020 23:50) (129/55 - 177/84)  BP(mean): 78 (06 Oct 2020 22:54) (70 - 98)  RR: 25 (07 Oct 2020 01:54) (23 - 35)  SpO2: 95% (07 Oct 2020 01:54) (93% - 96%)  		  LUNGS:  [  ]Clear bilaterally [x  ] Wheeze [  ] Rhonchi [  ] Rales [  ] Crackles; Other:  HEART: Irregular Irregular [  ] No murmur[  ]  Normal S1S2[ x ] Tachycardia;  Other:  CAPILLARY REFiLL:  	Fingers: [ x ] less than 2 seconds [  ] more than 2 seconds                                           Toes: [  ]  less than 2 seconds [  ] more than 2 seconds   PERIPHERAL PULSES:  Radial: [  x] Palpable  [  ]  non-palpable                                         Dorsalis Pedis: [  ] Palpable  [  ] non-palpable                                         Posterior Tibial: [  ] Palpable  [  ] non-palpable                                          Other:  SKIN:   [  ]  Diaphoretic  [  ]  mottling  [  ]  Cold extremities  [ x ]  Warm [ x ]  Dry                      Other:    BEDSIDE ULTRASOUND FINDINGS (IF APPLICABLE):    Labs:  06 Oct 2020 20:59    133    |  99     |  20     ----------------------------<  221    4.2     |  20     |  1.08     Ca    9.7        06 Oct 2020 20:59    TPro  7.2    /  Alb  4.4    /  TBili  0.7    /  DBili  x      /  AST  16     /  ALT  10     /  AlkPhos  50     06 Oct 2020 20:59                          13.0   20.93 )-----------( 286      ( 06 Oct 2020 20:59 )             41.0     PT/INR - ( 06 Oct 2020 20:59 )   PT: 20.4 sec;   INR: 1.75 ratio         PTT - ( 06 Oct 2020 20:59 )  PTT:36.9 sec  Lactate: 2.2 > 1.6    HPI- 89M w/ dementia (family reports memory loss), afib on eliquis, HTN, DM2, HLD present to [facility]  for evaluation of fever and UTI admit for severe sepsis w/ respiratory failure due to acute cystitis.  Now with Fever / Wheezing and mild hematuria after straight catheterization    > Will give Tylenol IV x1 now  >Duoneb for wheezing  > Oxygen as needed   > Monitor hematuria. likely from trauma after catheterization  >CBC, BMP in am  >Bladder scan   >Continue current Antibiotics  > Follow-up with Attending     Walter Monterroso, ANP-C  Department of Medicine  [id number]

## 2020-10-07 NOTE — PROVIDER CONTACT NOTE (OTHER) - ACTION/TREATMENT ORDERED:
LEONA Batres aware, NP to notify RN of meds to be ordered, cont to monitor Pt, give meds as ordered
LEONA Batres aware, Pt rec'd lopressor with good result, Pt will get again tonight, okay to send to 6
NP Gisel aware, okay to send to 6T, NP to put in a PRN Lopressor, send Pt to 6T at this time, cont to monitor
NP aware. pt pending CT of abdomen at this time. Continue to monitor patient for distress or new changes
NP to bedside to assess patient. IV Tylenol 1G & Deuonebx1 given. will continue to monitor and reassess patient.

## 2020-10-07 NOTE — PROGRESS NOTE ADULT - PROBLEM/PLAN-10
Last office visit: 05/02/19    Upcoming scheduled visit: none    Last refill given: 09/06/18 for 3 month supply with 3 refills    Medication refill request approved per protocol    DISPLAY PLAN FREE TEXT

## 2020-10-07 NOTE — CONSULT NOTE ADULT - ASSESSMENT
89M w/ dementia (family reports memory loss), afib on eliquis, HTN, DM2, HLD present to [facility] for evaluation of fever and UTI. The patient is reported to be found to have fever this morning and upon evaluation by urgent care he was diagnosed with UTI and given bactrim which he took 1 dose in late evening but demonstrated continued fatigue, fever, chills, and general sick appearance. The patient cannot remember why he came to the hospital and therefore there is limited history. Medication rec obtained from Ms. Jade. The patient is noted to live alone and has HHA who assists with adls. The patient is reported to at baseline have short-term memory problems but no formal evaluation for dementia reported.    In the ED, VS: T 102, 177/84, 120, 25 93%RA  s/p acetaminophen 650x1, [id number](21:53), LR 1.55Lx1 (06 Oct 2020 23:12)    WBC 23 <-- 20.  Lact 2.6.  UA (+) nit/large LE.  Cov-19 pcr, RVP with SARS-COV (-), Cov-19 IgG ab (-).  CTap no hydronephrosis, (+) enlarged prostate, mild diverticulitis of cecum with secondary involvement of appendix, cholelithiasis.  Cxr shows clear lungs.      Pt given dose of rocephin in the ER.  Now on zosyn.  ID Consult called for further abx managment.     Sepsis:    - Pt febrile, tachycardic, elevated lactate, leukocytosis.  Cont hemodynamic monitoring, IVF hydration as needed, monitor pulse ox, supp O2 as required, check repeat lactate.    - Agree with broad spectrum abx, source  could be from UTI/prostatitis vs diverticulitis.    - Cont zosyn for /GI coverage.    - Check ua, ucx, blood cx x 2      Mild diverticulitis:    - Mild wall thickening of sigmoid colon, possible involvement of cecum and secondary involvement of appendix.  Pt with some RLQ to lower abd TTP.  c/o bloating.    - Cont zosyn.  Check repeat lactate.      - GI and Surgical eval called by Primary      UTI:    - UA (+) nit/LE.  Enlarged prostate seen on imaging.  check urine cx.    - Cont zosyn      will follow,    Abbi Avalos  [phone]

## 2020-10-07 NOTE — CHART NOTE - NSCHARTNOTEFT_GEN_A_CORE
Called by RN to see patient for concern of distended abdomen.  Patient seen and evaluated at bedside , abdomen firm, and distended with faint bowel sounds. No tenderness  or c/o pain on palpation  Patient is a poor historian and cannot recall last bowel movement. Bladder scan after voiding 454 ml.  Patient catheterized with 660 ml urine.    Vital Signs Last 24 Hrs  T(C): 37.3 (07 Oct 2020 04:25), Max: 38.9 (06 Oct 2020 20:00)  T(F): 99.2 (07 Oct 2020 04:25), Max: 102.1 (07 Oct 2020 02:50)  HR: 100 (07 Oct 2020 03:10) (79 - 123)  BP: 128/71 (07 Oct 2020 03:10) (128/71 - 177/84)  BP(mean): 78 (06 Oct 2020 22:54) (70 - 98)  RR: 23 (07 Oct 2020 03:10) (23 - 35)  SpO2: 97% (07 Oct 2020 03:10) (93% - 97%)    Impression: Distended Abdomen R/o SBO    Will obtain CT abdomen to r/o bowel obstruction  Discussed above findings with Dr Vincent who is in agreement of the above plan    Walter Monterroso ANP-C  Department of Medicine  [id number] Called by RN to see patient for concern of distended abdomen.  Patient seen and evaluated at bedside , abdomen firm, and distended with faint bowel sounds. No tenderness  or c/o pain on palpation. No nausea, vomiting or diarrhea .   Patient is a poor historian and cannot recall last bowel movement. Bladder scan after voiding 454 ml.  Patient catheterized with 660 ml urine.    Vital Signs Last 24 Hrs  T(C): 37.3 (07 Oct 2020 04:25), Max: 38.9 (06 Oct 2020 20:00)  T(F): 99.2 (07 Oct 2020 04:25), Max: 102.1 (07 Oct 2020 02:50)  HR: 100 (07 Oct 2020 03:10) (79 - 123)  BP: 128/71 (07 Oct 2020 03:10) (128/71 - 177/84)  BP(mean): 78 (06 Oct 2020 22:54) (70 - 98)  RR: 23 (07 Oct 2020 03:10) (23 - 35)  SpO2: 97% (07 Oct 2020 03:10) (93% - 97%)    Impression: Distended Abdomen R/o SBO    Will obtain CT abdomen to r/o bowel obstruction  Discussed above findings with Dr Vincent who is in agreement of the above plan    MOISES Avalos-C  Department of Medicine  [id number] Called by RN to see patient for concern of distended abdomen.  Patient seen and evaluated at bedside , abdomen firm, and distended with faint bowel sounds. No tenderness  or c/o pain on palpation. No nausea, vomiting or diarrhea .   Patient is a poor historian and cannot recall last bowel movement. Bladder scan after voiding 454 ml.  Patient catheterized with 660 ml urine.    Vital Signs Last 24 Hrs  T(C): 37.3 (07 Oct 2020 04:25), Max: 38.9 (06 Oct 2020 20:00)  T(F): 99.2 (07 Oct 2020 04:25), Max: 102.1 (07 Oct 2020 02:50)  HR: 100 (07 Oct 2020 03:10) (79 - 123)  BP: 128/71 (07 Oct 2020 03:10) (128/71 - 177/84)  BP(mean): 78 (06 Oct 2020 22:54) (70 - 98)  RR: 23 (07 Oct 2020 03:10) (23 - 35)  SpO2: 97% (07 Oct 2020 03:10) (93% - 97%)    Impression: Distended Abdomen R/o SBO    Will obtain CT abdomen/ pelvis STAT  to r/o bowel obstruction  Discussed above findings with Dr Vincent who is in agreement of the above plan    MOISES Avalos-C  Department of Medicine  [id number] Called by RN to see patient for concern of distended abdomen.  Patient seen and evaluated at bedside , abdomen firm, and distended with faint bowel sounds,  without rebound tenderness, tympanic to percussion.   No nausea, vomiting or diarrhea .   Patient is a poor historian and cannot recall last bowel movement. Bladder scan after voiding 454 ml.  Patient catheterized with 660 ml urine.    Vital Signs Last 24 Hrs  T(C): 37.3 (07 Oct 2020 04:25), Max: 38.9 (06 Oct 2020 20:00)  T(F): 99.2 (07 Oct 2020 04:25), Max: 102.1 (07 Oct 2020 02:50)  HR: 100 (07 Oct 2020 03:10) (79 - 123)  BP: 128/71 (07 Oct 2020 03:10) (128/71 - 177/84)  BP(mean): 78 (06 Oct 2020 22:54) (70 - 98)  RR: 23 (07 Oct 2020 03:10) (23 - 35)  SpO2: 97% (07 Oct 2020 03:10) (93% - 97%)    Impression: Distended Abdomen R/o SBO    Will obtain CT abdomen/ pelvis STAT  to r/o bowel obstruction  Discussed above findings with Dr Vincent who is in agreement of the above plan  Hold Eliquis  Cont with Micha Monterroso, ANP-C  Department of Medicine  [phone]

## 2020-10-07 NOTE — PROVIDER CONTACT NOTE (OTHER) - ASSESSMENT
Pt afib on tele, HR 120s, Pt agitated/restless, attempting to get oob, RN asking if stable to send to 6T

## 2020-10-07 NOTE — PROGRESS NOTE ADULT - PROBLEM SELECTOR PLAN 1
- UA+ w/ sepsis  - zosyn until urine/blood culture return with organism  - IV fluid given in ED appears sufficient at time of medicine exam. will need further dosing of IV fluid based upon SBP/clinical exam  - lactic acidosis resolving with treatment as above  -s/p straight cath 2/2 urinary retention, rpt bladder scan at 5 am, if >200 cc, will place indwelling patel

## 2020-10-07 NOTE — PROGRESS NOTE ADULT - PROBLEM SELECTOR PLAN 2
- leukocytosis, tachycardia, fever, respiratory failure/lactic acidosis now improving  - driven by sepsis process rather than suspect pulmonary pathology  - treatment as above  - hold antihypertensives and av lavelle blocker overnight

## 2020-10-07 NOTE — PROGRESS NOTE ADULT - PROBLEM SELECTOR PLAN 9
suspect represents strain from afib w/ rvr  no cp reported by patient  delta troponin not c/w acute myocardial infarction

## 2020-10-07 NOTE — CONSULT NOTE ADULT - ASSESSMENT
Echo 3/12/15: mild MR, mild AS, grossly nl LV sys fx ,mild pulm htn      a/p  89M w/ dementia (family reports memory loss), afib on eliquis, HTN, DM2, HLD present to [facility] for evaluation of fever and UTI.    1. Chronic Afib with RVR   - secondary to infection/ sepsis   - now rate controlled, c/w lopressor 25 mg BID, digoxin 0.125 mg daily  - Echo 3/12/15: mild MR, mild AS, grossly nl LV sys fx ,mild pulm htn      a/p  89M w/ dementia (family reports memory loss), afib on eliquis, mild AS, HTN, DM2, HLD present with  fever and UTI/ sepsis     1. Chronic Afib with RVR   - secondary to infection/ sepsis   - now rate controlled, c/w lopressor 25 mg BID, digoxin 0.125 mg daily  - ChadsVac =3. Resume oral a/c   - check echo to eval AS/ LV fx   -cv stable, no decomp CHF on exam     2. Mild AS  -check echo    3. UTI/ Sepsis/  diverticulitis:  -presenting febrile, tachycardic, elevated lactate, leukocytosis  -f/u cultures   -ID f/u- abx    -CT abd noted. -management per med, GI eval?     4. HTN   -elevated bp   lopressor resumed today.  trend bp     dvt ppx

## 2020-10-07 NOTE — CONSULT NOTE ADULT - SUBJECTIVE AND OBJECTIVE BOX
HPI:  89M w/ dementia (family reports memory loss), afib on eliquis, HTN, DM2, HLD present to [facility] for evaluation of fever and UTI. The patient is reported to be found to have fever this morning and upon evaluation by urgent care he was diagnosed with UTI and given bactrim which he took 1 dose in late evening but demonstrated continued fatigue, fever, chills, and general sick appearance. The patient cannot remember why he came to the hospital and therefore there is limited history. Medication rec obtained from MsChantel Jade. The patient is noted to live alone and has HHA who assists with adls. The patient is reported to at baseline have short-term memory problems but no formal evaluation for dementia reported.    Found to have colonic diverticulosis w/ secondary involvement of appendix. Incidental finding. Colorectal surgery consulted for management.     PAST MEDICAL & SURGICAL HISTORY:  BPH (benign prostatic hypertrophy)    Macular degeneration    DM (diabetes mellitus)    HTN (hypertension)    AF (atrial fibrillation)    Pacemaker    HLD (hyperlipidemia)    S/P knee replacement, bilateral        MEDICATIONS  (STANDING):  atorvastatin 80 milliGRAM(s) Oral at bedtime  dextrose 5%. 1000 milliLiter(s) (50 mL/Hr) IV Continuous <Continuous>  dextrose 50% Injectable 12.5 Gram(s) IV Push once  dextrose 50% Injectable 25 Gram(s) IV Push once  dextrose 50% Injectable 25 Gram(s) IV Push once  digoxin     Tablet 0.125 milliGRAM(s) Oral daily  fenofibrate Tablet 145 milliGRAM(s) Oral daily  insulin lispro (HumaLOG) corrective regimen sliding scale   SubCutaneous every 6 hours  lactated ringers. 1000 milliLiter(s) (100 mL/Hr) IV Continuous <Continuous>  latanoprost 0.005% Ophthalmic Solution 1 Drop(s) Both EYES at bedtime  metoprolol tartrate 25 milliGRAM(s) Oral two times a day  piperacillin/tazobactam IVPB.. 3.375 Gram(s) IV Intermittent every 8 hours    MEDICATIONS  (PRN):  dextrose 40% Gel 15 Gram(s) Oral once PRN Blood Glucose LESS THAN 70 milliGRAM(s)/deciliter  glucagon  Injectable 1 milliGRAM(s) IntraMuscular once PRN Glucose LESS THAN 70 milligrams/deciliter      Allergies    procainamide (Other (Severe))    Intolerances        SOCIAL HISTORY:  Never smoker  Denies drinking  Lives at home with HHA.    FAMILY HISTORY:  No pertinent family history in first degree relatives        Physical Exam:  General: NAD, resting comfortably, A&Ox3  HEENT: NC/AT, EOMI, normal hearing,  neck supple  Pulmonary: On 2L O2  Cardiovascular: NSR, no evidence of cyanosis/edema  Abdominal: Soft, ND, tender in lower quadrants  Extremities: WWP. Normal Strength. Full ROM.     Vital Signs Last 24 Hrs  T(C): 37.3 (07 Oct 2020 11:47), Max: 38.9 (06 Oct 2020 20:00)  T(F): 99.1 (07 Oct 2020 11:47), Max: 102.1 (07 Oct 2020 02:50)  HR: 129 (07 Oct 2020 11:47) (79 - 139)  BP: 174/76 (07 Oct 2020 11:47) (128/71 - 183/79)  BP(mean): 78 (06 Oct 2020 22:54) (70 - 98)  RR: 22 (07 Oct 2020 07:41) (22 - 35)  SpO2: 96% (07 Oct 2020 07:41) (93% - 99%)    I&O's Summary    06 Oct 2020 07:01  -  07 Oct 2020 07:00  --------------------------------------------------------  IN: 0 mL / OUT: 966 mL / NET: -966 mL        LABS:                        13.9   23.06 )-----------( 263      ( 07 Oct 2020 06:52 )             44.3     10-07    135  |  101  |  16  ----------------------------<  151<H>  3.9   |  21<L>  |  0.95    Ca    9.4      07 Oct 2020 06:52  Phos  3.0     10-  Mg     1.8     10-    TPro  7.0  /  Alb  4.2  /  TBili  0.6  /  DBili  x   /  AST  23  /  ALT  12  /  AlkPhos  50  10-07    PT/INR - ( 07 Oct 2020 06:52 )   PT: 18.4 sec;   INR: 1.57 ratio         PTT - ( 07 Oct 2020 06:52 )  PTT:39.5 sec  Urinalysis Basic - ( 06 Oct 2020 20:59 )    Color: Yellow / Appearance: Slightly Turbid / S.032 / pH: x  Gluc: x / Ketone: Trace  / Bili: Negative / Urobili: Negative   Blood: x / Protein: 300 mg/dL / Nitrite: Positive   Leuk Esterase: Large / RBC: 16 /hpf /  /HPF   Sq Epi: x / Non Sq Epi: 0 /hpf / Bacteria: Many      CAPILLARY BLOOD GLUCOSE      POCT Blood Glucose.: 164 mg/dL (07 Oct 2020 13:55)  POCT Blood Glucose.: 202 mg/dL (07 Oct 2020 08:36)  POCT Blood Glucose.: 137 mg/dL (07 Oct 2020 06:36)    LIVER FUNCTIONS - ( 07 Oct 2020 06:52 )  Alb: 4.2 g/dL / Pro: 7.0 g/dL / ALK PHOS: 50 U/L / ALT: 12 U/L / AST: 23 U/L / GGT: x             RADIOLOGY & ADDITIONAL STUDIES:  < from: CT Abdomen and Pelvis w/ IV Cont (10.07.20 @ 06:56) >  IMPRESSION:  Colonic diverticulosis, particularly involving the sigmoid colon. There is mild wall thickening of the mid sigmoid colon with associated mild inflammatory changes and fluid, suggestive of mild diverticulitis. This process is inseparable from and may involve the base of the cecum. The proximal appendix is at the upper limits of normal in diameter proximally, and is in close proximity to the mild inflammatory changes involving the sigmoid colon, suggesting secondary involvement of the appendix.    Enlarged prostategland.    Cholelithiasis.  < end of copied text >

## 2020-10-07 NOTE — CONSULT NOTE ADULT - SUBJECTIVE AND OBJECTIVE BOX
CARDIOLOGY CONSULT - Dr. Siddiqi         HPI:  History collected from the chart and from aid given patient's mental status    89M w/ dementia (family reports memory loss), afib on eliquis, HTN, DM2, HLD present to [facility] for evaluation of fever and UTI. The patient is reported to be found to have fever this morning and upon evaluation by urgent care he was diagnosed with UTI and given bactrim which he took 1 dose in late evening but demonstrated continued fatigue, fever, chills, and general sick appearance. The patient cannot remember why he came to the hospital and therefore there is limited history. In the ED, pt noted to be in afib with RVR. lopressor 25 mg PO BID resumed in ED. recieved first dose now rate controlled   Echo 3/12/15: mild MR, mild AS, grossly nl LV sys fx ,mild pulm htn    ROS otherwise negative     PAST MEDICAL & SURGICAL HISTORY:  BPH (benign prostatic hypertrophy)    Macular degeneration    DM (diabetes mellitus)    HTN (hypertension)    AF (atrial fibrillation)    Pacemaker    HLD (hyperlipidemia)    S/P knee replacement, bilateral            PREVIOUS DIAGNOSTIC TESTING:    [ ] Echocardiogram:  Echo 3/12/15: mild MR, mild AS, grossly nl LV sys fx ,mild pulm htn    [ ]  Catheterization:    [ ] Stress Test:  	    MEDICATIONS:  Home Medications:  Crestor 20 mg oral tablet: 1 tab(s) orally once a day (07 Oct 2020 00:38)  digoxin 125 mcg (0.125 mg) oral tablet: 1 tab(s) orally once a day (07 Oct 2020 00:38)  Eliquis 5 mg oral tablet: 1 tab(s) orally 2 times a day (07 Oct 2020 00:38)  enalapril 5 mg oral tablet: 1 tab(s) orally once a day (07 Oct 2020 00:38)  fenofibrate 145 mg oral tablet: 1 tab(s) orally once a day (07 Oct 2020 00:38)  Januvia 50 mg oral tablet: 1 tab(s) orally once a day (07 Oct 2020 00:38)  latanoprost 0.005% ophthalmic solution: 1 drop(s) to each affected eye once a day (in the evening) (07 Oct 2020 00:38)  metFORMIN 750 mg oral tablet, extended release: 1 tab(s) orally once a day (07 Oct 2020 00:38)  metoprolol succinate 50 mg oral tablet, extended release: 1 tab(s) orally once a day (07 Oct 2020 00:38)  Myrbetriq 25 mg oral tablet, extended release: 1 tab(s) orally once a day (07 Oct 2020 00:38)  silodosin 8 mg oral capsule: 1 cap(s) orally once a day (07 Oct 2020 00:38)      MEDICATIONS  (STANDING):  atorvastatin 80 milliGRAM(s) Oral at bedtime  dextrose 5%. 1000 milliLiter(s) (50 mL/Hr) IV Continuous <Continuous>  dextrose 50% Injectable 12.5 Gram(s) IV Push once  dextrose 50% Injectable 25 Gram(s) IV Push once  dextrose 50% Injectable 25 Gram(s) IV Push once  digoxin     Tablet 0.125 milliGRAM(s) Oral daily  fenofibrate Tablet 145 milliGRAM(s) Oral daily  insulin lispro (HumaLOG) corrective regimen sliding scale   SubCutaneous every 6 hours  lactated ringers. 1000 milliLiter(s) (100 mL/Hr) IV Continuous <Continuous>  latanoprost 0.005% Ophthalmic Solution 1 Drop(s) Both EYES at bedtime  metoprolol tartrate 25 milliGRAM(s) Oral two times a day  piperacillin/tazobactam IVPB.. 3.375 Gram(s) IV Intermittent every 8 hours      FAMILY HISTORY:  No pertinent family history in first degree relatives        SOCIAL HISTORY:    [ x] Non-smoker  [ ] Smoker  [ ] Alcohol    Allergies    procainamide (Other (Severe))    Intolerances    	    REVIEW OF SYSTEMS:  CONSTITUTIONAL: see hpi   EYES: No eye pain, visual disturbances, or discharge  ENMT:  No difficulty hearing, tinnitus, vertigo; No sinus or throat pain  NECK: No pain or stiffness  RESPIRATORY: No cough, wheezing, chills or hemoptysis; No Shortness of Breath  CARDIOVASCULAR: No chest pain, palpitations, passing out, dizziness, or leg swelling  GASTROINTESTINAL: No abdominal or epigastric pain. No nausea, vomiting, or hematemesis; No diarrhea or constipation. No melena or hematochezia.  GENITOURINARY: No dysuria, frequency, hematuria, or incontinence  NEUROLOGICAL: No headaches, memory loss, loss of strength, numbness, or tremors  SKIN: No itching, burning, rashes, or lesions   	    [x ] All others negative	  [ ] Unable to obtain    PHYSICAL EXAM:  T(C): 37.3 (10-07-20 @ 11:47), Max: 38.9 (10-06-20 @ 20:00)  HR: 129 (10-07-20 @ 11:47) (79 - 139)  BP: 174/76 (10-07-20 @ 11:47) (128/71 - 183/79)  RR: 22 (10-07-20 @ 07:41) (22 - 35)  SpO2: 96% (10-07-20 @ 07:41) (93% - 99%)  Wt(kg): --  I&O's Summary    06 Oct 2020 07:01  -  07 Oct 2020 07:00  --------------------------------------------------------  IN: 0 mL / OUT: 966 mL / NET: -966 mL        Appearance: Normal	  Psychiatry: A & O x 2, Mood & affect appropriate  HEENT:   Normal oral mucosa, PERRL, EOMI	  Lymphatic: No lymphadenopathy  Cardiovascular: Normal S1 S2, irregular + murmur   Respiratory: Lungs clear to auscultation	  Gastrointestinal:  Soft, Non-tender, + BS	  Skin: No rashes, No ecchymoses, No cyanosis	  Neurologic: Non-focal  Extremities: Normal range of motion, No clubbing, cyanosis or edema  Vascular: Peripheral pulses palpable 2+ bilaterally    TELEMETRY: afib hr 80s 	    ECG:  Afib with RVR hr 111 bpm,non specific st abnl 	  RADIOLOGY:    Xray Chest 1 View- PORTABLE-Urgent (10.06.20 @ 20:25) >  FINDINGS:  Left-sided pacemaker with lead terminating in the right ventricle.  The lungs are clear.  Heart size is normal.  Degenerative changes of the bilateral shoulders.      IMPRESSION:    Clear lungs.        CT Abdomen and Pelvis w/ IV Cont (10.07.20 @ 06:56) >  IMPRESSION:  Colonic diverticulosis, particularly involving the sigmoid colon. There is mild wall thickening of the mid sigmoid colon with associated mild inflammatory changes and fluid, suggestive of mild diverticulitis. This process is inseparable from and may involve the base of the cecum. The proximal appendix is at the upper limits of normal in diameter proximally, and is in close proximity to the mild inflammatory changes involving the sigmoid colon, suggesting secondary involvement of the appendix.    Enlarged prostategland.    Cholelithiasis.        < end of copied text >    OTHER: 	  	  LABS:	 	    CARDIAC MARKERS:  Troponin T, High Sensitivity Result: 59 ng/L (10-07 @ 00:11)  Troponin T, High Sensitivity Result: 55 ng/L (10-06 @ 20:59)                                  13.9   23.06 )-----------( 263      ( 07 Oct 2020 06:52 )             44.3     10-07    135  |  101  |  16  ----------------------------<  151<H>  3.9   |  21<L>  |  0.95    Ca    9.4      07 Oct 2020 06:52  Phos  3.0     10-07  Mg     1.8     10-07    TPro  7.0  /  Alb  4.2  /  TBili  0.6  /  DBili  x   /  AST  23  /  ALT  12  /  AlkPhos  50  10-07    PT/INR - ( 07 Oct 2020 06:52 )   PT: 18.4 sec;   INR: 1.57 ratio         PTT - ( 07 Oct 2020 06:52 )  PTT:39.5 sec  proBNP: Serum Pro-Brain Natriuretic Peptide: 2001 pg/mL (10-06 @ 20:59)    Lipid Profile:   HgA1c:   TSH:

## 2020-10-07 NOTE — PROGRESS NOTE ADULT - ASSESSMENT
89M w/ dementia (family reports memory loss), afib on eliquis, HTN, DM2, HLD present to [facility] for evaluation of fever and UTI admit for severe sepsis w/ respiratory failure due to acute cystitis.

## 2020-10-07 NOTE — CONSULT NOTE ADULT - SUBJECTIVE AND OBJECTIVE BOX
Patient is a 89y old  Male who presents with a chief complaint of 89M p/w fever and UTI (06 Oct 2020 23:12)      HPI:    History collected from the chart and from the patient's daughter Ms. Mary Jade given patient's mental status    89M w/ dementia (family reports memory loss), afib on eliquis, HTN, DM2, HLD present to [facility] for evaluation of fever and UTI. The patient is reported to be found to have fever this morning and upon evaluation by urgent care he was diagnosed with UTI and given bactrim which he took 1 dose in late evening but demonstrated continued fatigue, fever, chills, and general sick appearance. The patient cannot remember why he came to the hospital and therefore there is limited history. Medication rec obtained from Ms. Jade. The patient is noted to live alone and has HHA who assists with adls. The patient is reported to at baseline have short-term memory problems but no formal evaluation for dementia reported.    In the ED, VS: T 102, 177/84, 120, 25 93%RA  s/p acetaminophen 650x1, [id number](21:53), LR 1.55Lx1 (06 Oct 2020 23:12)    WBC 23 <-- 20.  Lact 2.6.  UA (+) nit/large LE.  Cov-19 pcr, RVP with SARS-COV (-), Cov-19 IgG ab (-).  CTap no hydronephrosis, (+) enlarged prostate, mild diverticulitis of cecum with secondary involvement of appendix, cholelithiasis.  Cxr shows clear lungs.      Pt given dose of rocephin in the ER.  Now on zosyn.  ID Consult called for further abx managment.       REVIEW OF SYSTEMS:    CONSTITUTIONAL: No fever, weight loss, or fatigue  EYES: No eye pain, visual disturbances, or discharge  ENMT:  No sore throat  NECK: No pain or stiffness  RESPIRATORY: No cough, wheezing, chills or hemoptysis; No shortness of breath  CARDIOVASCULAR: No chest pain, palpitations, dizziness, or leg swelling  GASTROINTESTINAL: No abdominal or epigastric pain. No nausea, vomiting, or hematemesis; No diarrhea or constipation. No melena or hematochezia.  GENITOURINARY: No dysuria, frequency, hematuria, or incontinence  NEUROLOGICAL: No headaches, memory loss, loss of strength, numbness, or tremors  SKIN: No itching, burning, rashes, or lesions   LYMPH NODES: No enlarged glands  MUSCULOSKELETAL: No joint pain or swelling; No muscle, back, or extremity pain      PAST MEDICAL & SURGICAL HISTORY:  BPH (benign prostatic hypertrophy)    Macular degeneration    DM (diabetes mellitus)    HTN (hypertension)    AF (atrial fibrillation)    Pacemaker    HLD (hyperlipidemia)    S/P knee replacement, bilateral        Allergies    procainamide (Other (Severe))    Intolerances        FAMILY HISTORY:  No pertinent family history in first degree relatives        SOCIAL HISTORY:  former smoker (quit 30 yr prior, rare social use reported), no alcohol, no drug. lives alone in [facility]. daughter lives in [place]      MEDICATIONS  (STANDING):  atorvastatin 80 milliGRAM(s) Oral at bedtime  dextrose 5%. 1000 milliLiter(s) (50 mL/Hr) IV Continuous <Continuous>  dextrose 50% Injectable 12.5 Gram(s) IV Push once  dextrose 50% Injectable 25 Gram(s) IV Push once  dextrose 50% Injectable 25 Gram(s) IV Push once  digoxin     Tablet 0.125 milliGRAM(s) Oral daily  fenofibrate Tablet 145 milliGRAM(s) Oral daily  insulin lispro (HumaLOG) corrective regimen sliding scale   SubCutaneous every 6 hours  lactated ringers. 1000 milliLiter(s) (100 mL/Hr) IV Continuous <Continuous>  latanoprost 0.005% Ophthalmic Solution 1 Drop(s) Both EYES at bedtime  piperacillin/tazobactam IVPB.. 3.375 Gram(s) IV Intermittent every 8 hours    MEDICATIONS  (PRN):  dextrose 40% Gel 15 Gram(s) Oral once PRN Blood Glucose LESS THAN 70 milliGRAM(s)/deciliter  glucagon  Injectable 1 milliGRAM(s) IntraMuscular once PRN Glucose LESS THAN 70 milligrams/deciliter      Vital Signs Last 24 Hrs  T(C): 37.3 (07 Oct 2020 11:47), Max: 38.9 (06 Oct 2020 20:00)  T(F): 99.1 (07 Oct 2020 11:47), Max: 102.1 (07 Oct 2020 02:50)  HR: 129 (07 Oct 2020 11:47) (79 - 139)  BP: 174/76 (07 Oct 2020 11:47) (128/71 - 183/79)  BP(mean): 78 (06 Oct 2020 22:54) (70 - 98)  RR: 22 (07 Oct 2020 07:41) (22 - 35)  SpO2: 96% (07 Oct 2020 07:41) (93% - 99%)    PHYSICAL EXAM:    GENERAL: NAD, well-groomed  HEAD:  Atraumatic, Normocephalic  EYES: EOMI, PERRLA, conjunctiva and sclera clear  ENMT: No tonsillar erythema, exudates, or enlargement; Moist mucous membranes  NECK: Supple, No JVD  CHEST/LUNG: Clear to percussion bilaterally; No rales, rhonchi, wheezing, or rubs  HEART: Regular rate and rhythm; No murmurs, rubs, or gallops  ABDOMEN: Soft, Nontender, Nondistended; Bowel sounds present  EXTREMITIES:  2+ Peripheral Pulses, No clubbing, cyanosis, or edema  LYMPH: No lymphadenopathy noted  SKIN: No rashes or lesions    LABS:  CBC Full  -  ( 07 Oct 2020 06:52 )  WBC Count : 23.06 K/uL  RBC Count : 4.91 M/uL  Hemoglobin : 13.9 g/dL  Hematocrit : 44.3 %  Platelet Count - Automated : 263 K/uL  Mean Cell Volume : 90.2 fl  Mean Cell Hemoglobin : 28.3 pg  Mean Cell Hemoglobin Concentration : 31.4 gm/dL  Auto Neutrophil # : 18.51 K/uL  Auto Lymphocyte # : 1.88 K/uL  Auto Monocyte # : 2.35 K/uL  Auto Eosinophil # : 0.03 K/uL  Auto Basophil # : 0.06 K/uL  Auto Neutrophil % : 80.2 %  Auto Lymphocyte % : 8.2 %  Auto Monocyte % : 10.2 %  Auto Eosinophil % : 0.1 %  Auto Basophil % : 0.3 %      10-07    135  |  101  |  16  ----------------------------<  151<H>  3.9   |  21<L>  |  0.95    Ca    9.4      07 Oct 2020 06:52  Phos  3.0     10-  Mg     1.8     10-    TPro  7.0  /  Alb  4.2  /  TBili  0.6  /  DBili  x   /  AST  23  /  ALT  12  /  AlkPhos  50  10-07      LIVER FUNCTIONS - ( 07 Oct 2020 06:52 )  Alb: 4.2 g/dL / Pro: 7.0 g/dL / ALK PHOS: 50 U/L / ALT: 12 U/L / AST: 23 U/L / GGT: x                               MICROBIOLOGY:        Urinalysis Basic - ( 06 Oct 2020 20:59 )    Color: Yellow / Appearance: Slightly Turbid / S.032 / pH: x  Gluc: x / Ketone: Trace  / Bili: Negative / Urobili: Negative   Blood: x / Protein: 300 mg/dL / Nitrite: Positive   Leuk Esterase: Large / RBC: 16 /hpf /  /HPF   Sq Epi: x / Non Sq Epi: 0 /hpf / Bacteria: Many        COVID-19 Antibody - for prior infection screening (10.07.20 @ 03:46)   COVID-19 IgG Antibody Index: 0.08: Roche ECLIA Total AB (FREDDIE)   NOTE: This result index represents a total antibody measurement, which   includes IgG, IgA, and IgM   Negative <= 0.99 Index   Positive >= 1.00 Index Index   COVID-19 IgG Antibody Interpretation: Negative: This test has not been reviewed by the FDA by the standard review   process. It has been authorized for emergency use by the FDA. [employer] has validated this test to be accurate.   Negative results do not rule out SARS-CoV-2 infection, particularly in   those who have been in recent contact with the virus. Follow-up testing   with a molecular diagnostic test should be considered to rule out   infection in these individuals.   Results from antibody testing should not be used as thesole basis to   diagnose or exclude SARS-CoV-2 infection, or to inform infection status.   Positive results may rarely be due to past or present infection with   non-SARS-CoV-2 coronavirus strains, such as coronavirus HKU1, NL63, OC43,   or 229E. [employer], through extensive validation   testing, has confirmed that this risk is minimal with this test.       Respiratory Viral Panel + COVID-19 by REGAN (10.06.20 @ 20:59)   Rapid RVP Result: [employer]   SARS-CoV-2: NotDete: This Respiratory Panel uses polymerase chain reaction (PCR) to detect for   adenovirus; coronavirus (HKU1, NL63, 229E, OC43); human metapneumovirus   (hMPV); human enterovirus/rhinovirus (Entero/RV); influenza A; influenza   A/H1; influenza A/H3; influenza A/H1-2009; influenza B; parainfluenza   viruses 1, 2, 3, 4; respiratory syncytial virus; Mycoplasma pneumoniae;   Chlamydophila pneumoniae; and SARS-CoV-2.       RADIOLOGY:    < from: CT Abdomen and Pelvis w/ IV Cont (10.07.20 @ 06:56) >  EXAM:  CT ABDOMEN AND PELVIS IC                            PROCEDURE DATE:  10/07/2020            INTERPRETATION:  CLINICAL INFORMATION: Sepsis due to cystitis with abdominal distention.    COMPARISON: Prior abdominal CT dated 2019. Correlation is also made with abdominal ultrasound dated 1/3/2020.    PROCEDURE:  CT of the Abdomen and Pelvis was performed with intravenous contrast.  Intravenous contrast: 90 ml Omnipaque 350. 10 ml discarded.  Oral contrast: None.  Sagittal and coronal reformats were performed.    Study is limited secondary to abundant streak artifact from the patient's arms at his sides.    FINDINGS:  LOWER CHEST: Pacemaker. Peripheral partially imaged right lower lobe opacity, slightly decreased since 2019.    LIVER: Within normal limits.  BILE DUCTS: Normal caliber.  GALLBLADDER: Cholelithiasis.  SPLEEN: Within normal limits.  PANCREAS: Diffusely atrophic.  ADRENALS: Within normal limits.  KIDNEYS/URETERS: No hydronephrosis. Subcentimeter hypodense foci in the right kidney, too small to characterize.    BLADDER: Within normal limits.  REPRODUCTIVE ORGANS: Prostate gland is enlarged, measuring 7 x 6.2 cm..    BOWEL: No bowel obstruction. Colonic diverticulosis, particularly involving the sigmoid colon.There is mild wall thickening of the mid sigmoid colon with adjacent mild inflammatory changes and trace fluid (602:50 and 2:69). This process is inseparable from and may involve the base of the cecum (2:69). In addition, the proximal appendix is at the upper limits of normal in caliber, measuring 0.8 cm (602:50). The distal appendix is normal in caliber.. Small hiatal hernia.  PERITONEUM: Trace free fluid in the right lower quadrant. Less than 1 cm mesenteric lymph nodes particularly in the right lower quadrant.  VESSELS: Diffuse atherosclerotic calcification.  RETROPERITONEUM/LYMPH NODES: No lymphadenopathy.  ABDOMINAL WALL: Fat-containing left inguinal hernia.  BONES: Degenerative changes in the spine and hips.    IMPRESSION:  Colonic diverticulosis, particularly involving the sigmoid colon. There is mild wall thickening of the mid sigmoid colon with associated mild inflammatory changes and fluid, suggestive of mild diverticulitis. This process is inseparable from and may involve the base of the cecum. The proximal appendix is at the upper limits of normal in diameter proximally, and is in close proximity to the mild inflammatory changes involving the sigmoid colon, suggesting secondary involvement of the appendix.    Enlarged prostategland.    Cholelithiasis.    < end of copied text >          < from: Xray Chest 1 View- PORTABLE-Urgent (10.06.20 @ 20:25) >    FINDINGS:  Left-sided pacemaker with lead terminating in the right ventricle.  The lungs are clear.  Heart size is normal.  Degenerative changes of the bilateral shoulders.      IMPRESSION:    Clear lungs.    < end of copied text >             Patient is a 89y old  Male who presents with a chief complaint of 89M p/w fever and UTI (06 Oct 2020 23:12)      HPI:    History collected from the chart and from the patient's daughter Ms. Mary Jade given patient's mental status    89M w/ dementia (family reports memory loss), afib on eliquis, HTN, DM2, HLD present to [facility] for evaluation of fever and UTI. The patient is reported to be found to have fever this morning and upon evaluation by urgent care he was diagnosed with UTI and given bactrim which he took 1 dose in late evening but demonstrated continued fatigue, fever, chills, and general sick appearance. The patient cannot remember why he came to the hospital and therefore there is limited history. Medication rec obtained from Ms. Jade. The patient is noted to live alone and has HHA who assists with adls. The patient is reported to at baseline have short-term memory problems but no formal evaluation for dementia reported.    In the ED, VS: T 102, 177/84, 120, 25 93%RA  s/p acetaminophen 650x1, [id number](21:53), LR 1.55Lx1 (06 Oct 2020 23:12)    WBC 23 <-- 20.  Lact 2.6.  UA (+) nit/large LE.  Cov-19 pcr, RVP with SARS-COV (-), Cov-19 IgG ab (-).  CTap no hydronephrosis, (+) enlarged prostate, mild diverticulitis of cecum with secondary involvement of appendix, cholelithiasis.  Cxr shows clear lungs.      Pt given dose of rocephin in the ER.  Now on zosyn.  ID Consult called for further abx managment.       REVIEW OF SYSTEMS:    CONSTITUTIONAL: No fever, weight loss, or fatigue  EYES: No eye pain, visual disturbances, or discharge  ENMT:  No sore throat  NECK: No pain or stiffness  RESPIRATORY: No cough, wheezing, chills or hemoptysis; No shortness of breath  CARDIOVASCULAR: No chest pain, palpitations, dizziness, or leg swelling  GASTROINTESTINAL: No abdominal or epigastric pain. No nausea, vomiting, or hematemesis; No diarrhea or constipation. No melena or hematochezia.  GENITOURINARY: No dysuria, frequency, hematuria, or incontinence  NEUROLOGICAL: No headaches, memory loss, loss of strength, numbness, or tremors  SKIN: No itching, burning, rashes, or lesions   LYMPH NODES: No enlarged glands  MUSCULOSKELETAL: No joint pain or swelling; No muscle, back, or extremity pain      PAST MEDICAL & SURGICAL HISTORY:  BPH (benign prostatic hypertrophy)    Macular degeneration    DM (diabetes mellitus)    HTN (hypertension)    AF (atrial fibrillation)    Pacemaker    HLD (hyperlipidemia)    S/P knee replacement, bilateral        Allergies    procainamide (Other (Severe))    Intolerances        FAMILY HISTORY:  No pertinent family history in first degree relatives        SOCIAL HISTORY:  former smoker (quit 30 yr prior, rare social use reported), no alcohol, no drug. lives alone in [facility]. daughter lives in [place]      MEDICATIONS  (STANDING):  atorvastatin 80 milliGRAM(s) Oral at bedtime  dextrose 5%. 1000 milliLiter(s) (50 mL/Hr) IV Continuous <Continuous>  dextrose 50% Injectable 12.5 Gram(s) IV Push once  dextrose 50% Injectable 25 Gram(s) IV Push once  dextrose 50% Injectable 25 Gram(s) IV Push once  digoxin     Tablet 0.125 milliGRAM(s) Oral daily  fenofibrate Tablet 145 milliGRAM(s) Oral daily  insulin lispro (HumaLOG) corrective regimen sliding scale   SubCutaneous every 6 hours  lactated ringers. 1000 milliLiter(s) (100 mL/Hr) IV Continuous <Continuous>  latanoprost 0.005% Ophthalmic Solution 1 Drop(s) Both EYES at bedtime  piperacillin/tazobactam IVPB.. 3.375 Gram(s) IV Intermittent every 8 hours    MEDICATIONS  (PRN):  dextrose 40% Gel 15 Gram(s) Oral once PRN Blood Glucose LESS THAN 70 milliGRAM(s)/deciliter  glucagon  Injectable 1 milliGRAM(s) IntraMuscular once PRN Glucose LESS THAN 70 milligrams/deciliter      Vital Signs Last 24 Hrs  T(C): 37.3 (07 Oct 2020 11:47), Max: 38.9 (06 Oct 2020 20:00)  T(F): 99.1 (07 Oct 2020 11:47), Max: 102.1 (07 Oct 2020 02:50)  HR: 129 (07 Oct 2020 11:47) (79 - 139)  BP: 174/76 (07 Oct 2020 11:47) (128/71 - 183/79)  BP(mean): 78 (06 Oct 2020 22:54) (70 - 98)  RR: 22 (07 Oct 2020 07:41) (22 - 35)  SpO2: 96% (07 Oct 2020 07:41) (93% - 99%)    PHYSICAL EXAM:    GENERAL: NAD, well-groomed  HEAD:  Atraumatic, Normocephalic  EYES: EOMI, PERRLA, conjunctiva and sclera clear  ENMT: No tonsillar erythema, exudates, or enlargement; Moist mucous membranes  NECK: Supple, No JVD  CHEST/LUNG: Clear to percussion bilaterally; No rales, rhonchi, wheezing, or rubs  HEART: Regular rate and rhythm; No murmurs, rubs, or gallops  ABDOMEN: + RLQ ttp.  Abd bloated  EXTREMITIES:  2+ Peripheral Pulses, No clubbing, cyanosis, or edema  LYMPH: No lymphadenopathy noted  SKIN: No rashes or lesions    LABS:  CBC Full  -  ( 07 Oct 2020 06:52 )  WBC Count : 23.06 K/uL  RBC Count : 4.91 M/uL  Hemoglobin : 13.9 g/dL  Hematocrit : 44.3 %  Platelet Count - Automated : 263 K/uL  Mean Cell Volume : 90.2 fl  Mean Cell Hemoglobin : 28.3 pg  Mean Cell Hemoglobin Concentration : 31.4 gm/dL  Auto Neutrophil # : 18.51 K/uL  Auto Lymphocyte # : 1.88 K/uL  Auto Monocyte # : 2.35 K/uL  Auto Eosinophil # : 0.03 K/uL  Auto Basophil # : 0.06 K/uL  Auto Neutrophil % : 80.2 %  Auto Lymphocyte % : 8.2 %  Auto Monocyte % : 10.2 %  Auto Eosinophil % : 0.1 %  Auto Basophil % : 0.3 %      10-07    135  |  101  |  16  ----------------------------<  151<H>  3.9   |  21<L>  |  0.95    Ca    9.4      07 Oct 2020 06:52  Phos  3.0     10  Mg     1.8     10-07    TPro  7.0  /  Alb  4.2  /  TBili  0.6  /  DBili  x   /  AST  23  /  ALT  12  /  AlkPhos  50  10      LIVER FUNCTIONS - ( 07 Oct 2020 06:52 )  Alb: 4.2 g/dL / Pro: 7.0 g/dL / ALK PHOS: 50 U/L / ALT: 12 U/L / AST: 23 U/L / GGT: x                               MICROBIOLOGY:        Urinalysis Basic - ( 06 Oct 2020 20:59 )    Color: Yellow / Appearance: Slightly Turbid / S.032 / pH: x  Gluc: x / Ketone: Trace  / Bili: Negative / Urobili: Negative   Blood: x / Protein: 300 mg/dL / Nitrite: Positive   Leuk Esterase: Large / RBC: 16 /hpf /  /HPF   Sq Epi: x / Non Sq Epi: 0 /hpf / Bacteria: Many        COVID-19 Antibody - for prior infection screening (10.07.20 @ 03:46)   COVID-19 IgG Antibody Index: 0.08: Roche ECLIA Total AB (FREDDIE)   NOTE: This result index represents a total antibody measurement, which   includes IgG, IgA, and IgM   Negative <= 0.99 Index   Positive >= 1.00 Index Index   COVID-19 IgG Antibody Interpretation: Negative: This test has not been reviewed by the FDA by the standard review   process. It has been authorized for emergency use by the FDA. [employer] has validated this test to be accurate.   Negative results do not rule out SARS-CoV-2 infection, particularly in   those who have been in recent contact with the virus. Follow-up testing   with a molecular diagnostic test should be considered to rule out   infection in these individuals.   Results from antibody testing should not be used as thesole basis to   diagnose or exclude SARS-CoV-2 infection, or to inform infection status.   Positive results may rarely be due to past or present infection with   non-SARS-CoV-2 coronavirus strains, such as coronavirus HKU1, NL63, OC43,   or 229E. [employer], through extensive validation   testing, has confirmed that this risk is minimal with this test.       Respiratory Viral Panel + COVID-19 by REGAN (10.06.20 @ 20:59)   [facility]apid [facility]VP [facility]: [facility]   SARS-CoV-2: NotDete: This Respiratory Panel uses polymerase chain reaction (PCR) to detect for   adenovirus; coronavirus (HKU1, NL63, 229E, OC43); human metapneumovirus   (hMPV); human enterovirus/rhinovirus (Entero/RV); influenza A; influenza   A/H1; influenza A/H3; influenza A/H1-2009; influenza B; parainfluenza   viruses 1, 2, 3, 4; respiratory syncytial virus; Mycoplasma pneumoniae;   Chlamydophila pneumoniae; and SARS-CoV-2.       RADIOLOGY:    < from: CT Abdomen and Pelvis w/ IV Cont (10.07.20 @ 06:56) >  EXAM:  CT ABDOMEN AND PELVIS IC                            PROCEDURE DATE:  10/07/2020            INTERPRETATION:  CLINICAL INFORMATION: Sepsis due to cystitis with abdominal distention.    COMPARISON: Prior abdominal CT dated 2019. Correlation is also made with abdominal ultrasound dated 1/3/2020.    PROCEDURE:  CT of the Abdomen and Pelvis was performed with intravenous contrast.  Intravenous contrast: 90 ml Omnipaque 350. 10 ml discarded.  Oral contrast: None.  Sagittal and coronal reformats were performed.    Study is limited secondary to abundant streak artifact from the patient's arms at his sides.    FINDINGS:  LOWER CHEST: Pacemaker. Peripheral partially imaged right lower lobe opacity, slightly decreased since 2019.    LIVER: Within normal limits.  BILE DUCTS: Normal caliber.  GALLBLADDER: Cholelithiasis.  SPLEEN: Within normal limits.  PANCREAS: Diffusely atrophic.  ADRENALS: Within normal limits.  KIDNEYS/URETERS: No hydronephrosis. Subcentimeter hypodense foci in the right kidney, too small to characterize.    BLADDER: Within normal limits.  REPRODUCTIVE ORGANS: Prostate gland is enlarged, measuring 7 x 6.2 cm..    BOWEL: No bowel obstruction. Colonic diverticulosis, particularly involving the sigmoid colon.There is mild wall thickening of the mid sigmoid colon with adjacent mild inflammatory changes and trace fluid (602:50 and 2:69). This process is inseparable from and may involve the base of the cecum (2:69). In addition, the proximal appendix is at the upper limits of normal in caliber, measuring 0.8 cm (602:50). The distal appendix is normal in caliber.. Small hiatal hernia.  PERITONEUM: Trace free fluid in the right lower quadrant. Less than 1 cm mesenteric lymph nodes particularly in the right lower quadrant.  VESSELS: Diffuse atherosclerotic calcification.  RETROPERITONEUM/LYMPH NODES: No lymphadenopathy.  ABDOMINAL WALL: Fat-containing left inguinal hernia.  BONES: Degenerative changes in the spine and hips.    IMPRESSION:  Colonic diverticulosis, particularly involving the sigmoid colon. There is mild wall thickening of the mid sigmoid colon with associated mild inflammatory changes and fluid, suggestive of mild diverticulitis. This process is inseparable from and may involve the base of the cecum. The proximal appendix is at the upper limits of normal in diameter proximally, and is in close proximity to the mild inflammatory changes involving the sigmoid colon, suggesting secondary involvement of the appendix.    Enlarged prostategland.    Cholelithiasis.    < end of copied text >          < from: Xray Chest 1 View- PORTABLE-Urgent (10.06.20 @ 20:25) >    FINDINGS:  Left-sided pacemaker with lead terminating in the right ventricle.  The lungs are clear.  Heart size is normal.  Degenerative changes of the bilateral shoulders.      IMPRESSION:    Clear lungs.    < end of copied text >

## 2020-10-07 NOTE — CHART NOTE - NSCHARTNOTEFT_GEN_A_CORE
Medicine Attending Chart Update    - called by LEONA Najera informing that patient developed distended abdomen on examination this morning. This finding is new from time of admission exam.  - On my examination, the patient does not appear distressed, abdominal exam demonstrates new distention which is tense but not rigid and without rebound tenderness, tympanic to percussion.  - CT a/p with IV co ordered to r/o acute GI pathology- concern for development for bowel obstruction vs ileus. I discussed with LEONA najera the tentative plan for CT scan (I discussed with radiology techs to expedite). Pending scan results LEONA Najera or following ACP(currently soon change of shift) will call surgical consult.  - hold ac for now given above. continue with casper.      Dominick Vincent MD  Medicine Attending  Department of Hospital Medicine  pager: [phone] (available from 20:00 to 08:00)

## 2020-10-07 NOTE — CONSULT NOTE ADULT - SUBJECTIVE AND OBJECTIVE BOX
Patient is a 89y Male     Patient is a 89y old  Male who presents with a chief complaint of 89M p/w fever and UTI (07 Oct 2020 17:23)      HPI:  History collected from the chart and from the patient's daughter Ms. Mary Jade given patient's mental status    89M w/ dementia (family reports memory loss), afib on eliquis, HTN, DM2, HLD present to [facility] [place] of fever and UTI. The patient is reported to be found to have fever this morning and upon evaluation by urgent care he was diagnosed with UTI and given bactrim which he took 1 dose in late evening but demonstrated continued fatigue, fever, chills, and general sick appearance. The patient cannot remember why he came to the hospital and therefore there is limited history. Medication rec obtained from Ms. Jade. The patient is noted to live alone and has HHA who assists with adls. The patient is reported to at baseline have short-term memory problems but no formal evaluation for dementia reported.    In the ED, , 177/84, 120, 25 93%RA  s/p acetaminophen 650x1, [id number](21:53), LR 1.55Lx1 (06 Oct 2020 23:12)      PAST MEDICAL & SURGICAL HISTORY:  BPH (benign prostatic hypertrophy)    Macular degeneration    DM (diabetes mellitus)    HTN (hypertension)    AF (atrial fibrillation)    Pacemaker    HLD (hyperlipidemia)    S/P knee replacement, bilateral        MEDICATIONS  (STANDING):  atorvastatin 80 milliGRAM(s) Oral at bedtime  dextrose 5%. 1000 milliLiter(s) (50 mL/Hr) IV Continuous <Continuous>  dextrose 50% Injectable 12.5 Gram(s) IV Push once  dextrose 50% Injectable 25 Gram(s) IV Push once  dextrose 50% Injectable 25 Gram(s) IV Push once  digoxin     Tablet 0.125 milliGRAM(s) Oral daily  fenofibrate Tablet 145 milliGRAM(s) Oral daily  insulin lispro (HumaLOG) corrective regimen sliding scale   SubCutaneous every 6 hours  latanoprost 0.005% Ophthalmic Solution 1 Drop(s) Both EYES at bedtime  metoprolol tartrate 25 milliGRAM(s) Oral two times a day  piperacillin/tazobactam IVPB.. 3.375 Gram(s) IV Intermittent every 8 hours      Allergies    procainamide (Other (Severe))    Intolerances        SOCIAL HISTORY:  Denies ETOh,Smoking,     FAMILY HISTORY:  No pertinent family history in first degree relatives        REVIEW OF SYSTEMS:    CONSTITUTIONAL: No weakness, fevers or chills  EYES/ENT: No visual changes;  No vertigo or throat pain   NECK: No pain or stiffness  RESPIRATORY: No cough, wheezing, hemoptysis; No shortness of breath  CARDIOVASCULAR: No chest pain or palpitations  GASTROINTESTINAL: No abdominal or epigastric pain. No nausea, vomiting, or hematemesis; No diarrhea or constipation. No melena or hematochezia.  GENITOURINARY: No dysuria, frequency or hematuria  NEUROLOGICAL: No numbness or weakness  SKIN: No itching, burning, rashes, or lesions   All other review of systems is negative unless indicated above.    VITAL:  T(C): , Max: 38.9 (10-06-20 @ 20:00)  T(F): , Max: 102.1 (10-07-20 @ 02:50)  HR: 139 (10-07-20 @ 18:20)  BP: 159/84 (10-07-20 @ 18:20)  BP(mean): 78 (10-06-20 @ 22:54)  RR: 22 (10-07-20 @ 17:27)  SpO2: 94% (10-07-20 @ 17:27)  Wt(kg): --    I and O's:    10-06 @ 07:01  -  10-07 @ 07:00  --------------------------------------------------------  IN: 0 mL / OUT: 966 mL / NET: -966 mL      Height (cm): 172.7 (10-06 @ 19:50)  Weight (kg): 49.9 (10-06 @ 19:50)  BMI (kg/m2): 16.7 (10-06 @ 19:50)  BSA (m2): 1.59 (10-06 @ 19:50)    PHYSICAL EXAM:    Constitutional: NAD  HEENT: PERRLA,   Neck: No JVD  Respiratory: CTA B/L  Cardiovascular: S1 and S2  Gastrointestinal: BS+, soft, NT/ND  Extremities: No peripheral edema  Neurological: A/O x 3, no focal deficits  Psychiatric: Normal mood, normal affect  : No Palmer  Skin: No rashes  Access: Not applicable  Back: No CVA tenderness    LABS:                        13.9   23.06 )-----------( 263      ( 07 Oct 2020 06:52 )             44.3     10-07    135  |  101  |  16  ----------------------------<  151<H>  3.9   |  21<L>  |  0.95    Ca    9.4      07 Oct 2020 06:52  Phos  3.0     10-07  Mg     1.8     10-07    TPro  7.0  /  Alb  4.2  /  TBili  0.6  /  DBili  x   /  AST  23  /  ALT  12  /  AlkPhos  50  10-07          RADIOLOGY & ADDITIONAL STUDIES:

## 2020-10-07 NOTE — PROGRESS NOTE ADULT - SUBJECTIVE AND OBJECTIVE BOX
Patient is a 89y old  Male who presents with a chief complaint of 89M p/w fever and UTI (07 Oct 2020 19:04)      SUBJECTIVE / OVERNIGHT EVENTS: ptn denies pain/N/V, s/p urinary retention, straight cath done at 7:30 pm w 900 cc out.     MEDICATIONS  (STANDING):  atorvastatin 80 milliGRAM(s) Oral at bedtime  dextrose 5%. 1000 milliLiter(s) (50 mL/Hr) IV Continuous <Continuous>  dextrose 50% Injectable 12.5 Gram(s) IV Push once  dextrose 50% Injectable 25 Gram(s) IV Push once  dextrose 50% Injectable 25 Gram(s) IV Push once  digoxin     Tablet 0.125 milliGRAM(s) Oral daily  fenofibrate Tablet 145 milliGRAM(s) Oral daily  insulin lispro (HumaLOG) corrective regimen sliding scale   SubCutaneous every 6 hours  latanoprost 0.005% Ophthalmic Solution 1 Drop(s) Both EYES at bedtime  metoprolol tartrate 25 milliGRAM(s) Oral two times a day  piperacillin/tazobactam IVPB.. 3.375 Gram(s) IV Intermittent every 8 hours    MEDICATIONS  (PRN):  dextrose 40% Gel 15 Gram(s) Oral once PRN Blood Glucose LESS THAN 70 milliGRAM(s)/deciliter  glucagon  Injectable 1 milliGRAM(s) IntraMuscular once PRN Glucose LESS THAN 70 milligrams/deciliter      Vital Signs Last 24 Hrs  T(F): 98.2 (10-07-20 @ 17:27), Max: 102.1 (10-07-20 @ 02:50)  HR: 139 (10-07-20 @ 18:20) (79 - 139)  BP: 159/84 (10-07-20 @ 18:20) (128/71 - 188/96)  RR: 22 (10-07-20 @ 17:27) (22 - 28)  SpO2: 94% (10-07-20 @ 17:27) (94% - 99%)  Telemetry:   CAPILLARY BLOOD GLUCOSE      POCT Blood Glucose.: 148 mg/dL (07 Oct 2020 18:44)  POCT Blood Glucose.: 164 mg/dL (07 Oct 2020 13:55)  POCT Blood Glucose.: 202 mg/dL (07 Oct 2020 08:36)  POCT Blood Glucose.: 137 mg/dL (07 Oct 2020 06:36)    I&O's Summary    06 Oct 2020 07:01  -  07 Oct 2020 07:00  --------------------------------------------------------  IN: 0 mL / OUT: 966 mL / NET: -966 mL        PHYSICAL EXAM:  GENERAL: NAD, well-developed  HEAD:  Atraumatic, Normocephalic  EYES: EOMI, PERRLA, conjunctiva and sclera clear  NECK: Supple, No JVD  CHEST/LUNG: Clear to auscultation bilaterally; No wheeze  HEART: Regular rate and rhythm; No murmurs, rubs, or gallops  ABDOMEN: Soft, Nontender, Nondistended; Bowel sounds present  EXTREMITIES:  2+ Peripheral Pulses, No clubbing, cyanosis, or edema  PSYCH: AAOx3  NEUROLOGY: non-focal  SKIN: No rashes or lesions    LABS:                        13.9   23.06 )-----------( 263      ( 07 Oct 2020 06:52 )             44.3     10-07    135  |  101  |  16  ----------------------------<  151<H>  3.9   |  21<L>  |  0.95    Ca    9.4      07 Oct 2020 06:52  Phos  3.0     10-07  Mg     1.8     10-    TPro  7.0  /  Alb  4.2  /  TBili  0.6  /  DBili  x   /  AST  23  /  ALT  12  /  AlkPhos  50  10-07    PT/INR - ( 07 Oct 2020 06:52 )   PT: 18.4 sec;   INR: 1.57 ratio         PTT - ( 07 Oct 2020 06:52 )  PTT:39.5 sec      Urinalysis Basic - ( 06 Oct 2020 20:59 )    Color: Yellow / Appearance: Slightly Turbid / S.032 / pH: x  Gluc: x / Ketone: Trace  / Bili: Negative / Urobili: Negative   Blood: x / Protein: 300 mg/dL / Nitrite: Positive   Leuk Esterase: Large / RBC: 16 /hpf /  /HPF   Sq Epi: x / Non Sq Epi: 0 /hpf / Bacteria: Many        RADIOLOGY & ADDITIONAL TESTS:    Imaging Personally Reviewed:    Consultant(s) Notes Reviewed:      Care Discussed with Consultants/Other Providers:

## 2020-10-07 NOTE — CONSULT NOTE ADULT - ASSESSMENT
89M w/ dementia, afib (on eliquis), HTN, DM2, HLD presenting with UTI, found to have diverticulosis on CT.     Plan:   - NPO (sips/chips), IVF  - No NGT necessary unless patient becomes nauseated/has episode of emesis.   - May follow up outpatient for further management when returns to baseline level of health  - C/w broadspectrum abx: Zosyn for gram(-) and anaerobic coverage    Discussed with Dr. Kearney, Colorectal surgeon    Madina Mcclellan, PGY2  [facility]  [phone]

## 2020-10-07 NOTE — CONSULT NOTE ADULT - ATTENDING COMMENTS
Patient seen and examined, agree with the above assessment and plan by LEONA Sarkar.  a/p  89M w/ dementia (family reports memory loss), afib on eliquis, mild AS, HTN, DM2, HLD present with  fever and UTI/ sepsis     1. Chronic Afib with RVR   - secondary to infection/ sepsis   - now rate controlled, c/w lopressor 25 mg BID, digoxin 0.125 mg daily  - ChadsVac =3. Resume oral a/c   - check echo to eval AS/ LV fx   -cv stable, no decomp CHF on exam     2. Mild AS  -check echo    3. UTI/ Sepsis/  diverticulitis:  -presenting febrile, tachycardic, elevated lactate, leukocytosis  -f/u cultures   -ID f/u- abx    -CT abd noted. -management per med, GI eval?     4. HTN   -elevated bp   lopressor resumed today.  trend bp     dvt ppx

## 2020-10-07 NOTE — PROGRESS NOTE ADULT - PROBLEM SELECTOR PLAN 5
likely has dementia at baseline per family  A&Ox3 at time of medicine exam but cannot detail course prior to hospitalization  Haldol 1 mg IVP q6H prn agitation

## 2020-10-08 LAB
A1C WITH ESTIMATED AVERAGE GLUCOSE RESULT: 6.4 % — HIGH (ref 4–5.6)
ANION GAP SERPL CALC-SCNC: 10 MMOL/L — SIGNIFICANT CHANGE UP (ref 5–17)
BUN SERPL-MCNC: 16 MG/DL — SIGNIFICANT CHANGE UP (ref 7–23)
CALCIUM SERPL-MCNC: 8.6 MG/DL — SIGNIFICANT CHANGE UP (ref 8.4–10.5)
CHLORIDE SERPL-SCNC: 99 MMOL/L — SIGNIFICANT CHANGE UP (ref 96–108)
CO2 SERPL-SCNC: 24 MMOL/L — SIGNIFICANT CHANGE UP (ref 22–31)
CREAT SERPL-MCNC: 0.93 MG/DL — SIGNIFICANT CHANGE UP (ref 0.5–1.3)
ESTIMATED AVERAGE GLUCOSE: 137 MG/DL — HIGH (ref 68–114)
GLUCOSE BLDC GLUCOMTR-MCNC: 117 MG/DL — HIGH (ref 70–99)
GLUCOSE BLDC GLUCOMTR-MCNC: 131 MG/DL — HIGH (ref 70–99)
GLUCOSE BLDC GLUCOMTR-MCNC: 174 MG/DL — HIGH (ref 70–99)
GLUCOSE BLDC GLUCOMTR-MCNC: 185 MG/DL — HIGH (ref 70–99)
GLUCOSE BLDC GLUCOMTR-MCNC: 197 MG/DL — HIGH (ref 70–99)
GLUCOSE BLDC GLUCOMTR-MCNC: 224 MG/DL — HIGH (ref 70–99)
GLUCOSE SERPL-MCNC: 130 MG/DL — HIGH (ref 70–99)
HCT VFR BLD CALC: 38.6 % — LOW (ref 39–50)
HGB BLD-MCNC: 12.3 G/DL — LOW (ref 13–17)
MAGNESIUM SERPL-MCNC: 1.8 MG/DL — SIGNIFICANT CHANGE UP (ref 1.6–2.6)
MCHC RBC-ENTMCNC: 28 PG — SIGNIFICANT CHANGE UP (ref 27–34)
MCHC RBC-ENTMCNC: 31.9 GM/DL — LOW (ref 32–36)
MCV RBC AUTO: 87.7 FL — SIGNIFICANT CHANGE UP (ref 80–100)
NRBC # BLD: 0 /100 WBCS — SIGNIFICANT CHANGE UP (ref 0–0)
PLATELET # BLD AUTO: 235 K/UL — SIGNIFICANT CHANGE UP (ref 150–400)
POTASSIUM SERPL-MCNC: 3.6 MMOL/L — SIGNIFICANT CHANGE UP (ref 3.5–5.3)
POTASSIUM SERPL-SCNC: 3.6 MMOL/L — SIGNIFICANT CHANGE UP (ref 3.5–5.3)
RBC # BLD: 4.4 M/UL — SIGNIFICANT CHANGE UP (ref 4.2–5.8)
RBC # FLD: 14.1 % — SIGNIFICANT CHANGE UP (ref 10.3–14.5)
SODIUM SERPL-SCNC: 133 MMOL/L — LOW (ref 135–145)
WBC # BLD: 14.47 K/UL — HIGH (ref 3.8–10.5)
WBC # FLD AUTO: 14.47 K/UL — HIGH (ref 3.8–10.5)

## 2020-10-08 PROCEDURE — 93010 ELECTROCARDIOGRAM REPORT: CPT

## 2020-10-08 RX ORDER — APIXABAN 2.5 MG/1
2.5 TABLET, FILM COATED ORAL EVERY 12 HOURS
Refills: 0 | Status: DISCONTINUED | OUTPATIENT
Start: 2020-10-08 | End: 2020-10-08

## 2020-10-08 RX ORDER — TAMSULOSIN HYDROCHLORIDE 0.4 MG/1
0.4 CAPSULE ORAL AT BEDTIME
Refills: 0 | Status: DISCONTINUED | OUTPATIENT
Start: 2020-10-08 | End: 2020-10-10

## 2020-10-08 RX ORDER — ACETAMINOPHEN 500 MG
650 TABLET ORAL ONCE
Refills: 0 | Status: COMPLETED | OUTPATIENT
Start: 2020-10-08 | End: 2020-10-08

## 2020-10-08 RX ORDER — METOPROLOL TARTRATE 50 MG
50 TABLET ORAL
Refills: 0 | Status: DISCONTINUED | OUTPATIENT
Start: 2020-10-08 | End: 2020-10-10

## 2020-10-08 RX ORDER — DORZOLAMIDE HYDROCHLORIDE 20 MG/ML
1 SOLUTION/ DROPS OPHTHALMIC
Refills: 0 | Status: DISCONTINUED | OUTPATIENT
Start: 2020-10-08 | End: 2020-10-10

## 2020-10-08 RX ADMIN — PIPERACILLIN AND TAZOBACTAM 25 GRAM(S): 4; .5 INJECTION, POWDER, LYOPHILIZED, FOR SOLUTION INTRAVENOUS at 22:03

## 2020-10-08 RX ADMIN — Medication 0.12 MILLIGRAM(S): at 05:59

## 2020-10-08 RX ADMIN — Medication 25 MILLIGRAM(S): at 05:59

## 2020-10-08 RX ADMIN — Medication 650 MILLIGRAM(S): at 04:13

## 2020-10-08 RX ADMIN — Medication 50 MILLIGRAM(S): at 19:35

## 2020-10-08 RX ADMIN — Medication 1: at 11:45

## 2020-10-08 RX ADMIN — Medication 145 MILLIGRAM(S): at 11:53

## 2020-10-08 RX ADMIN — PIPERACILLIN AND TAZOBACTAM 25 GRAM(S): 4; .5 INJECTION, POWDER, LYOPHILIZED, FOR SOLUTION INTRAVENOUS at 06:00

## 2020-10-08 RX ADMIN — ATORVASTATIN CALCIUM 80 MILLIGRAM(S): 80 TABLET, FILM COATED ORAL at 22:03

## 2020-10-08 RX ADMIN — PIPERACILLIN AND TAZOBACTAM 25 GRAM(S): 4; .5 INJECTION, POWDER, LYOPHILIZED, FOR SOLUTION INTRAVENOUS at 11:51

## 2020-10-08 RX ADMIN — Medication 650 MILLIGRAM(S): at 05:44

## 2020-10-08 RX ADMIN — TAMSULOSIN HYDROCHLORIDE 0.4 MILLIGRAM(S): 0.4 CAPSULE ORAL at 22:03

## 2020-10-08 RX ADMIN — Medication 1: at 17:43

## 2020-10-08 RX ADMIN — Medication 25 MILLIGRAM(S): at 16:31

## 2020-10-08 NOTE — PROGRESS NOTE ADULT - SUBJECTIVE AND OBJECTIVE BOX
Patient is a 89y old  Male who presents with a chief complaint of 89M p/w fever and UTI (08 Oct 2020 17:32)      SUBJECTIVE / OVERNIGHT EVENTS: MS at baseline, has pain in left forearm w swelling around the IV line. elbow and shoulder w FROM. patel, indwelling w gross hematuria, no mechanical manipulation as per daughter at bedside. BP is elevated    MEDICATIONS  (STANDING):  atorvastatin 80 milliGRAM(s) Oral at bedtime  dextrose 5%. 1000 milliLiter(s) (50 mL/Hr) IV Continuous <Continuous>  dextrose 50% Injectable 12.5 Gram(s) IV Push once  dextrose 50% Injectable 25 Gram(s) IV Push once  dextrose 50% Injectable 25 Gram(s) IV Push once  digoxin     Tablet 0.125 milliGRAM(s) Oral daily  dorzolamide 2% Ophthalmic Solution 1 Drop(s) Left EYE <User Schedule>  enalapril 5 milliGRAM(s) Oral daily  fenofibrate Tablet 145 milliGRAM(s) Oral daily  insulin lispro (HumaLOG) corrective regimen sliding scale   SubCutaneous every 6 hours  latanoprost 0.005% Ophthalmic Solution 1 Drop(s) Both EYES at bedtime  metoprolol tartrate 50 milliGRAM(s) Oral two times a day  piperacillin/tazobactam IVPB.. 3.375 Gram(s) IV Intermittent every 8 hours  tamsulosin 0.4 milliGRAM(s) Oral at bedtime    MEDICATIONS  (PRN):  dextrose 40% Gel 15 Gram(s) Oral once PRN Blood Glucose LESS THAN 70 milliGRAM(s)/deciliter  glucagon  Injectable 1 milliGRAM(s) IntraMuscular once PRN Glucose LESS THAN 70 milligrams/deciliter      Vital Signs Last 24 Hrs  T(F): 97.6 (10-08-20 @ 16:30), Max: 101.3 (10-08-20 @ 04:00)  HR: 100 (10-08-20 @ 16:30) (75 - 101)  BP: 182/80 (10-08-20 @ 16:30) (150/70 - 182/80)  RR: 19 (10-08-20 @ 16:30) (19 - 24)  SpO2: 96% (10-08-20 @ 16:30) (96% - 96%)  Telemetry:   CAPILLARY BLOOD GLUCOSE      POCT Blood Glucose.: 197 mg/dL (08 Oct 2020 17:32)  POCT Blood Glucose.: 224 mg/dL (08 Oct 2020 16:25)  POCT Blood Glucose.: 185 mg/dL (08 Oct 2020 11:31)  POCT Blood Glucose.: 131 mg/dL (08 Oct 2020 07:38)  POCT Blood Glucose.: 117 mg/dL (08 Oct 2020 05:03)  POCT Blood Glucose.: 124 mg/dL (07 Oct 2020 23:44)  POCT Blood Glucose.: 177 mg/dL (07 Oct 2020 21:07)    I&O's Summary    07 Oct 2020 07:01  -  08 Oct 2020 07:00  --------------------------------------------------------  IN: 200 mL / OUT: 1400 mL / NET: -1200 mL    08 Oct 2020 07:01  -  08 Oct 2020 19:43  --------------------------------------------------------  IN: 240 mL / OUT: 700 mL / NET: -460 mL        PHYSICAL EXAM:  GENERAL: NAD, well-developed  HEAD:  Atraumatic, Normocephalic  EYES: EOMI, PERRLA, conjunctiva and sclera clear  NECK: Supple, No JVD  CHEST/LUNG: Clear to auscultation bilaterally; No wheeze  HEART: Regular rate and rhythm; No murmurs, rubs, or gallops  ABDOMEN: Soft, Nontender, Nondistended; Bowel sounds present  EXTREMITIES:  2+ Peripheral Pulses, No clubbing, cyanosis, or edema  PSYCH: AAOx3  NEUROLOGY: non-focal  SKIN: No rashes or lesions    LABS:                        12.3   14.47 )-----------( 235      ( 08 Oct 2020 10:37 )             38.6     10-08    133<L>  |  99  |  16  ----------------------------<  130<H>  3.6   |  24  |  0.93    Ca    8.6      08 Oct 2020 10:37  Phos  3.0     10-07  Mg     1.8     10-08    TPro  7.0  /  Alb  4.2  /  TBili  0.6  /  DBili  x   /  AST  23  /  ALT  12  /  AlkPhos  50  10-07    PT/INR - ( 07 Oct 2020 06:52 )   PT: 18.4 sec;   INR: 1.57 ratio         PTT - ( 07 Oct 2020 06:52 )  PTT:39.5 sec      Urinalysis Basic - ( 06 Oct 2020 20:59 )    Color: Yellow / Appearance: Slightly Turbid / S.032 / pH: x  Gluc: x / Ketone: Trace  / Bili: Negative / Urobili: Negative   Blood: x / Protein: 300 mg/dL / Nitrite: Positive   Leuk Esterase: Large / RBC: 16 /hpf /  /HPF   Sq Epi: x / Non Sq Epi: 0 /hpf / Bacteria: Many        RADIOLOGY & ADDITIONAL TESTS:    Imaging Personally Reviewed:    Consultant(s) Notes Reviewed:      Care Discussed with Consultants/Other Providers:

## 2020-10-08 NOTE — PROGRESS NOTE ADULT - SUBJECTIVE AND OBJECTIVE BOX
CARDIOLOGY FOLLOW UP - Dr. Siddiqi    CC nad       PHYSICAL EXAM:  T(C): 37.2 (10-08-20 @ 11:24), Max: 38.5 (10-08-20 @ 04:00)  HR: 90 (10-08-20 @ 11:24) (75 - 139)  BP: 157/68 (10-08-20 @ 11:24) (150/70 - 188/96)  RR: 20 (10-08-20 @ 11:24) (20 - 24)  SpO2: 96% (10-08-20 @ 11:24) (94% - 96%)  Wt(kg): --  I&O's Summary    07 Oct 2020 07:01  -  08 Oct 2020 07:00  --------------------------------------------------------  IN: 200 mL / OUT: 1400 mL / NET: -1200 mL    08 Oct 2020 07:01  -  08 Oct 2020 12:42  --------------------------------------------------------  IN: 0 mL / OUT: 0 mL / NET: 0 mL        Appearance: Normal	  Cardiovascular: Normal S1 S2,irreg   Respiratory: Lungs clear to auscultation	  Gastrointestinal:  Soft, Non-tender, + BS	  Extremities: Normal range of motion, No clubbing, cyanosis or edema        MEDICATIONS  (STANDING):  atorvastatin 80 milliGRAM(s) Oral at bedtime  dextrose 5%. 1000 milliLiter(s) (50 mL/Hr) IV Continuous <Continuous>  dextrose 50% Injectable 12.5 Gram(s) IV Push once  dextrose 50% Injectable 25 Gram(s) IV Push once  dextrose 50% Injectable 25 Gram(s) IV Push once  digoxin     Tablet 0.125 milliGRAM(s) Oral daily  fenofibrate Tablet 145 milliGRAM(s) Oral daily  insulin lispro (HumaLOG) corrective regimen sliding scale   SubCutaneous every 6 hours  latanoprost 0.005% Ophthalmic Solution 1 Drop(s) Both EYES at bedtime  metoprolol tartrate 25 milliGRAM(s) Oral two times a day  piperacillin/tazobactam IVPB.. 3.375 Gram(s) IV Intermittent every 8 hours      TELEMETRY: afib , increased to 160 breifly    ECG:  	  RADIOLOGY:   DIAGNOSTIC TESTING:  [ ] Echocardiogram:  [ ]  Catheterization:  [ ] Stress Test:    OTHER: 	    LABS:	 	                                12.3   14.47 )-----------( 235      ( 08 Oct 2020 10:37 )             38.6     10-08    133<L>  |  99  |  16  ----------------------------<  130<H>  3.6   |  24  |  0.93    Ca    8.6      08 Oct 2020 10:37  Phos  3.0     10-07  Mg     1.8     10-08    TPro  7.0  /  Alb  4.2  /  TBili  0.6  /  DBili  x   /  AST  23  /  ALT  12  /  AlkPhos  50  10-07    PT/INR - ( 07 Oct 2020 06:52 )   PT: 18.4 sec;   INR: 1.57 ratio         PTT - ( 07 Oct 2020 06:52 )  PTT:39.5 sec     Private Vehicle

## 2020-10-08 NOTE — PROGRESS NOTE ADULT - SUBJECTIVE AND OBJECTIVE BOX
Infectious Diseases progress note:    Subjective: Pt awake, alert.  Daughter at bedside.   s/p Patel placement for urinary retention, urine cloudy with blood tinged urine.  Daughter at bedside.     ROS:  CONSTITUTIONAL:  No fever, chills, rigors  CARDIOVASCULAR:  No chest pain or palpitations  RESPIRATORY:   No SOB, cough, dyspnea on exertion.  No wheezing  GASTROINTESTINAL:  No abd pain, N/V, diarrhea/constipation  EXTREMITIES:  No swelling or joint pain  GENITOURINARY:  No burning on urination, increased frequency or urgency.  No flank pain  NEUROLOGIC:  No HA, visual disturbances  SKIN: No rashes    Allergies    procainamide (Other (Severe))    Intolerances        ANTIBIOTICS/RELEVANT:  antimicrobials  piperacillin/tazobactam IVPB.. 3.375 Gram(s) IV Intermittent every 8 hours    immunologic:    OTHER:  atorvastatin 80 milliGRAM(s) Oral at bedtime  dextrose 40% Gel 15 Gram(s) Oral once PRN  dextrose 5%. 1000 milliLiter(s) IV Continuous <Continuous>  dextrose 50% Injectable 12.5 Gram(s) IV Push once  dextrose 50% Injectable 25 Gram(s) IV Push once  dextrose 50% Injectable 25 Gram(s) IV Push once  digoxin     Tablet 0.125 milliGRAM(s) Oral daily  fenofibrate Tablet 145 milliGRAM(s) Oral daily  glucagon  Injectable 1 milliGRAM(s) IntraMuscular once PRN  insulin lispro (HumaLOG) corrective regimen sliding scale   SubCutaneous every 6 hours  latanoprost 0.005% Ophthalmic Solution 1 Drop(s) Both EYES at bedtime  metoprolol tartrate 25 milliGRAM(s) Oral two times a day      Objective:  Vital Signs Last 24 Hrs  T(C): 36.4 (08 Oct 2020 16:30), Max: 38.5 (08 Oct 2020 04:00)  T(F): 97.6 (08 Oct 2020 16:30), Max: 101.3 (08 Oct 2020 04:00)  HR: 100 (08 Oct 2020 16:30) (75 - 139)  BP: 182/80 (08 Oct 2020 16:30) (150/70 - 182/80)  BP(mean): --  RR: 19 (08 Oct 2020 16:30) (19 - 24)  SpO2: 96% (08 Oct 2020 16:30) (96% - 96%)    PHYSICAL EXAM:  Constitutional:NAD  Eyes:MARTHA, EOMI  Ear/Nose/Throat: no thrush, mucositis.  Moist mucous membranes	  Neck:no JVD, no lymphadenopathy, supple  Respiratory: CTA magi  Cardiovascular: S1S2 RRR, no murmurs  Gastrointestinal:soft, nontender,  nondistended (+) BS  Extremities:no e/e/c  Skin:  no rashes, open wounds or ulcerations  :  patel in place draining blood tinged cloudy urine        LABS:                        12.3   14.47 )-----------( 235      ( 08 Oct 2020 10:37 )             38.6     10-08    133<L>  |  99  |  16  ----------------------------<  130<H>  3.6   |  24  |  0.93    Ca    8.6      08 Oct 2020 10:37  Phos  3.0     10-07  Mg     1.8     10-08    TPro  7.0  /  Alb  4.2  /  TBili  0.6  /  DBili  x   /  AST  23  /  ALT  12  /  AlkPhos  50  10-07    PT/INR - ( 07 Oct 2020 06:52 )   PT: 18.4 sec;   INR: 1.57 ratio         PTT - ( 07 Oct 2020 06:52 )  PTT:39.5 sec  Urinalysis Basic - ( 06 Oct 2020 20:59 )    Color: Yellow / Appearance: Slightly Turbid / S.032 / pH: x  Gluc: x / Ketone: Trace  / Bili: Negative / Urobili: Negative   Blood: x / Protein: 300 mg/dL / Nitrite: Positive   Leuk Esterase: Large / RBC: 16 /hpf /  /HPF   Sq Epi: x / Non Sq Epi: 0 /hpf / Bacteria: Many                  Rapid RVP Result: [facility]          MICROBIOLOGY:      Culture - Blood (10.06.20 @ 23:07)   Specimen Source: .Blood Blood-Peripheral   Culture Results:   No growth to date.     RADIOLOGY & ADDITIONAL STUDIES:    < from: CT Abdomen and Pelvis w/ IV Cont (10.07.20 @ 06:56) >  EXAM:  CT ABDOMEN AND PELVIS IC                            PROCEDURE DATE:  10/07/2020            INTERPRETATION:  CLINICAL INFORMATION: Sepsis due to cystitis with abdominal distention.    COMPARISON: Prior abdominal CT dated 2019. Correlation is also made with abdominal ultrasound dated 1/3/2020.    PROCEDURE:  CT of the Abdomen and Pelvis was performed with intravenous contrast.  Intravenous contrast: 90 ml Omnipaque 350. 10 ml discarded.  Oral contrast: None.  Sagittal and coronal reformats were performed.    Study is limited secondary to abundant streak artifact from the patient's arms at his sides.    FINDINGS:  LOWER CHEST: Pacemaker. Peripheral partially imaged right lower lobe opacity, slightly decreased since 2019.    LIVER: Within normal limits.  BILE DUCTS: Normal caliber.  GALLBLADDER: Cholelithiasis.  SPLEEN: Within normal limits.  PANCREAS: Diffusely atrophic.  ADRENALS: Within normal limits.  KIDNEYS/URETERS: No hydronephrosis. Subcentimeter hypodense foci in the right kidney, too small to characterize.    BLADDER: Within normal limits.  REPRODUCTIVE ORGANS: Prostate gland is enlarged, measuring 7 x 6.2 cm..    BOWEL: No bowel obstruction. Colonic diverticulosis, particularly involving the sigmoid colon.There is mild wall thickening of the mid sigmoid colon with adjacent mild inflammatory changes and trace fluid (602:50 and 2:69). This process is inseparable from and may involve the base of the cecum (2:69). In addition, the proximal appendix is at the upper limits of normal in caliber, measuring 0.8 cm (602:50). The distal appendix is normal in caliber.. Small hiatal hernia.  PERITONEUM: Trace free fluid in the right lower quadrant. Less than 1 cm mesenteric lymph nodes particularly in the right lower quadrant.  VESSELS: Diffuse atherosclerotic calcification.  RETROPERITONEUM/LYMPH NODES: No lymphadenopathy.  ABDOMINAL WALL: Fat-containing left inguinal hernia.  BONES: Degenerative changes in the spine and hips.    IMPRESSION:  Colonic diverticulosis, particularly involving the sigmoid colon. There is mild wall thickening of the mid sigmoid colon with associated mild inflammatory changes and fluid, suggestive of mild diverticulitis. This process is inseparable from and may involve the base of the cecum. The proximal appendix is at the upper limits of normal in diameter proximally, and is in close proximity to the mild inflammatory changes involving the sigmoid colon, suggesting secondary involvement of the appendix.    Enlarged prostate gland.    Cholelithiasis.    < end of copied text >

## 2020-10-08 NOTE — PROGRESS NOTE ADULT - ASSESSMENT
Echo 3/12/15: mild MR, mild AS, grossly nl LV sys fx ,mild pulm htn      a/p  89M w/ dementia (family reports memory loss), afib on eliquis, mild AS, HTN, DM2, HLD present with  fever and UTI/ sepsis     1. Chronic Afib with RVR   - secondary to infection/ sepsis   - now rate controlled, c/w lopressor 25 mg BID, digoxin 0.125 mg daily  - ChadsVac =3. Resume oral a/c   - check echo to eval AS/ LV fx   -cv stable, no decomp CHF on exam     2. Mild AS  -check echo    3. UTI/ Sepsis/  diverticulitis:  -presenting febrile, tachycardic, elevated lactate, leukocytosis  -f/u cultures   -ID f/u- abx    -CT abd noted. -management per med, GI f/u     4. HTN   -bp acceptable  -cw current meds     dvt ppx     Echo 3/12/15: mild MR, mild AS, grossly nl LV sys fx ,mild pulm htn      a/p  89M w/ dementia (family reports memory loss), afib on eliquis, mild AS, HTN, DM2, HLD present with  fever and UTI/ sepsis     1. Chronic Afib with RVR   -secondary to infection/ sepsis   -now rate controlled, c/w lopressor 25 mg BID, digoxin 0.125 mg daily  -ChadsVac =3. Resume oral a/c   -check echo to eval AS/ LV fx   -cv stable, no decomp CHF on exam     2. Mild AS  -check echo    3. UTI/ Sepsis/  diverticulitis:  -presenting febrile, tachycardic, elevated lactate, leukocytosis  -f/u cultures   -ID f/u- abx    -CT abd noted. -management per med, GI f/u     4. HTN   -bp acceptable  -cw current meds     dvt ppx

## 2020-10-08 NOTE — PROGRESS NOTE ADULT - ASSESSMENT
89M w/ dementia (family reports memory loss), afib on eliquis, HTN, DM2, HLD present to [facility] for evaluation of fever and UTI admit for severe sepsis w functional paraplegia due to acute cystitis.  On zosyn day2, blood cx NTD, urine Cx was not sent on admission, will send today, but may be negative at this point 2/2 partially treated  Indwelling patel in place 2/2 urinary retention 2/2 acute hemorrhagic cystitis. has h/o BPH. will place on Flomax 0.4 mg qhs  mental status at baseline,cont haldol prn agitation, has h/o dementia  sepsis resolved, PT eval pending.  AFib on tele, HR , hold outptn Eliquis while has hemorrhagic cystitis. -180, will raise Metoprolol to 50 mg bid and resume outptn Vasotec 5 mg daily  ptn is on O2NC, would check RA pulse Ox, CXR clear  Right arm edema w purple hue and warmth: check doppler to R/O thrombophlebitis, warm compresses, DC IV line in that arm  cont insulin on a sliding scale, most all FS under 200  hyponatremia, mild, encourage po intake  GOC d/w daughter x 30 min: full code

## 2020-10-08 NOTE — PROGRESS NOTE ADULT - SUBJECTIVE AND OBJECTIVE BOX
Surgery Progress Note    SUBJECTIVE: Pt seen and examined at bedside. Patient comfortable and in no-apparent distress. Afib with RVR to 160s overnight    Vital Signs Last 24 Hrs  T(C): 36.9 (08 Oct 2020 05:00), Max: 38.5 (08 Oct 2020 04:00)  T(F): 98.5 (08 Oct 2020 05:00), Max: 101.3 (08 Oct 2020 04:00)  HR: 100 (08 Oct 2020 05:15) (86 - 139)  BP: 166/75 (08 Oct 2020 05:15) (153/69 - 188/96)  BP(mean): --  RR: 24 (08 Oct 2020 04:00) (22 - 24)  SpO2: 96% (08 Oct 2020 04:00) (94% - 99%)    Physical Exam:  General: NAD, resting comfortably, A&Ox3  HEENT: NC/AT, EOMI, normal hearing,  neck supple  Pulmonary: On 2L O2  Cardiovascular: NSR, no evidence of cyanosis/edema  Abdominal: Soft, ND, tender in lower quadrants  Extremities: WWP. Normal Strength. Full ROM.     LABS:                        13.9   23.06 )-----------( 263      ( 07 Oct 2020 06:52 )             44.3     10-07    135  |  101  |  16  ----------------------------<  151<H>  3.9   |  21<L>  |  0.95    Ca    9.4      07 Oct 2020 06:52  Phos  3.0     10-07  Mg     1.8     10-07    TPro  7.0  /  Alb  4.2  /  TBili  0.6  /  DBili  x   /  AST  23  /  ALT  12  /  AlkPhos  50  10-07    PT/INR - ( 07 Oct 2020 06:52 )   PT: 18.4 sec;   INR: 1.57 ratio         PTT - ( 07 Oct 2020 06:52 )  PTT:39.5 sec  Urinalysis Basic - ( 06 Oct 2020 20:59 )    Color: Yellow / Appearance: Slightly Turbid / S.032 / pH: x  Gluc: x / Ketone: Trace  / Bili: Negative / Urobili: Negative   Blood: x / Protein: 300 mg/dL / Nitrite: Positive   Leuk Esterase: Large / RBC: 16 /hpf /  /HPF   Sq Epi: x / Non Sq Epi: 0 /hpf / Bacteria: Many        INs and OUTs:    10-06-20 @ 07:01  -  10-07-20 @ 07:00  --------------------------------------------------------  IN: 0 mL / OUT: 966 mL / NET: -966 mL    10-07-20 @ 07:01  -  10-08-20 @ 05:17  --------------------------------------------------------  IN: 0 mL / OUT: 1400 mL / NET: -1400 mL

## 2020-10-08 NOTE — PROGRESS NOTE ADULT - ASSESSMENT
89M w/ dementia (family reports memory loss), afib on eliquis, HTN, DM2, HLD present to [facility] for evaluation of fever and UTI. The patient is reported to be found to have fever this morning and upon evaluation by urgent care he was diagnosed with UTI and given bactrim which he took 1 dose in late evening but demonstrated continued fatigue, fever, chills, and general sick appearance. The patient cannot remember why he came to the hospital and therefore there is limited history. Medication rec obtained from Ms. Jade. The patient is noted to live alone and has HHA who assists with adls. The patient is reported to at baseline have short-term memory problems but no formal evaluation for dementia reported.    In the ED, VS: T 102, 177/84, 120, 25 93%RA  s/p acetaminophen 650x1, [id number](21:53), LR 1.55Lx1 (06 Oct 2020 23:12)    WBC 23 <-- 20.  Lact 2.6.  UA (+) nit/large LE.  Cov-19 pcr, RVP with SARS-COV (-), Cov-19 IgG ab (-).  CTap no hydronephrosis, (+) enlarged prostate, mild diverticulitis of cecum with secondary involvement of appendix, cholelithiasis.  Cxr shows clear lungs.      Pt given dose of rocephin in the ER.  Now on zosyn.  ID Consult called for further abx managment.     Sepsis:    - Pt febrile, tachycardic, elevated lactate, leukocytosis.  Cont hemodynamic monitoring, IVF hydration as needed, monitor pulse ox, supp O2 as required, check repeat lactate.    - Agree with broad spectrum abx, source  could be from UTI/prostatitis vs diverticulitis.    - Cont zosyn for /GI coverage.    - Check ua, ucx, blood cx x 2 (ngtd)      Mild diverticulitis:    - Mild wall thickening of sigmoid colon, possible involvement of cecum and secondary involvement of appendix.  Pt with some RLQ to lower abd TTP.  c/o bloating.    - Cont zosyn.  Check repeat lactate.      - GI and Surgical eval called by Primary - f/u appreciated.  No urgent intervention required at this time.       UTI:    - UA (+) nit/LE.  Enlarged prostate seen on imaging.  check urine cx.    - Pt with urinary retention requiring patel.     - Cont zosyn    - Consider tamsulosin vs finasteride      d/w pt's daughter at bedside.       will follow,    Abbi Avalos  [phone]

## 2020-10-08 NOTE — PROGRESS NOTE ADULT - ASSESSMENT
89M w/ dementia, afib (on eliquis), HTN, DM2, HLD presenting with UTI, found to have diverticulosis on CT.     Plan:   - NPO (sips/chips), IVF  - No NGT necessary unless patient becomes nauseated/has episode of emesis.   - May follow up outpatient for further management when returns to baseline level of health  - C/w broadspectrum abx: Zosyn for gram(-) and anaerobic coverage    [facility]  [phone] 89M w/ dementia, afib (on eliquis), HTN, DM2, HLD presenting with UTI, found to have diverticulosis on CT.     Plan:   - NPO (sips/chips), IVF  - No NGT necessary unless patient becomes nauseated/has episode of emesis.   - May follow up outpatient for further management when returns to baseline level of health  - C/w broadspectrum abx: Zosyn for gram(-) and anaerobic coverage  - will sign off at this time, please call for any further questions    [facility]  [phone]

## 2020-10-09 LAB
-  AMIKACIN: SIGNIFICANT CHANGE UP
-  AMOXICILLIN/CLAVULANIC ACID: SIGNIFICANT CHANGE UP
-  AMPICILLIN/SULBACTAM: SIGNIFICANT CHANGE UP
-  AMPICILLIN: SIGNIFICANT CHANGE UP
-  AZTREONAM: SIGNIFICANT CHANGE UP
-  CEFAZOLIN: SIGNIFICANT CHANGE UP
-  CEFEPIME: SIGNIFICANT CHANGE UP
-  CEFOXITIN: SIGNIFICANT CHANGE UP
-  CEFTRIAXONE: SIGNIFICANT CHANGE UP
-  CIPROFLOXACIN: SIGNIFICANT CHANGE UP
-  ERTAPENEM: SIGNIFICANT CHANGE UP
-  GENTAMICIN: SIGNIFICANT CHANGE UP
-  IMIPENEM: SIGNIFICANT CHANGE UP
-  LEVOFLOXACIN: SIGNIFICANT CHANGE UP
-  MEROPENEM: SIGNIFICANT CHANGE UP
-  NITROFURANTOIN: SIGNIFICANT CHANGE UP
-  PIPERACILLIN/TAZOBACTAM: SIGNIFICANT CHANGE UP
-  TIGECYCLINE: SIGNIFICANT CHANGE UP
-  TOBRAMYCIN: SIGNIFICANT CHANGE UP
-  TRIMETHOPRIM/SULFAMETHOXAZOLE: SIGNIFICANT CHANGE UP
ANION GAP SERPL CALC-SCNC: 9 MMOL/L — SIGNIFICANT CHANGE UP (ref 5–17)
BUN SERPL-MCNC: 16 MG/DL — SIGNIFICANT CHANGE UP (ref 7–23)
CALCIUM SERPL-MCNC: 8.2 MG/DL — LOW (ref 8.4–10.5)
CHLORIDE SERPL-SCNC: 101 MMOL/L — SIGNIFICANT CHANGE UP (ref 96–108)
CO2 SERPL-SCNC: 22 MMOL/L — SIGNIFICANT CHANGE UP (ref 22–31)
CREAT SERPL-MCNC: 0.84 MG/DL — SIGNIFICANT CHANGE UP (ref 0.5–1.3)
CULTURE RESULTS: SIGNIFICANT CHANGE UP
GLUCOSE BLDC GLUCOMTR-MCNC: 156 MG/DL — HIGH (ref 70–99)
GLUCOSE BLDC GLUCOMTR-MCNC: 187 MG/DL — HIGH (ref 70–99)
GLUCOSE BLDC GLUCOMTR-MCNC: 198 MG/DL — HIGH (ref 70–99)
GLUCOSE BLDC GLUCOMTR-MCNC: 208 MG/DL — HIGH (ref 70–99)
GLUCOSE BLDC GLUCOMTR-MCNC: 234 MG/DL — HIGH (ref 70–99)
GLUCOSE SERPL-MCNC: 151 MG/DL — HIGH (ref 70–99)
HCT VFR BLD CALC: 36.2 % — LOW (ref 39–50)
HGB BLD-MCNC: 12 G/DL — LOW (ref 13–17)
MCHC RBC-ENTMCNC: 28.6 PG — SIGNIFICANT CHANGE UP (ref 27–34)
MCHC RBC-ENTMCNC: 33.1 GM/DL — SIGNIFICANT CHANGE UP (ref 32–36)
MCV RBC AUTO: 86.2 FL — SIGNIFICANT CHANGE UP (ref 80–100)
METHOD TYPE: SIGNIFICANT CHANGE UP
NRBC # BLD: 0 /100 WBCS — SIGNIFICANT CHANGE UP (ref 0–0)
ORGANISM # SPEC MICROSCOPIC CNT: SIGNIFICANT CHANGE UP
ORGANISM # SPEC MICROSCOPIC CNT: SIGNIFICANT CHANGE UP
PLATELET # BLD AUTO: 232 K/UL — SIGNIFICANT CHANGE UP (ref 150–400)
POTASSIUM SERPL-MCNC: 3.6 MMOL/L — SIGNIFICANT CHANGE UP (ref 3.5–5.3)
POTASSIUM SERPL-SCNC: 3.6 MMOL/L — SIGNIFICANT CHANGE UP (ref 3.5–5.3)
RBC # BLD: 4.2 M/UL — SIGNIFICANT CHANGE UP (ref 4.2–5.8)
RBC # FLD: 14.1 % — SIGNIFICANT CHANGE UP (ref 10.3–14.5)
SODIUM SERPL-SCNC: 132 MMOL/L — LOW (ref 135–145)
SPECIMEN SOURCE: SIGNIFICANT CHANGE UP
WBC # BLD: 12.17 K/UL — HIGH (ref 3.8–10.5)
WBC # FLD AUTO: 12.17 K/UL — HIGH (ref 3.8–10.5)

## 2020-10-09 PROCEDURE — 93308 TTE F-UP OR LMTD: CPT | Mod: 26

## 2020-10-09 PROCEDURE — 93321 DOPPLER ECHO F-UP/LMTD STD: CPT | Mod: 26

## 2020-10-09 PROCEDURE — 93971 EXTREMITY STUDY: CPT | Mod: 26

## 2020-10-09 RX ADMIN — PIPERACILLIN AND TAZOBACTAM 25 GRAM(S): 4; .5 INJECTION, POWDER, LYOPHILIZED, FOR SOLUTION INTRAVENOUS at 21:47

## 2020-10-09 RX ADMIN — Medication 145 MILLIGRAM(S): at 12:21

## 2020-10-09 RX ADMIN — ATORVASTATIN CALCIUM 80 MILLIGRAM(S): 80 TABLET, FILM COATED ORAL at 21:48

## 2020-10-09 RX ADMIN — PIPERACILLIN AND TAZOBACTAM 25 GRAM(S): 4; .5 INJECTION, POWDER, LYOPHILIZED, FOR SOLUTION INTRAVENOUS at 14:03

## 2020-10-09 RX ADMIN — Medication 1: at 16:34

## 2020-10-09 RX ADMIN — DORZOLAMIDE HYDROCHLORIDE 1 DROP(S): 20 SOLUTION/ DROPS OPHTHALMIC at 16:35

## 2020-10-09 RX ADMIN — PIPERACILLIN AND TAZOBACTAM 25 GRAM(S): 4; .5 INJECTION, POWDER, LYOPHILIZED, FOR SOLUTION INTRAVENOUS at 05:42

## 2020-10-09 RX ADMIN — Medication 50 MILLIGRAM(S): at 16:35

## 2020-10-09 RX ADMIN — Medication 5 MILLIGRAM(S): at 05:43

## 2020-10-09 RX ADMIN — Medication 50 MILLIGRAM(S): at 05:43

## 2020-10-09 RX ADMIN — Medication 1: at 07:52

## 2020-10-09 RX ADMIN — Medication 0.12 MILLIGRAM(S): at 05:43

## 2020-10-09 RX ADMIN — LATANOPROST 1 DROP(S): 0.05 SOLUTION/ DROPS OPHTHALMIC; TOPICAL at 21:48

## 2020-10-09 RX ADMIN — TAMSULOSIN HYDROCHLORIDE 0.4 MILLIGRAM(S): 0.4 CAPSULE ORAL at 21:48

## 2020-10-09 RX ADMIN — DORZOLAMIDE HYDROCHLORIDE 1 DROP(S): 20 SOLUTION/ DROPS OPHTHALMIC at 05:42

## 2020-10-09 RX ADMIN — Medication 2: at 00:13

## 2020-10-09 RX ADMIN — Medication 1: at 12:20

## 2020-10-09 NOTE — PROGRESS NOTE ADULT - SUBJECTIVE AND OBJECTIVE BOX
INTERVAL HPI/OVERNIGHT EVENTS:    MEDICATIONS  (STANDING):  atorvastatin 80 milliGRAM(s) Oral at bedtime  dextrose 5%. 1000 milliLiter(s) (50 mL/Hr) IV Continuous <Continuous>  dextrose 50% Injectable 12.5 Gram(s) IV Push once  dextrose 50% Injectable 25 Gram(s) IV Push once  dextrose 50% Injectable 25 Gram(s) IV Push once  digoxin     Tablet 0.125 milliGRAM(s) Oral daily  dorzolamide 2% Ophthalmic Solution 1 Drop(s) Left EYE <User Schedule>  enalapril 5 milliGRAM(s) Oral daily  fenofibrate Tablet 145 milliGRAM(s) Oral daily  insulin lispro (HumaLOG) corrective regimen sliding scale   SubCutaneous every 6 hours  latanoprost 0.005% Ophthalmic Solution 1 Drop(s) Both EYES at bedtime  metoprolol tartrate 50 milliGRAM(s) Oral two times a day  piperacillin/tazobactam IVPB.. 3.375 Gram(s) IV Intermittent every 8 hours  tamsulosin 0.4 milliGRAM(s) Oral at bedtime    MEDICATIONS  (PRN):  dextrose 40% Gel 15 Gram(s) Oral once PRN Blood Glucose LESS THAN 70 milliGRAM(s)/deciliter  glucagon  Injectable 1 milliGRAM(s) IntraMuscular once PRN Glucose LESS THAN 70 milligrams/deciliter      Allergies    procainamide (Other (Severe))    Intolerances            PHYSICAL EXAM:   Vital Signs:  Vital Signs Last 24 Hrs  T(C): 36.3 (09 Oct 2020 03:57), Max: 37.2 (08 Oct 2020 11:24)  T(F): 97.4 (09 Oct 2020 03:57), Max: 98.9 (08 Oct 2020 11:24)  HR: 84 (09 Oct 2020 03:57) (80 - 100)  BP: 154/79 (09 Oct 2020 03:57) (153/78 - 182/80)  BP(mean): --  RR: 18 (09 Oct 2020 03:57) (18 - 20)  SpO2: 98% (09 Oct 2020 03:57) (94% - 98%)  Daily     Daily     GENERAL:  no distress  HEENT:  NC/AT,  anicteric  refused exam today, eating breakfast      LABS:                        12.3   14.47 )-----------( 235      ( 08 Oct 2020 10:37 )             38.6     10-09    132<L>  |  101  |  16  ----------------------------<  151<H>  3.6   |  22  |  0.84    Ca    8.2<L>      09 Oct 2020 06:45  Mg     1.8     10-08            RADIOLOGY & ADDITIONAL TESTS:

## 2020-10-09 NOTE — DIETITIAN INITIAL EVALUATION ADULT. - PHYSICAL APPEARANCE
other (specify) Ht: 68 inches Wt: 172 pounds BMI: 26.1 kg/m2 IBW: 154 pounds(+/-10%) 112%IBW  no edema. no pressure ulcers documented.

## 2020-10-09 NOTE — DIETITIAN INITIAL EVALUATION ADULT. - OTHER INFO
Per chart, 89 y/o male "w/ dementia (family reports memory loss), afib on eliquis, HTN, DM2, HLD present to [facility] for evaluation of fever and UTI... The patient is reported to at baseline have short-term memory problems but no formal evaluation for dementia reported." Pt with sepsis, also found with mild diverticulosis, no urgent intervention needed at this time.    Information obtained from: Pt, EMR. Noted pt with some memory problems. Also called pt's daughter Mary to confirm information.    Prior to admission: Pt reports he is a good eater, has a good appetite. He reports he does not follow a specific diet at home including a low-sugar diet. Daughter confirms, DM has never been out of control, pt has HHA 10 hours x7 days per week and has A1c if 6.4% indicating good glycemic control. He takes metformin and Januvia at home per outpatient medication records.    Weight Hx: Pt reports UBW is 170 pounds, consistent with weight from 2015 documented in prior RD note of 170 pounds. Dosing wt this admission is 110 pounds which is inaccurate, as pt appears overweight and bed scale taken by RD showed 172 pounds. Nutrition-focused physical exam also conducted with no muscle or fat loss found.    This admission: Pt reports good PO intake in-house, states he had oatmeal, peaches and a roll for breakfast this morning (report is consistent with items on meal ticket). Pt confirms NKFA, no nausea/vomiting, no difficulty chewing/swallowing, no GI distress, no micronutrient supplementation PTA, last BM this morning after a few days of not having a bowel movement.    Education: Pt is not a good candidate for diabetes diet education at this time. Discussed with pt's daughter, she also has no questions. She had some concerns about new diverticulosis dx, discussed nutrition recommendations with her.

## 2020-10-09 NOTE — PHYSICAL THERAPY INITIAL EVALUATION ADULT - DISCHARGE DISPOSITION, PT EVAL
home w/ assist/outpatient PT/Pt may benefit from increased HHA hours secondary to dementia (HHA stating she will discuss w/ daughter).

## 2020-10-09 NOTE — PROGRESS NOTE ADULT - ASSESSMENT
89M w/ dementia (family reports memory loss), afib on eliquis, HTN, DM2, HLD present to [facility] for evaluation of fever and UTI admit for severe sepsis w functional paraplegia due to acute cystitis.  On zosyn day3, blood cx NTD, urine Cx was not sent on admission, was sent on 10/8, but may be negative at this point 2/2 partially treated  Indwelling patel in place 2/2 urinary retention 2/2 acute hemorrhagic cystitis. has h/o BPH. will place on Flomax 0.4 mg qhs. will dc when hematuria clears  mental status at baseline,cont haldol prn agitation, has h/o dementia  sepsis resolved, PT eval done: home w home PT.  AFib on tele, HR 80s, hold outptn Eliquis while has hemorrhagic cystitis. , cont Metoprolol to 50 mg bid and  Vasotec 5 mg daily  ptn is on O2NC, would check RA pulse Ox, CXR clear  Right arm edema w purple hue and warmth: superficial thrombophlebitis on doppler, cont warm compresses, no IV line in that arm  cont insulin on a sliding scale, most all FS under 200  hyponatremia, mild, encourage po intake  GOC d/w daughter x 30 min: full code

## 2020-10-09 NOTE — PROGRESS NOTE ADULT - SUBJECTIVE AND OBJECTIVE BOX
Infectious Diseases progress note:    Subjective: pt seen and examined earlier this afternoon, pt reported to have pulled on Patel causing increased bleeding.  pt being attended to by RN and PCA.  Still with hematuria in bag.   Patel currently in place.  Afebrile.      ROS:  CONSTITUTIONAL:  No fever, chills, rigors  CARDIOVASCULAR:  No chest pain or palpitations  RESPIRATORY:   No SOB, cough, dyspnea on exertion.  No wheezing  GASTROINTESTINAL:  No abd pain, N/V, diarrhea/constipation  EXTREMITIES:  No swelling or joint pain  GENITOURINARY:  No burning on urination, increased frequency or urgency.  No flank pain  NEUROLOGIC:  No HA, visual disturbances  SKIN: No rashes    Allergies    procainamide (Other (Severe))    Intolerances        ANTIBIOTICS/RELEVANT:  antimicrobials  piperacillin/tazobactam IVPB.. 3.375 Gram(s) IV Intermittent every 8 hours    immunologic:    OTHER:  atorvastatin 80 milliGRAM(s) Oral at bedtime  dextrose 40% Gel 15 Gram(s) Oral once PRN  dextrose 5%. 1000 milliLiter(s) IV Continuous <Continuous>  dextrose 50% Injectable 12.5 Gram(s) IV Push once  dextrose 50% Injectable 25 Gram(s) IV Push once  dextrose 50% Injectable 25 Gram(s) IV Push once  digoxin     Tablet 0.125 milliGRAM(s) Oral daily  dorzolamide 2% Ophthalmic Solution 1 Drop(s) Left EYE <User Schedule>  enalapril 5 milliGRAM(s) Oral daily  fenofibrate Tablet 145 milliGRAM(s) Oral daily  glucagon  Injectable 1 milliGRAM(s) IntraMuscular once PRN  insulin lispro (HumaLOG) corrective regimen sliding scale   SubCutaneous every 6 hours  latanoprost 0.005% Ophthalmic Solution 1 Drop(s) Both EYES at bedtime  metoprolol tartrate 50 milliGRAM(s) Oral two times a day  tamsulosin 0.4 milliGRAM(s) Oral at bedtime      Objective:  Vital Signs Last 24 Hrs  T(C): 36.6 (09 Oct 2020 10:54), Max: 36.8 (09 Oct 2020 00:16)  T(F): 97.9 (09 Oct 2020 10:54), Max: 98.3 (09 Oct 2020 00:16)  HR: 76 (09 Oct 2020 10:54) (76 - 91)  BP: 155/83 (09 Oct 2020 10:54) (153/78 - 169/69)  BP(mean): --  RR: 18 (09 Oct 2020 10:54) (18 - 19)  SpO2: 98% (09 Oct 2020 10:54) (94% - 98%)    PHYSICAL EXAM:  Constitutional:NAD  Eyes:MARTHA, EOMI  Ear/Nose/Throat: no thrush, mucositis.  Moist mucous membranes	  Neck:no JVD, no lymphadenopathy, supple  Respiratory: CTA magi  Cardiovascular: S1S2 RRR, no murmurs  Gastrointestinal:soft, nontender,  nondistended (+) BS  Extremities:no e/e/c  Skin:  no rashes, open wounds or ulcerations  : patel with hematuria.          LABS:                        12.0   12.17 )-----------( 232      ( 09 Oct 2020 06:43 )             36.2     10-09    132<L>  |  101  |  16  ----------------------------<  151<H>  3.6   |  22  |  0.84    Ca    8.2<L>      09 Oct 2020 06:45  Mg     1.8     10-08                      Rapid RVP Result: [facility]          MICROBIOLOGY:      Culture - Urine (10.07.20 @ 01:09)   - Amikacin: S <=16   - Amoxicillin/Clavulanic Acid: S <=8/4   - Ampicillin: S <=8 These ampicillin results predict results for amoxicillin   - Ampicillin/Sulbactam: S <=4/2 Enterobacter, Citrobacter, and Serratia may develop resistance during prolonged therapy (3-4 days)   - Aztreonam: S <=4   - Cefazolin: S <=2 ([id number]) For uncomplicated UTI with K. pneumoniae, E. coli, or P. mirablis: SENIA <=16 is sensitive and SENIA >=32 is resistant. This also predicts results for oral agents cefaclor, cefdinir, cefpodoxime, cefprozil, cefuroxime axetil, cephalexin and locarbef for uncomplicated UTI. Note that some isolates may be susceptible to these agents while testing resistant to cefazolin.   - Cefepime: S <=2   - Cefoxitin: S <=8   - Ceftriaxone: S <=1 Enterobacter, Citrobacter, and Serratia may develop resistance during prolonged therapy   - Ciprofloxacin: S <=0.25   - Ertapenem: S <=0.5   - Gentamicin: S <=2   - Imipenem: S <=1   - Levofloxacin: S <=0.5   - Meropenem: S <=1   - Nitrofurantoin: S <=32 Should not be used to treat pyelonephritis   - Piperacillin/Tazobactam: S <=8   - Tigecycline: S <=2   - Tobramycin: S <=2   - Trimethoprim/Sulfamethoxazole: S <=0.5/9.5   Specimen Source: .Urine Clean Catch (Midstream)   Culture Results:   10,000 - 49,000 CFU/mL Escherichia coli   Organism Identification: Escherichia coli   Organism: Escherichia coli   Method Type: SENIA       Culture - Blood (10.06.20 @ 23:07)   Specimen Source: .Blood Blood-Peripheral   Culture Results:   No growth to date.     RADIOLOGY & ADDITIONAL STUDIES:

## 2020-10-09 NOTE — DIETITIAN INITIAL EVALUATION ADULT. - PERTINENT LABORATORY DATA
10-09 Na132 mmol/L<L> Glu 151 mg/dL<H> K+ 3.6 mmol/L Cr  0.84 mg/dL BUN 16 mg/dL Phos n/a   Alb n/a   PAB n/a

## 2020-10-09 NOTE — PROGRESS NOTE ADULT - ASSESSMENT
89M w/ dementia (family reports memory loss), afib on eliquis, HTN, DM2, HLD present to [facility] for evaluation of fever and UTI. The patient is reported to be found to have fever this morning and upon evaluation by urgent care he was diagnosed with UTI and given bactrim which he took 1 dose in late evening but demonstrated continued fatigue, fever, chills, and general sick appearance. The patient cannot remember why he came to the hospital and therefore there is limited history. Medication rec obtained from Ms. Jade. The patient is noted to live alone and has HHA who assists with adls. The patient is reported to at baseline have short-term memory problems but no formal evaluation for dementia reported.    In the ED, VS: T 102, 177/84, 120, 25 93%RA  s/p acetaminophen 650x1, [id number](21:53), LR 1.55Lx1 (06 Oct 2020 23:12)    WBC 23 <-- 20.  Lact 2.6.  UA (+) nit/large LE.  Cov-19 pcr, RVP with SARS-COV (-), Cov-19 IgG ab (-).  CTap no hydronephrosis, (+) enlarged prostate, mild diverticulitis of cecum with secondary involvement of appendix, cholelithiasis.  Cxr shows clear lungs.      Pt given dose of rocephin in the ER.  Now on zosyn.  ID Consult called for further abx managment.     Sepsis:    - Pt febrile, tachycardic, elevated lactate, leukocytosis.  Cont hemodynamic monitoring, IVF hydration as needed, monitor pulse ox, supp O2 as required, check repeat lactate.    - Agree with broad spectrum abx, source  could be from UTI/prostatitis vs diverticulitis.    - on zosyn for  /GI coverage.  ucx growing non ESBL e.coli,    - Check ua, ucx, blood cx x 2 (ngtd)      Mild diverticulitis:    - Mild wall thickening of sigmoid colon, possible involvement of cecum and secondary involvement of appendix.  Pt with some RLQ to lower abd TTP.  c/o bloating.    - Cont zosyn.  Check repeat lactate.      - GI and Surgical eval called by Primary - f/u appreciated.  No urgent intervention required at this time.       UTI:    - UA (+) nit/LE.  Enlarged prostate seen on imaging.  check urine cx.    - Pt with urinary retention requiring patel.     - Cont zosyn.  Growing non ESBL E.coli from ucx.     - pt started on tamsulosin.        * Cont zosyn for now, sensis reviewed.  Can probably de-escalate to PO augmentin 875mg bid upon hospital d/c, through 10/19      (Dr. Shane Henry covering 10/10 and 10/11, [phone])

## 2020-10-09 NOTE — DIETITIAN INITIAL EVALUATION ADULT. - ADD RECOMMEND
Change diet to Consistent Carbohydrate with snack, remove renal restriction. Monitor PO intake, and need for nutrition supplementation. Education provided, pt's daughter is aware RD remains available.

## 2020-10-09 NOTE — PROGRESS NOTE ADULT - ASSESSMENT
Echo 3/12/15: mild MR, mild AS, grossly nl LV sys fx ,mild pulm htn      a/p  89M w/ dementia (family reports memory loss), afib on eliquis, mild AS, HTN, DM2, HLD present with  fever and UTI/ sepsis     1. Chronic Afib with RVR   -secondary to infection/ sepsis   -now rate controlled, c/w lopressor 25 mg BID, digoxin 0.125 mg daily  -ChadsVac =3. Resume oral a/c   -check echo to eval AS/ LV fx   -cv stable, no decomp CHF on exam     2. Mild AS  -check echo    3. UTI/ Sepsis/  diverticulitis:  -presenting febrile, tachycardic, elevated lactate, leukocytosis  -f/u cultures   -ID f/u- abx    -CT abd noted. -management per med, GI f/u     4. HTN   -bp acceptable  -cw current meds     5. Right Upper extremity edema  -doppler pending to R/O thrombophlebitis    dvt ppx

## 2020-10-09 NOTE — PROGRESS NOTE ADULT - SUBJECTIVE AND OBJECTIVE BOX
CARDIOLOGY FOLLOW UP - Dr. Siddiqi    CC NAD    Right arm edema , doppler pending to R/O thrombophlebitis    PHYSICAL EXAM:  T(C): 36.6 (10-09-20 @ 10:54), Max: 36.8 (10-09-20 @ 00:16)  HR: 76 (10-09-20 @ 10:54) (76 - 100)  BP: 155/83 (10-09-20 @ 10:54) (153/78 - 182/80)  RR: 18 (10-09-20 @ 10:54) (18 - 19)  SpO2: 98% (10-09-20 @ 10:54) (94% - 98%)  Wt(kg): --  I&O's Summary    08 Oct 2020 07:01  -  09 Oct 2020 07:00  --------------------------------------------------------  IN: 240 mL / OUT: 1500 mL / NET: -1260 mL        Appearance: Normal	  Cardiovascular: Normal S1 S2 irreg   Respiratory: Lungs clear to auscultation	  Gastrointestinal:  Soft, Non-tender, + BS	  Extremities: Normal range of motion, No clubbing, Right arm edema         MEDICATIONS  (STANDING):  atorvastatin 80 milliGRAM(s) Oral at bedtime  dextrose 5%. 1000 milliLiter(s) (50 mL/Hr) IV Continuous <Continuous>  dextrose 50% Injectable 12.5 Gram(s) IV Push once  dextrose 50% Injectable 25 Gram(s) IV Push once  dextrose 50% Injectable 25 Gram(s) IV Push once  digoxin     Tablet 0.125 milliGRAM(s) Oral daily  dorzolamide 2% Ophthalmic Solution 1 Drop(s) Left EYE <User Schedule>  enalapril 5 milliGRAM(s) Oral daily  fenofibrate Tablet 145 milliGRAM(s) Oral daily  insulin lispro (HumaLOG) corrective regimen sliding scale   SubCutaneous every 6 hours  latanoprost 0.005% Ophthalmic Solution 1 Drop(s) Both EYES at bedtime  metoprolol tartrate 50 milliGRAM(s) Oral two times a day  piperacillin/tazobactam IVPB.. 3.375 Gram(s) IV Intermittent every 8 hours  tamsulosin 0.4 milliGRAM(s) Oral at bedtime      TELEMETRY: AFIB     ECG:  	  RADIOLOGY:   DIAGNOSTIC TESTING:  [ ] Echocardiogram:  [ ]  Catheterization:  [ ] Stress Test:    OTHER: 	    LABS:	 	                                12.0   12.17 )-----------( 232      ( 09 Oct 2020 06:43 )             36.2     10-09    132<L>  |  101  |  16  ----------------------------<  151<H>  3.6   |  22  |  0.84    Ca    8.2<L>      09 Oct 2020 06:45  Mg     1.8     10-08

## 2020-10-09 NOTE — PROGRESS NOTE ADULT - SUBJECTIVE AND OBJECTIVE BOX
Patient is a 89y old  Male who presents with a chief complaint of 89M p/w fever and UTI (09 Oct 2020 18:11)      SUBJECTIVE / OVERNIGHT EVENTS:    MEDICATIONS  (STANDING):  atorvastatin 80 milliGRAM(s) Oral at bedtime  dextrose 5%. 1000 milliLiter(s) (50 mL/Hr) IV Continuous <Continuous>  dextrose 50% Injectable 12.5 Gram(s) IV Push once  dextrose 50% Injectable 25 Gram(s) IV Push once  dextrose 50% Injectable 25 Gram(s) IV Push once  digoxin     Tablet 0.125 milliGRAM(s) Oral daily  dorzolamide 2% Ophthalmic Solution 1 Drop(s) Left EYE <User Schedule>  enalapril 5 milliGRAM(s) Oral daily  fenofibrate Tablet 145 milliGRAM(s) Oral daily  insulin lispro (HumaLOG) corrective regimen sliding scale   SubCutaneous every 6 hours  latanoprost 0.005% Ophthalmic Solution 1 Drop(s) Both EYES at bedtime  metoprolol tartrate 50 milliGRAM(s) Oral two times a day  piperacillin/tazobactam IVPB.. 3.375 Gram(s) IV Intermittent every 8 hours  tamsulosin 0.4 milliGRAM(s) Oral at bedtime    MEDICATIONS  (PRN):  dextrose 40% Gel 15 Gram(s) Oral once PRN Blood Glucose LESS THAN 70 milliGRAM(s)/deciliter  glucagon  Injectable 1 milliGRAM(s) IntraMuscular once PRN Glucose LESS THAN 70 milligrams/deciliter      Vital Signs Last 24 Hrs  T(F): 97.8 (10-09-20 @ 20:57), Max: 98.3 (10-09-20 @ 00:16)  HR: 81 (10-09-20 @ 20:57) (76 - 84)  BP: 150/77 (10-09-20 @ 20:57) (150/77 - 155/83)  RR: 18 (10-09-20 @ 20:57) (18 - 18)  SpO2: 98% (10-09-20 @ 20:57) (94% - 98%)  Telemetry:   CAPILLARY BLOOD GLUCOSE      POCT Blood Glucose.: 208 mg/dL (09 Oct 2020 21:29)  POCT Blood Glucose.: 187 mg/dL (09 Oct 2020 16:22)  POCT Blood Glucose.: 198 mg/dL (09 Oct 2020 12:16)  POCT Blood Glucose.: 156 mg/dL (09 Oct 2020 07:30)  POCT Blood Glucose.: 234 mg/dL (09 Oct 2020 00:12)    I&O's Summary    08 Oct 2020 07:01  -  09 Oct 2020 07:00  --------------------------------------------------------  IN: 240 mL / OUT: 1500 mL / NET: -1260 mL    09 Oct 2020 07:01  -  09 Oct 2020 23:16  --------------------------------------------------------  IN: 765 mL / OUT: 1200 mL / NET: -435 mL        PHYSICAL EXAM:  GENERAL: NAD, well-developed  HEAD:  Atraumatic, Normocephalic  EYES: EOMI, PERRLA, conjunctiva and sclera clear  NECK: Supple, No JVD  CHEST/LUNG: Clear to auscultation bilaterally; No wheeze  HEART: Regular rate and rhythm; No murmurs, rubs, or gallops  ABDOMEN: Soft, Nontender, Nondistended; Bowel sounds present  EXTREMITIES:  2+ Peripheral Pulses, No clubbing, cyanosis, or edema  PSYCH: AAOx3  NEUROLOGY: non-focal  SKIN: No rashes or lesions    LABS:                        12.0   12.17 )-----------( 232      ( 09 Oct 2020 06:43 )             36.2     10-09    132<L>  |  101  |  16  ----------------------------<  151<H>  3.6   |  22  |  0.84    Ca    8.2<L>      09 Oct 2020 06:45  Mg     1.8     10-08                RADIOLOGY & ADDITIONAL TESTS:    Imaging Personally Reviewed:    Consultant(s) Notes Reviewed:      Care Discussed with Consultants/Other Providers:   Patient is a 89y old  Male who presents with a chief complaint of 89M p/w fever and UTI (09 Oct 2020 18:11)      SUBJECTIVE / OVERNIGHT EVENTS: MS back at baseline, still has some hematuria but much improved, will keep SALINAS in place one more day until resolution of hematuria    MEDICATIONS  (STANDING):  atorvastatin 80 milliGRAM(s) Oral at bedtime  dextrose 5%. 1000 milliLiter(s) (50 mL/Hr) IV Continuous <Continuous>  dextrose 50% Injectable 12.5 Gram(s) IV Push once  dextrose 50% Injectable 25 Gram(s) IV Push once  dextrose 50% Injectable 25 Gram(s) IV Push once  digoxin     Tablet 0.125 milliGRAM(s) Oral daily  dorzolamide 2% Ophthalmic Solution 1 Drop(s) Left EYE <User Schedule>  enalapril 5 milliGRAM(s) Oral daily  fenofibrate Tablet 145 milliGRAM(s) Oral daily  insulin lispro (HumaLOG) corrective regimen sliding scale   SubCutaneous every 6 hours  latanoprost 0.005% Ophthalmic Solution 1 Drop(s) Both EYES at bedtime  metoprolol tartrate 50 milliGRAM(s) Oral two times a day  piperacillin/tazobactam IVPB.. 3.375 Gram(s) IV Intermittent every 8 hours  tamsulosin 0.4 milliGRAM(s) Oral at bedtime    MEDICATIONS  (PRN):  dextrose 40% Gel 15 Gram(s) Oral once PRN Blood Glucose LESS THAN 70 milliGRAM(s)/deciliter  glucagon  Injectable 1 milliGRAM(s) IntraMuscular once PRN Glucose LESS THAN 70 milligrams/deciliter      Vital Signs Last 24 Hrs  T(F): 97.8 (10-09-20 @ 20:57), Max: 98.3 (10-09-20 @ 00:16)  HR: 81 (10-09-20 @ 20:57) (76 - 84)  BP: 150/77 (10-09-20 @ 20:57) (150/77 - 155/83)  RR: 18 (10-09-20 @ 20:57) (18 - 18)  SpO2: 98% (10-09-20 @ 20:57) (94% - 98%)  Telemetry:   CAPILLARY BLOOD GLUCOSE      POCT Blood Glucose.: 208 mg/dL (09 Oct 2020 21:29)  POCT Blood Glucose.: 187 mg/dL (09 Oct 2020 16:22)  POCT Blood Glucose.: 198 mg/dL (09 Oct 2020 12:16)  POCT Blood Glucose.: 156 mg/dL (09 Oct 2020 07:30)  POCT Blood Glucose.: 234 mg/dL (09 Oct 2020 00:12)    I&O's Summary    08 Oct 2020 07:01  -  09 Oct 2020 07:00  --------------------------------------------------------  IN: 240 mL / OUT: 1500 mL / NET: -1260 mL    09 Oct 2020 07:01  -  09 Oct 2020 23:16  --------------------------------------------------------  IN: 765 mL / OUT: 1200 mL / NET: -435 mL        PHYSICAL EXAM:  GENERAL: NAD, well-developed  HEAD:  Atraumatic, Normocephalic  EYES: EOMI, PERRLA, conjunctiva and sclera clear  NECK: Supple, No JVD  CHEST/LUNG: Clear to auscultation bilaterally; No wheeze  HEART: Regular rate and rhythm; No murmurs, rubs, or gallops  ABDOMEN: Soft, Nontender, Nondistended; Bowel sounds present  EXTREMITIES:  2+ Peripheral Pulses, No clubbing, cyanosis, or edema  PSYCH: AAOx3  NEUROLOGY: non-focal  SKIN: No rashes or lesions    LABS:                        12.0   12.17 )-----------( 232      ( 09 Oct 2020 06:43 )             36.2     10-09    132<L>  |  101  |  16  ----------------------------<  151<H>  3.6   |  22  |  0.84    Ca    8.2<L>      09 Oct 2020 06:45  Mg     1.8     10-08                RADIOLOGY & ADDITIONAL TESTS:    Imaging Personally Reviewed:    Consultant(s) Notes Reviewed:      Care Discussed with Consultants/Other Providers:

## 2020-10-09 NOTE — PHYSICAL THERAPY INITIAL EVALUATION ADULT - PERTINENT HX OF CURRENT PROBLEM, REHAB EVAL
Pt is a 89M w/ dementia, afib (on eliquis), HTN, DM2, HLD presenting with UTI, found to have diverticulosis on CT. LUE dopplers: Superficial thrombosis of the left cephalic vein. CT Abdomen:

## 2020-10-10 ENCOUNTER — TRANSCRIPTION ENCOUNTER (OUTPATIENT)
Age: 85
End: 2020-10-10

## 2020-10-10 VITALS
DIASTOLIC BLOOD PRESSURE: 74 MMHG | TEMPERATURE: 98 F | RESPIRATION RATE: 18 BRPM | SYSTOLIC BLOOD PRESSURE: 130 MMHG | HEART RATE: 81 BPM | OXYGEN SATURATION: 97 %

## 2020-10-10 LAB
ANION GAP SERPL CALC-SCNC: 12 MMOL/L — SIGNIFICANT CHANGE UP (ref 5–17)
BUN SERPL-MCNC: 16 MG/DL — SIGNIFICANT CHANGE UP (ref 7–23)
CALCIUM SERPL-MCNC: 9 MG/DL — SIGNIFICANT CHANGE UP (ref 8.4–10.5)
CHLORIDE SERPL-SCNC: 103 MMOL/L — SIGNIFICANT CHANGE UP (ref 96–108)
CO2 SERPL-SCNC: 21 MMOL/L — LOW (ref 22–31)
CREAT SERPL-MCNC: 0.85 MG/DL — SIGNIFICANT CHANGE UP (ref 0.5–1.3)
GLUCOSE BLDC GLUCOMTR-MCNC: 138 MG/DL — HIGH (ref 70–99)
GLUCOSE BLDC GLUCOMTR-MCNC: 143 MG/DL — HIGH (ref 70–99)
GLUCOSE BLDC GLUCOMTR-MCNC: 145 MG/DL — HIGH (ref 70–99)
GLUCOSE BLDC GLUCOMTR-MCNC: 177 MG/DL — HIGH (ref 70–99)
GLUCOSE BLDC GLUCOMTR-MCNC: 188 MG/DL — HIGH (ref 70–99)
GLUCOSE BLDC GLUCOMTR-MCNC: 214 MG/DL — HIGH (ref 70–99)
GLUCOSE SERPL-MCNC: 146 MG/DL — HIGH (ref 70–99)
HCT VFR BLD CALC: 37.6 % — LOW (ref 39–50)
HGB BLD-MCNC: 12.3 G/DL — LOW (ref 13–17)
MAGNESIUM SERPL-MCNC: 2 MG/DL — SIGNIFICANT CHANGE UP (ref 1.6–2.6)
MCHC RBC-ENTMCNC: 28.1 PG — SIGNIFICANT CHANGE UP (ref 27–34)
MCHC RBC-ENTMCNC: 32.7 GM/DL — SIGNIFICANT CHANGE UP (ref 32–36)
MCV RBC AUTO: 85.8 FL — SIGNIFICANT CHANGE UP (ref 80–100)
NRBC # BLD: 0 /100 WBCS — SIGNIFICANT CHANGE UP (ref 0–0)
PLATELET # BLD AUTO: 242 K/UL — SIGNIFICANT CHANGE UP (ref 150–400)
POTASSIUM SERPL-MCNC: 3.7 MMOL/L — SIGNIFICANT CHANGE UP (ref 3.5–5.3)
POTASSIUM SERPL-SCNC: 3.7 MMOL/L — SIGNIFICANT CHANGE UP (ref 3.5–5.3)
RBC # BLD: 4.38 M/UL — SIGNIFICANT CHANGE UP (ref 4.2–5.8)
RBC # FLD: 14.1 % — SIGNIFICANT CHANGE UP (ref 10.3–14.5)
SODIUM SERPL-SCNC: 136 MMOL/L — SIGNIFICANT CHANGE UP (ref 135–145)
WBC # BLD: 9.8 K/UL — SIGNIFICANT CHANGE UP (ref 3.8–10.5)
WBC # FLD AUTO: 9.8 K/UL — SIGNIFICANT CHANGE UP (ref 3.8–10.5)

## 2020-10-10 RX ORDER — APIXABAN 2.5 MG/1
1 TABLET, FILM COATED ORAL
Qty: 0 | Refills: 0 | DISCHARGE
Start: 2020-10-10

## 2020-10-10 RX ORDER — APIXABAN 2.5 MG/1
1 TABLET, FILM COATED ORAL
Qty: 60 | Refills: 0
Start: 2020-10-10 | End: 2020-11-08

## 2020-10-10 RX ORDER — APIXABAN 2.5 MG/1
1 TABLET, FILM COATED ORAL
Qty: 0 | Refills: 0 | DISCHARGE

## 2020-10-10 RX ORDER — APIXABAN 2.5 MG/1
2.5 TABLET, FILM COATED ORAL EVERY 12 HOURS
Refills: 0 | Status: DISCONTINUED | OUTPATIENT
Start: 2020-10-10 | End: 2020-10-10

## 2020-10-10 RX ADMIN — PIPERACILLIN AND TAZOBACTAM 25 GRAM(S): 4; .5 INJECTION, POWDER, LYOPHILIZED, FOR SOLUTION INTRAVENOUS at 05:36

## 2020-10-10 RX ADMIN — Medication 0: at 06:16

## 2020-10-10 RX ADMIN — Medication 0: at 02:15

## 2020-10-10 RX ADMIN — DORZOLAMIDE HYDROCHLORIDE 1 DROP(S): 20 SOLUTION/ DROPS OPHTHALMIC at 05:36

## 2020-10-10 RX ADMIN — Medication 50 MILLIGRAM(S): at 05:35

## 2020-10-10 RX ADMIN — Medication 50 MILLIGRAM(S): at 16:58

## 2020-10-10 RX ADMIN — Medication 1: at 16:58

## 2020-10-10 RX ADMIN — APIXABAN 2.5 MILLIGRAM(S): 2.5 TABLET, FILM COATED ORAL at 16:58

## 2020-10-10 RX ADMIN — Medication 145 MILLIGRAM(S): at 11:50

## 2020-10-10 RX ADMIN — DORZOLAMIDE HYDROCHLORIDE 1 DROP(S): 20 SOLUTION/ DROPS OPHTHALMIC at 16:58

## 2020-10-10 RX ADMIN — PIPERACILLIN AND TAZOBACTAM 25 GRAM(S): 4; .5 INJECTION, POWDER, LYOPHILIZED, FOR SOLUTION INTRAVENOUS at 14:21

## 2020-10-10 RX ADMIN — Medication 5 MILLIGRAM(S): at 05:35

## 2020-10-10 RX ADMIN — Medication 0.12 MILLIGRAM(S): at 05:35

## 2020-10-10 RX ADMIN — Medication 2: at 11:49

## 2020-10-10 NOTE — DISCHARGE NOTE NURSING/CASE MANAGEMENT/SOCIAL WORK - PATIENT PORTAL LINK FT
Patient arrived on cart accompanied by transporter; on room air, VSS, belongings charted, 4 eyes done with Isabela PEÑA. Patient non-English speaking and poor historian/confused, admission done thru phone call with son Jason. Will continue to monitor.    You can access the FollowMyHealth Patient Portal offered by [facility] by registering at the following website: [email or web address]. By joining [facility]’s FollowMyHealth portal, you will also be able to view your health information using other applications (apps) compatible with our system.

## 2020-10-10 NOTE — PROGRESS NOTE ADULT - REASON FOR ADMISSION
89M p/w fever and UTI

## 2020-10-10 NOTE — DISCHARGE NOTE PROVIDER - HOSPITAL COURSE
89M w/ dementia (family reports memory loss), afib on eliquis, HTN, DM2, HLD present to [facility] for evaluation of fever and UTI admit for severe sepsis w functional paraplegia due to acute cystitis.  Treated with zosyn, blood cx NTD, urine Cx was not sent on admission, sent on 10/8, but may remain negative at this point 2/2 partially treated.   Indwelling patel in place 2/2 urinary retention 2/2 acute hemorrhagic cystitis. HEMATURIA HAS CLEARED. has h/o BPH. cont  on Flomax 0.4 mg qhs, patel d/c's prior to discharge and was able to void spontaneously.  sepsis resolved, PT eval done: home w home PT  AFib on tele, HR 80s, resume outptn Eliquis  SBP 130s, con't  Metoprolol  50 mg bid and Vasotec 5 mg daily   RA pulse Ox is 97%  Right arm edema w purple hue and warmth: doppler c/w superficial thrombophlebitis, cont warm compresses, no IV line in that arm  cont insulin on a sliding scale, most all FS under 200  hyponatremia resolved,     DCP and medication reconciliation discussed with Dr Crandall and in agreement. Patient will follow up with his PMD and urologist after discharge.

## 2020-10-10 NOTE — DISCHARGE NOTE PROVIDER - NSDCCPCAREPLAN_GEN_ALL_CORE_FT
PRINCIPAL DISCHARGE DIAGNOSIS  Diagnosis: UTI (urinary tract infection)  Assessment and Plan of Treatment: You had a bladder &/or kidney infection  Call your doctor with pain or burning with urination, frequent urination, feeling to urinate suddenly, blood in urine, fever, back pain, nausea or vomiting  You need to take and finish your antibiotic as prescribed so that the infection does not reoccur  Please follow up with your primary care physician after discharge for continued monitoring and management.  Drink adequate fluids      SECONDARY DISCHARGE DIAGNOSES  Diagnosis: History of BPH  Assessment and Plan of Treatment: You have an enlarged prostate gland which gets bigger as men get older - it is a very common problem and has nothing to do with prostate cancer  Call your doctor if you are urinating more frequently, have trouble starting to urinate, have weak stream, urine leaking or dribbling, and feeling as though bladder is not empty after urination  Your doctor will monitor your prostate with a rectal exam as well as urine or blood testing  You can help yourself by reducing the amount of fluid you drink before going to bed, limiting the amount of alcohol & caffeine you drink   Avoid cold & allergy medication that contain decongestants or antihistamines which make BPH symptoms worse  You can also "double void" by waiting a moment after urinating & trying again  Take your medication as prescribed - one medication helps to relax the muscle aroung the urethra and the other medication you may take prevents the prostate from growing more or even shrinking the prostate      Diagnosis: Essential hypertension  Assessment and Plan of Treatment: Essential hypertension  Low salt diet  Activity as tolerated.  Take all medication as prescribed.  Follow up with your medical doctor for routine blood pressure monitoring at your next visit.  Notify your doctor if you have any of the following symptoms:   Dizziness, Lightheadedness, Blurry vision, Headache, Chest pain, Shortness of breath      Diagnosis: Type 2 diabetes mellitus with hyperglycemia, without long-term current use of insulin  Assessment and Plan of Treatment: Type 2 diabetes mellitus with hyperglycemia, without long-term current use of insulin  HgA1C this admission, 6.4.  Make sure you get your HgA1c checked every three months.  If you take oral diabetes medications, check your blood glucose two times a day.  If you take insulin, check your blood glucose before meals and at bedtime.  It's important not to skip any meals.  Keep a log of your blood glucose results and always take it with you to your doctor appointments.  Keep a list of your current medications including injectables and over the counter medications and bring this medication list with you to all your doctor appointments.  If you have not seen your opthalmologist this year call for appointment.  Check your feet daily for redness, sores, or openings. Do not self treat. If no improvement in two days call your primary care physician for an appointment.  Low blood sugar (hypoglycemia) is a blood sugar below 70mg/dl. Check your blood sugar if you feel signs/symptoms of hypoglycemia. If your blood sugar is below 70 take 15 grams of carbohydrates (ex 4 oz of apple juice, 3-4 glucosr tablets, or 4-6 oz of regular soda) wait 15 minutes and repeat blood sugar to make sure it comes up above 70.  If your blood sugar is above 70 and you are due for a meal, have a meal.  If you are not due for a meal have a snack.  This snack helps keeps your blood sugar at a safe range.      Diagnosis: Atrial fibrillation with RVR  Assessment and Plan of Treatment: Atrial fibrillation with RVR  Atrial fibrillation is the most common heart rhythm problem & has the risk of stroke & heart attack  It helps if you control your blood pressure, not drink more than 1-2 alcohol drinks per day, cut down on caffeine, getting treatment for over active thyroid gland, & getting exercise  Call your doctor if you feel your heart racing or beating unusually, chest tightness or pain, lightheaded, faint, shortness of breath especially with exercise  It is important to take your heart medication as prescribed  You may be on anticoagulation which is very important to take as directed - you may need blood work to monitor drug levels      Diagnosis: Hypoxia  Assessment and Plan of Treatment: Resolved    Diagnosis: Sepsis  Assessment and Plan of Treatment: Take all antibiotics as ordered.  Call you Health care provider upon arrival home to make a one week follow up appointment.  If you develop fever, chills, malaise, or change in mental status call your Health Care Provider or go to the Emergency Department.  Nutrition is important, eat small frequent meals to help ensure you get adequate calories.  Do not stay in bed all day!  Increase your activity daily as tolerated.

## 2020-10-10 NOTE — PROGRESS NOTE ADULT - SUBJECTIVE AND OBJECTIVE BOX
[facility], Division of Infectious Diseases  [address] Patel, [address] [place]  [phone]  ([phone] - weekdays after 5pm and weekends)    Name: CHERY AMEZCUA  Age: 89y  Gender: Male  MRN: [id number]    Interval History:  Patient seen earlier this morning, sitting in chair comfortable. No complaints.  Denies fever, chills, chest pain, dyspnea, cough, abdominal pain, n/v/d.   Notes reviewed  ROS reviewed, pertinent positives and negatives as above.     Objective:  Vitals:   T(F): 97.8 (10-10-20 @ 11:17), Max: 98.3 (10-10-20 @ 04:10)  HR: 81 (10-10-20 @ 11:17) (81 - 86)  BP: 130/74 (10-10-20 @ 11:17) (130/74 - 170/80)  RR: 18 (10-10-20 @ 11:17) (18 - 18)  SpO2: 97% (10-10-20 @ 11:17) (96% - 98%)    Physical Examination:  General: no acute distress, nontoxic appearing  HEENT: NC/AT, EOMI, anicteric, Solomon, neck supple  Cardio: S1, S2 heard, RRR, no murmurs  Resp: clear to auscultation bilaterally, no rales/wheezes/rhonchi  Abd: soft, NT, ND, + bowel sounds  Neuro: AAOx3, no obvious focal deficits  Ext: no edema or cyanosis, moving extremities  Skin: warm, dry, no visible rash  Lines: RUE PIV looks ok  Palmer: present with yellow urine    Laboratory Studies:  CBC:                       12.3   9.80  )-----------( 242      ( 10 Oct 2020 06:35 )             37.6     CMP: 10-10    136  |  103  |  16  ----------------------------<  146<H>  3.7   |  21<L>  |  0.85    Ca    9.0      10 Oct 2020 06:35  Mg     2.0     10-10    Microbiology:  Culture - Urine (collected 10-07-20 @ 01:09)  Source: .Urine Clean Catch (Midstream)  Final Report (10-09-20 @ 18:10):    10,000 - 49,000 CFU/mL Escherichia coli  Organism: Escherichia coli (10-09-20 @ 18:10)  Organism: Escherichia coli (10-09-20 @ 18:10)      -  Amikacin: S <=16      -  Amoxicillin/Clavulanic Acid: S <=8/4      -  Ampicillin: S <=8 These ampicillin results predict results for amoxicillin      -  Ampicillin/Sulbactam: S <=4/2 Enterobacter, Citrobacter, and Serratia may develop resistance during prolonged therapy (3-4 days)      -  Aztreonam: S <=4      -  Cefazolin: S <=2 (MIC_CL_COM_ENTERIC_CEFAZU) For uncomplicated UTI with K. pneumoniae, E. coli, or P. mirablis: SENIA <=16 is sensitive and SENIA >=32 is resistant. This also predicts results for oral agents cefaclor, cefdinir, cefpodoxime, cefprozil, cefuroxime axetil, cephalexin and locarbef for uncomplicated UTI. Note that some isolates may be susceptible to these agents while testing resistant to cefazolin.      -  Cefepime: S <=2      -  Cefoxitin: S <=8      -  Ceftriaxone: S <=1 Enterobacter, Citrobacter, and Serratia may develop resistance during prolonged therapy      -  Ciprofloxacin: S <=0.25      -  Ertapenem: S <=0.5      -  Gentamicin: S <=2      -  Imipenem: S <=1      -  Levofloxacin: S <=0.5      -  Meropenem: S <=1      -  Nitrofurantoin: S <=32 Should not be used to treat pyelonephritis      -  Piperacillin/Tazobactam: S <=8      -  Tigecycline: S <=2      -  Tobramycin: S <=2      -  Trimethoprim/Sulfamethoxazole: S <=0.5/9.5      Method Type: SENIA    Culture - Blood (collected 10-06-20 @ 23:07)  Source: .Blood Blood-Peripheral  Preliminary Report (10-08-20 @ 01:02):    No growth to date.    Culture - Blood (collected 10-06-20 @ 23:07)  Source: .Blood Blood-Peripheral  Preliminary Report (10-08-20 @ 01:02):    No growth to date.    Radiology:  VA Duplex Upper Ext Vein Scan, Left (10.09.20 @ 11:35) >IMPRESSION: No evidence of left upper extremity deep venous thrombosis. Superficial thrombosis of the left cephalic vein.  CT Abdomen and Pelvis w/ IV Cont (10.07.20 @ 06:56) > IMPRESSION: Colonic diverticulosis, particularly involving the sigmoid colon. There is mild wall thickening of the mid sigmoid colon with associated mild inflammatory changes and fluid, suggestive of mild diverticulitis. This process is inseparable from and may involve the base of the cecum. The proximal appendix is at the upper limits of normal in diameter proximally, and is in close proximity to the mild inflammatory changes involving the sigmoid colon, suggesting secondary involvement of the appendix.  Enlarged prostategland. Cholelithiasis.  Xray Chest 1 View- PORTABLE-Urgent (10.06.20 @ 20:25) > IMPRESSION: Clear lungs.    Medications:  apixaban 2.5 milliGRAM(s) Oral every 12 hours  atorvastatin 80 milliGRAM(s) Oral at bedtime  dextrose 40% Gel 15 Gram(s) Oral once PRN  dextrose 5%. 1000 milliLiter(s) IV Continuous <Continuous>  dextrose 50% Injectable 12.5 Gram(s) IV Push once  dextrose 50% Injectable 25 Gram(s) IV Push once  dextrose 50% Injectable 25 Gram(s) IV Push once  digoxin     Tablet 0.125 milliGRAM(s) Oral daily  dorzolamide 2% Ophthalmic Solution 1 Drop(s) Left EYE <User Schedule>  enalapril 5 milliGRAM(s) Oral daily  fenofibrate Tablet 145 milliGRAM(s) Oral daily  glucagon  Injectable 1 milliGRAM(s) IntraMuscular once PRN  insulin lispro (HumaLOG) corrective regimen sliding scale   SubCutaneous every 6 hours  latanoprost 0.005% Ophthalmic Solution 1 Drop(s) Both EYES at bedtime  metoprolol tartrate 50 milliGRAM(s) Oral two times a day  piperacillin/tazobactam IVPB.. 3.375 Gram(s) IV Intermittent every 8 hours  tamsulosin 0.4 milliGRAM(s) Oral at bedtime    Antimicrobials:  piperacillin/tazobactam IVPB.. 3.375 Gram(s) IV Intermittent every 8 hours

## 2020-10-10 NOTE — DISCHARGE NOTE PROVIDER - NSDCMRMEDTOKEN_GEN_ALL_CORE_FT
Crestor 20 mg oral tablet: 1 tab(s) orally once a day  digoxin 125 mcg (0.125 mg) oral tablet: 1 tab(s) orally once a day  Eliquis 5 mg oral tablet: 1 tab(s) orally 2 times a day  enalapril 5 mg oral tablet: 1 tab(s) orally once a day  fenofibrate 145 mg oral tablet: 1 tab(s) orally once a day  Januvia 50 mg oral tablet: 1 tab(s) orally once a day  latanoprost 0.005% ophthalmic solution: 1 drop(s) to each affected eye once a day (in the evening)  metFORMIN 750 mg oral tablet, extended release: 1 tab(s) orally once a day  metoprolol succinate 50 mg oral tablet, extended release: 1 tab(s) orally once a day  Myrbetriq 25 mg oral tablet, extended release: 1 tab(s) orally once a day  silodosin 8 mg oral capsule: 1 cap(s) orally once a day   Augmentin 875 mg-125 mg oral tablet: 1 milligram(s) orally 2 times a day   Crestor 20 mg oral tablet: 1 tab(s) orally once a day  digoxin 125 mcg (0.125 mg) oral tablet: 1 tab(s) orally once a day  Eliquis 5 mg oral tablet: 1 tab(s) orally 2 times a day  enalapril 5 mg oral tablet: 1 tab(s) orally once a day  fenofibrate 145 mg oral tablet: 1 tab(s) orally once a day  Januvia 50 mg oral tablet: 1 tab(s) orally once a day  latanoprost 0.005% ophthalmic solution: 1 drop(s) to each affected eye once a day (in the evening)  metFORMIN 750 mg oral tablet, extended release: 1 tab(s) orally once a day  metoprolol succinate 50 mg oral tablet, extended release: 1 tab(s) orally once a day  Myrbetriq 25 mg oral tablet, extended release: 1 tab(s) orally once a day  silodosin 8 mg oral capsule: 1 cap(s) orally once a day

## 2020-10-10 NOTE — PROGRESS NOTE ADULT - SUBJECTIVE AND OBJECTIVE BOX
Patient is a 89y old  Male who presents with a chief complaint of 89M p/w fever and UTI (09 Oct 2020 23:16)      SUBJECTIVE / OVERNIGHT EVENTS: MS back at baseline,  hematuria has resolved, will try TOV today, DC planning   MEDICATIONS  (STANDING):  atorvastatin 80 milliGRAM(s) Oral at bedtime  dextrose 5%. 1000 milliLiter(s) (50 mL/Hr) IV Continuous <Continuous>  dextrose 50% Injectable 12.5 Gram(s) IV Push once  dextrose 50% Injectable 25 Gram(s) IV Push once  dextrose 50% Injectable 25 Gram(s) IV Push once  digoxin     Tablet 0.125 milliGRAM(s) Oral daily  dorzolamide 2% Ophthalmic Solution 1 Drop(s) Left EYE <User Schedule>  enalapril 5 milliGRAM(s) Oral daily  fenofibrate Tablet 145 milliGRAM(s) Oral daily  insulin lispro (HumaLOG) corrective regimen sliding scale   SubCutaneous every 6 hours  latanoprost 0.005% Ophthalmic Solution 1 Drop(s) Both EYES at bedtime  metoprolol tartrate 50 milliGRAM(s) Oral two times a day  piperacillin/tazobactam IVPB.. 3.375 Gram(s) IV Intermittent every 8 hours  tamsulosin 0.4 milliGRAM(s) Oral at bedtime    MEDICATIONS  (PRN):  dextrose 40% Gel 15 Gram(s) Oral once PRN Blood Glucose LESS THAN 70 milliGRAM(s)/deciliter  glucagon  Injectable 1 milliGRAM(s) IntraMuscular once PRN Glucose LESS THAN 70 milligrams/deciliter      Vital Signs Last 24 Hrs  T(F): 97.8 (10-10-20 @ 11:17), Max: 98.3 (10-10-20 @ 04:10)  HR: 81 (10-10-20 @ 11:17) (81 - 86)  BP: 130/74 (10-10-20 @ 11:17) (130/74 - 170/80)  RR: 18 (10-10-20 @ 11:17) (18 - 18)  SpO2: 97% (10-10-20 @ 11:17) (96% - 98%)  Telemetry:   CAPILLARY BLOOD GLUCOSE      POCT Blood Glucose.: 214 mg/dL (10 Oct 2020 11:28)  POCT Blood Glucose.: 143 mg/dL (10 Oct 2020 07:42)  POCT Blood Glucose.: 138 mg/dL (10 Oct 2020 06:15)  POCT Blood Glucose.: 188 mg/dL (10 Oct 2020 02:12)  POCT Blood Glucose.: 145 mg/dL (10 Oct 2020 02:03)  POCT Blood Glucose.: 208 mg/dL (09 Oct 2020 21:29)  POCT Blood Glucose.: 187 mg/dL (09 Oct 2020 16:22)  POCT Blood Glucose.: 198 mg/dL (09 Oct 2020 12:16)    I&O's Summary    09 Oct 2020 07:01  -  10 Oct 2020 07:00  --------------------------------------------------------  IN: 765 mL / OUT: 1600 mL / NET: -835 mL    10 Oct 2020 07:01  -  10 Oct 2020 11:55  --------------------------------------------------------  IN: 240 mL / OUT: 0 mL / NET: 240 mL        PHYSICAL EXAM:  GENERAL: NAD, well-developed  HEAD:  Atraumatic, Normocephalic  EYES: EOMI, PERRLA, conjunctiva and sclera clear  NECK: Supple, No JVD  CHEST/LUNG: Clear to auscultation bilaterally; No wheeze  HEART: Regular rate and rhythm; No murmurs, rubs, or gallops  ABDOMEN: Soft, Nontender, Nondistended; Bowel sounds present  EXTREMITIES:  2+ Peripheral Pulses, No clubbing, cyanosis, or edema  PSYCH: AAOx3  NEUROLOGY: non-focal  SKIN: No rashes or lesions    LABS:                        12.3   9.80  )-----------( 242      ( 10 Oct 2020 06:35 )             37.6     10-10    136  |  103  |  16  ----------------------------<  146<H>  3.7   |  21<L>  |  0.85    Ca    9.0      10 Oct 2020 06:35  Mg     2.0     10-10                RADIOLOGY & ADDITIONAL TESTS:    Imaging Personally Reviewed:    Consultant(s) Notes Reviewed:      Care Discussed with Consultants/Other Providers:

## 2020-10-10 NOTE — PROGRESS NOTE ADULT - ASSESSMENT
89M w/ dementia (family reports memory loss), afib on eliquis, HTN, DM2, HLD present to [facility] for evaluation of fever and UTI admit for severe sepsis w functional paraplegia due to acute cystitis.  On zosyn day4, blood cx NTD, urine Cx was not sent on admission, sent on 10/8, but may remain negative at this point 2/2 partially treated. Upon DC will change to po augmentin 500 bid for total of 1 week of Abx  Indwelling patel in place 2/2 urinary retention 2/2 acute hemorrhagic cystitis. HEMATURIA HAS CLEARED. has h/o BPH. cont  on Flomax 0.4 mg qhs. DC Patel, TOV  mental status at baseline,cont haldol prn agitation, has h/o dementia  sepsis resolved, PT eval done: home w home PT  AFib on tele, HR 80s, resume outptn Eliquis  SBP 130s, con't  Metoprolol  50 mg bid and Vasotec 5 mg daily  ptn is on O2NC, would check RA pulse Ox, CXR clear  Right arm edema w purple hue and warmth: doppler c/w superficial thrombophlebitis, cont warm compresses, no IV line in that arm  cont insulin on a sliding scale, most all FS under 200  hyponatremia resolved,   GOC d/w daughter x 30 min: full code  DC planning     89M w/ dementia (family reports memory loss), afib on eliquis, HTN, DM2, HLD present to [facility] for evaluation of fever and UTI admit for severe sepsis w functional paraplegia due to acute cystitis.  On zosyn day4, blood cx NTD, urine Cx was not sent on admission, sent on 10/8, but may remain negative at this point 2/2 partially treated. Upon DC will change to po augmentin 500 bid for total of 1 week of Abx  Indwelling patel in place 2/2 urinary retention 2/2 acute hemorrhagic cystitis. HEMATURIA HAS CLEARED. has h/o BPH. cont  on Flomax 0.4 mg qhs. DC Patel, TOV  mental status at baseline,cont haldol prn agitation, has h/o dementia  sepsis resolved, PT eval done: home w home PT  AFib on tele, HR 80s, resume outptn Eliquis  SBP 130s, con't  Metoprolol  50 mg bid and Vasotec 5 mg daily   RA pulse Ox is 97%  Right arm edema w purple hue and warmth: doppler c/w superficial thrombophlebitis, cont warm compresses, no IV line in that arm  cont insulin on a sliding scale, most all FS under 200  hyponatremia resolved,   GOC d/w daughter x 30 min: full code  DC planning

## 2020-10-10 NOTE — DISCHARGE NOTE PROVIDER - NSDCFUADDAPPT_GEN_ALL_CORE_FT
Please follow up with your primary care physician, Dr Monterroso 1 week after discharge for continued monitoring and management.    Please follow up with your urologist, Dr Welsh 1 week after discharge for continued monitoring and management.

## 2020-10-10 NOTE — PROGRESS NOTE ADULT - ASSESSMENT
89M w/ dementia (family reports memory loss), afib on eliquis, HTN, DM2, HLD present to [facility] for evaluation of fever and UTI. The patient is reported to be found to have fever this morning and upon evaluation by urgent care he was diagnosed with UTI and given bactrim which he took 1 dose in late evening but demonstrated continued fatigue, fever, chills, and general sick appearance. The patient cannot remember why he came to the hospital and therefore there is limited history. Medication rec obtained from Ms. Jade. The patient is noted to live alone and has HHA who assists with adls. The patient is reported to at baseline have short-term memory problems but no formal evaluation for dementia reported.    In the ED, VS: T 102, 177/84, 120, 25 93%RA  s/p acetaminophen 650x1, [id number](21:53), LR 1.55Lx1 (06 Oct 2020 23:12)    WBC 23 <-- 20.  Lact 2.6.  UA (+) nit/large LE.  Cov-19 pcr, RVP with SARS-COV (-), Cov-19 IgG ab (-).  CTap no hydronephrosis, (+) enlarged prostate, mild diverticulitis of cecum with secondary involvement of appendix, cholelithiasis.  Cxr shows clear lungs.      Pt given dose of rocephin in the ER.  Now on zosyn.  ID Consult called for further abx managment.     Sepsis:  - Pt febrile, tachycardic, elevated lactate, leukocytosis.  Cont hemodynamic monitoring, IVF hydration as needed, monitor pulse ox, supp O2 as required, check repeat lactate.  - Agree with broad spectrum abx, source  could be from UTI/prostatitis vs diverticulitis.  - ucx growing non ESBL E.coli (pansensitive)  - Blood cultures NGTD  - Continue on Zosyn for /GI coverage.     Mild diverticulitis:  - Mild wall thickening of sigmoid colon, possible involvement of cecum and secondary involvement of appendix.  Pt with some RLQ to lower abd TTP.  c/o bloating.  - Cont zosyn.   - GI and Surgical eval called by Primary - f/u appreciated.  No urgent intervention required at this time.     UTI:  - UA (+) nit/LE.  Enlarged prostate seen on imaging.  - Pt with urinary retention requiring patel.   - Growing non ESBL E.coli (pansensitive) in urine culture.   - pt started on tamsulosin.      Continue zosyn for now, sensitivities reviewed. Can probably de-escalate to PO augmentin 875mg bid upon hospital d/c, through 10/19.  No new recommendations at this time.

## 2020-10-10 NOTE — PROGRESS NOTE ADULT - ATTENDING COMMENTS
Shane Henry M.D.  [facility], Division of Infectious Diseases  [phone]  After 5pm on weekdays and all day on weekends - please call [phone]  Covering for Dr. Abbi Avalos
Agree with above NP note.  cv stable  cont rate control as ordered
I have seen and examined the patient. I agree with the above surgery resident's note.  adv diet as renzo  no role for surgery
Agree with above NP note.  cv stable  cont rate control   f//u echo   med f/u

## 2020-10-10 NOTE — PHARMACOTHERAPY INTERVENTION NOTE - COMMENTS
Counseled patient and aid at bedside on discharge medication doses, indications, and possible side effects.

## 2020-10-10 NOTE — PROGRESS NOTE ADULT - SUBJECTIVE AND OBJECTIVE BOX
INTERVAL HPI/OVERNIGHT EVENTS:    MEDICATIONS  (STANDING):  apixaban 2.5 milliGRAM(s) Oral every 12 hours  atorvastatin 80 milliGRAM(s) Oral at bedtime  dextrose 5%. 1000 milliLiter(s) (50 mL/Hr) IV Continuous <Continuous>  dextrose 50% Injectable 12.5 Gram(s) IV Push once  dextrose 50% Injectable 25 Gram(s) IV Push once  dextrose 50% Injectable 25 Gram(s) IV Push once  digoxin     Tablet 0.125 milliGRAM(s) Oral daily  dorzolamide 2% Ophthalmic Solution 1 Drop(s) Left EYE <User Schedule>  enalapril 5 milliGRAM(s) Oral daily  fenofibrate Tablet 145 milliGRAM(s) Oral daily  insulin lispro (HumaLOG) corrective regimen sliding scale   SubCutaneous every 6 hours  latanoprost 0.005% Ophthalmic Solution 1 Drop(s) Both EYES at bedtime  metoprolol tartrate 50 milliGRAM(s) Oral two times a day  piperacillin/tazobactam IVPB.. 3.375 Gram(s) IV Intermittent every 8 hours  tamsulosin 0.4 milliGRAM(s) Oral at bedtime    MEDICATIONS  (PRN):  dextrose 40% Gel 15 Gram(s) Oral once PRN Blood Glucose LESS THAN 70 milliGRAM(s)/deciliter  glucagon  Injectable 1 milliGRAM(s) IntraMuscular once PRN Glucose LESS THAN 70 milligrams/deciliter      Allergies    procainamide (Other (Severe))    Intolerances            PHYSICAL EXAM:   Vital Signs:  Vital Signs Last 24 Hrs  T(C): 36.6 (10 Oct 2020 11:17), Max: 36.8 (10 Oct 2020 04:10)  T(F): 97.8 (10 Oct 2020 11:17), Max: 98.3 (10 Oct 2020 04:10)  HR: 81 (10 Oct 2020 11:17) (81 - 86)  BP: 130/74 (10 Oct 2020 11:17) (130/74 - 170/80)  BP(mean): --  RR: 18 (10 Oct 2020 11:17) (18 - 18)  SpO2: 97% (10 Oct 2020 11:17) (96% - 98%)  Daily     Daily     GENERAL:  no distress  HEENT:  NC/AT,  anicteric  CHEST:   no increased effort, breath sounds clear  HEART:  Regular rhythm  ABDOMEN:  Soft, non-tender, non-distended, normoactive bowel sounds,  no masses ,no hepato-splenomegaly, no signs of chronic liver disease  EXTEREMITIES:  no cyanosis      LABS:                        12.3   9.80  )-----------( 242      ( 10 Oct 2020 06:35 )             37.6     10-10    136  |  103  |  16  ----------------------------<  146<H>  3.7   |  21<L>  |  0.85    Ca    9.0      10 Oct 2020 06:35  Mg     2.0     10-10            RADIOLOGY & ADDITIONAL TESTS:

## 2020-10-21 PROCEDURE — 82565 ASSAY OF CREATININE: CPT

## 2020-10-21 PROCEDURE — 93308 TTE F-UP OR LMTD: CPT

## 2020-10-21 PROCEDURE — 97161 PT EVAL LOW COMPLEX 20 MIN: CPT

## 2020-10-21 PROCEDURE — 85018 HEMOGLOBIN: CPT

## 2020-10-21 PROCEDURE — 85610 PROTHROMBIN TIME: CPT

## 2020-10-21 PROCEDURE — 82962 GLUCOSE BLOOD TEST: CPT

## 2020-10-21 PROCEDURE — 83036 HEMOGLOBIN GLYCOSYLATED A1C: CPT

## 2020-10-21 PROCEDURE — 83605 ASSAY OF LACTIC ACID: CPT

## 2020-10-21 PROCEDURE — 85014 HEMATOCRIT: CPT

## 2020-10-21 PROCEDURE — 93321 DOPPLER ECHO F-UP/LMTD STD: CPT

## 2020-10-21 PROCEDURE — 96374 THER/PROPH/DIAG INJ IV PUSH: CPT

## 2020-10-21 PROCEDURE — 99285 EMERGENCY DEPT VISIT HI MDM: CPT | Mod: 25

## 2020-10-21 PROCEDURE — 0225U NFCT DS DNA&RNA 21 SARSCOV2: CPT

## 2020-10-21 PROCEDURE — 83735 ASSAY OF MAGNESIUM: CPT

## 2020-10-21 PROCEDURE — 74177 CT ABD & PELVIS W/CONTRAST: CPT

## 2020-10-21 PROCEDURE — 83880 ASSAY OF NATRIURETIC PEPTIDE: CPT

## 2020-10-21 PROCEDURE — 82330 ASSAY OF CALCIUM: CPT

## 2020-10-21 PROCEDURE — 71045 X-RAY EXAM CHEST 1 VIEW: CPT

## 2020-10-21 PROCEDURE — 86901 BLOOD TYPING SEROLOGIC RH(D): CPT

## 2020-10-21 PROCEDURE — 87086 URINE CULTURE/COLONY COUNT: CPT

## 2020-10-21 PROCEDURE — 87186 SC STD MICRODIL/AGAR DIL: CPT

## 2020-10-21 PROCEDURE — 85025 COMPLETE CBC W/AUTO DIFF WBC: CPT

## 2020-10-21 PROCEDURE — 80053 COMPREHEN METABOLIC PANEL: CPT

## 2020-10-21 PROCEDURE — 81001 URINALYSIS AUTO W/SCOPE: CPT

## 2020-10-21 PROCEDURE — 86900 BLOOD TYPING SEROLOGIC ABO: CPT

## 2020-10-21 PROCEDURE — 85730 THROMBOPLASTIN TIME PARTIAL: CPT

## 2020-10-21 PROCEDURE — 80162 ASSAY OF DIGOXIN TOTAL: CPT

## 2020-10-21 PROCEDURE — 86769 SARS-COV-2 COVID-19 ANTIBODY: CPT

## 2020-10-21 PROCEDURE — 82435 ASSAY OF BLOOD CHLORIDE: CPT

## 2020-10-21 PROCEDURE — 93971 EXTREMITY STUDY: CPT

## 2020-10-21 PROCEDURE — 84132 ASSAY OF SERUM POTASSIUM: CPT

## 2020-10-21 PROCEDURE — 84295 ASSAY OF SERUM SODIUM: CPT

## 2020-10-21 PROCEDURE — 80048 BASIC METABOLIC PNL TOTAL CA: CPT

## 2020-10-21 PROCEDURE — 84100 ASSAY OF PHOSPHORUS: CPT

## 2020-10-21 PROCEDURE — 93005 ELECTROCARDIOGRAM TRACING: CPT

## 2020-10-21 PROCEDURE — 94640 AIRWAY INHALATION TREATMENT: CPT

## 2020-10-21 PROCEDURE — 82947 ASSAY GLUCOSE BLOOD QUANT: CPT

## 2020-10-21 PROCEDURE — 82803 BLOOD GASES ANY COMBINATION: CPT

## 2020-10-21 PROCEDURE — 96361 HYDRATE IV INFUSION ADD-ON: CPT

## 2020-10-21 PROCEDURE — 86850 RBC ANTIBODY SCREEN: CPT

## 2020-10-21 PROCEDURE — 85027 COMPLETE CBC AUTOMATED: CPT

## 2020-10-21 PROCEDURE — 87040 BLOOD CULTURE FOR BACTERIA: CPT

## 2020-10-21 PROCEDURE — 84484 ASSAY OF TROPONIN QUANT: CPT

## 2021-03-24 NOTE — PROGRESS NOTE ADULT - SUBJECTIVE AND OBJECTIVE BOX
CHERY AMEZCUA:[id number],   89yMale followed for:  procainamide (Other (Severe))    PAST MEDICAL & SURGICAL HISTORY:  BPH (benign prostatic hypertrophy)    Macular degeneration    DM (diabetes mellitus)    HTN (hypertension)    AF (atrial fibrillation)    Pacemaker    HLD (hyperlipidemia)    S/P knee replacement, bilateral      FAMILY HISTORY:  No pertinent family history in first degree relatives      MEDICATIONS  (STANDING):  atorvastatin 80 milliGRAM(s) Oral at bedtime  dextrose 5%. 1000 milliLiter(s) (50 mL/Hr) IV Continuous <Continuous>  dextrose 50% Injectable 12.5 Gram(s) IV Push once  dextrose 50% Injectable 25 Gram(s) IV Push once  dextrose 50% Injectable 25 Gram(s) IV Push once  digoxin     Tablet 0.125 milliGRAM(s) Oral daily  fenofibrate Tablet 145 milliGRAM(s) Oral daily  insulin lispro (HumaLOG) corrective regimen sliding scale   SubCutaneous every 6 hours  latanoprost 0.005% Ophthalmic Solution 1 Drop(s) Both EYES at bedtime  metoprolol tartrate 25 milliGRAM(s) Oral two times a day  piperacillin/tazobactam IVPB.. 3.375 Gram(s) IV Intermittent every 8 hours    MEDICATIONS  (PRN):  dextrose 40% Gel 15 Gram(s) Oral once PRN Blood Glucose LESS THAN 70 milliGRAM(s)/deciliter  glucagon  Injectable 1 milliGRAM(s) IntraMuscular once PRN Glucose LESS THAN 70 milligrams/deciliter      Vital Signs Last 24 Hrs  T(C): 36.6 (08 Oct 2020 08:00), Max: 38.5 (08 Oct 2020 04:00)  T(F): 97.9 (08 Oct 2020 08:00), Max: 101.3 (08 Oct 2020 04:00)  HR: 75 (08 Oct 2020 08:00) (75 - 139)  BP: 150/70 (08 Oct 2020 08:00) (150/70 - 188/96)  BP(mean): --  RR: 20 (08 Oct 2020 08:00) (20 - 24)  SpO2: 96% (08 Oct 2020 08:00) (94% - 96%)  nc/at  s1s2  cta  soft, nt, nd no guarding or rebound  no c/c/e    CBC Full  -  ( 07 Oct 2020 06:52 )  WBC Count : 23.06 K/uL  RBC Count : 4.91 M/uL  Hemoglobin : 13.9 g/dL  Hematocrit : 44.3 %  Platelet Count - Automated : 263 K/uL  Mean Cell Volume : 90.2 fl  Mean Cell Hemoglobin : 28.3 pg  Mean Cell Hemoglobin Concentration : 31.4 gm/dL  Auto Neutrophil # : 18.51 K/uL  Auto Lymphocyte # : 1.88 K/uL  Auto Monocyte # : 2.35 K/uL  Auto Eosinophil # : 0.03 K/uL  Auto Basophil # : 0.06 K/uL  Auto Neutrophil % : 80.2 %  Auto Lymphocyte % : 8.2 %  Auto Monocyte % : 10.2 %  Auto Eosinophil % : 0.1 %  Auto Basophil % : 0.3 %    10-07    135  |  101  |  16  ----------------------------<  151<H>  3.9   |  21<L>  |  0.95    Ca    9.4      07 Oct 2020 06:52  Phos  3.0     10-07  Mg     1.8     10-07    TPro  7.0  /  Alb  4.2  /  TBili  0.6  /  DBili  x   /  AST  23  /  ALT  12  /  AlkPhos  50  10-07    PT/INR - ( 07 Oct 2020 06:52 )   PT: 18.4 sec;   INR: 1.57 ratio         PTT - ( 07 Oct 2020 06:52 )  PTT:39.5 sec occupational therapy

## 2021-05-25 NOTE — ED ADULT NURSE NOTE - CAS EDP DISCH DISPOSITION ADMI
Continue medications for anxiety.  Please follow up with your mental health providers this week. You should also keep your primary care provider up to date.  Return immediately to the emergency department if you having worsening symptoms or new concerns of any kind.  You should also return if you want further assessment of your anxiety.  
[facility]

## 2021-07-15 ENCOUNTER — EMERGENCY (EMERGENCY)
Facility: HOSPITAL | Age: 86
LOS: 1 days | End: 2021-07-15
Attending: EMERGENCY MEDICINE
Payer: MEDICARE

## 2021-07-15 VITALS
OXYGEN SATURATION: 98 % | TEMPERATURE: 98 F | WEIGHT: 169.98 LBS | HEIGHT: 68 IN | SYSTOLIC BLOOD PRESSURE: 151 MMHG | DIASTOLIC BLOOD PRESSURE: 68 MMHG | HEART RATE: 72 BPM | RESPIRATION RATE: 18 BRPM

## 2021-07-15 VITALS
SYSTOLIC BLOOD PRESSURE: 195 MMHG | HEART RATE: 65 BPM | TEMPERATURE: 98 F | DIASTOLIC BLOOD PRESSURE: 70 MMHG | OXYGEN SATURATION: 98 % | RESPIRATION RATE: 21 BRPM

## 2021-07-15 DIAGNOSIS — Z96.653 PRESENCE OF ARTIFICIAL KNEE JOINT, BILATERAL: Chronic | ICD-10-CM

## 2021-07-15 LAB
ALBUMIN SERPL ELPH-MCNC: 4.3 G/DL — SIGNIFICANT CHANGE UP (ref 3.3–5)
ALP SERPL-CCNC: 53 U/L — SIGNIFICANT CHANGE UP (ref 40–120)
ALT FLD-CCNC: 11 U/L — SIGNIFICANT CHANGE UP (ref 10–45)
ANION GAP SERPL CALC-SCNC: 13 MMOL/L — SIGNIFICANT CHANGE UP (ref 5–17)
AST SERPL-CCNC: 13 U/L — SIGNIFICANT CHANGE UP (ref 10–40)
BASOPHILS # BLD AUTO: 0.05 K/UL — SIGNIFICANT CHANGE UP (ref 0–0.2)
BASOPHILS NFR BLD AUTO: 0.4 % — SIGNIFICANT CHANGE UP (ref 0–2)
BILIRUB SERPL-MCNC: 0.3 MG/DL — SIGNIFICANT CHANGE UP (ref 0.2–1.2)
BUN SERPL-MCNC: 27 MG/DL — HIGH (ref 7–23)
CALCIUM SERPL-MCNC: 9.6 MG/DL — SIGNIFICANT CHANGE UP (ref 8.4–10.5)
CHLORIDE SERPL-SCNC: 101 MMOL/L — SIGNIFICANT CHANGE UP (ref 96–108)
CO2 SERPL-SCNC: 23 MMOL/L — SIGNIFICANT CHANGE UP (ref 22–31)
CREAT SERPL-MCNC: 1.05 MG/DL — SIGNIFICANT CHANGE UP (ref 0.5–1.3)
EOSINOPHIL # BLD AUTO: 0.31 K/UL — SIGNIFICANT CHANGE UP (ref 0–0.5)
EOSINOPHIL NFR BLD AUTO: 2.7 % — SIGNIFICANT CHANGE UP (ref 0–6)
GLUCOSE SERPL-MCNC: 135 MG/DL — HIGH (ref 70–99)
HCT VFR BLD CALC: 41.3 % — SIGNIFICANT CHANGE UP (ref 39–50)
HGB BLD-MCNC: 13.4 G/DL — SIGNIFICANT CHANGE UP (ref 13–17)
IMM GRANULOCYTES NFR BLD AUTO: 0.7 % — SIGNIFICANT CHANGE UP (ref 0–1.5)
LYMPHOCYTES # BLD AUTO: 2.56 K/UL — SIGNIFICANT CHANGE UP (ref 1–3.3)
LYMPHOCYTES # BLD AUTO: 22.6 % — SIGNIFICANT CHANGE UP (ref 13–44)
MCHC RBC-ENTMCNC: 28.9 PG — SIGNIFICANT CHANGE UP (ref 27–34)
MCHC RBC-ENTMCNC: 32.4 GM/DL — SIGNIFICANT CHANGE UP (ref 32–36)
MCV RBC AUTO: 89 FL — SIGNIFICANT CHANGE UP (ref 80–100)
MONOCYTES # BLD AUTO: 1.43 K/UL — HIGH (ref 0–0.9)
MONOCYTES NFR BLD AUTO: 12.6 % — SIGNIFICANT CHANGE UP (ref 2–14)
NEUTROPHILS # BLD AUTO: 6.9 K/UL — SIGNIFICANT CHANGE UP (ref 1.8–7.4)
NEUTROPHILS NFR BLD AUTO: 61 % — SIGNIFICANT CHANGE UP (ref 43–77)
NRBC # BLD: 0 /100 WBCS — SIGNIFICANT CHANGE UP (ref 0–0)
PLATELET # BLD AUTO: 239 K/UL — SIGNIFICANT CHANGE UP (ref 150–400)
POTASSIUM SERPL-MCNC: 4.5 MMOL/L — SIGNIFICANT CHANGE UP (ref 3.5–5.3)
POTASSIUM SERPL-SCNC: 4.5 MMOL/L — SIGNIFICANT CHANGE UP (ref 3.5–5.3)
PROT SERPL-MCNC: 7.1 G/DL — SIGNIFICANT CHANGE UP (ref 6–8.3)
RBC # BLD: 4.64 M/UL — SIGNIFICANT CHANGE UP (ref 4.2–5.8)
RBC # FLD: 13.6 % — SIGNIFICANT CHANGE UP (ref 10.3–14.5)
SODIUM SERPL-SCNC: 137 MMOL/L — SIGNIFICANT CHANGE UP (ref 135–145)
TROPONIN T, HIGH SENSITIVITY RESULT: 14 NG/L — SIGNIFICANT CHANGE UP (ref 0–51)
TROPONIN T, HIGH SENSITIVITY RESULT: 15 NG/L — SIGNIFICANT CHANGE UP (ref 0–51)
TROPONIN T, HIGH SENSITIVITY RESULT: 15 NG/L — SIGNIFICANT CHANGE UP (ref 0–51)
WBC # BLD: 11.33 K/UL — HIGH (ref 3.8–10.5)
WBC # FLD AUTO: 11.33 K/UL — HIGH (ref 3.8–10.5)

## 2021-07-15 PROCEDURE — 73030 X-RAY EXAM OF SHOULDER: CPT | Mod: 26,LT

## 2021-07-15 PROCEDURE — 85025 COMPLETE CBC W/AUTO DIFF WBC: CPT

## 2021-07-15 PROCEDURE — 73030 X-RAY EXAM OF SHOULDER: CPT

## 2021-07-15 PROCEDURE — 99284 EMERGENCY DEPT VISIT MOD MDM: CPT | Mod: 25

## 2021-07-15 PROCEDURE — 82962 GLUCOSE BLOOD TEST: CPT

## 2021-07-15 PROCEDURE — 93010 ELECTROCARDIOGRAM REPORT: CPT | Mod: GC

## 2021-07-15 PROCEDURE — 71045 X-RAY EXAM CHEST 1 VIEW: CPT

## 2021-07-15 PROCEDURE — 93005 ELECTROCARDIOGRAM TRACING: CPT

## 2021-07-15 PROCEDURE — 99285 EMERGENCY DEPT VISIT HI MDM: CPT | Mod: 25,GC

## 2021-07-15 PROCEDURE — 85652 RBC SED RATE AUTOMATED: CPT

## 2021-07-15 PROCEDURE — 84484 ASSAY OF TROPONIN QUANT: CPT

## 2021-07-15 PROCEDURE — 71045 X-RAY EXAM CHEST 1 VIEW: CPT | Mod: 26

## 2021-07-15 PROCEDURE — 80053 COMPREHEN METABOLIC PANEL: CPT

## 2021-07-15 RX ORDER — ASPIRIN/CALCIUM CARB/MAGNESIUM 324 MG
162 TABLET ORAL ONCE
Refills: 0 | Status: COMPLETED | OUTPATIENT
Start: 2021-07-15 | End: 2021-07-15

## 2021-07-15 RX ADMIN — Medication 162 MILLIGRAM(S): at 16:25

## 2021-07-15 NOTE — ED ADULT NURSE NOTE - OBJECTIVE STATEMENT
89yo M, PMH unclear at this time aside from afib with pacemaker. bibems from home for c/o chest pain with inspiration x 2 weeks. also with pain to L shoulder for 1 year as per pt. pt arrives awake, alert. good skin color. oriented to name, location, date. poor historian in regards to reason for visit, denies pain when asked at this time. pt calls out for Abril who he says is his HHA. pt is calm, cooperative, follows all commands. speech clear and coherent, no focal weakness or defecits. EKG done, CM in place. pt is close to nurses station , comfort measures met. stretcher lowered with rails up

## 2021-07-15 NOTE — ED ADULT NURSE NOTE - ED CARDIAC RHYTHM
[address] 
[address] [place] [id number] 
[phone] Work/School Note Date: 9/28/2018 To Whom It May concern: 
 
Douglas Gimenez was seen and treated today in the emergency room by the following provider(s): 
Attending Provider: Trey Barrow MD 
Nurse Practitioner: Tal Pagan NP. Douglas Gimenez may return to work on 9/29/2018. Sincerely, Kandi Mejia RN 
 
 
 

irregular

## 2021-07-15 NOTE — ED PROVIDER NOTE - NSFOLLOWUPINSTRUCTIONS_ED_ALL_ED_FT
You were seen in the emergency department for chest pain.     An evaluation that was performed today did not show that you had a dangerous or life threatening cause of your pain.     PLEASE BE AWARE THAT AN EMERGENCY DEPARTMENT EVALUATION CANNOT FULLY RULE OUT ALL RISK OF POTENTIAL LIFE THREATENING CAUSES OF CHEST PAIN - INCLUDING A HEART ATTACK.     Please make an appointment to see your doctor in the next several days for a complete exam. If you have chest pain, dizziness, shortness of breath, numbness, profuse sweating, or pain that radiates to your arms or jaw - return to the ED promptly. Please ask your doctor for a referral for a stress test to evaluate the blood flow to your heart and possible risks of potential heart disease or heart attack    RETURN TO THE ED RIGHT AWAY IF:  - YOU HAVE FURTHER EPISODES OF CHEST PAIN, or if your pain changes, or radiates to your arm, back or jaw or if you have dizziness / sweating or feel that you may vomit - THIS IS AN EMERGENCY.     Do not wait to see if the pain will go away. Get medical help at once. Call your local emergency services ([phone]). Do not drive yourself to the hospital.     RETURN TO THE EMERGENCY DEPARTMENT IF:  - YOU HAVE TROUBLE BREATHING  - YOU FEEL THAT YOUR SYMPTOMS ARE WORSENING  - YOU HAVE FACIAL DROOP OR WEAKNESS ON ONE SIDE OF YOUR BODY  - YOU HAVE FEVERS OVER 100.5 THAT DO NOT GO DOWN WITH MOTRIN OR TYLENOL  - YOU HAVE CONTINUED VOMITING OR DIARRHEA AND YOU CANNOT TOLERATE DRINKING LIQUIDS    YOU MAY ALWAYS RETURN TO THE ED IF YOU FEEL SICK OR HAVE CONCERNS Please followup with your cardiologist at 1pm tomorrow.    You were seen in the emergency department for chest pain.     An evaluation that was performed today did not show that you had a dangerous or life threatening cause of your pain.     PLEASE BE AWARE THAT AN EMERGENCY DEPARTMENT EVALUATION CANNOT FULLY RULE OUT ALL RISK OF POTENTIAL LIFE THREATENING CAUSES OF CHEST PAIN - INCLUDING A HEART ATTACK.     Please make an appointment to see your doctor in the next several days for a complete exam. If you have chest pain, dizziness, shortness of breath, numbness, profuse sweating, or pain that radiates to your arms or jaw - return to the ED promptly. Please ask your doctor for a referral for a stress test to evaluate the blood flow to your heart and possible risks of potential heart disease or heart attack    RETURN TO THE ED RIGHT AWAY IF:  - YOU HAVE FURTHER EPISODES OF CHEST PAIN, or if your pain changes, or radiates to your arm, back or jaw or if you have dizziness / sweating or feel that you may vomit - THIS IS AN EMERGENCY.     Do not wait to see if the pain will go away. Get medical help at once. Call your local emergency services ([phone]). Do not drive yourself to the hospital.     RETURN TO THE EMERGENCY DEPARTMENT IF:  - YOU HAVE TROUBLE BREATHING  - YOU FEEL THAT YOUR SYMPTOMS ARE WORSENING  - YOU HAVE FACIAL DROOP OR WEAKNESS ON ONE SIDE OF YOUR BODY  - YOU HAVE FEVERS OVER 100.5 THAT DO NOT GO DOWN WITH MOTRIN OR TYLENOL  - YOU HAVE CONTINUED VOMITING OR DIARRHEA AND YOU CANNOT TOLERATE DRINKING LIQUIDS    YOU MAY ALWAYS RETURN TO THE ED IF YOU FEEL SICK OR HAVE CONCERNS

## 2021-07-15 NOTE — ED PROVIDER NOTE - OBJECTIVE STATEMENT
The patient is a 89y male with pmhx of afib with pacemaker who is BIBEMS from home with CP for the last 2 weeks. Pt is poor historian, possibly due to early onset dementia. Pt also complaining of L shoulder pain and decreased ROM for 1 year. Pt's daughter and home aide are in the room. They say that pt has been complaining about CP and touching his chest. Denies any missed does of medicines. No fevers, sick contacts, SOB, n/v/d, urinary symptoms, irregular BMs. Allergic to procainamide. The patient is a 89y male with pmhx of afib with pacemaker who is BIBEMS from home with CP for the last 2 weeks. Pt is poor historian, possibly due to early onset dementia. Pt also complaining of L shoulder pain and decreased ROM for 1 year. CP is not exertional or positional, but is mildly worse with inspiration. Pt's daughter and home aide are in the room. They say that pt has been complaining about CP and touching his chest. Denies any missed does of medicines. No fevers, sick contacts, SOB, n/v/d, urinary symptoms, irregular BMs. Allergic to procainamide.

## 2021-07-15 NOTE — ED PROVIDER NOTE - PATIENT PORTAL LINK FT
You can access the FollowMyHealth Patient Portal offered by [facility] by registering at the following website: [email or web address]. By joining [employer]’s FollowMyHealth portal, you will also be able to view your health information using other applications (apps) compatible with our system.

## 2021-07-15 NOTE — ED PROVIDER NOTE - CLINICAL SUMMARY MEDICAL DECISION MAKING FREE TEXT BOX
joon 89 m with afib ppm with chronic l sided cp x months and chronic l shoulde rpain n today l sided chest pain worse than nml nonexertional non postional -- no assoc n/v no fever no cough no sob - ekg nonischemic -- trop cxr and r/o acs if neg possibel dc w close fu - pt also with ttp l shoulder no gross deformity dec rom will image - non acute as per pt joon 89 m with afib ppm with chronic l sided cp x months and chronic l shoulder pain n today l sided chest pain worse than nml nonexertional non positional -- no assoc n/v no fever no cough no sob - ekg nonischemic -- trop cxr and r/o acs if neg possibly dc w close fu - pt also with ttp l shoulder no gross deformity dec rom will image - non acute as per pt

## 2021-07-15 NOTE — ED PROVIDER NOTE - PROGRESS NOTE DETAILS
ap- pt signed out to me pending results of labs and reassessment, pt states "I feel fine" accompanied w/ aide, will repeat trop at 7PM; he is aaox2-3 accoampnied w/ his aide who reports pt is at his baseline and if stable will likely dc home w/ outpt follow up ap- pt signed out to me pending results of labs and reassessment, pt states "I feel fine" accompanied w/ aide, will repeat trop at 7PM; he is aaox2-3 accompanied w/ his aide who reports pt is at his baseline and if stable will likely dc home w/ outpt follow up Jose Roth MD (PGY-2): Spoke to pt's cardiologist, Dr. Monterroso, who is ok with pt going home if repeat troponin is wnl. He said he can see pt tomorrow at 1:30 pm.

## 2021-07-15 NOTE — ED ADULT NURSE NOTE - CAS DISCH ACCOMP BY
Willis Ritter with a 2 weeks history of otalgia in the right ear, patient reports nasal congestion and pressure on face. Patient did not have a fever at home.     No past medical history on file.    Current Outpatient Prescriptions   Medication Sig Dispense Refill   • azithromycin (ZITHROMAX) 250 MG tablet Take two tabs today then take one tab daily for 4days. 6 tablet 0     No current facility-administered medications for this visit.        ALLERGIES:  Not on File    REVIEW OF SYSTEMS:  All other systems are negative    OBJECTIVE:  Visit Vitals   • Pulse 82   • Temp 98.2 °F (36.8 °C) (Oral)   • Resp 16   • SpO2 99%     General appearance: Healthy  HEENT: Ears: abnormal tympanic membrane right ear - erythematous and bulging and abnormal tympanic membrane left ear - erythematous and bulging  Nose: nose shows no deformity, asymmetry, or inflammation nasal congestion  Oropharynx: normal.  Lungs:  Clear bilaterally.      ASSESSMENT:   1. Bilateral acute otitis media        PLAN:  Orders Placed This Encounter   • azithromycin (ZITHROMAX) 250 MG tablet       Push fluids, rest. May use Tylenol or Ibuprofen for pain or fever.  Explained medications given and its side effects. Patient given written copies of discharge instructions. All questions answered and patient is agreeable to this plan. Refer to after visit summary.         daughter/Family

## 2021-07-15 NOTE — ED PROVIDER NOTE - PHYSICAL EXAMINATION
Gen: NAD, A&Ox2. Non-toxic appearing  HEENT: Normocephalic and atraumatic. PERRL, EOMI, no nasal discharge, mucous membranes moist, no scleral icterus.  CV: Irregularly irregular rhythm, +S1/S2, no M/R/G. No significant lower extremity edema. Radial and DP pulses present and symmetrical. Capillary refill less than 2 seconds.  Resp: Normal effort and rate. CTAB, no rales, rhonchi, or wheezes.  GI: Abdomen soft, non-distended, non tender to palpation. No masses appreciated. Bowel sounds present.  MSK: No chest wall tenderness. Pt has limited abduction of both shoulders (40 degrees), with R being worse than L  Neuro: Following commands, speaking in full sentences, moving extremities spontaneously. CN II-XII intact. Strength and sensation symmetric and intact throughout. Reflexes 2+ throughout. Cerebellar testing normal.  Psych: Appropriate mood, cooperative

## 2021-07-16 LAB — ERYTHROCYTE [SEDIMENTATION RATE] IN BLOOD: 16 MM/HR — SIGNIFICANT CHANGE UP (ref 0–20)

## 2021-09-26 ENCOUNTER — EMERGENCY (EMERGENCY)
Facility: HOSPITAL | Age: 86
LOS: 1 days | Discharge: HOME | End: 2021-09-26
Attending: EMERGENCY MEDICINE
Payer: MEDICARE

## 2021-09-26 VITALS
OXYGEN SATURATION: 98 % | DIASTOLIC BLOOD PRESSURE: 85 MMHG | TEMPERATURE: 98 F | SYSTOLIC BLOOD PRESSURE: 166 MMHG | HEART RATE: 85 BPM | RESPIRATION RATE: 19 BRPM

## 2021-09-26 VITALS
SYSTOLIC BLOOD PRESSURE: 108 MMHG | TEMPERATURE: 98 F | DIASTOLIC BLOOD PRESSURE: 71 MMHG | OXYGEN SATURATION: 97 % | HEART RATE: 89 BPM | WEIGHT: 169.98 LBS | RESPIRATION RATE: 18 BRPM | HEIGHT: 68 IN

## 2021-09-26 DIAGNOSIS — Z96.653 PRESENCE OF ARTIFICIAL KNEE JOINT, BILATERAL: Chronic | ICD-10-CM

## 2021-09-26 LAB
ALBUMIN SERPL ELPH-MCNC: 4 G/DL — SIGNIFICANT CHANGE UP (ref 3.3–5)
ALP SERPL-CCNC: 42 U/L — SIGNIFICANT CHANGE UP (ref 40–120)
ALT FLD-CCNC: 9 U/L — LOW (ref 10–45)
ANION GAP SERPL CALC-SCNC: 15 MMOL/L — SIGNIFICANT CHANGE UP (ref 5–17)
APPEARANCE UR: CLEAR — SIGNIFICANT CHANGE UP
APTT BLD: 39 SEC — HIGH (ref 27.5–35.5)
AST SERPL-CCNC: 11 U/L — SIGNIFICANT CHANGE UP (ref 10–40)
BACTERIA # UR AUTO: NEGATIVE — SIGNIFICANT CHANGE UP
BILIRUB SERPL-MCNC: 0.5 MG/DL — SIGNIFICANT CHANGE UP (ref 0.2–1.2)
BILIRUB UR-MCNC: NEGATIVE — SIGNIFICANT CHANGE UP
BUN SERPL-MCNC: 37 MG/DL — HIGH (ref 7–23)
CALCIUM SERPL-MCNC: 9 MG/DL — SIGNIFICANT CHANGE UP (ref 8.4–10.5)
CHLORIDE SERPL-SCNC: 99 MMOL/L — SIGNIFICANT CHANGE UP (ref 96–108)
CO2 SERPL-SCNC: 21 MMOL/L — LOW (ref 22–31)
COLOR SPEC: YELLOW — SIGNIFICANT CHANGE UP
CREAT SERPL-MCNC: 1.19 MG/DL — SIGNIFICANT CHANGE UP (ref 0.5–1.3)
DIFF PNL FLD: NEGATIVE — SIGNIFICANT CHANGE UP
EPI CELLS # UR: 2 /HPF — SIGNIFICANT CHANGE UP
GLUCOSE SERPL-MCNC: 276 MG/DL — HIGH (ref 70–99)
GLUCOSE UR QL: ABNORMAL
HCT VFR BLD CALC: 37.6 % — LOW (ref 39–50)
HGB BLD-MCNC: 12.2 G/DL — LOW (ref 13–17)
HYALINE CASTS # UR AUTO: 4 /LPF — HIGH (ref 0–2)
INR BLD: 1.89 RATIO — HIGH (ref 0.88–1.16)
KETONES UR-MCNC: NEGATIVE — SIGNIFICANT CHANGE UP
LEUKOCYTE ESTERASE UR-ACNC: ABNORMAL
MCHC RBC-ENTMCNC: 28.7 PG — SIGNIFICANT CHANGE UP (ref 27–34)
MCHC RBC-ENTMCNC: 32.4 GM/DL — SIGNIFICANT CHANGE UP (ref 32–36)
MCV RBC AUTO: 88.5 FL — SIGNIFICANT CHANGE UP (ref 80–100)
NITRITE UR-MCNC: NEGATIVE — SIGNIFICANT CHANGE UP
NRBC # BLD: 0 /100 WBCS — SIGNIFICANT CHANGE UP (ref 0–0)
PH UR: 6 — SIGNIFICANT CHANGE UP (ref 5–8)
PLATELET # BLD AUTO: 232 K/UL — SIGNIFICANT CHANGE UP (ref 150–400)
POTASSIUM SERPL-MCNC: 4.4 MMOL/L — SIGNIFICANT CHANGE UP (ref 3.5–5.3)
POTASSIUM SERPL-SCNC: 4.4 MMOL/L — SIGNIFICANT CHANGE UP (ref 3.5–5.3)
PROT SERPL-MCNC: 6.6 G/DL — SIGNIFICANT CHANGE UP (ref 6–8.3)
PROT UR-MCNC: SIGNIFICANT CHANGE UP
PROTHROM AB SERPL-ACNC: 22 SEC — HIGH (ref 10.6–13.6)
RBC # BLD: 4.25 M/UL — SIGNIFICANT CHANGE UP (ref 4.2–5.8)
RBC # FLD: 14.1 % — SIGNIFICANT CHANGE UP (ref 10.3–14.5)
RBC CASTS # UR COMP ASSIST: 5 /HPF — HIGH (ref 0–4)
SODIUM SERPL-SCNC: 135 MMOL/L — SIGNIFICANT CHANGE UP (ref 135–145)
SP GR SPEC: 1.02 — SIGNIFICANT CHANGE UP (ref 1.01–1.02)
UROBILINOGEN FLD QL: NEGATIVE — SIGNIFICANT CHANGE UP
WBC # BLD: 13.65 K/UL — HIGH (ref 3.8–10.5)
WBC # FLD AUTO: 13.65 K/UL — HIGH (ref 3.8–10.5)
WBC UR QL: 24 /HPF — HIGH (ref 0–5)

## 2021-09-26 PROCEDURE — 70450 CT HEAD/BRAIN W/O DYE: CPT | Mod: MA

## 2021-09-26 PROCEDURE — 99284 EMERGENCY DEPT VISIT MOD MDM: CPT

## 2021-09-26 PROCEDURE — 85610 PROTHROMBIN TIME: CPT

## 2021-09-26 PROCEDURE — 81001 URINALYSIS AUTO W/SCOPE: CPT

## 2021-09-26 PROCEDURE — 71045 X-RAY EXAM CHEST 1 VIEW: CPT

## 2021-09-26 PROCEDURE — 99284 EMERGENCY DEPT VISIT MOD MDM: CPT | Mod: 25

## 2021-09-26 PROCEDURE — 71260 CT THORAX DX C+: CPT | Mod: MA

## 2021-09-26 PROCEDURE — 73030 X-RAY EXAM OF SHOULDER: CPT | Mod: 26,RT

## 2021-09-26 PROCEDURE — 85027 COMPLETE CBC AUTOMATED: CPT

## 2021-09-26 PROCEDURE — 93010 ELECTROCARDIOGRAM REPORT: CPT

## 2021-09-26 PROCEDURE — 99283 EMERGENCY DEPT VISIT LOW MDM: CPT

## 2021-09-26 PROCEDURE — 80053 COMPREHEN METABOLIC PANEL: CPT

## 2021-09-26 PROCEDURE — 93005 ELECTROCARDIOGRAM TRACING: CPT

## 2021-09-26 PROCEDURE — 71260 CT THORAX DX C+: CPT | Mod: 26,MA

## 2021-09-26 PROCEDURE — 72125 CT NECK SPINE W/O DYE: CPT | Mod: 26,MA

## 2021-09-26 PROCEDURE — 71045 X-RAY EXAM CHEST 1 VIEW: CPT | Mod: 26

## 2021-09-26 PROCEDURE — 85730 THROMBOPLASTIN TIME PARTIAL: CPT

## 2021-09-26 PROCEDURE — 87086 URINE CULTURE/COLONY COUNT: CPT

## 2021-09-26 PROCEDURE — 74177 CT ABD & PELVIS W/CONTRAST: CPT | Mod: MA

## 2021-09-26 PROCEDURE — 72125 CT NECK SPINE W/O DYE: CPT | Mod: MA

## 2021-09-26 PROCEDURE — 73030 X-RAY EXAM OF SHOULDER: CPT

## 2021-09-26 PROCEDURE — 70450 CT HEAD/BRAIN W/O DYE: CPT | Mod: 26,MA

## 2021-09-26 PROCEDURE — 74177 CT ABD & PELVIS W/CONTRAST: CPT | Mod: 26,MA

## 2021-09-26 RX ORDER — SODIUM CHLORIDE 9 MG/ML
500 INJECTION INTRAMUSCULAR; INTRAVENOUS; SUBCUTANEOUS ONCE
Refills: 0 | Status: COMPLETED | OUTPATIENT
Start: 2021-09-26 | End: 2021-09-26

## 2021-09-26 RX ORDER — LIDOCAINE 4 G/100G
1 CREAM TOPICAL ONCE
Refills: 0 | Status: COMPLETED | OUTPATIENT
Start: 2021-09-26 | End: 2021-09-26

## 2021-09-26 RX ORDER — SODIUM CHLORIDE 9 MG/ML
1000 INJECTION INTRAMUSCULAR; INTRAVENOUS; SUBCUTANEOUS ONCE
Refills: 0 | Status: COMPLETED | OUTPATIENT
Start: 2021-09-26 | End: 2021-09-26

## 2021-09-26 RX ADMIN — SODIUM CHLORIDE 500 MILLILITER(S): 9 INJECTION INTRAMUSCULAR; INTRAVENOUS; SUBCUTANEOUS at 16:34

## 2021-09-26 RX ADMIN — SODIUM CHLORIDE 500 MILLILITER(S): 9 INJECTION INTRAMUSCULAR; INTRAVENOUS; SUBCUTANEOUS at 11:46

## 2021-09-26 RX ADMIN — LIDOCAINE 1 PATCH: 4 CREAM TOPICAL at 11:46

## 2021-09-26 NOTE — ED PROVIDER NOTE - PATIENT PORTAL LINK FT
You can access the FollowMyHealth Patient Portal offered by [facility] by registering at the following website: [email or web address]. By joining [employer]’s FollowMyHealth portal, you will also be able to view your health information using other applications (apps) compatible with our system.

## 2021-09-26 NOTE — ED PROVIDER NOTE - PHYSICAL EXAMINATION
General: non-toxic, NAD  HEENT: NCAT, mydriatic pupils non-reactive to light  Cardiac: irregular rhythm, no murmurs, 2+ peripheral pulses  Resp: CTAB. L chest wall tenderness midclavicular/anterior axillary line.  Abdomen: soft, non-distended, bowel sounds present, no ttp, no rebound or guarding. no organomegaly  Extremities: no peripheral edema, calf tenderness, or leg size discrepancies. no bony tenderness  Skin: no rashes  Neuro: AAOx4, 5+motor, sensation grossly intact  Psych: mood and affect appropriate General: non-toxic, NAD  HEENT: NCAT, mydriatic pupils non-reactive to light  Cardiac: irregular rhythm, no murmurs, 2+ peripheral pulses  Resp: CTAB. L chest wall tenderness midclavicular/anterior axillary line.  Abdomen: soft, non-distended, bowel sounds present, no ttp, no rebound or guarding. no organomegaly  Extremities: no peripheral edema, calf tenderness, or leg size discrepancies. no bony tenderness  Skin: L buttock swelling > R with discrete circular bruises of the L buttock, inflammation at L gluteal cleft.   Neuro: AAOx4, 5+motor, sensation grossly intact  Psych: mood and affect appropriate

## 2021-09-26 NOTE — ED PROVIDER NOTE - NS ED ROS FT
Constitutional: no fevers, chills  HEENT: no HA, vision changes from baseline, rhinorrhea, otorrhea  Cardiac: +L inspiratory CP no palpitations  Respiratory: no SOB, cough or hemoptysis  GI: no n/v/d/c, abd pain, bloody or dark stools  : no dysuria, frequency, or hematuria  MSK: no joint pain, neck pain +back pain  Skin: no rashes, jaundice, pruritis +ecchymoses buttocks  Neuro: no numbness/tingling, weakness, +unsteady gait increasing from baseline  ROS otherwise negative except as per HPI

## 2021-09-26 NOTE — CONSULT NOTE ADULT - ATTENDING COMMENTS
ATTENDING ATTESTATION  I have seen and examined this patient with the resident housestaff. I have reviewed all labs, imaging and reports. I have participated in formulating the plan, and have read and agree with the history, ROS, exam, assessment and plan as stated above.     Fall three days ago, has been ambulating at home and able to do ADL's. Has chest wall pain and workup reveals a single rib fracture with a small hemo/ptx. Able to use IS well.   As patient has been functioning well at home for 3 days without pain meds, we have discussed with him admission vs. home with IS and pain meds. He prefers to return home, will plan for Tramadol and Tylenol.   As the injury is 3 days old, no need for any repeat imaging of the chest, as the hemo/ptx would have worsened already.   He has followup already scheduled with his PMD this week. May also follow up with me if preferred/can't see pmd.     Total time spent in the care of this patient today (excluding critical care & procedures): 55 min                 Over 50% of the total time was spent on counseling and coordination of care.     Mayuri Spence M.D., M.S.  Division of Acute Care Surgery

## 2021-09-26 NOTE — ED PROVIDER NOTE - CLINICAL SUMMARY MEDICAL DECISION MAKING FREE TEXT BOX
h/o ptx, assistive device use for ambulation and urinary incontinence presents with low BP and chest wall tenderness 2d s/p fall. no midline spinal tenderness on exam however pt is a poor historian advanced in age on ACs, so full body scan necessary to r/o rib fractures and internal bleed.

## 2021-09-26 NOTE — ED ADULT NURSE NOTE - CHIEF COMPLAINT QUOTE
Mechanical fall 9/24 +head injury (witnessed), RMA from EMS at that time. Patient sent to ED today for difficulty ambulating, L sided rib pain on Eliquis.

## 2021-09-26 NOTE — CONSULT NOTE ADULT - SUBJECTIVE AND OBJECTIVE BOX
TRAUMA CONSULT NOTE  --------------------------------------------------------------------------------------------    HPI: 89M w/ hx of Afib on Eliquis, PPM, DM, HTN, BPH, presenting 3 days after a mechanical fall. He was standing up at dinner and fell backward landing on his buttocks and hit his head on a chair or table behind him. Denies LOC. Was able to ambulate after. EMS was called but he decided not to come to the ED because he was not having significant pain.    Today he was brought to the ED by his daughter and aide because he was still having chest wall pain. Currently he complains of left posterior chest wall pain. Has no other complaints. Denies shortness of breath. He is able to take in 1500 ml w/ incentive spirometry.    In the ED vitals are normal, satting 98 on room air, WBC 13, Hct 37.      PAST MEDICAL & SURGICAL HISTORY:  HLD (hyperlipidemia)    Pacemaker    AF (atrial fibrillation)    HTN (hypertension)    DM (diabetes mellitus)    Macular degeneration    BPH (benign prostatic hypertrophy)    S/P knee replacement, bilateral    --------------------------------------------------------------------------------------------    Vitals:   T(C): 36.7 (21 @ 18:46), Max: 36.8 (21 @ 10:56)  HR: 85 (21 @ 18:46) (80 - 89)  BP: 166/85 (21 @ 18:46) (108/69 - 172/76)  RR: 19 (21 @ 18:46) (18 - 20)  SpO2: 98% (21 @ 18:46) (95% - 98%)      PHYSICAL EXAM:  General: Alert, NAD  Neuro: A+Ox3, no focal deficits  HEENT: NC/AT, no asymmetry, no scleral icterus, no subconjunctival hemorrhage, no periorbital ecchymosis or swelling  Neck: Soft, no swelling, no crepitus  Cardio: Pulse irregular  Resp: Airway patent, unlabored breathing  Thorax: Left posterior chest wall tenderness  Spine: No point tenderness, no deformities  GI/Abd: Soft, NT/ND, no rebound/guarding, no masses palpated, no ecchymosis  Vascular: All 4 extremities warm, B/l radial pulses palpable,  b/l DP palpable  Skin: Intact, no breakdown  Extremities: All 4 extremities moving spontaneously, no limitations, no lacerations, all compartments soft, ecchymosis over left gluteal region    --------------------------------------------------------------------------------------------    LABS  CBC ( @ 12:)                              12.2<L>                         13.65<H>  )----------------(  232        --    % Neutrophils, --    % Lymphocytes, ANC: --                                  37.6<L>    BMP ( @ 12:02)             135     |  99      |  37<H> 		Ca++ --      Ca 9.0                ---------------------------------( 276<H>		Mg --                 4.4     |  21<L>   |  1.19  			Ph --        LFTs ( @ 12:02)      TPro 6.6 / Alb 4.0 / TBili 0.5 / DBili -- / AST 11 / ALT 9<L> / AlkPhos 42    Coags ( @ 12:02)  aPTT 39.0<H> / INR 1.89<H> / PT 22.0<H>      --------------------------------------------------------------------------------------------    MICROBIOLOGY  Urinalysis ( @ 13:11):     Color: Yellow / Appearance: Clear / S.023 / pH: 6.0 / Gluc: 200 mg/dL<!> / Ketones: Negative / Bili: Negative / Urobili: Negative / Protein :Trace / Nitrites: Negative / Leuk.Est: Large<!> / RBC: 5<H> / WBC: 24<H> / Sq Epi:  / Non Sq Epi: 2 / Bacteria Negative       --------------------------------------------------------------------------------------------    IMAGING    TECHNIQUE: Axial CT scanning of the brain and cervical spine were obtained without the administration of intravenous contrast.  Images of the cervical spine were reformatted in the sagittal and coronal planes to evaluate osseous alignment.    COMPARISON: Brainand cervical spine CT dated 2019    FINDINGS:    Brain CT:    No acute hemorrhage, hydrocephalus, midline shift or extra-axial collections are identified. Age-appropriate involutional changes and mild microvascular ischemic changes are similar in appearance. Similar ventricular prominence may be on the basis of involutional change or normal pressure hydrocephalus.    The orbits are not remarkable in appearance. Bilateral lens replacement is noted.    No displaced calvarial fracture is visualized.    The visualized paranasal sinuses and tympanomastoid cavities are free of acute disease.    Cervical spine CT:    No acute fracture traumatic subluxation is identified. 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 Multilevel degenerative changes are present with disc space narrowing, osteophyte formation, uncovertebral joint and facet arthropathy resulting in multilevel central canal and neural foraminal stenosis.    The prevertebral soft tissues are within normal limits.    The lung apices are clear.    Chronic posterior rib fractures of right ribs 2 and 3 and left RIBS 3 are noted.    IMPRESSION:    Brain CT: No acute hemorrhage, extra-axial collections or displaced calvarial fracture.    Cervical spine CT: No acute fracture or traumatic subluxation. Multilevel degenerative changes.      PROCEDURE:  CT of the Chest, Abdomen and Pelvis was performed.  Imaging was performed through the chest in the arterial phase followed by imaging of the abdomen and pelvis in the portal venous phase.  Sagittal and coronal reformats were performed.    FINDINGS:  CHEST:  LUNGS AND LARGE AIRWAYS: Patent central airways. Right lower lobe round atelectasis, unchanged since 10/7/2020.  PLEURA: Small left pleural effusion measuring higher than simple fluid. Small left pneumothorax.  VESSELS:Atherosclerotic changes of the aorta and coronary arteries. No aortic dissection.  HEART: Heart size is normal. No pericardial effusion.  MEDIASTINUM AND KRISTIN: No lymphadenopathy.  CHEST WALL AND LOWER NECK: Left chest wall cardiac pacemaker.    ABDOMEN AND PELVIS:  LIVER: 1.7 cm arterially enhancing right lobe lesion (2:81).  BILE DUCTS: Normal caliber.  GALLBLADDER: Cholelithiasis versus biliary sludge.  SPLEEN: Within normal limits.  PANCREAS: Atrophic.  ADRENALS: Within normal limits.  KIDNEYS/URETERS: No hydronephrosis. Subcentimeter right hypodense foci too small to characterize.    BLADDER: Within normal limits.  REPRODUCTIVE ORGANS: Prostate is enlarged.    BOWEL: No bowel obstruction. Sigmoid diverticulosis without acute diverticulitis. Appendix is normal.  PERITONEUM: No ascites.  VESSELS: Atherosclerotic changes. Patent celiac axis, SMA and MARIBEL.  RETROPERITONEUM/LYMPH NODES: No lymphadenopathy. No retroperitoneal hematoma.  ABDOMINAL WALL: Fat-containing left inguinal hernia. Left gluteal intramuscular hematoma.  BONES: Minimally displaced left third posterior rib fracture. Degenerative changes of the spine. Chronic right ninth and 10th rib fractures.    IMPRESSION:  Minimally displaced left third posterior rib fracture. Small left hemopneumothorax.  Left gluteal intramuscular hematoma.  No evidence of solid organ injury.  1.7 cm arterially enhancing right hepatic lobe lesion, for which contrast enhanced CT or MRI following liver protocol is recommended.

## 2021-09-26 NOTE — ED ADULT NURSE NOTE - OBJECTIVE STATEMENT
Pt came in c/o fall Thursday while eating at a restaurant. Pt on Eliquis. No distress. Breathing easy and non labored/ According to the caregiver fall was witnessed. Pt alert and awake. Pt knows his name and his birth date. No visible injury noted. Pt c/o back pain, right shoulder pain and left sided chest pain. No chills. No diaphoresis. Pt came in c/o fall Thursday while eating at a restaurant. Pt on Eliquis. No distress. Breathing easy and non labored/ According to the caregiver fall was witnessed. Pt alert and awake. Pt knows his name and his birth date. No visible injury noted. Pt c/o back pain, right shoulder pain and left sided chest pain. No chills. No diaphoresis. +swelling left buttocks with redness.

## 2021-09-26 NOTE — CONSULT NOTE ADULT - ASSESSMENT
89M w/ hx of Afib on Eliquis, PPM, DM, HTN, BPH, presenting 3 days after a mechanical fall with left posterior 3rd rib fracture, small pneumohemothorax, and left gluteal intramuscular hematoma. Patient has been ambulating and continuing daily activities since the fall.    - Patient 3 days after fall with no signs of respiratory compromise - breathing comfortably and satting well on room air, incentive spirometry 1500  - No need for intervention or admission. Recommend pain control with lidocaine patch, Tylenol, Motrin, and tramadol for breakthrough pain.  - Patient was advised to follow up later this week with PCP or with Dr. Spence. He says he already has an appointment with his PCP later this week.  - Discussed with attending, Dr. Spence    Trauma surgery  Pager [phone]

## 2021-09-26 NOTE — ED PROVIDER NOTE - NSFOLLOWUPINSTRUCTIONS_ED_ALL_ED_FT
You were seen in the Emergency Department for fall and difficulty walking .     1) Advance activity as tolerated.   2) Continue all previously prescribed medications as directed.    3) Follow up with your primary care physician in 24-48 hours - take copies of your results.    4) Return to the Emergency Department for worsening or persistent symptoms, and/or ANY NEW OR CONCERNING SYMPTOMS.    FOLLOW UP  Dr. Spence or your Primary Care Doctor in the next 2-3 days to monitor your status     MEDICATIONS/TREATMENTS  -Continue to use incentive spirometer twice every 2 hours.   -Take tylenol/ibuprofen as needed for pain    RETURN TO ER FOR  Altered mental status, seizure like activity, loss of control of one or more limbs, excessive tiredness, inability to walk or stand, severe headache, loss of bowel or bladder control, fever, shortness of breath, chest pain or any new/concerning symptoms.

## 2021-09-26 NOTE — ED PROVIDER NOTE - OBJECTIVE STATEMENT
h/o afib on eliquis, bladder incontinence, and ambulatory with walker at baseline presents s/p witnessed mechanical fall on 9/24 at a restaurant when he landed on his buttocks and struck his head on an object (unsure chair v table). No LOC or vomiting or change in mentation per aid at bedside. Daughter and aid insisted he be evaluated at ED because of increasing difficulty walking after the fall as well as inspiratory L sided chest pain. h/o afib on eliquis, bladder incontinence, and ambulatory with walker at baseline presents s/p witnessed mechanical fall on 9/24 at a restaurant when he landed on his buttocks and struck his head on an object (unsure chair v table). No LOC or vomiting or change in mentation per aid at bedside. Daughter and aid insisted he be evaluated at ED because of increasing difficulty walking after the fall, LBP, as well as inspiratory L sided chest pain. No HA, vision changes from baseline, neck pain, numbness/tingling, urinary retention or loss of bowel control.

## 2021-09-26 NOTE — ED PROVIDER NOTE - NSICDXFAMHXNEG_GEN_ED
irritable bowel syndrome 67 y/o F with PMH of Breast cancer(s/p resection and chemo/RT and currently on Herceptin), Hypothyroidism presented from cardiology clinic for abnormal TTE

## 2021-09-26 NOTE — ED PROVIDER NOTE - PROGRESS NOTE DETAILS
labs +leukocytosis and new anemia since last hospital visit. add on ua/ucx. trauma surgery made aware of pt. will evaluate in ED. ED Sign Out, eval for blunt trauma, HPTx, pending Trauma consult for admission -- Andrei Chamberlain MD Trauma Surg saw and evaluated - Dr. Primitivo Lugo     F/U with Dr. Spence on Wednesday and/or PCP   dc with tramadol.   No concern for admission due to delayed presentation. CT today counts as "repeat".

## 2021-09-26 NOTE — ED PROVIDER NOTE - ATTENDING CONTRIBUTION TO CARE
Attending MD Khanna:  I personally have seen and examined this patient.  Resident note reviewed and agree on plan of care and except where noted.  See HPI, PE, and MDM for details.      89M with mild dementia, afib on eliquis presenting for evaluation of left anterior chest pain, abrasion to frontal scalp and difficulty walking after a fall that occurred 9/24. Patient here with GCS 15, abrasion to frontal scalp, ttp left anterior chest wall, BS present and equal b/l, nontender abdomen. Plan for CT head, C spine, CAP, labs, ECG, reassess

## 2021-09-26 NOTE — ED ADULT NURSE REASSESSMENT NOTE - NS ED NURSE REASSESS COMMENT FT1
Pt received lying in bed. A&O x 2, disoriented to time. States he has "pain on my butt." Afib on CM. O2 100%. Denying chest pain or SOB. Home health aide at bedside. Awaiting dispo.

## 2021-09-26 NOTE — ED ADULT NURSE REASSESSMENT NOTE - NS ED NURSE REASSESS COMMENT FT1
Pt wheeled to waiting room with home health aide. Pt to be brought home by aide. Denying chest pain, SOB.

## 2021-09-26 NOTE — ED ADULT NURSE NOTE - NSIMPLEMENTINTERV_GEN_ALL_ED
Implemented All Fall with Harm Risk Interventions:  [place] to call system. Call bell, personal items and telephone within reach. Instruct patient to call for assistance. Room bathroom lighting operational. Non-slip footwear when patient is off stretcher. Physically safe environment: no spills, clutter or unnecessary equipment. Stretcher in lowest position, wheels locked, appropriate side rails in place. Provide visual cue, wrist band, yellow gown, etc. Monitor gait and stability. Monitor for mental status changes and reorient to person, place, and time. Review medications for side effects contributing to fall risk. Reinforce activity limits and safety measures with patient and family. Provide visual clues: red socks.

## 2021-09-27 LAB
CULTURE RESULTS: SIGNIFICANT CHANGE UP
SPECIMEN SOURCE: SIGNIFICANT CHANGE UP

## 2021-12-28 NOTE — ED ADULT TRIAGE NOTE - WEIGHT IN KG
Patient provided with discharge information along with followup information. Patient verbalized understanding of instructions. Patient PIV removed intact and per protocol. Patient ambulated to POV with steady gait, no deficits noted by this RN.          [facility]  12/27/21 1612
Performed wound care to skin tear on patient's left elbow. Used NS and Hibiclens to scrub area throughouly. Dressed with a layer of bacitracin, a piece of mepatel, and wrapped with 3\" loose kerlix. Patient understands all care directions, and has no additional questions or concerns.       Priscaann Cage  12/27/21 2008
Pt ambulated 30 ft with pulse ox, SpO2 >96% on RA, pt denies any cp or sob with ambulation, tolerated well.       Krissy Michaels RN  12/27/21 2010
49.9

## 2022-04-20 ENCOUNTER — APPOINTMENT (OUTPATIENT)
Dept: UROLOGY | Facility: CLINIC | Age: 87
End: 2022-04-20

## 2022-04-26 NOTE — ED ADULT TRIAGE NOTE - ESI TRIAGE ACUITY LEVEL, MLM
Consider psychiatric testing with the clinic where new therapist practices    Continue risperidone 0.25 mg twice a day and if not noting improvement can increase to 0.5 mg twice a day next week.  Continue the lamotrigine 200 mg daily      
3

## 2022-06-02 NOTE — PATIENT PROFILE ADULT - DO YOU FEEL THREATENED BY OTHERS?
CM following. Pt from home, Uses walker at baseline. Wife transport. HD at [facility] MWF. Needs echo, stress. no

## 2022-09-21 ENCOUNTER — APPOINTMENT (OUTPATIENT)
Dept: ALLERGY | Facility: CLINIC | Age: 87
End: 2022-09-21

## 2022-12-12 NOTE — ED PROVIDER NOTE - PRINCIPAL DIAGNOSIS
----- Message from Mima Kay, Patient Care Assistant sent at 12/12/2022  1:18 PM CST -----  Regarding: returning call  Contact: pt  Type:  Patient Returning Call    Who Called:  pt   Who Left Message for Patient:  not sure   Does the patient know what this is regarding?:  yes   Best Call Back Number:  [phone]    Additional Information:  please call pt to advise. Thanks!       Influenza B

## 2023-01-01 ENCOUNTER — OUTPATIENT (OUTPATIENT)
Dept: OUTPATIENT SERVICES | Facility: HOSPITAL | Age: 88
LOS: 1 days | End: 2023-01-01
Payer: MEDICARE

## 2023-01-01 ENCOUNTER — INPATIENT (INPATIENT)
Facility: HOSPITAL | Age: 88
LOS: 3 days | Discharge: HOME HEALTH | DRG: 698 | End: 2023-10-16
Attending: INTERNAL MEDICINE | Admitting: INTERNAL MEDICINE
Payer: MEDICARE

## 2023-01-01 ENCOUNTER — INPATIENT (INPATIENT)
Facility: HOSPITAL | Age: 88
LOS: 3 days | Discharge: HOME | DRG: 312 | End: 2023-08-29
Attending: INTERNAL MEDICINE | Admitting: INTERNAL MEDICINE
Payer: MEDICARE

## 2023-01-01 ENCOUNTER — TRANSCRIPTION ENCOUNTER (OUTPATIENT)
Age: 88
End: 2023-01-01

## 2023-01-01 ENCOUNTER — EMERGENCY (EMERGENCY)
Facility: HOSPITAL | Age: 88
LOS: 1 days | Discharge: HOME | End: 2023-01-01
Attending: EMERGENCY MEDICINE
Payer: MEDICARE

## 2023-01-01 ENCOUNTER — INPATIENT (INPATIENT)
Facility: HOSPITAL | Age: 88
LOS: 6 days | DRG: 64 | End: 2024-01-03
Attending: INTERNAL MEDICINE | Admitting: SPECIALIST
Payer: MEDICARE

## 2023-01-01 ENCOUNTER — APPOINTMENT (OUTPATIENT)
Dept: WOUND CARE | Facility: HOSPITAL | Age: 88
End: 2023-01-01

## 2023-01-01 ENCOUNTER — APPOINTMENT (OUTPATIENT)
Dept: WOUND CARE | Facility: CLINIC | Age: 88
End: 2023-01-01
Payer: MEDICARE

## 2023-01-01 VITALS
HEART RATE: 74 BPM | DIASTOLIC BLOOD PRESSURE: 61 MMHG | SYSTOLIC BLOOD PRESSURE: 107 MMHG | RESPIRATION RATE: 18 BRPM | TEMPERATURE: 98 F | OXYGEN SATURATION: 97 %

## 2023-01-01 VITALS
SYSTOLIC BLOOD PRESSURE: 156 MMHG | RESPIRATION RATE: 18 BRPM | DIASTOLIC BLOOD PRESSURE: 75 MMHG | WEIGHT: 169.98 LBS | HEART RATE: 60 BPM | OXYGEN SATURATION: 97 % | HEIGHT: 72 IN | TEMPERATURE: 98 F

## 2023-01-01 VITALS
TEMPERATURE: 97 F | DIASTOLIC BLOOD PRESSURE: 71 MMHG | SYSTOLIC BLOOD PRESSURE: 192 MMHG | HEIGHT: 74 IN | WEIGHT: 179.9 LBS | HEART RATE: 97 BPM | OXYGEN SATURATION: 96 % | RESPIRATION RATE: 16 BRPM

## 2023-01-01 VITALS
DIASTOLIC BLOOD PRESSURE: 68 MMHG | TEMPERATURE: 98 F | SYSTOLIC BLOOD PRESSURE: 149 MMHG | HEART RATE: 79 BPM | OXYGEN SATURATION: 97 % | RESPIRATION RATE: 18 BRPM

## 2023-01-01 VITALS
HEIGHT: 74 IN | DIASTOLIC BLOOD PRESSURE: 64 MMHG | HEART RATE: 85 BPM | OXYGEN SATURATION: 98 % | SYSTOLIC BLOOD PRESSURE: 104 MMHG | TEMPERATURE: 98 F | RESPIRATION RATE: 20 BRPM

## 2023-01-01 VITALS
OXYGEN SATURATION: 95 % | RESPIRATION RATE: 24 BRPM | SYSTOLIC BLOOD PRESSURE: 161 MMHG | TEMPERATURE: 97.6 F | HEART RATE: 78 BPM | DIASTOLIC BLOOD PRESSURE: 94 MMHG

## 2023-01-01 VITALS — WEIGHT: 156.31 LBS

## 2023-01-01 DIAGNOSIS — Z51.5 ENCOUNTER FOR PALLIATIVE CARE: ICD-10-CM

## 2023-01-01 DIAGNOSIS — B36.9 SUPERFICIAL MYCOSIS, UNSPECIFIED: ICD-10-CM

## 2023-01-01 DIAGNOSIS — L89.616 PRESSURE-INDUCED DEEP TISSUE DAMAGE OF RIGHT HEEL: ICD-10-CM

## 2023-01-01 DIAGNOSIS — Z96.653 PRESENCE OF ARTIFICIAL KNEE JOINT, BILATERAL: Chronic | ICD-10-CM

## 2023-01-01 DIAGNOSIS — L30.8 OTHER SPECIFIED DERMATITIS: ICD-10-CM

## 2023-01-01 DIAGNOSIS — L89.616 PRESSURE-INDUCED DEEP TISSUE DAMAGE OF RT HEEL: ICD-10-CM

## 2023-01-01 DIAGNOSIS — Z71.89 OTHER SPECIFIED COUNSELING: ICD-10-CM

## 2023-01-01 DIAGNOSIS — I61.0 NONTRAUMATIC INTRACEREBRAL HEMORRHAGE IN HEMISPHERE, SUBCORTICAL: ICD-10-CM

## 2023-01-01 DIAGNOSIS — R06.00 DYSPNEA, UNSPECIFIED: ICD-10-CM

## 2023-01-01 DIAGNOSIS — I62.9 NONTRAUMATIC INTRACRANIAL HEMORRHAGE, UNSPECIFIED: ICD-10-CM

## 2023-01-01 DIAGNOSIS — D72.829 ELEVATED WHITE BLOOD CELL COUNT, UNSPECIFIED: ICD-10-CM

## 2023-01-01 DIAGNOSIS — R53.2 FUNCTIONAL QUADRIPLEGIA: ICD-10-CM

## 2023-01-01 DIAGNOSIS — R13.10 DYSPHAGIA, UNSPECIFIED: ICD-10-CM

## 2023-01-01 DIAGNOSIS — R68.89 OTHER GENERAL SYMPTOMS AND SIGNS: ICD-10-CM

## 2023-01-01 DIAGNOSIS — R41.82 ALTERED MENTAL STATUS, UNSPECIFIED: ICD-10-CM

## 2023-01-01 LAB
A1C WITH ESTIMATED AVERAGE GLUCOSE RESULT: 6 % — HIGH (ref 4–5.6)
A1C WITH ESTIMATED AVERAGE GLUCOSE RESULT: 6 % — HIGH (ref 4–5.6)
A1C WITH ESTIMATED AVERAGE GLUCOSE RESULT: 6.8 % — HIGH (ref 4–5.6)
ALBUMIN SERPL ELPH-MCNC: 3.3 G/DL — SIGNIFICANT CHANGE UP (ref 3.3–5)
ALBUMIN SERPL ELPH-MCNC: 3.8 G/DL — SIGNIFICANT CHANGE UP (ref 3.3–5)
ALBUMIN SERPL ELPH-MCNC: 4.2 G/DL — SIGNIFICANT CHANGE UP (ref 3.3–5)
ALBUMIN SERPL ELPH-MCNC: 4.2 G/DL — SIGNIFICANT CHANGE UP (ref 3.3–5)
ALBUMIN SERPL ELPH-MCNC: 4.5 G/DL — SIGNIFICANT CHANGE UP (ref 3.3–5)
ALP SERPL-CCNC: 42 U/L — SIGNIFICANT CHANGE UP (ref 40–120)
ALP SERPL-CCNC: 48 U/L — SIGNIFICANT CHANGE UP (ref 40–120)
ALP SERPL-CCNC: 52 U/L — SIGNIFICANT CHANGE UP (ref 40–120)
ALP SERPL-CCNC: 53 U/L — SIGNIFICANT CHANGE UP (ref 40–120)
ALP SERPL-CCNC: 53 U/L — SIGNIFICANT CHANGE UP (ref 40–120)
ALT FLD-CCNC: 11 U/L — SIGNIFICANT CHANGE UP (ref 10–45)
ALT FLD-CCNC: 13 U/L — SIGNIFICANT CHANGE UP (ref 10–45)
ALT FLD-CCNC: 17 U/L — SIGNIFICANT CHANGE UP (ref 10–45)
ANION GAP SERPL CALC-SCNC: 12 MMOL/L — SIGNIFICANT CHANGE UP (ref 5–17)
ANION GAP SERPL CALC-SCNC: 12 MMOL/L — SIGNIFICANT CHANGE UP (ref 5–17)
ANION GAP SERPL CALC-SCNC: 13 MMOL/L — SIGNIFICANT CHANGE UP (ref 5–17)
ANION GAP SERPL CALC-SCNC: 14 MMOL/L — SIGNIFICANT CHANGE UP (ref 5–17)
ANION GAP SERPL CALC-SCNC: 15 MMOL/L — SIGNIFICANT CHANGE UP (ref 5–17)
ANION GAP SERPL CALC-SCNC: 16 MMOL/L — SIGNIFICANT CHANGE UP (ref 5–17)
ANION GAP SERPL CALC-SCNC: 19 MMOL/L — HIGH (ref 5–17)
ANION GAP SERPL CALC-SCNC: 19 MMOL/L — HIGH (ref 5–17)
APPEARANCE UR: ABNORMAL
APPEARANCE UR: CLEAR — SIGNIFICANT CHANGE UP
APTT BLD: 34.2 SEC — SIGNIFICANT CHANGE UP (ref 24.5–35.6)
APTT BLD: 37.3 SEC — HIGH (ref 24.5–35.6)
APTT BLD: 40.1 SEC — HIGH (ref 24.5–35.6)
APTT BLD: 40.1 SEC — HIGH (ref 24.5–35.6)
AST SERPL-CCNC: 10 U/L — SIGNIFICANT CHANGE UP (ref 10–40)
AST SERPL-CCNC: 17 U/L — SIGNIFICANT CHANGE UP (ref 10–40)
AST SERPL-CCNC: 18 U/L — SIGNIFICANT CHANGE UP (ref 10–40)
AST SERPL-CCNC: 18 U/L — SIGNIFICANT CHANGE UP (ref 10–40)
AST SERPL-CCNC: 24 U/L — SIGNIFICANT CHANGE UP (ref 10–40)
BACTERIA # UR AUTO: ABNORMAL /HPF
BACTERIA # UR AUTO: ABNORMAL /HPF
BACTERIA # UR AUTO: NEGATIVE /HPF — SIGNIFICANT CHANGE UP
BACTERIA # UR AUTO: NEGATIVE /HPF — SIGNIFICANT CHANGE UP
BACTERIA # UR AUTO: NEGATIVE — SIGNIFICANT CHANGE UP
BACTERIA # UR AUTO: NEGATIVE — SIGNIFICANT CHANGE UP
BASE EXCESS BLDV CALC-SCNC: 0.7 MMOL/L — SIGNIFICANT CHANGE UP (ref -2–3)
BASOPHILS # BLD AUTO: 0 K/UL — SIGNIFICANT CHANGE UP (ref 0–0.2)
BASOPHILS # BLD AUTO: 0 K/UL — SIGNIFICANT CHANGE UP (ref 0–0.2)
BASOPHILS # BLD AUTO: 0.03 K/UL — SIGNIFICANT CHANGE UP (ref 0–0.2)
BASOPHILS # BLD AUTO: 0.03 K/UL — SIGNIFICANT CHANGE UP (ref 0–0.2)
BASOPHILS # BLD AUTO: 0.05 K/UL — SIGNIFICANT CHANGE UP (ref 0–0.2)
BASOPHILS NFR BLD AUTO: 0 % — SIGNIFICANT CHANGE UP (ref 0–2)
BASOPHILS NFR BLD AUTO: 0 % — SIGNIFICANT CHANGE UP (ref 0–2)
BASOPHILS NFR BLD AUTO: 0.3 % — SIGNIFICANT CHANGE UP (ref 0–2)
BASOPHILS NFR BLD AUTO: 0.3 % — SIGNIFICANT CHANGE UP (ref 0–2)
BASOPHILS NFR BLD AUTO: 0.4 % — SIGNIFICANT CHANGE UP (ref 0–2)
BILIRUB SERPL-MCNC: 0.4 MG/DL — SIGNIFICANT CHANGE UP (ref 0.2–1.2)
BILIRUB SERPL-MCNC: 0.4 MG/DL — SIGNIFICANT CHANGE UP (ref 0.2–1.2)
BILIRUB SERPL-MCNC: 0.5 MG/DL — SIGNIFICANT CHANGE UP (ref 0.2–1.2)
BILIRUB SERPL-MCNC: 0.5 MG/DL — SIGNIFICANT CHANGE UP (ref 0.2–1.2)
BILIRUB SERPL-MCNC: 0.6 MG/DL — SIGNIFICANT CHANGE UP (ref 0.2–1.2)
BILIRUB UR-MCNC: NEGATIVE — SIGNIFICANT CHANGE UP
BLD GP AB SCN SERPL QL: NEGATIVE — SIGNIFICANT CHANGE UP
BUN SERPL-MCNC: 16 MG/DL — SIGNIFICANT CHANGE UP (ref 7–23)
BUN SERPL-MCNC: 19 MG/DL — SIGNIFICANT CHANGE UP (ref 7–23)
BUN SERPL-MCNC: 19 MG/DL — SIGNIFICANT CHANGE UP (ref 7–23)
BUN SERPL-MCNC: 23 MG/DL — SIGNIFICANT CHANGE UP (ref 7–23)
BUN SERPL-MCNC: 24 MG/DL — HIGH (ref 7–23)
BUN SERPL-MCNC: 27 MG/DL — HIGH (ref 7–23)
BUN SERPL-MCNC: 27 MG/DL — HIGH (ref 7–23)
BUN SERPL-MCNC: 28 MG/DL — HIGH (ref 7–23)
BUN SERPL-MCNC: 28 MG/DL — HIGH (ref 7–23)
BUN SERPL-MCNC: 30 MG/DL — HIGH (ref 7–23)
BUN SERPL-MCNC: 35 MG/DL — HIGH (ref 7–23)
BUN SERPL-MCNC: 37 MG/DL — HIGH (ref 7–23)
CA-I SERPL-SCNC: 1.34 MMOL/L — HIGH (ref 1.15–1.33)
CALCIUM SERPL-MCNC: 10 MG/DL — SIGNIFICANT CHANGE UP (ref 8.4–10.5)
CALCIUM SERPL-MCNC: 6.9 MG/DL — LOW (ref 8.4–10.5)
CALCIUM SERPL-MCNC: 6.9 MG/DL — LOW (ref 8.4–10.5)
CALCIUM SERPL-MCNC: 8.9 MG/DL — SIGNIFICANT CHANGE UP (ref 8.4–10.5)
CALCIUM SERPL-MCNC: 9 MG/DL — SIGNIFICANT CHANGE UP (ref 8.4–10.5)
CALCIUM SERPL-MCNC: 9.2 MG/DL — SIGNIFICANT CHANGE UP (ref 8.4–10.5)
CALCIUM SERPL-MCNC: 9.2 MG/DL — SIGNIFICANT CHANGE UP (ref 8.4–10.5)
CALCIUM SERPL-MCNC: 9.3 MG/DL — SIGNIFICANT CHANGE UP (ref 8.4–10.5)
CALCIUM SERPL-MCNC: 9.3 MG/DL — SIGNIFICANT CHANGE UP (ref 8.4–10.5)
CALCIUM SERPL-MCNC: 9.8 MG/DL — SIGNIFICANT CHANGE UP (ref 8.4–10.5)
CALCIUM SERPL-MCNC: 9.8 MG/DL — SIGNIFICANT CHANGE UP (ref 8.4–10.5)
CAST: 2 /LPF — SIGNIFICANT CHANGE UP (ref 0–4)
CAST: 2 /LPF — SIGNIFICANT CHANGE UP (ref 0–4)
CAST: 33 /LPF — HIGH (ref 0–4)
CAST: 33 /LPF — HIGH (ref 0–4)
CHLORIDE BLDV-SCNC: 99 MMOL/L — SIGNIFICANT CHANGE UP (ref 96–108)
CHLORIDE SERPL-SCNC: 100 MMOL/L — SIGNIFICANT CHANGE UP (ref 96–108)
CHLORIDE SERPL-SCNC: 101 MMOL/L — SIGNIFICANT CHANGE UP (ref 96–108)
CHLORIDE SERPL-SCNC: 102 MMOL/L — SIGNIFICANT CHANGE UP (ref 96–108)
CHLORIDE SERPL-SCNC: 102 MMOL/L — SIGNIFICANT CHANGE UP (ref 96–108)
CHLORIDE SERPL-SCNC: 104 MMOL/L — SIGNIFICANT CHANGE UP (ref 96–108)
CHLORIDE SERPL-SCNC: 105 MMOL/L — SIGNIFICANT CHANGE UP (ref 96–108)
CHLORIDE SERPL-SCNC: 105 MMOL/L — SIGNIFICANT CHANGE UP (ref 96–108)
CHLORIDE SERPL-SCNC: 115 MMOL/L — HIGH (ref 96–108)
CHLORIDE SERPL-SCNC: 115 MMOL/L — HIGH (ref 96–108)
CHLORIDE SERPL-SCNC: 97 MMOL/L — SIGNIFICANT CHANGE UP (ref 96–108)
CHLORIDE SERPL-SCNC: 99 MMOL/L — SIGNIFICANT CHANGE UP (ref 96–108)
CHOLEST SERPL-MCNC: 107 MG/DL — SIGNIFICANT CHANGE UP
CHOLEST SERPL-MCNC: 107 MG/DL — SIGNIFICANT CHANGE UP
CK MB CFR SERPL CALC: 2.5 NG/ML — SIGNIFICANT CHANGE UP (ref 0–6.7)
CK SERPL-CCNC: 49 U/L — SIGNIFICANT CHANGE UP (ref 30–200)
CO2 BLDV-SCNC: 27 MMOL/L — HIGH (ref 22–26)
CO2 SERPL-SCNC: 13 MMOL/L — LOW (ref 22–31)
CO2 SERPL-SCNC: 13 MMOL/L — LOW (ref 22–31)
CO2 SERPL-SCNC: 17 MMOL/L — LOW (ref 22–31)
CO2 SERPL-SCNC: 17 MMOL/L — LOW (ref 22–31)
CO2 SERPL-SCNC: 19 MMOL/L — LOW (ref 22–31)
CO2 SERPL-SCNC: 20 MMOL/L — LOW (ref 22–31)
CO2 SERPL-SCNC: 21 MMOL/L — LOW (ref 22–31)
CO2 SERPL-SCNC: 21 MMOL/L — LOW (ref 22–31)
CO2 SERPL-SCNC: 22 MMOL/L — SIGNIFICANT CHANGE UP (ref 22–31)
COLOR SPEC: ABNORMAL
COLOR SPEC: SIGNIFICANT CHANGE UP
COLOR SPEC: YELLOW — SIGNIFICANT CHANGE UP
COLOR SPEC: YELLOW — SIGNIFICANT CHANGE UP
CREAT ?TM UR-MCNC: 177 MG/DL — SIGNIFICANT CHANGE UP
CREAT ?TM UR-MCNC: 177 MG/DL — SIGNIFICANT CHANGE UP
CREAT SERPL-MCNC: 0.7 MG/DL — SIGNIFICANT CHANGE UP (ref 0.5–1.3)
CREAT SERPL-MCNC: 0.85 MG/DL — SIGNIFICANT CHANGE UP (ref 0.5–1.3)
CREAT SERPL-MCNC: 0.85 MG/DL — SIGNIFICANT CHANGE UP (ref 0.5–1.3)
CREAT SERPL-MCNC: 0.93 MG/DL — SIGNIFICANT CHANGE UP (ref 0.5–1.3)
CREAT SERPL-MCNC: 0.93 MG/DL — SIGNIFICANT CHANGE UP (ref 0.5–1.3)
CREAT SERPL-MCNC: 0.94 MG/DL — SIGNIFICANT CHANGE UP (ref 0.5–1.3)
CREAT SERPL-MCNC: 1.03 MG/DL — SIGNIFICANT CHANGE UP (ref 0.5–1.3)
CREAT SERPL-MCNC: 1.12 MG/DL — SIGNIFICANT CHANGE UP (ref 0.5–1.3)
CREAT SERPL-MCNC: 1.12 MG/DL — SIGNIFICANT CHANGE UP (ref 0.5–1.3)
CREAT SERPL-MCNC: 1.3 MG/DL — SIGNIFICANT CHANGE UP (ref 0.5–1.3)
CREAT SERPL-MCNC: 1.35 MG/DL — HIGH (ref 0.5–1.3)
CREAT SERPL-MCNC: 1.36 MG/DL — HIGH (ref 0.5–1.3)
CREAT SERPL-MCNC: 1.36 MG/DL — HIGH (ref 0.5–1.3)
CREAT SERPL-MCNC: 1.6 MG/DL — HIGH (ref 0.5–1.3)
CULTURE RESULTS: SIGNIFICANT CHANGE UP
DIFF PNL FLD: ABNORMAL
DIGOXIN SERPL-MCNC: 0.6 NG/ML — LOW (ref 0.8–2)
DIGOXIN SERPL-MCNC: 0.7 NG/ML — LOW (ref 0.8–2)
EGFR: 39 ML/MIN/1.73M2 — LOW
EGFR: 47 ML/MIN/1.73M2 — LOW
EGFR: 47 ML/MIN/1.73M2 — LOW
EGFR: 48 ML/MIN/1.73M2 — LOW
EGFR: 50 ML/MIN/1.73M2 — LOW
EGFR: 60 ML/MIN/1.73M2 — SIGNIFICANT CHANGE UP
EGFR: 60 ML/MIN/1.73M2 — SIGNIFICANT CHANGE UP
EGFR: 66 ML/MIN/1.73M2 — SIGNIFICANT CHANGE UP
EGFR: 74 ML/MIN/1.73M2 — SIGNIFICANT CHANGE UP
EGFR: 75 ML/MIN/1.73M2 — SIGNIFICANT CHANGE UP
EGFR: 75 ML/MIN/1.73M2 — SIGNIFICANT CHANGE UP
EGFR: 79 ML/MIN/1.73M2 — SIGNIFICANT CHANGE UP
EGFR: 79 ML/MIN/1.73M2 — SIGNIFICANT CHANGE UP
EGFR: 84 ML/MIN/1.73M2 — SIGNIFICANT CHANGE UP
EOSINOPHIL # BLD AUTO: 0 K/UL — SIGNIFICANT CHANGE UP (ref 0–0.5)
EOSINOPHIL # BLD AUTO: 0 K/UL — SIGNIFICANT CHANGE UP (ref 0–0.5)
EOSINOPHIL # BLD AUTO: 0.09 K/UL — SIGNIFICANT CHANGE UP (ref 0–0.5)
EOSINOPHIL # BLD AUTO: 0.09 K/UL — SIGNIFICANT CHANGE UP (ref 0–0.5)
EOSINOPHIL # BLD AUTO: 0.17 K/UL — SIGNIFICANT CHANGE UP (ref 0–0.5)
EOSINOPHIL NFR BLD AUTO: 0 % — SIGNIFICANT CHANGE UP (ref 0–6)
EOSINOPHIL NFR BLD AUTO: 0 % — SIGNIFICANT CHANGE UP (ref 0–6)
EOSINOPHIL NFR BLD AUTO: 0.8 % — SIGNIFICANT CHANGE UP (ref 0–6)
EOSINOPHIL NFR BLD AUTO: 0.8 % — SIGNIFICANT CHANGE UP (ref 0–6)
EOSINOPHIL NFR BLD AUTO: 1.4 % — SIGNIFICANT CHANGE UP (ref 0–6)
EPI CELLS # UR: 1 /HPF — SIGNIFICANT CHANGE UP
EPI CELLS # UR: 5 /HPF — SIGNIFICANT CHANGE UP
ESTIMATED AVERAGE GLUCOSE: 126 MG/DL — HIGH (ref 68–114)
ESTIMATED AVERAGE GLUCOSE: 126 MG/DL — HIGH (ref 68–114)
ESTIMATED AVERAGE GLUCOSE: 148 MG/DL — HIGH (ref 68–114)
GAS PNL BLDV: 129 MMOL/L — LOW (ref 136–145)
GAS PNL BLDV: SIGNIFICANT CHANGE UP
GLUCOSE BLDC GLUCOMTR-MCNC: 131 MG/DL — HIGH (ref 70–99)
GLUCOSE BLDC GLUCOMTR-MCNC: 134 MG/DL — HIGH (ref 70–99)
GLUCOSE BLDC GLUCOMTR-MCNC: 135 MG/DL — HIGH (ref 70–99)
GLUCOSE BLDC GLUCOMTR-MCNC: 135 MG/DL — HIGH (ref 70–99)
GLUCOSE BLDC GLUCOMTR-MCNC: 146 MG/DL — HIGH (ref 70–99)
GLUCOSE BLDC GLUCOMTR-MCNC: 150 MG/DL — HIGH (ref 70–99)
GLUCOSE BLDC GLUCOMTR-MCNC: 150 MG/DL — HIGH (ref 70–99)
GLUCOSE BLDC GLUCOMTR-MCNC: 153 MG/DL — HIGH (ref 70–99)
GLUCOSE BLDC GLUCOMTR-MCNC: 154 MG/DL — HIGH (ref 70–99)
GLUCOSE BLDC GLUCOMTR-MCNC: 161 MG/DL — HIGH (ref 70–99)
GLUCOSE BLDC GLUCOMTR-MCNC: 163 MG/DL — HIGH (ref 70–99)
GLUCOSE BLDC GLUCOMTR-MCNC: 164 MG/DL — HIGH (ref 70–99)
GLUCOSE BLDC GLUCOMTR-MCNC: 165 MG/DL — HIGH (ref 70–99)
GLUCOSE BLDC GLUCOMTR-MCNC: 172 MG/DL — HIGH (ref 70–99)
GLUCOSE BLDC GLUCOMTR-MCNC: 188 MG/DL — HIGH (ref 70–99)
GLUCOSE BLDC GLUCOMTR-MCNC: 192 MG/DL — HIGH (ref 70–99)
GLUCOSE BLDC GLUCOMTR-MCNC: 192 MG/DL — HIGH (ref 70–99)
GLUCOSE BLDC GLUCOMTR-MCNC: 199 MG/DL — HIGH (ref 70–99)
GLUCOSE BLDC GLUCOMTR-MCNC: 201 MG/DL — HIGH (ref 70–99)
GLUCOSE BLDC GLUCOMTR-MCNC: 204 MG/DL — HIGH (ref 70–99)
GLUCOSE BLDC GLUCOMTR-MCNC: 204 MG/DL — HIGH (ref 70–99)
GLUCOSE BLDC GLUCOMTR-MCNC: 212 MG/DL — HIGH (ref 70–99)
GLUCOSE BLDC GLUCOMTR-MCNC: 214 MG/DL — HIGH (ref 70–99)
GLUCOSE BLDC GLUCOMTR-MCNC: 238 MG/DL — HIGH (ref 70–99)
GLUCOSE BLDC GLUCOMTR-MCNC: 243 MG/DL — HIGH (ref 70–99)
GLUCOSE BLDC GLUCOMTR-MCNC: 243 MG/DL — HIGH (ref 70–99)
GLUCOSE BLDC GLUCOMTR-MCNC: 251 MG/DL — HIGH (ref 70–99)
GLUCOSE BLDC GLUCOMTR-MCNC: 266 MG/DL — HIGH (ref 70–99)
GLUCOSE BLDC GLUCOMTR-MCNC: 266 MG/DL — HIGH (ref 70–99)
GLUCOSE BLDC GLUCOMTR-MCNC: 272 MG/DL — HIGH (ref 70–99)
GLUCOSE BLDV-MCNC: 117 MG/DL — HIGH (ref 70–99)
GLUCOSE BLDV-MCNC: 229 MG/DL — HIGH (ref 70–99)
GLUCOSE BLDV-MCNC: 229 MG/DL — HIGH (ref 70–99)
GLUCOSE SERPL-MCNC: 111 MG/DL — HIGH (ref 70–99)
GLUCOSE SERPL-MCNC: 128 MG/DL — HIGH (ref 70–99)
GLUCOSE SERPL-MCNC: 135 MG/DL — HIGH (ref 70–99)
GLUCOSE SERPL-MCNC: 141 MG/DL — HIGH (ref 70–99)
GLUCOSE SERPL-MCNC: 141 MG/DL — HIGH (ref 70–99)
GLUCOSE SERPL-MCNC: 151 MG/DL — HIGH (ref 70–99)
GLUCOSE SERPL-MCNC: 151 MG/DL — HIGH (ref 70–99)
GLUCOSE SERPL-MCNC: 155 MG/DL — HIGH (ref 70–99)
GLUCOSE SERPL-MCNC: 165 MG/DL — HIGH (ref 70–99)
GLUCOSE SERPL-MCNC: 202 MG/DL — HIGH (ref 70–99)
GLUCOSE SERPL-MCNC: 202 MG/DL — HIGH (ref 70–99)
GLUCOSE SERPL-MCNC: 214 MG/DL — HIGH (ref 70–99)
GLUCOSE SERPL-MCNC: 214 MG/DL — HIGH (ref 70–99)
GLUCOSE SERPL-MCNC: 220 MG/DL — HIGH (ref 70–99)
GLUCOSE UR QL: ABNORMAL
GLUCOSE UR QL: NEGATIVE MG/DL — SIGNIFICANT CHANGE UP
GLUCOSE UR QL: NEGATIVE — SIGNIFICANT CHANGE UP
HCO3 BLDV-SCNC: 25 MMOL/L — SIGNIFICANT CHANGE UP (ref 22–29)
HCT VFR BLD CALC: 33.5 % — LOW (ref 39–50)
HCT VFR BLD CALC: 33.5 % — LOW (ref 39–50)
HCT VFR BLD CALC: 35.9 % — LOW (ref 39–50)
HCT VFR BLD CALC: 36 % — LOW (ref 39–50)
HCT VFR BLD CALC: 36.4 % — LOW (ref 39–50)
HCT VFR BLD CALC: 37 % — LOW (ref 39–50)
HCT VFR BLD CALC: 39.1 % — SIGNIFICANT CHANGE UP (ref 39–50)
HCT VFR BLD CALC: 39.1 % — SIGNIFICANT CHANGE UP (ref 39–50)
HCT VFR BLD CALC: 39.5 % — SIGNIFICANT CHANGE UP (ref 39–50)
HCT VFR BLD CALC: 39.5 % — SIGNIFICANT CHANGE UP (ref 39–50)
HCT VFR BLD CALC: 39.8 % — SIGNIFICANT CHANGE UP (ref 39–50)
HCT VFR BLD CALC: 39.8 % — SIGNIFICANT CHANGE UP (ref 39–50)
HCT VFR BLD CALC: 42.3 % — SIGNIFICANT CHANGE UP (ref 39–50)
HCT VFR BLD CALC: 42.3 % — SIGNIFICANT CHANGE UP (ref 39–50)
HCT VFR BLD CALC: 45.1 % — SIGNIFICANT CHANGE UP (ref 39–50)
HCT VFR BLD CALC: 45.9 % — SIGNIFICANT CHANGE UP (ref 39–50)
HCT VFR BLDA CALC: 37 % — LOW (ref 39–51)
HDLC SERPL-MCNC: 37 MG/DL — LOW
HDLC SERPL-MCNC: 37 MG/DL — LOW
HGB BLD CALC-MCNC: 12.3 G/DL — LOW (ref 12.6–17.4)
HGB BLD-MCNC: 10.9 G/DL — LOW (ref 13–17)
HGB BLD-MCNC: 10.9 G/DL — LOW (ref 13–17)
HGB BLD-MCNC: 11.8 G/DL — LOW (ref 13–17)
HGB BLD-MCNC: 12 G/DL — LOW (ref 13–17)
HGB BLD-MCNC: 12.1 G/DL — LOW (ref 13–17)
HGB BLD-MCNC: 12.3 G/DL — LOW (ref 13–17)
HGB BLD-MCNC: 13.2 G/DL — SIGNIFICANT CHANGE UP (ref 13–17)
HGB BLD-MCNC: 13.2 G/DL — SIGNIFICANT CHANGE UP (ref 13–17)
HGB BLD-MCNC: 13.3 G/DL — SIGNIFICANT CHANGE UP (ref 13–17)
HGB BLD-MCNC: 14.1 G/DL — SIGNIFICANT CHANGE UP (ref 13–17)
HGB BLD-MCNC: 14.2 G/DL — SIGNIFICANT CHANGE UP (ref 13–17)
HGB BLD-MCNC: 14.9 G/DL — SIGNIFICANT CHANGE UP (ref 13–17)
HGB BLD-MCNC: 15.1 G/DL — SIGNIFICANT CHANGE UP (ref 13–17)
HYALINE CASTS # UR AUTO: 0 /LPF — SIGNIFICANT CHANGE UP (ref 0–2)
HYALINE CASTS # UR AUTO: 6 /LPF — HIGH (ref 0–2)
IMM GRANULOCYTES NFR BLD AUTO: 0.7 % — SIGNIFICANT CHANGE UP (ref 0–0.9)
IMM GRANULOCYTES NFR BLD AUTO: 0.7 % — SIGNIFICANT CHANGE UP (ref 0–0.9)
IMM GRANULOCYTES NFR BLD AUTO: 0.8 % — SIGNIFICANT CHANGE UP (ref 0–0.9)
INR BLD: 1.05 RATIO — SIGNIFICANT CHANGE UP (ref 0.85–1.18)
INR BLD: 1.05 RATIO — SIGNIFICANT CHANGE UP (ref 0.85–1.18)
INR BLD: 1.28 RATIO — HIGH (ref 0.85–1.18)
INR BLD: 1.47 RATIO — HIGH (ref 0.85–1.18)
INR BLD: 1.47 RATIO — HIGH (ref 0.85–1.18)
INR BLD: 2.02 RATIO — HIGH (ref 0.85–1.18)
KETONES UR-MCNC: ABNORMAL MG/DL
KETONES UR-MCNC: ABNORMAL MG/DL
KETONES UR-MCNC: NEGATIVE MG/DL — SIGNIFICANT CHANGE UP
KETONES UR-MCNC: NEGATIVE MG/DL — SIGNIFICANT CHANGE UP
KETONES UR-MCNC: NEGATIVE — SIGNIFICANT CHANGE UP
KETONES UR-MCNC: NEGATIVE — SIGNIFICANT CHANGE UP
LACTATE BLDV-MCNC: 1.9 MMOL/L — SIGNIFICANT CHANGE UP (ref 0.5–2)
LACTATE BLDV-MCNC: 2.6 MMOL/L — HIGH (ref 0.5–2)
LACTATE SERPL-SCNC: 1.4 MMOL/L — SIGNIFICANT CHANGE UP (ref 0.5–2)
LACTATE SERPL-SCNC: 1.4 MMOL/L — SIGNIFICANT CHANGE UP (ref 0.5–2)
LACTATE SERPL-SCNC: 2.3 MMOL/L — HIGH (ref 0.5–2)
LEUKOCYTE ESTERASE UR-ACNC: ABNORMAL
LEUKOCYTE ESTERASE UR-ACNC: NEGATIVE — SIGNIFICANT CHANGE UP
LIPID PNL WITH DIRECT LDL SERPL: 47 MG/DL — SIGNIFICANT CHANGE UP
LIPID PNL WITH DIRECT LDL SERPL: 47 MG/DL — SIGNIFICANT CHANGE UP
LYMPHOCYTES # BLD AUTO: 0.47 K/UL — LOW (ref 1–3.3)
LYMPHOCYTES # BLD AUTO: 1.62 K/UL — SIGNIFICANT CHANGE UP (ref 1–3.3)
LYMPHOCYTES # BLD AUTO: 1.62 K/UL — SIGNIFICANT CHANGE UP (ref 1–3.3)
LYMPHOCYTES # BLD AUTO: 1.85 K/UL — SIGNIFICANT CHANGE UP (ref 1–3.3)
LYMPHOCYTES # BLD AUTO: 1.89 K/UL — SIGNIFICANT CHANGE UP (ref 1–3.3)
LYMPHOCYTES # BLD AUTO: 13.5 % — SIGNIFICANT CHANGE UP (ref 13–44)
LYMPHOCYTES # BLD AUTO: 15 % — SIGNIFICANT CHANGE UP (ref 13–44)
LYMPHOCYTES # BLD AUTO: 15 % — SIGNIFICANT CHANGE UP (ref 13–44)
LYMPHOCYTES # BLD AUTO: 15.5 % — SIGNIFICANT CHANGE UP (ref 13–44)
LYMPHOCYTES # BLD AUTO: 2.7 % — LOW (ref 13–44)
MAGNESIUM SERPL-MCNC: 1.7 MG/DL — SIGNIFICANT CHANGE UP (ref 1.6–2.6)
MAGNESIUM SERPL-MCNC: 2 MG/DL — SIGNIFICANT CHANGE UP (ref 1.6–2.6)
MAGNESIUM SERPL-MCNC: 2 MG/DL — SIGNIFICANT CHANGE UP (ref 1.6–2.6)
MANUAL SMEAR VERIFICATION: SIGNIFICANT CHANGE UP
MANUAL SMEAR VERIFICATION: SIGNIFICANT CHANGE UP
MCHC RBC-ENTMCNC: 28.5 PG — SIGNIFICANT CHANGE UP (ref 27–34)
MCHC RBC-ENTMCNC: 28.5 PG — SIGNIFICANT CHANGE UP (ref 27–34)
MCHC RBC-ENTMCNC: 28.9 PG — SIGNIFICANT CHANGE UP (ref 27–34)
MCHC RBC-ENTMCNC: 28.9 PG — SIGNIFICANT CHANGE UP (ref 27–34)
MCHC RBC-ENTMCNC: 29 PG — SIGNIFICANT CHANGE UP (ref 27–34)
MCHC RBC-ENTMCNC: 29.1 PG — SIGNIFICANT CHANGE UP (ref 27–34)
MCHC RBC-ENTMCNC: 29.1 PG — SIGNIFICANT CHANGE UP (ref 27–34)
MCHC RBC-ENTMCNC: 29.2 PG — SIGNIFICANT CHANGE UP (ref 27–34)
MCHC RBC-ENTMCNC: 29.3 PG — SIGNIFICANT CHANGE UP (ref 27–34)
MCHC RBC-ENTMCNC: 29.4 PG — SIGNIFICANT CHANGE UP (ref 27–34)
MCHC RBC-ENTMCNC: 29.4 PG — SIGNIFICANT CHANGE UP (ref 27–34)
MCHC RBC-ENTMCNC: 29.8 PG — SIGNIFICANT CHANGE UP (ref 27–34)
MCHC RBC-ENTMCNC: 32.4 GM/DL — SIGNIFICANT CHANGE UP (ref 32–36)
MCHC RBC-ENTMCNC: 32.4 GM/DL — SIGNIFICANT CHANGE UP (ref 32–36)
MCHC RBC-ENTMCNC: 32.5 GM/DL — SIGNIFICANT CHANGE UP (ref 32–36)
MCHC RBC-ENTMCNC: 32.5 GM/DL — SIGNIFICANT CHANGE UP (ref 32–36)
MCHC RBC-ENTMCNC: 32.9 GM/DL — SIGNIFICANT CHANGE UP (ref 32–36)
MCHC RBC-ENTMCNC: 33 GM/DL — SIGNIFICANT CHANGE UP (ref 32–36)
MCHC RBC-ENTMCNC: 33.3 GM/DL — SIGNIFICANT CHANGE UP (ref 32–36)
MCHC RBC-ENTMCNC: 33.4 GM/DL — SIGNIFICANT CHANGE UP (ref 32–36)
MCHC RBC-ENTMCNC: 33.4 GM/DL — SIGNIFICANT CHANGE UP (ref 32–36)
MCHC RBC-ENTMCNC: 33.6 GM/DL — SIGNIFICANT CHANGE UP (ref 32–36)
MCHC RBC-ENTMCNC: 33.7 GM/DL — SIGNIFICANT CHANGE UP (ref 32–36)
MCHC RBC-ENTMCNC: 33.8 GM/DL — SIGNIFICANT CHANGE UP (ref 32–36)
MCHC RBC-ENTMCNC: 33.8 GM/DL — SIGNIFICANT CHANGE UP (ref 32–36)
MCHC RBC-ENTMCNC: 34.2 GM/DL — SIGNIFICANT CHANGE UP (ref 32–36)
MCV RBC AUTO: 86.1 FL — SIGNIFICANT CHANGE UP (ref 80–100)
MCV RBC AUTO: 86.3 FL — SIGNIFICANT CHANGE UP (ref 80–100)
MCV RBC AUTO: 86.3 FL — SIGNIFICANT CHANGE UP (ref 80–100)
MCV RBC AUTO: 87 FL — SIGNIFICANT CHANGE UP (ref 80–100)
MCV RBC AUTO: 87.1 FL — SIGNIFICANT CHANGE UP (ref 80–100)
MCV RBC AUTO: 87.1 FL — SIGNIFICANT CHANGE UP (ref 80–100)
MCV RBC AUTO: 87.2 FL — SIGNIFICANT CHANGE UP (ref 80–100)
MCV RBC AUTO: 87.7 FL — SIGNIFICANT CHANGE UP (ref 80–100)
MCV RBC AUTO: 87.8 FL — SIGNIFICANT CHANGE UP (ref 80–100)
MCV RBC AUTO: 88.4 FL — SIGNIFICANT CHANGE UP (ref 80–100)
MCV RBC AUTO: 89.4 FL — SIGNIFICANT CHANGE UP (ref 80–100)
MCV RBC AUTO: 89.6 FL — SIGNIFICANT CHANGE UP (ref 80–100)
MONOCYTES # BLD AUTO: 1.29 K/UL — HIGH (ref 0–0.9)
MONOCYTES # BLD AUTO: 1.29 K/UL — HIGH (ref 0–0.9)
MONOCYTES # BLD AUTO: 1.74 K/UL — HIGH (ref 0–0.9)
MONOCYTES # BLD AUTO: 1.84 K/UL — HIGH (ref 0–0.9)
MONOCYTES # BLD AUTO: 1.9 K/UL — HIGH (ref 0–0.9)
MONOCYTES NFR BLD AUTO: 11.9 % — SIGNIFICANT CHANGE UP (ref 2–14)
MONOCYTES NFR BLD AUTO: 11.9 % — SIGNIFICANT CHANGE UP (ref 2–14)
MONOCYTES NFR BLD AUTO: 13.6 % — SIGNIFICANT CHANGE UP (ref 2–14)
MONOCYTES NFR BLD AUTO: 15.4 % — HIGH (ref 2–14)
MONOCYTES NFR BLD AUTO: 9.9 % — SIGNIFICANT CHANGE UP (ref 2–14)
MRSA PCR RESULT.: SIGNIFICANT CHANGE UP
NEUTROPHILS # BLD AUTO: 10.21 K/UL — HIGH (ref 1.8–7.4)
NEUTROPHILS # BLD AUTO: 15.35 K/UL — HIGH (ref 1.8–7.4)
NEUTROPHILS # BLD AUTO: 7.69 K/UL — HIGH (ref 1.8–7.4)
NEUTROPHILS # BLD AUTO: 7.69 K/UL — HIGH (ref 1.8–7.4)
NEUTROPHILS # BLD AUTO: 7.93 K/UL — HIGH (ref 1.8–7.4)
NEUTROPHILS NFR BLD AUTO: 66.5 % — SIGNIFICANT CHANGE UP (ref 43–77)
NEUTROPHILS NFR BLD AUTO: 67.8 % — SIGNIFICANT CHANGE UP (ref 43–77)
NEUTROPHILS NFR BLD AUTO: 71.3 % — SIGNIFICANT CHANGE UP (ref 43–77)
NEUTROPHILS NFR BLD AUTO: 71.3 % — SIGNIFICANT CHANGE UP (ref 43–77)
NEUTROPHILS NFR BLD AUTO: 86.5 % — HIGH (ref 43–77)
NEUTS BAND # BLD: 0.9 % — SIGNIFICANT CHANGE UP (ref 0–8)
NEUTS BAND # BLD: 5.1 % — SIGNIFICANT CHANGE UP (ref 0–8)
NITRITE UR-MCNC: NEGATIVE — SIGNIFICANT CHANGE UP
NON HDL CHOLESTEROL: 70 MG/DL — SIGNIFICANT CHANGE UP
NON HDL CHOLESTEROL: 70 MG/DL — SIGNIFICANT CHANGE UP
NRBC # BLD: 0 /100 WBCS — SIGNIFICANT CHANGE UP (ref 0–0)
NT-PROBNP SERPL-SCNC: 2780 PG/ML — HIGH (ref 0–300)
NT-PROBNP SERPL-SCNC: 2780 PG/ML — HIGH (ref 0–300)
OSMOLALITY UR: 352 MOS/KG — SIGNIFICANT CHANGE UP (ref 300–900)
OSMOLALITY UR: 352 MOS/KG — SIGNIFICANT CHANGE UP (ref 300–900)
PCO2 BLDV: 40 MMHG — LOW (ref 42–55)
PH BLDV: 7.41 — SIGNIFICANT CHANGE UP (ref 7.32–7.43)
PH UR: 5 — SIGNIFICANT CHANGE UP (ref 5–8)
PH UR: 5 — SIGNIFICANT CHANGE UP (ref 5–8)
PH UR: 5.5 — SIGNIFICANT CHANGE UP (ref 5–8)
PH UR: 6.5 — SIGNIFICANT CHANGE UP (ref 5–8)
PH UR: 8 — SIGNIFICANT CHANGE UP (ref 5–8)
PH UR: 8 — SIGNIFICANT CHANGE UP (ref 5–8)
PHOSPHATE SERPL-MCNC: 2.8 MG/DL — SIGNIFICANT CHANGE UP (ref 2.5–4.5)
PHOSPHATE SERPL-MCNC: 3.8 MG/DL — SIGNIFICANT CHANGE UP (ref 2.5–4.5)
PHOSPHATE SERPL-MCNC: 3.8 MG/DL — SIGNIFICANT CHANGE UP (ref 2.5–4.5)
PLAT MORPH BLD: NORMAL — SIGNIFICANT CHANGE UP
PLAT MORPH BLD: NORMAL — SIGNIFICANT CHANGE UP
PLATELET # BLD AUTO: 198 K/UL — SIGNIFICANT CHANGE UP (ref 150–400)
PLATELET # BLD AUTO: 198 K/UL — SIGNIFICANT CHANGE UP (ref 150–400)
PLATELET # BLD AUTO: 203 K/UL — SIGNIFICANT CHANGE UP (ref 150–400)
PLATELET # BLD AUTO: 204 K/UL — SIGNIFICANT CHANGE UP (ref 150–400)
PLATELET # BLD AUTO: 208 K/UL — SIGNIFICANT CHANGE UP (ref 150–400)
PLATELET # BLD AUTO: 209 K/UL — SIGNIFICANT CHANGE UP (ref 150–400)
PLATELET # BLD AUTO: 210 K/UL — SIGNIFICANT CHANGE UP (ref 150–400)
PLATELET # BLD AUTO: 219 K/UL — SIGNIFICANT CHANGE UP (ref 150–400)
PLATELET # BLD AUTO: 220 K/UL — SIGNIFICANT CHANGE UP (ref 150–400)
PLATELET # BLD AUTO: 221 K/UL — SIGNIFICANT CHANGE UP (ref 150–400)
PLATELET # BLD AUTO: 246 K/UL — SIGNIFICANT CHANGE UP (ref 150–400)
PLATELET # BLD AUTO: 246 K/UL — SIGNIFICANT CHANGE UP (ref 150–400)
PO2 BLDV: 44 MMHG — SIGNIFICANT CHANGE UP (ref 25–45)
POLYCHROMASIA BLD QL SMEAR: SLIGHT — SIGNIFICANT CHANGE UP
POTASSIUM BLDV-SCNC: 4.8 MMOL/L — SIGNIFICANT CHANGE UP (ref 3.5–5.1)
POTASSIUM SERPL-MCNC: 3.1 MMOL/L — LOW (ref 3.5–5.3)
POTASSIUM SERPL-MCNC: 3.1 MMOL/L — LOW (ref 3.5–5.3)
POTASSIUM SERPL-MCNC: 3.6 MMOL/L — SIGNIFICANT CHANGE UP (ref 3.5–5.3)
POTASSIUM SERPL-MCNC: 3.9 MMOL/L — SIGNIFICANT CHANGE UP (ref 3.5–5.3)
POTASSIUM SERPL-MCNC: 3.9 MMOL/L — SIGNIFICANT CHANGE UP (ref 3.5–5.3)
POTASSIUM SERPL-MCNC: 4 MMOL/L — SIGNIFICANT CHANGE UP (ref 3.5–5.3)
POTASSIUM SERPL-MCNC: 4.4 MMOL/L — SIGNIFICANT CHANGE UP (ref 3.5–5.3)
POTASSIUM SERPL-MCNC: 4.7 MMOL/L — SIGNIFICANT CHANGE UP (ref 3.5–5.3)
POTASSIUM SERPL-SCNC: 3.1 MMOL/L — LOW (ref 3.5–5.3)
POTASSIUM SERPL-SCNC: 3.1 MMOL/L — LOW (ref 3.5–5.3)
POTASSIUM SERPL-SCNC: 3.6 MMOL/L — SIGNIFICANT CHANGE UP (ref 3.5–5.3)
POTASSIUM SERPL-SCNC: 3.9 MMOL/L — SIGNIFICANT CHANGE UP (ref 3.5–5.3)
POTASSIUM SERPL-SCNC: 3.9 MMOL/L — SIGNIFICANT CHANGE UP (ref 3.5–5.3)
POTASSIUM SERPL-SCNC: 4 MMOL/L — SIGNIFICANT CHANGE UP (ref 3.5–5.3)
POTASSIUM SERPL-SCNC: 4.4 MMOL/L — SIGNIFICANT CHANGE UP (ref 3.5–5.3)
POTASSIUM SERPL-SCNC: 4.7 MMOL/L — SIGNIFICANT CHANGE UP (ref 3.5–5.3)
POTASSIUM UR-SCNC: 61 MMOL/L — SIGNIFICANT CHANGE UP
POTASSIUM UR-SCNC: 61 MMOL/L — SIGNIFICANT CHANGE UP
PROCALCITONIN SERPL-MCNC: 0.06 NG/ML — SIGNIFICANT CHANGE UP (ref 0.02–0.1)
PROT ?TM UR-MCNC: 37 MG/DL — HIGH (ref 0–12)
PROT ?TM UR-MCNC: 37 MG/DL — HIGH (ref 0–12)
PROT SERPL-MCNC: 6.2 G/DL — SIGNIFICANT CHANGE UP (ref 6–8.3)
PROT SERPL-MCNC: 6.4 G/DL — SIGNIFICANT CHANGE UP (ref 6–8.3)
PROT SERPL-MCNC: 6.7 G/DL — SIGNIFICANT CHANGE UP (ref 6–8.3)
PROT SERPL-MCNC: 6.7 G/DL — SIGNIFICANT CHANGE UP (ref 6–8.3)
PROT SERPL-MCNC: 7.1 G/DL — SIGNIFICANT CHANGE UP (ref 6–8.3)
PROT UR-MCNC: 100 MG/DL
PROT UR-MCNC: 100 MG/DL
PROT UR-MCNC: 30 MG/DL
PROT UR-MCNC: 30 MG/DL
PROT UR-MCNC: ABNORMAL
PROT UR-MCNC: ABNORMAL
PROT/CREAT UR-RTO: 0.2 RATIO — SIGNIFICANT CHANGE UP (ref 0–0.2)
PROT/CREAT UR-RTO: 0.2 RATIO — SIGNIFICANT CHANGE UP (ref 0–0.2)
PROTHROM AB SERPL-ACNC: 11.5 SEC — SIGNIFICANT CHANGE UP (ref 9.5–13)
PROTHROM AB SERPL-ACNC: 11.5 SEC — SIGNIFICANT CHANGE UP (ref 9.5–13)
PROTHROM AB SERPL-ACNC: 14 SEC — HIGH (ref 9.5–13)
PROTHROM AB SERPL-ACNC: 16 SEC — HIGH (ref 9.5–13)
PROTHROM AB SERPL-ACNC: 16 SEC — HIGH (ref 9.5–13)
PROTHROM AB SERPL-ACNC: 20.8 SEC — HIGH (ref 9.5–13)
RAPID RVP RESULT: SIGNIFICANT CHANGE UP
RBC # BLD: 3.82 M/UL — LOW (ref 4.2–5.8)
RBC # BLD: 3.82 M/UL — LOW (ref 4.2–5.8)
RBC # BLD: 4.07 M/UL — LOW (ref 4.2–5.8)
RBC # BLD: 4.13 M/UL — LOW (ref 4.2–5.8)
RBC # BLD: 4.13 M/UL — LOW (ref 4.2–5.8)
RBC # BLD: 4.17 M/UL — LOW (ref 4.2–5.8)
RBC # BLD: 4.49 M/UL — SIGNIFICANT CHANGE UP (ref 4.2–5.8)
RBC # BLD: 4.49 M/UL — SIGNIFICANT CHANGE UP (ref 4.2–5.8)
RBC # BLD: 4.54 M/UL — SIGNIFICANT CHANGE UP (ref 4.2–5.8)
RBC # BLD: 4.54 M/UL — SIGNIFICANT CHANGE UP (ref 4.2–5.8)
RBC # BLD: 4.61 M/UL — SIGNIFICANT CHANGE UP (ref 4.2–5.8)
RBC # BLD: 4.61 M/UL — SIGNIFICANT CHANGE UP (ref 4.2–5.8)
RBC # BLD: 4.82 M/UL — SIGNIFICANT CHANGE UP (ref 4.2–5.8)
RBC # BLD: 4.86 M/UL — SIGNIFICANT CHANGE UP (ref 4.2–5.8)
RBC # BLD: 5.14 M/UL — SIGNIFICANT CHANGE UP (ref 4.2–5.8)
RBC # BLD: 5.19 M/UL — SIGNIFICANT CHANGE UP (ref 4.2–5.8)
RBC # FLD: 13.1 % — SIGNIFICANT CHANGE UP (ref 10.3–14.5)
RBC # FLD: 13.3 % — SIGNIFICANT CHANGE UP (ref 10.3–14.5)
RBC # FLD: 13.5 % — SIGNIFICANT CHANGE UP (ref 10.3–14.5)
RBC # FLD: 13.6 % — SIGNIFICANT CHANGE UP (ref 10.3–14.5)
RBC # FLD: 13.7 % — SIGNIFICANT CHANGE UP (ref 10.3–14.5)
RBC # FLD: 13.8 % — SIGNIFICANT CHANGE UP (ref 10.3–14.5)
RBC # FLD: 14 % — SIGNIFICANT CHANGE UP (ref 10.3–14.5)
RBC # FLD: 14.1 % — SIGNIFICANT CHANGE UP (ref 10.3–14.5)
RBC BLD AUTO: SIGNIFICANT CHANGE UP
RBC BLD AUTO: SIGNIFICANT CHANGE UP
RBC CASTS # UR COMP ASSIST: 127 /HPF — HIGH (ref 0–4)
RBC CASTS # UR COMP ASSIST: 23 /HPF — HIGH (ref 0–4)
RBC CASTS # UR COMP ASSIST: 23 /HPF — HIGH (ref 0–4)
RBC CASTS # UR COMP ASSIST: 6 /HPF — HIGH (ref 0–4)
RBC CASTS # UR COMP ASSIST: 63 /HPF — HIGH (ref 0–4)
RBC CASTS # UR COMP ASSIST: 63 /HPF — HIGH (ref 0–4)
REVIEW: SIGNIFICANT CHANGE UP
REVIEW: SIGNIFICANT CHANGE UP
RH IG SCN BLD-IMP: POSITIVE — SIGNIFICANT CHANGE UP
S AUREUS DNA NOSE QL NAA+PROBE: SIGNIFICANT CHANGE UP
SAO2 % BLDV: 74.1 % — SIGNIFICANT CHANGE UP (ref 67–88)
SARS-COV-2 RNA SPEC QL NAA+PROBE: SIGNIFICANT CHANGE UP
SODIUM SERPL-SCNC: 132 MMOL/L — LOW (ref 135–145)
SODIUM SERPL-SCNC: 134 MMOL/L — LOW (ref 135–145)
SODIUM SERPL-SCNC: 135 MMOL/L — SIGNIFICANT CHANGE UP (ref 135–145)
SODIUM SERPL-SCNC: 136 MMOL/L — SIGNIFICANT CHANGE UP (ref 135–145)
SODIUM SERPL-SCNC: 136 MMOL/L — SIGNIFICANT CHANGE UP (ref 135–145)
SODIUM SERPL-SCNC: 137 MMOL/L — SIGNIFICANT CHANGE UP (ref 135–145)
SODIUM SERPL-SCNC: 138 MMOL/L — SIGNIFICANT CHANGE UP (ref 135–145)
SODIUM SERPL-SCNC: 138 MMOL/L — SIGNIFICANT CHANGE UP (ref 135–145)
SODIUM SERPL-SCNC: 141 MMOL/L — SIGNIFICANT CHANGE UP (ref 135–145)
SODIUM SERPL-SCNC: 141 MMOL/L — SIGNIFICANT CHANGE UP (ref 135–145)
SODIUM SERPL-SCNC: 144 MMOL/L — SIGNIFICANT CHANGE UP (ref 135–145)
SODIUM SERPL-SCNC: 144 MMOL/L — SIGNIFICANT CHANGE UP (ref 135–145)
SODIUM UR-SCNC: 9 MMOL/L — SIGNIFICANT CHANGE UP
SODIUM UR-SCNC: 9 MMOL/L — SIGNIFICANT CHANGE UP
SP GR SPEC: 1.01 — LOW (ref 1.01–1.02)
SP GR SPEC: 1.02 — SIGNIFICANT CHANGE UP (ref 1.01–1.02)
SP GR SPEC: 1.02 — SIGNIFICANT CHANGE UP (ref 1–1.03)
SPECIMEN SOURCE: SIGNIFICANT CHANGE UP
SQUAMOUS # UR AUTO: 0 /HPF — SIGNIFICANT CHANGE UP (ref 0–5)
SQUAMOUS # UR AUTO: 0 /HPF — SIGNIFICANT CHANGE UP (ref 0–5)
SQUAMOUS # UR AUTO: 10 /HPF — HIGH (ref 0–5)
SQUAMOUS # UR AUTO: 10 /HPF — HIGH (ref 0–5)
T3 SERPL-MCNC: 82 NG/DL — SIGNIFICANT CHANGE UP (ref 80–200)
T4 AB SER-ACNC: 6.1 UG/DL — SIGNIFICANT CHANGE UP (ref 4.6–12)
TRIGL SERPL-MCNC: 124 MG/DL — SIGNIFICANT CHANGE UP
TRIGL SERPL-MCNC: 124 MG/DL — SIGNIFICANT CHANGE UP
TROPONIN T, HIGH SENSITIVITY RESULT: 32 NG/L — SIGNIFICANT CHANGE UP (ref 0–51)
TROPONIN T, HIGH SENSITIVITY RESULT: 32 NG/L — SIGNIFICANT CHANGE UP (ref 0–51)
TROPONIN T, HIGH SENSITIVITY RESULT: 35 NG/L — SIGNIFICANT CHANGE UP (ref 0–51)
TSH SERPL-MCNC: 3.23 UIU/ML — SIGNIFICANT CHANGE UP (ref 0.27–4.2)
UROBILINOGEN FLD QL: 1 MG/DL — SIGNIFICANT CHANGE UP (ref 0.2–1)
UROBILINOGEN FLD QL: NEGATIVE — SIGNIFICANT CHANGE UP
UROBILINOGEN FLD QL: NEGATIVE — SIGNIFICANT CHANGE UP
UUN UR-MCNC: 171 MG/DL — SIGNIFICANT CHANGE UP
UUN UR-MCNC: 171 MG/DL — SIGNIFICANT CHANGE UP
WBC # BLD: 10.8 K/UL — HIGH (ref 3.8–10.5)
WBC # BLD: 10.8 K/UL — HIGH (ref 3.8–10.5)
WBC # BLD: 11.94 K/UL — HIGH (ref 3.8–10.5)
WBC # BLD: 12.25 K/UL — HIGH (ref 3.8–10.5)
WBC # BLD: 12.74 K/UL — HIGH (ref 3.8–10.5)
WBC # BLD: 13.05 K/UL — HIGH (ref 3.8–10.5)
WBC # BLD: 13.05 K/UL — HIGH (ref 3.8–10.5)
WBC # BLD: 13.49 K/UL — HIGH (ref 3.8–10.5)
WBC # BLD: 13.91 K/UL — HIGH (ref 3.8–10.5)
WBC # BLD: 14 K/UL — HIGH (ref 3.8–10.5)
WBC # BLD: 14.51 K/UL — HIGH (ref 3.8–10.5)
WBC # BLD: 14.51 K/UL — HIGH (ref 3.8–10.5)
WBC # BLD: 16.38 K/UL — HIGH (ref 3.8–10.5)
WBC # BLD: 16.38 K/UL — HIGH (ref 3.8–10.5)
WBC # BLD: 16.58 K/UL — HIGH (ref 3.8–10.5)
WBC # BLD: 17.56 K/UL — HIGH (ref 3.8–10.5)
WBC # FLD AUTO: 10.8 K/UL — HIGH (ref 3.8–10.5)
WBC # FLD AUTO: 10.8 K/UL — HIGH (ref 3.8–10.5)
WBC # FLD AUTO: 11.94 K/UL — HIGH (ref 3.8–10.5)
WBC # FLD AUTO: 12.25 K/UL — HIGH (ref 3.8–10.5)
WBC # FLD AUTO: 12.74 K/UL — HIGH (ref 3.8–10.5)
WBC # FLD AUTO: 13.05 K/UL — HIGH (ref 3.8–10.5)
WBC # FLD AUTO: 13.05 K/UL — HIGH (ref 3.8–10.5)
WBC # FLD AUTO: 13.49 K/UL — HIGH (ref 3.8–10.5)
WBC # FLD AUTO: 13.91 K/UL — HIGH (ref 3.8–10.5)
WBC # FLD AUTO: 14 K/UL — HIGH (ref 3.8–10.5)
WBC # FLD AUTO: 14.51 K/UL — HIGH (ref 3.8–10.5)
WBC # FLD AUTO: 14.51 K/UL — HIGH (ref 3.8–10.5)
WBC # FLD AUTO: 16.38 K/UL — HIGH (ref 3.8–10.5)
WBC # FLD AUTO: 16.38 K/UL — HIGH (ref 3.8–10.5)
WBC # FLD AUTO: 16.58 K/UL — HIGH (ref 3.8–10.5)
WBC # FLD AUTO: 17.56 K/UL — HIGH (ref 3.8–10.5)
WBC CLUMPS # UR AUTO: PRESENT
WBC CLUMPS # UR AUTO: PRESENT
WBC UR QL: 243 /HPF — HIGH (ref 0–5)
WBC UR QL: 243 /HPF — HIGH (ref 0–5)
WBC UR QL: 3 /HPF — SIGNIFICANT CHANGE UP (ref 0–5)
WBC UR QL: >50
WBC UR QL: >998 /HPF — HIGH (ref 0–5)
WBC UR QL: >998 /HPF — HIGH (ref 0–5)
YEAST-LIKE CELLS: PRESENT
YEAST-LIKE CELLS: PRESENT

## 2023-01-01 PROCEDURE — 84295 ASSAY OF SERUM SODIUM: CPT

## 2023-01-01 PROCEDURE — 81001 URINALYSIS AUTO W/SCOPE: CPT

## 2023-01-01 PROCEDURE — 70450 CT HEAD/BRAIN W/O DYE: CPT | Mod: MA

## 2023-01-01 PROCEDURE — 71260 CT THORAX DX C+: CPT | Mod: 26,MA

## 2023-01-01 PROCEDURE — 74177 CT ABD & PELVIS W/CONTRAST: CPT | Mod: 26,MA

## 2023-01-01 PROCEDURE — 86901 BLOOD TYPING SEROLOGIC RH(D): CPT

## 2023-01-01 PROCEDURE — 74177 CT ABD & PELVIS W/CONTRAST: CPT | Mod: MA

## 2023-01-01 PROCEDURE — 93975 VASCULAR STUDY: CPT

## 2023-01-01 PROCEDURE — 76770 US EXAM ABDO BACK WALL COMP: CPT | Mod: 26

## 2023-01-01 PROCEDURE — 70496 CT ANGIOGRAPHY HEAD: CPT | Mod: 26,MA

## 2023-01-01 PROCEDURE — 71260 CT THORAX DX C+: CPT | Mod: MA

## 2023-01-01 PROCEDURE — 70450 CT HEAD/BRAIN W/O DYE: CPT | Mod: 26,MA,59

## 2023-01-01 PROCEDURE — 84436 ASSAY OF TOTAL THYROXINE: CPT

## 2023-01-01 PROCEDURE — 99233 SBSQ HOSP IP/OBS HIGH 50: CPT

## 2023-01-01 PROCEDURE — 94640 AIRWAY INHALATION TREATMENT: CPT

## 2023-01-01 PROCEDURE — 99497 ADVNCD CARE PLAN 30 MIN: CPT | Mod: 25

## 2023-01-01 PROCEDURE — 80053 COMPREHEN METABOLIC PANEL: CPT

## 2023-01-01 PROCEDURE — 99285 EMERGENCY DEPT VISIT HI MDM: CPT

## 2023-01-01 PROCEDURE — 83605 ASSAY OF LACTIC ACID: CPT

## 2023-01-01 PROCEDURE — 85730 THROMBOPLASTIN TIME PARTIAL: CPT

## 2023-01-01 PROCEDURE — 96375 TX/PRO/DX INJ NEW DRUG ADDON: CPT

## 2023-01-01 PROCEDURE — 82803 BLOOD GASES ANY COMBINATION: CPT

## 2023-01-01 PROCEDURE — 93880 EXTRACRANIAL BILAT STUDY: CPT | Mod: 26

## 2023-01-01 PROCEDURE — 85610 PROTHROMBIN TIME: CPT

## 2023-01-01 PROCEDURE — 70450 CT HEAD/BRAIN W/O DYE: CPT | Mod: 26,MA

## 2023-01-01 PROCEDURE — 87040 BLOOD CULTURE FOR BACTERIA: CPT

## 2023-01-01 PROCEDURE — 85018 HEMOGLOBIN: CPT

## 2023-01-01 PROCEDURE — 72125 CT NECK SPINE W/O DYE: CPT | Mod: 26,MA

## 2023-01-01 PROCEDURE — 82947 ASSAY GLUCOSE BLOOD QUANT: CPT

## 2023-01-01 PROCEDURE — 82330 ASSAY OF CALCIUM: CPT

## 2023-01-01 PROCEDURE — 84484 ASSAY OF TROPONIN QUANT: CPT

## 2023-01-01 PROCEDURE — 82962 GLUCOSE BLOOD TEST: CPT

## 2023-01-01 PROCEDURE — 92610 EVALUATE SWALLOWING FUNCTION: CPT

## 2023-01-01 PROCEDURE — 72125 CT NECK SPINE W/O DYE: CPT | Mod: MA

## 2023-01-01 PROCEDURE — 36415 COLL VENOUS BLD VENIPUNCTURE: CPT

## 2023-01-01 PROCEDURE — 82435 ASSAY OF BLOOD CHLORIDE: CPT

## 2023-01-01 PROCEDURE — 93880 EXTRACRANIAL BILAT STUDY: CPT

## 2023-01-01 PROCEDURE — 80162 ASSAY OF DIGOXIN TOTAL: CPT

## 2023-01-01 PROCEDURE — 84132 ASSAY OF SERUM POTASSIUM: CPT

## 2023-01-01 PROCEDURE — 71045 X-RAY EXAM CHEST 1 VIEW: CPT | Mod: 26

## 2023-01-01 PROCEDURE — 93975 VASCULAR STUDY: CPT | Mod: 26

## 2023-01-01 PROCEDURE — 82553 CREATINE MB FRACTION: CPT

## 2023-01-01 PROCEDURE — 86850 RBC ANTIBODY SCREEN: CPT

## 2023-01-01 PROCEDURE — 72170 X-RAY EXAM OF PELVIS: CPT | Mod: 26

## 2023-01-01 PROCEDURE — 84480 ASSAY TRIIODOTHYRONINE (T3): CPT

## 2023-01-01 PROCEDURE — 83880 ASSAY OF NATRIURETIC PEPTIDE: CPT

## 2023-01-01 PROCEDURE — 85025 COMPLETE CBC W/AUTO DIFF WBC: CPT

## 2023-01-01 PROCEDURE — 97162 PT EVAL MOD COMPLEX 30 MIN: CPT

## 2023-01-01 PROCEDURE — 87086 URINE CULTURE/COLONY COUNT: CPT

## 2023-01-01 PROCEDURE — 96374 THER/PROPH/DIAG INJ IV PUSH: CPT

## 2023-01-01 PROCEDURE — 99221 1ST HOSP IP/OBS SF/LOW 40: CPT

## 2023-01-01 PROCEDURE — 86900 BLOOD TYPING SEROLOGIC ABO: CPT

## 2023-01-01 PROCEDURE — 70450 CT HEAD/BRAIN W/O DYE: CPT | Mod: 26

## 2023-01-01 PROCEDURE — 99284 EMERGENCY DEPT VISIT MOD MDM: CPT | Mod: 25

## 2023-01-01 PROCEDURE — 71045 X-RAY EXAM CHEST 1 VIEW: CPT

## 2023-01-01 PROCEDURE — 84443 ASSAY THYROID STIM HORMONE: CPT

## 2023-01-01 PROCEDURE — 84145 PROCALCITONIN (PCT): CPT

## 2023-01-01 PROCEDURE — 93306 TTE W/DOPPLER COMPLETE: CPT | Mod: 26

## 2023-01-01 PROCEDURE — 76770 US EXAM ABDO BACK WALL COMP: CPT

## 2023-01-01 PROCEDURE — 70450 CT HEAD/BRAIN W/O DYE: CPT | Mod: 26,59,77,MA

## 2023-01-01 PROCEDURE — 85014 HEMATOCRIT: CPT

## 2023-01-01 PROCEDURE — 85027 COMPLETE CBC AUTOMATED: CPT

## 2023-01-01 PROCEDURE — 84100 ASSAY OF PHOSPHORUS: CPT

## 2023-01-01 PROCEDURE — 93005 ELECTROCARDIOGRAM TRACING: CPT

## 2023-01-01 PROCEDURE — 99291 CRITICAL CARE FIRST HOUR: CPT

## 2023-01-01 PROCEDURE — 83036 HEMOGLOBIN GLYCOSYLATED A1C: CPT

## 2023-01-01 PROCEDURE — 70498 CT ANGIOGRAPHY NECK: CPT | Mod: 26,MA

## 2023-01-01 PROCEDURE — 87641 MR-STAPH DNA AMP PROBE: CPT

## 2023-01-01 PROCEDURE — 72170 X-RAY EXAM OF PELVIS: CPT

## 2023-01-01 PROCEDURE — 87640 STAPH A DNA AMP PROBE: CPT

## 2023-01-01 PROCEDURE — 99284 EMERGENCY DEPT VISIT MOD MDM: CPT

## 2023-01-01 PROCEDURE — 80048 BASIC METABOLIC PNL TOTAL CA: CPT

## 2023-01-01 PROCEDURE — 99215 OFFICE O/P EST HI 40 MIN: CPT

## 2023-01-01 PROCEDURE — G0463: CPT

## 2023-01-01 PROCEDURE — C8929: CPT

## 2023-01-01 PROCEDURE — 99292 CRITICAL CARE ADDL 30 MIN: CPT

## 2023-01-01 PROCEDURE — 83735 ASSAY OF MAGNESIUM: CPT

## 2023-01-01 PROCEDURE — 82550 ASSAY OF CK (CPK): CPT

## 2023-01-01 PROCEDURE — 99233 SBSQ HOSP IP/OBS HIGH 50: CPT | Mod: GC

## 2023-01-01 PROCEDURE — 0225U NFCT DS DNA&RNA 21 SARSCOV2: CPT

## 2023-01-01 RX ORDER — MIRABEGRON 50 MG/1
1 TABLET, EXTENDED RELEASE ORAL
Qty: 0 | Refills: 0 | DISCHARGE

## 2023-01-01 RX ORDER — ACETAMINOPHEN 500 MG
1000 TABLET ORAL ONCE
Refills: 0 | Status: COMPLETED | OUTPATIENT
Start: 2023-01-01 | End: 2023-01-01

## 2023-01-01 RX ORDER — CEFTRIAXONE 500 MG/1
1000 INJECTION, POWDER, FOR SOLUTION INTRAMUSCULAR; INTRAVENOUS EVERY 24 HOURS
Refills: 0 | Status: DISCONTINUED | OUTPATIENT
Start: 2023-01-01 | End: 2023-01-01

## 2023-01-01 RX ORDER — INSULIN LISPRO 100/ML
VIAL (ML) SUBCUTANEOUS
Refills: 0 | Status: DISCONTINUED | OUTPATIENT
Start: 2023-01-01 | End: 2023-01-01

## 2023-01-01 RX ORDER — SODIUM CHLORIDE 9 MG/ML
1000 INJECTION, SOLUTION INTRAVENOUS
Refills: 0 | Status: DISCONTINUED | OUTPATIENT
Start: 2023-01-01 | End: 2023-01-01

## 2023-01-01 RX ORDER — PIPERACILLIN AND TAZOBACTAM 4; .5 G/20ML; G/20ML
3.38 INJECTION, POWDER, LYOPHILIZED, FOR SOLUTION INTRAVENOUS EVERY 8 HOURS
Refills: 0 | Status: DISCONTINUED | OUTPATIENT
Start: 2023-01-01 | End: 2023-01-01

## 2023-01-01 RX ORDER — DEXTROSE 50 % IN WATER 50 %
15 SYRINGE (ML) INTRAVENOUS ONCE
Refills: 0 | Status: DISCONTINUED | OUTPATIENT
Start: 2023-01-01 | End: 2023-01-01

## 2023-01-01 RX ORDER — HYDROMORPHONE HYDROCHLORIDE 2 MG/ML
1 INJECTION INTRAMUSCULAR; INTRAVENOUS; SUBCUTANEOUS
Refills: 0 | Status: DISCONTINUED | OUTPATIENT
Start: 2023-01-01 | End: 2024-01-01

## 2023-01-01 RX ORDER — DIGOXIN 250 MCG
125 TABLET ORAL DAILY
Refills: 0 | Status: DISCONTINUED | OUTPATIENT
Start: 2023-01-01 | End: 2023-01-01

## 2023-01-01 RX ORDER — ASCORBIC ACID 60 MG
500 TABLET,CHEWABLE ORAL DAILY
Refills: 0 | Status: DISCONTINUED | OUTPATIENT
Start: 2023-01-01 | End: 2023-01-01

## 2023-01-01 RX ORDER — HYDRALAZINE HCL 50 MG
50 TABLET ORAL EVERY 8 HOURS
Refills: 0 | Status: DISCONTINUED | OUTPATIENT
Start: 2023-01-01 | End: 2023-01-01

## 2023-01-01 RX ORDER — GLUCAGON INJECTION, SOLUTION 0.5 MG/.1ML
1 INJECTION, SOLUTION SUBCUTANEOUS ONCE
Refills: 0 | Status: DISCONTINUED | OUTPATIENT
Start: 2023-01-01 | End: 2023-01-01

## 2023-01-01 RX ORDER — ATORVASTATIN CALCIUM 80 MG/1
80 TABLET, FILM COATED ORAL AT BEDTIME
Refills: 0 | Status: DISCONTINUED | OUTPATIENT
Start: 2023-01-01 | End: 2023-01-01

## 2023-01-01 RX ORDER — CHLORHEXIDINE GLUCONATE 213 G/1000ML
1 SOLUTION TOPICAL DAILY
Refills: 0 | Status: DISCONTINUED | OUTPATIENT
Start: 2023-01-01 | End: 2023-01-01

## 2023-01-01 RX ORDER — SODIUM CHLORIDE 9 MG/ML
500 INJECTION INTRAMUSCULAR; INTRAVENOUS; SUBCUTANEOUS ONCE
Refills: 0 | Status: COMPLETED | OUTPATIENT
Start: 2023-01-01 | End: 2023-01-01

## 2023-01-01 RX ORDER — METOPROLOL TARTRATE 50 MG
5 TABLET ORAL ONCE
Refills: 0 | Status: COMPLETED | OUTPATIENT
Start: 2023-01-01 | End: 2023-01-01

## 2023-01-01 RX ORDER — PIPERACILLIN AND TAZOBACTAM 4; .5 G/20ML; G/20ML
3.38 INJECTION, POWDER, LYOPHILIZED, FOR SOLUTION INTRAVENOUS ONCE
Refills: 0 | Status: COMPLETED | OUTPATIENT
Start: 2023-01-01 | End: 2023-01-01

## 2023-01-01 RX ORDER — METOPROLOL TARTRATE 50 MG
1 TABLET ORAL
Qty: 0 | Refills: 0 | DISCHARGE

## 2023-01-01 RX ORDER — DEXTROSE 50 % IN WATER 50 %
12.5 SYRINGE (ML) INTRAVENOUS ONCE
Refills: 0 | Status: DISCONTINUED | OUTPATIENT
Start: 2023-01-01 | End: 2023-01-01

## 2023-01-01 RX ORDER — CEFTRIAXONE 500 MG/1
1000 INJECTION, POWDER, FOR SOLUTION INTRAMUSCULAR; INTRAVENOUS ONCE
Refills: 0 | Status: COMPLETED | OUTPATIENT
Start: 2023-01-01 | End: 2023-01-01

## 2023-01-01 RX ORDER — ISOSORBIDE DINITRATE 5 MG/1
1 TABLET ORAL
Qty: 90 | Refills: 0
Start: 2023-01-01 | End: 2023-01-01

## 2023-01-01 RX ORDER — ISOSORBIDE DINITRATE 5 MG/1
10 TABLET ORAL THREE TIMES A DAY
Refills: 0 | Status: DISCONTINUED | OUTPATIENT
Start: 2023-01-01 | End: 2023-01-01

## 2023-01-01 RX ORDER — LATANOPROST 0.05 MG/ML
1 SOLUTION/ DROPS OPHTHALMIC; TOPICAL AT BEDTIME
Refills: 0 | Status: DISCONTINUED | OUTPATIENT
Start: 2023-01-01 | End: 2023-01-01

## 2023-01-01 RX ORDER — ACETAMINOPHEN 500 MG
2 TABLET ORAL
Qty: 0 | Refills: 0 | DISCHARGE
Start: 2023-01-01

## 2023-01-01 RX ORDER — PROTHROMBIN COMPLEX CONCENTRATE (HUMAN) 25.5; 16.5; 24; 22; 22; 26 [IU]/ML; [IU]/ML; [IU]/ML; [IU]/ML; [IU]/ML; [IU]/ML
3500 POWDER, FOR SOLUTION INTRAVENOUS ONCE
Refills: 0 | Status: COMPLETED | OUTPATIENT
Start: 2023-01-01 | End: 2023-01-01

## 2023-01-01 RX ORDER — METFORMIN HYDROCHLORIDE 850 MG/1
1 TABLET ORAL
Qty: 0 | Refills: 0 | DISCHARGE

## 2023-01-01 RX ORDER — HYDRALAZINE HCL 50 MG
25 TABLET ORAL THREE TIMES A DAY
Refills: 0 | Status: DISCONTINUED | OUTPATIENT
Start: 2023-01-01 | End: 2023-01-01

## 2023-01-01 RX ORDER — CEFUROXIME AXETIL 250 MG
1 TABLET ORAL
Qty: 4 | Refills: 0
Start: 2023-01-01 | End: 2023-01-01

## 2023-01-01 RX ORDER — LATANOPROST 0.05 MG/ML
1 SOLUTION/ DROPS OPHTHALMIC; TOPICAL
Qty: 0 | Refills: 0 | DISCHARGE

## 2023-01-01 RX ORDER — ASCORBIC ACID 60 MG
1 TABLET,CHEWABLE ORAL
Qty: 30 | Refills: 0
Start: 2023-01-01 | End: 2023-01-01

## 2023-01-01 RX ORDER — FENOFIBRATE,MICRONIZED 130 MG
145 CAPSULE ORAL DAILY
Refills: 0 | Status: DISCONTINUED | OUTPATIENT
Start: 2023-01-01 | End: 2023-01-01

## 2023-01-01 RX ORDER — SITAGLIPTIN 50 MG/1
1 TABLET, FILM COATED ORAL
Qty: 0 | Refills: 0 | DISCHARGE

## 2023-01-01 RX ORDER — POTASSIUM CHLORIDE 20 MEQ
10 PACKET (EA) ORAL
Refills: 0 | Status: DISCONTINUED | OUTPATIENT
Start: 2023-01-01 | End: 2023-01-01

## 2023-01-01 RX ORDER — ACETAMINOPHEN 500 MG
650 TABLET ORAL EVERY 6 HOURS
Refills: 0 | Status: DISCONTINUED | OUTPATIENT
Start: 2023-01-01 | End: 2023-01-01

## 2023-01-01 RX ORDER — IPRATROPIUM/ALBUTEROL SULFATE 18-103MCG
3 AEROSOL WITH ADAPTER (GRAM) INHALATION ONCE
Refills: 0 | Status: COMPLETED | OUTPATIENT
Start: 2023-01-01 | End: 2023-01-01

## 2023-01-01 RX ORDER — HYDRALAZINE HCL 50 MG
5 TABLET ORAL EVERY 6 HOURS
Refills: 0 | Status: DISCONTINUED | OUTPATIENT
Start: 2023-01-01 | End: 2024-01-01

## 2023-01-01 RX ORDER — METOPROLOL TARTRATE 50 MG
50 TABLET ORAL DAILY
Refills: 0 | Status: DISCONTINUED | OUTPATIENT
Start: 2023-01-01 | End: 2023-01-01

## 2023-01-01 RX ORDER — MIRABEGRON 25 MG/1
25 TABLET, FILM COATED, EXTENDED RELEASE ORAL
Qty: 90 | Refills: 0 | Status: DISCONTINUED | COMMUNITY
Start: 2020-09-14 | End: 2023-01-01

## 2023-01-01 RX ORDER — HALOPERIDOL DECANOATE 100 MG/ML
2 INJECTION INTRAMUSCULAR EVERY 6 HOURS
Refills: 0 | Status: DISCONTINUED | OUTPATIENT
Start: 2023-01-01 | End: 2023-01-01

## 2023-01-01 RX ORDER — IPRATROPIUM/ALBUTEROL SULFATE 18-103MCG
3 AEROSOL WITH ADAPTER (GRAM) INHALATION ONCE
Refills: 0 | Status: DISCONTINUED | OUTPATIENT
Start: 2023-01-01 | End: 2023-01-01

## 2023-01-01 RX ORDER — ROSUVASTATIN CALCIUM 5 MG/1
1 TABLET ORAL
Qty: 0 | Refills: 0 | DISCHARGE

## 2023-01-01 RX ORDER — APIXABAN 2.5 MG/1
5 TABLET, FILM COATED ORAL
Refills: 0 | Status: DISCONTINUED | OUTPATIENT
Start: 2023-01-01 | End: 2023-01-01

## 2023-01-01 RX ORDER — METOPROLOL TARTRATE 50 MG
25 TABLET ORAL ONCE
Refills: 0 | Status: COMPLETED | OUTPATIENT
Start: 2023-01-01 | End: 2023-01-01

## 2023-01-01 RX ORDER — DEXTROSE 50 % IN WATER 50 %
25 SYRINGE (ML) INTRAVENOUS ONCE
Refills: 0 | Status: DISCONTINUED | OUTPATIENT
Start: 2023-01-01 | End: 2023-01-01

## 2023-01-01 RX ORDER — MAGNESIUM SULFATE 500 MG/ML
2 VIAL (ML) INJECTION ONCE
Refills: 0 | Status: COMPLETED | OUTPATIENT
Start: 2023-01-01 | End: 2023-01-01

## 2023-01-01 RX ORDER — DIGOXIN 250 MCG
1 TABLET ORAL
Qty: 0 | Refills: 0 | DISCHARGE

## 2023-01-01 RX ORDER — LISINOPRIL 2.5 MG/1
10 TABLET ORAL DAILY
Refills: 0 | Status: DISCONTINUED | OUTPATIENT
Start: 2023-01-01 | End: 2023-01-01

## 2023-01-01 RX ORDER — MORPHINE SULFATE 50 MG/1
0.5 CAPSULE, EXTENDED RELEASE ORAL
Refills: 0 | Status: DISCONTINUED | OUTPATIENT
Start: 2023-01-01 | End: 2023-01-01

## 2023-01-01 RX ORDER — HYDROMORPHONE HYDROCHLORIDE 2 MG/ML
0.5 INJECTION INTRAMUSCULAR; INTRAVENOUS; SUBCUTANEOUS ONCE
Refills: 0 | Status: DISCONTINUED | OUTPATIENT
Start: 2023-01-01 | End: 2023-01-01

## 2023-01-01 RX ORDER — HYDRALAZINE HCL 50 MG
10 TABLET ORAL ONCE
Refills: 0 | Status: COMPLETED | OUTPATIENT
Start: 2023-01-01 | End: 2023-01-01

## 2023-01-01 RX ORDER — SODIUM CHLORIDE 9 MG/ML
1000 INJECTION INTRAMUSCULAR; INTRAVENOUS; SUBCUTANEOUS
Refills: 0 | Status: DISCONTINUED | OUTPATIENT
Start: 2023-01-01 | End: 2023-01-01

## 2023-01-01 RX ORDER — INSULIN LISPRO 100/ML
VIAL (ML) SUBCUTANEOUS EVERY 6 HOURS
Refills: 0 | Status: DISCONTINUED | OUTPATIENT
Start: 2023-01-01 | End: 2023-01-01

## 2023-01-01 RX ORDER — MORPHINE SULFATE 50 MG/1
2 CAPSULE, EXTENDED RELEASE ORAL ONCE
Refills: 0 | Status: DISCONTINUED | OUTPATIENT
Start: 2023-01-01 | End: 2023-01-01

## 2023-01-01 RX ORDER — METOPROLOL TARTRATE 50 MG
5 TABLET ORAL EVERY 6 HOURS
Refills: 0 | Status: DISCONTINUED | OUTPATIENT
Start: 2023-01-01 | End: 2023-01-01

## 2023-01-01 RX ORDER — NICARDIPINE HYDROCHLORIDE 30 MG/1
5 CAPSULE, EXTENDED RELEASE ORAL
Qty: 40 | Refills: 0 | Status: DISCONTINUED | OUTPATIENT
Start: 2023-01-01 | End: 2023-01-01

## 2023-01-01 RX ORDER — MORPHINE SULFATE 50 MG/1
1 CAPSULE, EXTENDED RELEASE ORAL
Refills: 0 | Status: DISCONTINUED | OUTPATIENT
Start: 2023-01-01 | End: 2023-01-01

## 2023-01-01 RX ORDER — ROBINUL 0.2 MG/ML
0.4 INJECTION INTRAMUSCULAR; INTRAVENOUS EVERY 6 HOURS
Refills: 0 | Status: DISCONTINUED | OUTPATIENT
Start: 2023-01-01 | End: 2024-01-01

## 2023-01-01 RX ORDER — HYDRALAZINE HCL 50 MG
1 TABLET ORAL
Qty: 90 | Refills: 0
Start: 2023-01-01 | End: 2023-01-01

## 2023-01-01 RX ORDER — HYDRALAZINE HCL 50 MG
10 TABLET ORAL EVERY 4 HOURS
Refills: 0 | Status: DISCONTINUED | OUTPATIENT
Start: 2023-01-01 | End: 2023-01-01

## 2023-01-01 RX ORDER — HYDROMORPHONE HYDROCHLORIDE 2 MG/ML
0.5 INJECTION INTRAMUSCULAR; INTRAVENOUS; SUBCUTANEOUS
Refills: 0 | Status: DISCONTINUED | OUTPATIENT
Start: 2023-01-01 | End: 2024-01-01

## 2023-01-01 RX ORDER — SILODOSIN 4 MG/1
1 CAPSULE ORAL
Qty: 0 | Refills: 0 | DISCHARGE

## 2023-01-01 RX ORDER — VANCOMYCIN HCL 1 G
1000 VIAL (EA) INTRAVENOUS ONCE
Refills: 0 | Status: COMPLETED | OUTPATIENT
Start: 2023-01-01 | End: 2023-01-01

## 2023-01-01 RX ORDER — SCOPALAMINE 1 MG/3D
1 PATCH, EXTENDED RELEASE TRANSDERMAL
Refills: 0 | Status: DISCONTINUED | OUTPATIENT
Start: 2023-01-01 | End: 2024-01-01

## 2023-01-01 RX ORDER — METOPROLOL TARTRATE 50 MG
50 TABLET ORAL ONCE
Refills: 0 | Status: COMPLETED | OUTPATIENT
Start: 2023-01-01 | End: 2023-01-01

## 2023-01-01 RX ORDER — SCOPALAMINE 1 MG/3D
1 PATCH, EXTENDED RELEASE TRANSDERMAL ONCE
Refills: 0 | Status: COMPLETED | OUTPATIENT
Start: 2023-01-01 | End: 2023-01-01

## 2023-01-01 RX ORDER — FENOFIBRATE,MICRONIZED 130 MG
1 CAPSULE ORAL
Qty: 0 | Refills: 0 | DISCHARGE

## 2023-01-01 RX ORDER — ACETAMINOPHEN 500 MG
650 TABLET ORAL EVERY 6 HOURS
Refills: 0 | Status: DISCONTINUED | OUTPATIENT
Start: 2023-01-01 | End: 2024-01-01

## 2023-01-01 RX ORDER — OXYBUTYNIN CHLORIDE 5 MG
5 TABLET ORAL
Refills: 0 | Status: DISCONTINUED | OUTPATIENT
Start: 2023-01-01 | End: 2023-01-01

## 2023-01-01 RX ORDER — METOPROLOL TARTRATE 50 MG
5 TABLET ORAL EVERY 6 HOURS
Refills: 0 | Status: DISCONTINUED | OUTPATIENT
Start: 2023-01-01 | End: 2024-01-01

## 2023-01-01 RX ADMIN — Medication 2: at 17:17

## 2023-01-01 RX ADMIN — Medication 145 MILLIGRAM(S): at 11:57

## 2023-01-01 RX ADMIN — Medication 25 MILLIGRAM(S): at 22:11

## 2023-01-01 RX ADMIN — APIXABAN 5 MILLIGRAM(S): 2.5 TABLET, FILM COATED ORAL at 18:45

## 2023-01-01 RX ADMIN — ISOSORBIDE DINITRATE 10 MILLIGRAM(S): 5 TABLET ORAL at 17:51

## 2023-01-01 RX ADMIN — Medication 1000 MILLIGRAM(S): at 21:15

## 2023-01-01 RX ADMIN — Medication 125 MICROGRAM(S): at 07:26

## 2023-01-01 RX ADMIN — ATORVASTATIN CALCIUM 80 MILLIGRAM(S): 80 TABLET, FILM COATED ORAL at 21:52

## 2023-01-01 RX ADMIN — HYDROMORPHONE HYDROCHLORIDE 1 MILLIGRAM(S): 2 INJECTION INTRAMUSCULAR; INTRAVENOUS; SUBCUTANEOUS at 18:01

## 2023-01-01 RX ADMIN — Medication 1: at 08:04

## 2023-01-01 RX ADMIN — Medication 100 MILLIEQUIVALENT(S): at 08:42

## 2023-01-01 RX ADMIN — MORPHINE SULFATE 0.5 MILLIGRAM(S): 50 CAPSULE, EXTENDED RELEASE ORAL at 12:52

## 2023-01-01 RX ADMIN — Medication 145 MILLIGRAM(S): at 11:05

## 2023-01-01 RX ADMIN — Medication 1 TABLET(S): at 11:05

## 2023-01-01 RX ADMIN — Medication 650 MILLIGRAM(S): at 18:00

## 2023-01-01 RX ADMIN — APIXABAN 5 MILLIGRAM(S): 2.5 TABLET, FILM COATED ORAL at 17:02

## 2023-01-01 RX ADMIN — Medication 125 MICROGRAM(S): at 05:22

## 2023-01-01 RX ADMIN — Medication 25 MILLIGRAM(S): at 13:24

## 2023-01-01 RX ADMIN — APIXABAN 5 MILLIGRAM(S): 2.5 TABLET, FILM COATED ORAL at 05:22

## 2023-01-01 RX ADMIN — MORPHINE SULFATE 2 MILLIGRAM(S): 50 CAPSULE, EXTENDED RELEASE ORAL at 11:23

## 2023-01-01 RX ADMIN — ISOSORBIDE DINITRATE 10 MILLIGRAM(S): 5 TABLET ORAL at 16:41

## 2023-01-01 RX ADMIN — Medication 500 MILLIGRAM(S): at 11:05

## 2023-01-01 RX ADMIN — LATANOPROST 1 DROP(S): 0.05 SOLUTION/ DROPS OPHTHALMIC; TOPICAL at 21:00

## 2023-01-01 RX ADMIN — ISOSORBIDE DINITRATE 10 MILLIGRAM(S): 5 TABLET ORAL at 17:16

## 2023-01-01 RX ADMIN — APIXABAN 5 MILLIGRAM(S): 2.5 TABLET, FILM COATED ORAL at 05:05

## 2023-01-01 RX ADMIN — ISOSORBIDE DINITRATE 10 MILLIGRAM(S): 5 TABLET ORAL at 05:22

## 2023-01-01 RX ADMIN — ISOSORBIDE DINITRATE 10 MILLIGRAM(S): 5 TABLET ORAL at 07:27

## 2023-01-01 RX ADMIN — Medication 1 TABLET(S): at 11:42

## 2023-01-01 RX ADMIN — Medication 5 MILLIGRAM(S): at 05:14

## 2023-01-01 RX ADMIN — Medication 25 MILLIGRAM(S): at 05:29

## 2023-01-01 RX ADMIN — CHLORHEXIDINE GLUCONATE 1 APPLICATION(S): 213 SOLUTION TOPICAL at 11:08

## 2023-01-01 RX ADMIN — SODIUM CHLORIDE 500 MILLILITER(S): 9 INJECTION INTRAMUSCULAR; INTRAVENOUS; SUBCUTANEOUS at 18:09

## 2023-01-01 RX ADMIN — APIXABAN 5 MILLIGRAM(S): 2.5 TABLET, FILM COATED ORAL at 05:17

## 2023-01-01 RX ADMIN — Medication 5 MILLIGRAM(S): at 17:16

## 2023-01-01 RX ADMIN — APIXABAN 5 MILLIGRAM(S): 2.5 TABLET, FILM COATED ORAL at 05:34

## 2023-01-01 RX ADMIN — Medication 25 MILLIGRAM(S): at 14:38

## 2023-01-01 RX ADMIN — PIPERACILLIN AND TAZOBACTAM 200 GRAM(S): 4; .5 INJECTION, POWDER, LYOPHILIZED, FOR SOLUTION INTRAVENOUS at 23:06

## 2023-01-01 RX ADMIN — Medication 25 MILLIGRAM(S): at 13:07

## 2023-01-01 RX ADMIN — APIXABAN 5 MILLIGRAM(S): 2.5 TABLET, FILM COATED ORAL at 05:18

## 2023-01-01 RX ADMIN — ROBINUL 0.4 MILLIGRAM(S): 0.2 INJECTION INTRAMUSCULAR; INTRAVENOUS at 05:19

## 2023-01-01 RX ADMIN — PIPERACILLIN AND TAZOBACTAM 25 GRAM(S): 4; .5 INJECTION, POWDER, LYOPHILIZED, FOR SOLUTION INTRAVENOUS at 05:34

## 2023-01-01 RX ADMIN — ROBINUL 0.4 MILLIGRAM(S): 0.2 INJECTION INTRAMUSCULAR; INTRAVENOUS at 18:00

## 2023-01-01 RX ADMIN — CEFTRIAXONE 100 MILLIGRAM(S): 500 INJECTION, POWDER, FOR SOLUTION INTRAMUSCULAR; INTRAVENOUS at 23:13

## 2023-01-01 RX ADMIN — ISOSORBIDE DINITRATE 10 MILLIGRAM(S): 5 TABLET ORAL at 11:49

## 2023-01-01 RX ADMIN — ROBINUL 0.4 MILLIGRAM(S): 0.2 INJECTION INTRAMUSCULAR; INTRAVENOUS at 18:02

## 2023-01-01 RX ADMIN — Medication 1 TABLET(S): at 11:07

## 2023-01-01 RX ADMIN — Medication 1000 MILLIGRAM(S): at 02:00

## 2023-01-01 RX ADMIN — Medication 25 MILLIGRAM(S): at 05:14

## 2023-01-01 RX ADMIN — Medication 1 TABLET(S): at 11:04

## 2023-01-01 RX ADMIN — MORPHINE SULFATE 1 MILLIGRAM(S): 50 CAPSULE, EXTENDED RELEASE ORAL at 14:46

## 2023-01-01 RX ADMIN — Medication 500 MILLIGRAM(S): at 11:41

## 2023-01-01 RX ADMIN — Medication 1: at 08:31

## 2023-01-01 RX ADMIN — APIXABAN 5 MILLIGRAM(S): 2.5 TABLET, FILM COATED ORAL at 05:30

## 2023-01-01 RX ADMIN — Medication 250 MILLIGRAM(S): at 23:05

## 2023-01-01 RX ADMIN — LISINOPRIL 10 MILLIGRAM(S): 2.5 TABLET ORAL at 05:17

## 2023-01-01 RX ADMIN — Medication 400 MILLIGRAM(S): at 18:10

## 2023-01-01 RX ADMIN — Medication 50 MILLIGRAM(S): at 05:34

## 2023-01-01 RX ADMIN — MORPHINE SULFATE 1 MILLIGRAM(S): 50 CAPSULE, EXTENDED RELEASE ORAL at 15:15

## 2023-01-01 RX ADMIN — Medication 50 MILLIGRAM(S): at 21:52

## 2023-01-01 RX ADMIN — Medication 3 MILLILITER(S): at 07:01

## 2023-01-01 RX ADMIN — PIPERACILLIN AND TAZOBACTAM 25 GRAM(S): 4; .5 INJECTION, POWDER, LYOPHILIZED, FOR SOLUTION INTRAVENOUS at 05:23

## 2023-01-01 RX ADMIN — ISOSORBIDE DINITRATE 10 MILLIGRAM(S): 5 TABLET ORAL at 11:04

## 2023-01-01 RX ADMIN — ISOSORBIDE DINITRATE 10 MILLIGRAM(S): 5 TABLET ORAL at 16:30

## 2023-01-01 RX ADMIN — SCOPALAMINE 1 PATCH: 1 PATCH, EXTENDED RELEASE TRANSDERMAL at 11:26

## 2023-01-01 RX ADMIN — ISOSORBIDE DINITRATE 10 MILLIGRAM(S): 5 TABLET ORAL at 11:07

## 2023-01-01 RX ADMIN — Medication 50 MILLIGRAM(S): at 05:17

## 2023-01-01 RX ADMIN — Medication 25 MILLIGRAM(S): at 07:27

## 2023-01-01 RX ADMIN — Medication 5 MILLIGRAM(S): at 05:22

## 2023-01-01 RX ADMIN — Medication 50 MILLIGRAM(S): at 14:16

## 2023-01-01 RX ADMIN — Medication 5 MILLIGRAM(S): at 18:02

## 2023-01-01 RX ADMIN — Medication 50 MILLIGRAM(S): at 07:27

## 2023-01-01 RX ADMIN — Medication 25 MILLIGRAM(S): at 14:58

## 2023-01-01 RX ADMIN — Medication 145 MILLIGRAM(S): at 12:47

## 2023-01-01 RX ADMIN — Medication 5 MILLIGRAM(S): at 07:27

## 2023-01-01 RX ADMIN — ROBINUL 0.4 MILLIGRAM(S): 0.2 INJECTION INTRAMUSCULAR; INTRAVENOUS at 23:40

## 2023-01-01 RX ADMIN — APIXABAN 5 MILLIGRAM(S): 2.5 TABLET, FILM COATED ORAL at 05:14

## 2023-01-01 RX ADMIN — Medication 1 MILLIGRAM(S): at 11:37

## 2023-01-01 RX ADMIN — Medication 25 MILLIGRAM(S): at 16:29

## 2023-01-01 RX ADMIN — ISOSORBIDE DINITRATE 10 MILLIGRAM(S): 5 TABLET ORAL at 11:42

## 2023-01-01 RX ADMIN — LISINOPRIL 10 MILLIGRAM(S): 2.5 TABLET ORAL at 05:34

## 2023-01-01 RX ADMIN — SCOPALAMINE 1 PATCH: 1 PATCH, EXTENDED RELEASE TRANSDERMAL at 19:18

## 2023-01-01 RX ADMIN — PIPERACILLIN AND TAZOBACTAM 200 GRAM(S): 4; .5 INJECTION, POWDER, LYOPHILIZED, FOR SOLUTION INTRAVENOUS at 08:29

## 2023-01-01 RX ADMIN — CEFTRIAXONE 100 MILLIGRAM(S): 500 INJECTION, POWDER, FOR SOLUTION INTRAMUSCULAR; INTRAVENOUS at 11:56

## 2023-01-01 RX ADMIN — ATORVASTATIN CALCIUM 80 MILLIGRAM(S): 80 TABLET, FILM COATED ORAL at 21:51

## 2023-01-01 RX ADMIN — ROBINUL 0.4 MILLIGRAM(S): 0.2 INJECTION INTRAMUSCULAR; INTRAVENOUS at 05:57

## 2023-01-01 RX ADMIN — CHLORHEXIDINE GLUCONATE 1 APPLICATION(S): 213 SOLUTION TOPICAL at 11:41

## 2023-01-01 RX ADMIN — CHLORHEXIDINE GLUCONATE 1 APPLICATION(S): 213 SOLUTION TOPICAL at 11:04

## 2023-01-01 RX ADMIN — Medication 5 MILLIGRAM(S): at 17:29

## 2023-01-01 RX ADMIN — Medication 10 MILLIGRAM(S): at 00:54

## 2023-01-01 RX ADMIN — Medication 25 MILLIGRAM(S): at 21:47

## 2023-01-01 RX ADMIN — Medication 3: at 11:49

## 2023-01-01 RX ADMIN — Medication 125 MICROGRAM(S): at 05:14

## 2023-01-01 RX ADMIN — Medication 25 MILLIGRAM(S): at 23:46

## 2023-01-01 RX ADMIN — HALOPERIDOL DECANOATE 2 MILLIGRAM(S): 100 INJECTION INTRAMUSCULAR at 11:48

## 2023-01-01 RX ADMIN — Medication 2: at 05:23

## 2023-01-01 RX ADMIN — APIXABAN 5 MILLIGRAM(S): 2.5 TABLET, FILM COATED ORAL at 17:15

## 2023-01-01 RX ADMIN — Medication 5 MILLIGRAM(S): at 05:06

## 2023-01-01 RX ADMIN — CEFTRIAXONE 100 MILLIGRAM(S): 500 INJECTION, POWDER, FOR SOLUTION INTRAMUSCULAR; INTRAVENOUS at 23:50

## 2023-01-01 RX ADMIN — NICARDIPINE HYDROCHLORIDE 25 MG/HR: 30 CAPSULE, EXTENDED RELEASE ORAL at 08:55

## 2023-01-01 RX ADMIN — LISINOPRIL 10 MILLIGRAM(S): 2.5 TABLET ORAL at 19:22

## 2023-01-01 RX ADMIN — Medication 3: at 11:30

## 2023-01-01 RX ADMIN — PIPERACILLIN AND TAZOBACTAM 25 GRAM(S): 4; .5 INJECTION, POWDER, LYOPHILIZED, FOR SOLUTION INTRAVENOUS at 13:05

## 2023-01-01 RX ADMIN — ISOSORBIDE DINITRATE 10 MILLIGRAM(S): 5 TABLET ORAL at 05:17

## 2023-01-01 RX ADMIN — Medication 125 MICROGRAM(S): at 05:34

## 2023-01-01 RX ADMIN — Medication 5 MILLIGRAM(S): at 11:39

## 2023-01-01 RX ADMIN — Medication 400 MILLIGRAM(S): at 20:33

## 2023-01-01 RX ADMIN — Medication 3 MILLILITER(S): at 19:03

## 2023-01-01 RX ADMIN — Medication 400 MILLIGRAM(S): at 07:37

## 2023-01-01 RX ADMIN — Medication 5 MILLIGRAM(S): at 11:58

## 2023-01-01 RX ADMIN — Medication 5 MILLIGRAM(S): at 19:49

## 2023-01-01 RX ADMIN — Medication 5 MILLIGRAM(S): at 18:45

## 2023-01-01 RX ADMIN — CEFTRIAXONE 100 MILLIGRAM(S): 500 INJECTION, POWDER, FOR SOLUTION INTRAMUSCULAR; INTRAVENOUS at 19:00

## 2023-01-01 RX ADMIN — Medication 5 MILLIGRAM(S): at 23:06

## 2023-01-01 RX ADMIN — Medication 1000 MILLIGRAM(S): at 18:04

## 2023-01-01 RX ADMIN — Medication 5 MILLIGRAM(S): at 23:40

## 2023-01-01 RX ADMIN — Medication 5 MILLIGRAM(S): at 05:18

## 2023-01-01 RX ADMIN — Medication 1: at 11:56

## 2023-01-01 RX ADMIN — Medication 50 MILLIGRAM(S): at 21:51

## 2023-01-01 RX ADMIN — LISINOPRIL 10 MILLIGRAM(S): 2.5 TABLET ORAL at 05:14

## 2023-01-01 RX ADMIN — Medication 1 TABLET(S): at 11:50

## 2023-01-01 RX ADMIN — ISOSORBIDE DINITRATE 10 MILLIGRAM(S): 5 TABLET ORAL at 05:14

## 2023-01-01 RX ADMIN — Medication 25 MILLIGRAM(S): at 05:18

## 2023-01-01 RX ADMIN — Medication 500 MILLIGRAM(S): at 11:50

## 2023-01-01 RX ADMIN — MORPHINE SULFATE 1 MILLIGRAM(S): 50 CAPSULE, EXTENDED RELEASE ORAL at 10:24

## 2023-01-01 RX ADMIN — HALOPERIDOL DECANOATE 2 MILLIGRAM(S): 100 INJECTION INTRAMUSCULAR at 21:52

## 2023-01-01 RX ADMIN — PIPERACILLIN AND TAZOBACTAM 25 GRAM(S): 4; .5 INJECTION, POWDER, LYOPHILIZED, FOR SOLUTION INTRAVENOUS at 21:34

## 2023-01-01 RX ADMIN — Medication 0.5 MILLIGRAM(S): at 10:24

## 2023-01-01 RX ADMIN — ROBINUL 0.4 MILLIGRAM(S): 0.2 INJECTION INTRAMUSCULAR; INTRAVENOUS at 23:08

## 2023-01-01 RX ADMIN — HALOPERIDOL DECANOATE 2 MILLIGRAM(S): 100 INJECTION INTRAMUSCULAR at 03:56

## 2023-01-01 RX ADMIN — Medication 10 MILLIGRAM(S): at 10:22

## 2023-01-01 RX ADMIN — Medication 25 MILLIGRAM(S): at 05:34

## 2023-01-01 RX ADMIN — Medication 125 MICROGRAM(S): at 05:18

## 2023-01-01 RX ADMIN — HYDROMORPHONE HYDROCHLORIDE 1 MILLIGRAM(S): 2 INJECTION INTRAMUSCULAR; INTRAVENOUS; SUBCUTANEOUS at 18:22

## 2023-01-01 RX ADMIN — SODIUM CHLORIDE 75 MILLILITER(S): 9 INJECTION, SOLUTION INTRAVENOUS at 08:36

## 2023-01-01 RX ADMIN — ISOSORBIDE DINITRATE 10 MILLIGRAM(S): 5 TABLET ORAL at 05:29

## 2023-01-01 RX ADMIN — Medication 5 MILLIGRAM(S): at 05:05

## 2023-01-01 RX ADMIN — Medication 10 MILLIGRAM(S): at 21:52

## 2023-01-01 RX ADMIN — Medication 25 MILLIGRAM(S): at 21:34

## 2023-01-01 RX ADMIN — Medication 1000 MILLIGRAM(S): at 09:12

## 2023-01-01 RX ADMIN — ROBINUL 0.4 MILLIGRAM(S): 0.2 INJECTION INTRAMUSCULAR; INTRAVENOUS at 11:38

## 2023-01-01 RX ADMIN — MORPHINE SULFATE 0.5 MILLIGRAM(S): 50 CAPSULE, EXTENDED RELEASE ORAL at 12:22

## 2023-01-01 RX ADMIN — ISOSORBIDE DINITRATE 10 MILLIGRAM(S): 5 TABLET ORAL at 10:22

## 2023-01-01 RX ADMIN — ROBINUL 0.4 MILLIGRAM(S): 0.2 INJECTION INTRAMUSCULAR; INTRAVENOUS at 23:01

## 2023-01-01 RX ADMIN — Medication 125 MICROGRAM(S): at 05:08

## 2023-01-01 RX ADMIN — SODIUM CHLORIDE 500 MILLILITER(S): 9 INJECTION INTRAMUSCULAR; INTRAVENOUS; SUBCUTANEOUS at 20:04

## 2023-01-01 RX ADMIN — ROBINUL 0.4 MILLIGRAM(S): 0.2 INJECTION INTRAMUSCULAR; INTRAVENOUS at 12:00

## 2023-01-01 RX ADMIN — CEFTRIAXONE 100 MILLIGRAM(S): 500 INJECTION, POWDER, FOR SOLUTION INTRAMUSCULAR; INTRAVENOUS at 21:50

## 2023-01-01 RX ADMIN — ATORVASTATIN CALCIUM 80 MILLIGRAM(S): 80 TABLET, FILM COATED ORAL at 23:46

## 2023-01-01 RX ADMIN — PIPERACILLIN AND TAZOBACTAM 25 GRAM(S): 4; .5 INJECTION, POWDER, LYOPHILIZED, FOR SOLUTION INTRAVENOUS at 23:50

## 2023-01-01 RX ADMIN — Medication 100 MILLIEQUIVALENT(S): at 09:43

## 2023-01-01 RX ADMIN — Medication 25 MILLIGRAM(S): at 21:51

## 2023-01-01 RX ADMIN — Medication 650 MILLIGRAM(S): at 17:37

## 2023-01-01 RX ADMIN — Medication 145 MILLIGRAM(S): at 14:15

## 2023-01-01 RX ADMIN — Medication 400 MILLIGRAM(S): at 01:30

## 2023-01-01 RX ADMIN — Medication 50 MILLIGRAM(S): at 05:06

## 2023-01-01 RX ADMIN — Medication 5 MILLIGRAM(S): at 23:08

## 2023-01-01 RX ADMIN — ROBINUL 0.4 MILLIGRAM(S): 0.2 INJECTION INTRAMUSCULAR; INTRAVENOUS at 17:30

## 2023-01-01 RX ADMIN — Medication 400 MILLIGRAM(S): at 17:23

## 2023-01-01 RX ADMIN — ATORVASTATIN CALCIUM 80 MILLIGRAM(S): 80 TABLET, FILM COATED ORAL at 22:11

## 2023-01-01 RX ADMIN — Medication 3: at 23:15

## 2023-01-01 RX ADMIN — Medication 5 MILLIGRAM(S): at 12:31

## 2023-01-01 RX ADMIN — Medication 50 MILLIGRAM(S): at 08:36

## 2023-01-01 RX ADMIN — SCOPALAMINE 1 PATCH: 1 PATCH, EXTENDED RELEASE TRANSDERMAL at 07:07

## 2023-01-01 RX ADMIN — ISOSORBIDE DINITRATE 10 MILLIGRAM(S): 5 TABLET ORAL at 14:16

## 2023-01-01 RX ADMIN — PIPERACILLIN AND TAZOBACTAM 25 GRAM(S): 4; .5 INJECTION, POWDER, LYOPHILIZED, FOR SOLUTION INTRAVENOUS at 11:48

## 2023-01-01 RX ADMIN — APIXABAN 5 MILLIGRAM(S): 2.5 TABLET, FILM COATED ORAL at 17:37

## 2023-01-01 RX ADMIN — SCOPALAMINE 1 PATCH: 1 PATCH, EXTENDED RELEASE TRANSDERMAL at 18:03

## 2023-01-01 RX ADMIN — Medication 25 MILLIGRAM(S): at 05:17

## 2023-01-01 RX ADMIN — SODIUM CHLORIDE 75 MILLILITER(S): 9 INJECTION, SOLUTION INTRAVENOUS at 02:19

## 2023-01-01 RX ADMIN — Medication 5 MILLIGRAM(S): at 08:00

## 2023-01-01 RX ADMIN — Medication 400 MILLIGRAM(S): at 08:42

## 2023-01-01 RX ADMIN — Medication 10 MILLIGRAM(S): at 11:36

## 2023-01-01 RX ADMIN — LATANOPROST 1 DROP(S): 0.05 SOLUTION/ DROPS OPHTHALMIC; TOPICAL at 22:59

## 2023-01-01 RX ADMIN — Medication 5 MILLIGRAM(S): at 07:26

## 2023-01-01 RX ADMIN — Medication 50 MILLIGRAM(S): at 05:25

## 2023-01-01 RX ADMIN — Medication 125 MICROGRAM(S): at 05:19

## 2023-01-01 RX ADMIN — SCOPALAMINE 1 PATCH: 1 PATCH, EXTENDED RELEASE TRANSDERMAL at 16:19

## 2023-01-01 RX ADMIN — Medication 2: at 12:47

## 2023-01-01 RX ADMIN — PROTHROMBIN COMPLEX CONCENTRATE (HUMAN) 400 INTERNATIONAL UNIT(S): 25.5; 16.5; 24; 22; 22; 26 POWDER, FOR SOLUTION INTRAVENOUS at 08:55

## 2023-01-01 RX ADMIN — APIXABAN 5 MILLIGRAM(S): 2.5 TABLET, FILM COATED ORAL at 17:16

## 2023-01-01 RX ADMIN — Medication 500 MILLIGRAM(S): at 11:10

## 2023-01-01 RX ADMIN — Medication 1: at 08:07

## 2023-01-01 RX ADMIN — ATORVASTATIN CALCIUM 80 MILLIGRAM(S): 80 TABLET, FILM COATED ORAL at 21:34

## 2023-01-01 RX ADMIN — Medication 5 MILLIGRAM(S): at 05:57

## 2023-01-01 RX ADMIN — Medication 2.5 MILLIGRAM(S): at 01:07

## 2023-01-01 RX ADMIN — ISOSORBIDE DINITRATE 10 MILLIGRAM(S): 5 TABLET ORAL at 05:34

## 2023-01-01 RX ADMIN — PIPERACILLIN AND TAZOBACTAM 25 GRAM(S): 4; .5 INJECTION, POWDER, LYOPHILIZED, FOR SOLUTION INTRAVENOUS at 07:38

## 2023-01-01 RX ADMIN — Medication 50 MILLIGRAM(S): at 05:30

## 2023-01-01 RX ADMIN — APIXABAN 5 MILLIGRAM(S): 2.5 TABLET, FILM COATED ORAL at 17:51

## 2023-01-01 RX ADMIN — Medication 5 MILLIGRAM(S): at 17:51

## 2023-01-01 RX ADMIN — Medication 25 MILLIGRAM(S): at 14:01

## 2023-01-01 RX ADMIN — PIPERACILLIN AND TAZOBACTAM 25 GRAM(S): 4; .5 INJECTION, POWDER, LYOPHILIZED, FOR SOLUTION INTRAVENOUS at 13:07

## 2023-01-01 RX ADMIN — Medication 50 MILLIGRAM(S): at 05:18

## 2023-01-01 RX ADMIN — Medication 145 MILLIGRAM(S): at 11:04

## 2023-01-01 RX ADMIN — Medication 125 MICROGRAM(S): at 05:30

## 2023-01-01 RX ADMIN — Medication 5 MILLIGRAM(S): at 18:00

## 2023-01-01 RX ADMIN — Medication 50 MILLIGRAM(S): at 05:22

## 2023-01-01 RX ADMIN — ISOSORBIDE DINITRATE 10 MILLIGRAM(S): 5 TABLET ORAL at 18:45

## 2023-01-01 RX ADMIN — ROBINUL 0.4 MILLIGRAM(S): 0.2 INJECTION INTRAMUSCULAR; INTRAVENOUS at 11:59

## 2023-01-01 RX ADMIN — ATORVASTATIN CALCIUM 80 MILLIGRAM(S): 80 TABLET, FILM COATED ORAL at 21:19

## 2023-01-01 RX ADMIN — Medication 145 MILLIGRAM(S): at 11:36

## 2023-01-01 RX ADMIN — SCOPALAMINE 1 PATCH: 1 PATCH, EXTENDED RELEASE TRANSDERMAL at 19:05

## 2023-01-01 RX ADMIN — Medication 145 MILLIGRAM(S): at 11:43

## 2023-01-01 RX ADMIN — LATANOPROST 1 DROP(S): 0.05 SOLUTION/ DROPS OPHTHALMIC; TOPICAL at 22:21

## 2023-01-01 RX ADMIN — APIXABAN 5 MILLIGRAM(S): 2.5 TABLET, FILM COATED ORAL at 07:26

## 2023-01-01 RX ADMIN — Medication 500 MILLIGRAM(S): at 11:07

## 2023-01-01 RX ADMIN — ISOSORBIDE DINITRATE 10 MILLIGRAM(S): 5 TABLET ORAL at 05:18

## 2023-01-01 RX ADMIN — HYDROMORPHONE HYDROCHLORIDE 1 MILLIGRAM(S): 2 INJECTION INTRAMUSCULAR; INTRAVENOUS; SUBCUTANEOUS at 14:25

## 2023-01-01 RX ADMIN — ISOSORBIDE DINITRATE 10 MILLIGRAM(S): 5 TABLET ORAL at 15:03

## 2023-01-01 RX ADMIN — HYDROMORPHONE HYDROCHLORIDE 0.5 MILLIGRAM(S): 2 INJECTION INTRAMUSCULAR; INTRAVENOUS; SUBCUTANEOUS at 12:44

## 2023-01-01 RX ADMIN — Medication 145 MILLIGRAM(S): at 15:53

## 2023-01-01 RX ADMIN — Medication 25 MILLIGRAM(S): at 21:19

## 2023-01-01 RX ADMIN — Medication 1: at 17:51

## 2023-01-01 RX ADMIN — PIPERACILLIN AND TAZOBACTAM 25 GRAM(S): 4; .5 INJECTION, POWDER, LYOPHILIZED, FOR SOLUTION INTRAVENOUS at 00:58

## 2023-01-01 RX ADMIN — Medication 50 GRAM(S): at 22:21

## 2023-01-01 RX ADMIN — Medication 1: at 17:43

## 2023-01-12 NOTE — ED ADULT NURSE NOTE - OBJECTIVE STATEMENT
1552 pt 89ym alert x 2 w/  sp fall bib ems, per  pt fell backwards, on eliquiis, pt able to verbalize concerns, vss able to ambulate from stretcher ems to bed, w/ one assist, pending eval/

## 2023-01-12 NOTE — ED PROVIDER NOTE - PHYSICAL EXAMINATION
GENERAL: Not in acute distress, non-toxic appearing  HEAD: normocephalic, atraumatic  HEENT: PERRLA, EOMI, normal conjunctiva, oral mucosa moist, neck supple  CARDIAC: regular rate and rhythm, normal S1 and S2,  no appreciable murmurs  PULM: clear to ascultation bilaterally, no crackles, rales, rhonchi, or wheezing  GI: abdomen nondistended, soft, nontender, no guarding or rebound tenderness  NEURO: alert and oriented x 3, normal speech, no focal motor or sensory deficits, gait normal, no gross neurologic deficit  MSK: No visible deformities, no peripheral edema, No pelvic instability or tenderness, no chest wall tenderness, no cspine, thoracic spine, or lumbar spine tenderness.  SKIN: No visible rashes, dry, well-perfused  PSYCH: appropriate mood and affect

## 2023-01-12 NOTE — ED PROVIDER NOTE - NSFOLLOWUPCLINICS_GEN_ALL_ED_FT
[facility] - [place]  Neurosurgery/Spine  [address], [address]  [place] [address]  Phone: [phone]  Fax:     [facility] - [facility]  Neurosurgery/Spine  [address] [place] [address]  Phone: [phone]  Fax:

## 2023-01-12 NOTE — ED PROVIDER NOTE - OBJECTIVE STATEMENT
89 year old male with hx of Afib on Eliquis comes into the ED via EMS s/p witnessed mechanical fall backwards with injury to back of the head. Pt was ambulatory with no LOC and no loss of memory. Currently in the ED, he does not have any complaints at this time. He denies any HA, neck pain, back pain, motor or sensory changes, n/v/abd pain, chest pain, palpitations, SOB.

## 2023-01-12 NOTE — ED PROVIDER NOTE - PATIENT PORTAL LINK FT
You can access the FollowMyHealth Patient Portal offered by [facility] by registering at the following website: [email or web address]. By joining [facility]’s FollowMyHealth portal, you will also be able to view your health information using other applications (apps) compatible with our system.

## 2023-01-12 NOTE — ED PROVIDER NOTE - PROGRESS NOTE DETAILS
Reevaluated Pt by bedside. Pt does not have any weakness in the arms or legs and is ambulatory. Updated them about the head CT which did not show any intracranial hemorrhage but did show a [id number] mass. Advised Pt follow up with PCP and/or Neurology for an MRI outpt. Pt is comfortable being DC home. Return precautions explained and Pt understands.

## 2023-01-12 NOTE — ED PROVIDER NOTE - NS ED ROS FT
GENERAL: No fever, chills  HEENT: No cough  CARDIAC: No chest pain, palpitations, lightheadedness, syncope  PULM: No dyspnea  GI: No abdominal pain, nausea, vomiting,   NEURO: No headache, motor weakness, sensory changes  MSK: No joint pain, back pain, pain in extremities

## 2023-01-12 NOTE — ED PROVIDER NOTE - ATTENDING CONTRIBUTION TO CARE
------------ATTENDING NOTE------------  pt brought to ED by EMS w/ aide after witnessed mechanical fall tonight, no LOC, +ambulatory at scene, no complaints, at baseline mental/functional status, no external signs trauma, ED sign out pending CT Head and close reassessment w/ planned dc if all wnl.  - Andrei Chamberlain MD   -----------------------------------------------

## 2023-01-12 NOTE — ED PROVIDER NOTE - CARE PLAN
Principal Discharge DX:	Closed head injury with concussion, without loss of consciousness, initial encounter   1

## 2023-01-12 NOTE — ED PROVIDER NOTE - NSFOLLOWUPINSTRUCTIONS_ED_ALL_ED_FT
See your Primary Doctor this week for follow up -- call to discuss.    Stay well hydrated, eat regular healthy meals, get plenty of rest, use walker/assistance.    See CONCUSSION information and return instructions given to you.    Seek immediate medical care for new/worsening symptoms/concerns. Please follow up with Neurosurgery this week regarding the brain mass that was seen on your CT scan.    See your Primary Doctor this week for follow up -- call to discuss.    Stay well hydrated, eat regular healthy meals, get plenty of rest, use walker/assistance.    See CONCUSSION information and return instructions given to you.    Seek immediate medical care for new/worsening symptoms/concerns.

## 2023-04-15 NOTE — PROGRESS NOTE ADULT - ASSESSMENT
89M w/ afib on eliquis, BPH, DM, HLD, HTN presents s/p mechanical fall yesterday, also notes to have RUE redness w pain, started prior to the fall  CT of pelvis shows bilateral nondisplaced illium Fx, pain well controlled  ptn also w questionable allergi rxn vs cellulitis to RUE, cont ABx, has some improvement   ID input appreciated  cont outptn meds  PT eval  dvt ppx not necessary
show

## 2023-08-25 NOTE — H&P ADULT - ASSESSMENT
89y/o M h/o AFib (on Eliquis), HTN, HLD, DM, PPM presenting after witnessed fall around 1 pm; usually walks with walker, and went to get up (no walker), and fell onto right side.  Aide does not think he hit head.  Aide also notes that he sounds congested since this morning.  No fever.  No cough.    ed findings: leukocytosis, infectious work up done, remains non diagnostic. will rpt RVP  check orthostatics  check TTE and carotids  cardiology and neuro evals  dash diet  keep on tele  rpt cbc in am

## 2023-08-25 NOTE — ED ADULT NURSE NOTE - NSFALLHARMRISKINTERV_ED_ALL_ED

## 2023-08-25 NOTE — ED PROVIDER NOTE - PROGRESS NOTE DETAILS
sg; leukocytosis no source negative urine. concern for endocarditis given pacemaker. got third cx, and put on vanc.

## 2023-08-25 NOTE — ED PROVIDER NOTE - PHYSICAL EXAMINATION
On Physical Exam:  General: well appearing, in NAD, speaking clearly in full sentences and without difficulty; cooperative with exam  HEENT: PERRL, MMM  Neck: no neck tenderness, no nuchal rigidity  Cardiac: normal s1, s2; RRR; no MGR  Lungs: CTABL  Abdomen: soft nontender/nondistended  : no bladder tenderness or distension  Skin: intact, no rash  Extremities: no peripheral edema, no gross deformities  Neuro: no gross neurologic deficits On Physical Exam:  General: well appearing, in NAD, speaking clearly in full sentences and without difficulty; cooperative with exam  HEENT: PERRL, MMM  Neck: no neck tenderness, no nuchal rigidity  Cardiac: normal s1, s2; RRR; +systolic murmur  Lungs: right lower lung rales  Abdomen: soft nontender  : no bladder tenderness or distension  Skin: intact, no rash R lateral chest wall and mid back scant ecchymoses, no abrasions/lacerations  Back: No T/L spine tenderness  Extremities: no peripheral edema, no gross deformities  Neuro: A&Ox3; moving all extremities, no rigidity/clonus

## 2023-08-25 NOTE — ED ADULT NURSE NOTE - OBJECTIVE STATEMENT
89y Male AOx1-2 (oriented to self) with PMH of pacemaker (on Eliquis), BPH, HTN, DM, afib, HLD BIBA from home to the ED s/p witnessed mechanical fall. Per aide at bedside, pt was eating, moved his chair backward and fell on right side. Denies head strike or LOC. At this time, pt is complaining of upper right back pain. Bruising noted to right upper back. Per endorsing 89y Male AOx1-2 baseline for pt per aide (mainly oriented to self) with PMH of pacemaker (on Eliquis), BPH, HTN, DM, afib, HLD BIBA from home to the ED s/p witnessed mechanical fall. Per aide at bedside, pt was eating, moved his chair backward and fell on right side. Aide was able to assist pt to standing position. Denies head strike or LOC. At this time, pt is complaining of upper right back pain. Bruising noted to right upper back. Per aide, pt endorsing itchiness for the past few weeks, denies pain in those areas. Scratch marks noted to left side of abdomen and back. No visible deformity noted.  Baseline uses walker to ambulate. Denies N/V, fever/chills, SOB, chest pain. Spontaneous/unlabored respirations, speaking in full sentences. Side rails up, bed in lowest position, safety maintained. 89y Male AOx1-2 baseline for pt per aide (mainly oriented to self) with PMH of pacemaker (on Eliquis), BPH, HTN, DM, afib, HLD BIBA from home to the ED s/p witnessed mechanical fall. Per aide at bedside, pt was eating, moved his chair backward and fell on right side. Aide was able to assist pt to standing position. Denies head strike or LOC. At this time, pt is complaining of upper right back pain. Bruising noted to right upper back. Per aide, pt endorsing itchiness for the past few weeks, denies pain in those areas. Scratch marks noted to left side of abdomen and back. No visible deformity noted.  Baseline uses walker to ambulate. Denies N/V, fever/chills, SOB, chest pain. Spontaneous/unlabored respirations, speaking in full sentences. Side rails up, bed in lowest position, safety maintained. Pt placed in gown, diaper changed, skin intact.

## 2023-08-25 NOTE — ED ADULT NURSE REASSESSMENT NOTE - NS ED NURSE REASSESS COMMENT FT1
Straight cath patient. Sterile technique used. 2 RNs at bedside. 100 mL drained. Pt tolerated procedure well. Urine sample sent.

## 2023-08-25 NOTE — H&P ADULT - HISTORY OF PRESENT ILLNESS
89y/o M h/o AFib (on Eliquis), HTN, HLD, DM, PPM presenting after witnessed fall around 1 pm; usually walks with walker, and went to get up (no walker), and fell onto right side.  Aide does not think he hit head.  Aide also notes that he sounds congested since this morning.  No fever.  No cough.

## 2023-08-25 NOTE — H&P ADULT - NSHPPHYSICALEXAM_GEN_ALL_CORE
T(C): 36.9 (08-25-23 @ 20:05), Max: 38.1 (08-25-23 @ 17:45)  HR: 71 (08-25-23 @ 20:05) (71 - 97)  BP: 187/81 (08-25-23 @ 20:05) (138/98 - 192/71)  RR: 20 (08-25-23 @ 20:05) (16 - 20)  SpO2: 95% (08-25-23 @ 20:05) (94% - 96%)    PHYSICAL EXAM:  GENERAL: NAD, well-developed  HEAD:  Atraumatic, Normocephalic  EYES: EOMI, PERRLA, conjunctiva and sclera clear  NECK: Supple, No JVD  CHEST/LUNG: Clear to auscultation bilaterally; No wheeze  HEART: Regular rate and rhythm; No murmurs, rubs, or gallops  ABDOMEN: Soft, Nontender, Nondistended; Bowel sounds present  EXTREMITIES:  2+ Peripheral Pulses, No clubbing, cyanosis, or edema  PSYCH: AAOx3  NEUROLOGY: non-focal  SKIN: No rashes or lesions

## 2023-08-25 NOTE — ED PROVIDER NOTE - CLINICAL SUMMARY MEDICAL DECISION MAKING FREE TEXT BOX
Attending note (Charlie): 89y/o M h/o AFib (on Eliquis), HTN, HLD, DM, PPM presenting after witnessed fall around 1 pm; found here to be febrile (but not tachycardic or appearing septic on initial evaluation); given age, AC use and fall, and R chest pain, RLL rales, evaluate for possible rib fractures, intraperitoneal bleeding/liver laceration; obtain CT head to eval for ICH, CT c spine r/o fracture (low suspicion), CT chest/abdomen/pelvis to evaluate for injuries. Obtain screening labs given concern for underlying infection (aide at home with sore throat, possible viral infection, r/o UTI/pneumonia). Obtain cbc (to evaluate for leukocytosis or anemia), CMP (to evaluate for electrolyte abnormalities or renal/liver dysfunction) vbg/lactate (screening for acidosis or elevated lactate level) and send rvp, blood cultures, urine culture. Anticipate need for admission for ongoing evaluation/management.

## 2023-08-25 NOTE — ED PROVIDER NOTE - NS ED ROS FT
Review of Systems:  -General: no fever  -ENT: +congestion, no difficulty swallowing  -Pulmonary: no cough, no shortness of breath  -Cardiac: +chest pain, no palpitations  -Gastrointestinal: +abdominal pain, no nausea, no vomiting, and no diarrhea.  -Genitourinary: no blood or pain with urination  -Musculoskeletal: +back  pain  -Skin: no rashes; + ecchymoses (back/right side)  -Endocrine: +h/o diabetes   -Neurologic: No new weakness or numbness in extremities    All else negative unless otherwise specified elsewhere in this note.

## 2023-08-25 NOTE — ED PROVIDER NOTE - OBJECTIVE STATEMENT
Attending note (Charlie): 89y/o M h/o AFib (on Eliquis), HTN, HLD, DM, PPM presenting after witnessed fall around 1 pm; usually walks with walker, and went to get up (no walker), and fell onto right side.  Aid does not think he hit head.  Aid also notes that he sounds congested since this morning.  No fever.  No cough.

## 2023-08-26 NOTE — PHYSICAL THERAPY INITIAL EVALUATION ADULT - NSPTDISCHREC_GEN_A_CORE
home with assistance (pt has 24hrs HHA) and home PT for safety assessment, fall prevention and to improve strength, balance and ADls/gait functions for safe return to PLOF. Pt owns RW, w/c and rollator at home./Home PT

## 2023-08-26 NOTE — CONSULT NOTE ADULT - ASSESSMENT
A/P    89y/o M h/o AFib (on Eliquis), HTN, HLD, DM, PPM presenting after witnessed fall around 1 pm; usually walks with walker, and went to get up (no walker), and fell onto right side.       #fall vs syncope   -hydrate  -check orthostatics  -check echo   -interrogate PPM  -neuro w/u  -infectious w/u    #AF, s/p ppm  -stable  -cont metoprolol, dig, a/c as ordered              75 minutes spent on total encounter; more than 50% of the visit was spent counseling and/or coordinating care by the attending physician.

## 2023-08-26 NOTE — ED ADULT NURSE REASSESSMENT NOTE - NS ED NURSE REASSESS COMMENT FT1
received report from MOE Samson at 2300 for handoff. pt appears comfortable resting in stretcher with appropriate side rails up for safety and call bell placed within reach. pt is awake and alert, A&Ox1 oriented to self disoriented to time and place, consistent with prior exam per MOE Samson. Admitting MD at bedside for eval currently. pt hypertensive with vitals otherwise stable, MD at bedside is aware of BP. pending new orders.

## 2023-08-26 NOTE — CHART NOTE - NSCHARTNOTEFT_GEN_A_CORE
CHERY AMEZCUA    Noticed that patient with 's, no c/o cp or sob.       Interventions taken     Additional BB: Lopressor 25mg po x1 to keep SBP <160's  cont assess and monitor  f/u with am team                    Mel Royal ANP-[facility] #[id number] CHERY AMEZCUA    Noticed that patient with 's, no c/o cp or sob.       Interventions taken     Additional BB: Lopressor 25mg po x1 and Hydralazine 10mg iv q4hr prn to keep SBP <160's  may need cardio consult if BP still not under controlled.  cont assess and monitor  f/u with am team                    Mel Royal ANP-[facility]  [facility] #[id number]

## 2023-08-26 NOTE — CONSULT NOTE ADULT - ASSESSMENT
89y/o M h/o AFib (on Eliquis), HTN, HLD, DM, PPM who presented after witnessed fall.    leukocytosis, fever, fall  RVP negative  UA negative and patient denies  symptoms  imaging w/o evidence of PNA, no hypoxia  CT abd/pelvis w/o acute findings  Recommendations  suspect reactive 2/2 fall  monitor off antibiotics  trend temps, wbc    Wilver Serrano M.D.  [facility], Division of Infectious Diseases  [phone]  After 5pm on weekdays and all day on weekends - please call [phone]  89y/o M h/o AFib (on Eliquis), HTN, HLD, DM, PPM who presented after witnessed fall.    sepsis vs SIRS, fall  RVP negative  UA negative and patient denies  symptoms  imaging w/o evidence of PNA, no hypoxia  CT abd/pelvis w/o acute findings  afebrile since admission, non-toxic  Recommendations  suspect reactive 2/2 fall  monitor off antibiotics  f/u blood cultures  trend temps, wbc    Wilver Serrano M.D.  [facility], Division of Infectious Diseases  [phone]  After 5pm on weekdays and all day on weekends - please call [phone]

## 2023-08-26 NOTE — PATIENT PROFILE ADULT - FALL HARM RISK - HARM RISK INTERVENTIONS
Assistance with ambulation/Assistance OOB with selected safe patient handling equipment/Communicate Risk of Fall with Harm to all staff/Discuss with provider need for PT consult/Monitor for mental status changes/Monitor gait and stability/Move patient closer to nurses' station/Provide patient with walking aids - walker, cane, crutches/Reinforce activity limits and safety measures with patient and family/Reorient to person, place and time as needed/Tailored Fall Risk Interventions/Toileting schedule using arm’s reach rule for commode and bathroom/Use of alarms - bed, chair and/or voice tab/Visual Cue: Yellow wristband and red socks/Bed in lowest position, wheels locked, appropriate side rails in place/Call bell, personal items and telephone in reach/Instruct patient to call for assistance before getting out of bed or chair/Non-slip footwear when patient is out of bed/[place] to call system/Physically safe environment - no spills, clutter or unnecessary equipment/Purposeful Proactive Rounding/Room/bathroom lighting operational, light cord in reach

## 2023-08-26 NOTE — CONSULT NOTE ADULT - ASSESSMENT
89y/o M with AFib (on Eliquis), HTN, HLD, DM, macular degeneration, BPH, s/p PPM presenting after witnessed fall. usually walks with walker, and went to get up (no walker), and fell onto right side.  Aide does not think he hit head.  denies LOC. denies tongue bite or convulsions.  doesn't remember about incontienence   CTH neg: chronic findings   CT C spine with multilevel degnerative changes. no acute fracture   + fever 100.6 tmax   + leukocytosis   o/e AAOx2, mild dysarthria but non focal ; walker baseline     Impression:   Fall r/o syncope.  doesn't sound like seizure.  possible mechanical   may be related to dehydration given possible infection        - c/w eliquis 5mg BID for AF  - check orthostatics   - infectious workup given fever and leukocytosis  - TTE and tele  - check carotid duplex   - doesn't sound like seizure can get rEEG outpatient   - PT/.OT  - check FS, glucose control <180  - GI/DVT ppx  - Counseling on diet, exercise, and medication adherence was done  - Counseling on smoking cessation and alcohol consumption offered when appropriate.  - Pain assessed and judicious use of narcotics when appropriate was discussed.    - Stroke education given when appropriate.  - Importance of fall prevention discussed.   - Differential diagnosis and plan of care discussed with patient and/or family and primary team  - Thank you for allowing me to participate in the care of this patient. Call with questions.     David Mancuso MD  Vascular Neurology  Office: [phone]

## 2023-08-26 NOTE — PHYSICAL THERAPY INITIAL EVALUATION ADULT - ADDITIONAL COMMENTS
As per the pt/Aide bedside, Pt lives in an apt with 24hrs HHA. +elevator access, Aide assist the pt with all mobility and ADls. Uses RW for mobility. Pt also owns Rollator and w/c at home.

## 2023-08-26 NOTE — PHYSICAL THERAPY INITIAL EVALUATION ADULT - PERTINENT HX OF CURRENT PROBLEM, REHAB EVAL
89y/o M h/o AFib (on Eliquis), HTN, HLD, DM, PPM presenting after witnessed fall. CTH, C-spine and CT Abd/Pelvis: negative for acute pathology. Xray pelvis: No fx or dislocation. CXR: clear no pneumothorax.

## 2023-08-26 NOTE — PHYSICAL THERAPY INITIAL EVALUATION ADULT - STANDING BALANCE: DYNAMIC, REHAB EVAL
Telephone Encounter by Kalli Coffey at 03/28/17 11:13 AM     Author:  Kalli Coffey Service:  (none) Author Type:  Patient      Filed:  03/28/17 11:17 AM Encounter Date:  3/28/2017 Status:  Signed     :  Kalli Coffey (Patient )              ARIELLA CHAVES    Patient Age: 5 year old    ACCT STATUS:   MESSAGE:[BN1.1T] Ariella is a patient of Dr. Blankenship's and his mother is calling to request that his 4 year old school medical form be filled out and faxed over to his school. Please fax to [phone].[BN1.1M] Message confirmed with caller.     Next and Last Visit with Provider and Department  Next visit with GRACE BLANKENSHIP is on 04/10/2017 at 11:00 AM in [facility]  Next visit with [facility]EDI[facility] is on 04/10/2017 at 11:00 AM in [facility]  Last visit with GRACE BLANKENSHIP was on 04/06/2016 at  4:30 PM in [facility]  Last visit with PEDIATRICS was on 12/16/2016 at  9:45 AM in [facility]     WEIGHT AND HEIGHT: As of 12/16/2016 weight is 38.2 lbs.(17.327 kg).   BMI is 15.22 kg/(m^2) calculated from:     Height 3' 6\" (1.067 m) as of 12/2/16     Weight 38 lb 3.2 oz (17.327 kg) as of 12/2/16[BN1.1T]      No Known Allergies[BN1.2T]  Current outpatient prescriptions       Medication  Sig Dispense Refill   • tobramycin (TOBREX) 0.3 % ophthalmic solution Place 1 Drop in both eyes 4 (four) times daily. 5 mL 0      PHARMACY to use:           Pharmacy preference(s) on file:    [facility] [address]  [place]  [address])    CALL BACK INFO:[BN1.1T] Ok to leave response (including medical information) on answering machine[BN1.1M]  ROUTING:[BN1.1T] ROUTE TO COVERING PHYSICIAN for response.[BN1.1M]        PCP: Grace Blankenship MD         INS: Payor: [employer] PPO / Plan: *No Plan* / Product Type: *No Product type* / Note: This is the primary coverage, but no account was found for  this location or the patient's primary location.   ADDRESS:  [address] [place] [id number][BN1.1T]       Revision History        User Key Date/Time User Provider Type Action    > BN1.2 03/28/17 11:17 AM Kalli Coffey Patient  Sign     BN1.1 03/28/17 11:13 AM Kalli Coffey Patient      M - Manual, T - Template             fair minus

## 2023-08-26 NOTE — CONSULT NOTE ADULT - SUBJECTIVE AND OBJECTIVE BOX
CARDIOLOGY CONSULT NOTE - DR. PENN        Date of Service: 08-26-23 @ 11:59      HPI:  x  no new complaints      PAST MEDICAL & SURGICAL HISTORY:  HLD (hyperlipidemia)      Pacemaker      AF (atrial fibrillation)      HTN (hypertension)      DM (diabetes mellitus)      Macular degeneration      BPH (benign prostatic hypertrophy)      S/P knee replacement, bilateral            PREVIOUS DIAGNOSTIC TESTING:    [ ] Echocardiogram:  [ ]  Catheterization:  [ ] Stress Test:  	    MEDICATIONS:    Home Medications:  Crestor 20 mg oral tablet: 1 tab(s) orally once a day (26 Aug 2023 00:06)  digoxin 125 mcg (0.125 mg) oral tablet: 1 tab(s) orally once a day (26 Aug 2023 00:06)  Eliquis 5 mg oral tablet: 1 tab(s) orally 2 times a day (26 Aug 2023 00:06)  enalapril 5 mg oral tablet: 1 tab(s) orally once a day (26 Aug 2023 00:06)  fenofibrate 145 mg oral tablet: 1 tab(s) orally once a day (26 Aug 2023 00:06)  Januvia 50 mg oral tablet: 1 tab(s) orally once a day (26 Aug 2023 00:06)  latanoprost 0.005% ophthalmic solution: 1 drop(s) to each affected eye once a day (in the evening) (26 Aug 2023 00:06)  metFORMIN 750 mg oral tablet, extended release: 1 tab(s) orally once a day (26 Aug 2023 00:06)  metoprolol succinate 50 mg oral tablet, extended release: 1 tab(s) orally once a day (26 Aug 2023 00:06)  Myrbetriq 25 mg oral tablet, extended release: 1 tab(s) orally once a day (26 Aug 2023 00:06)  silodosin 8 mg oral capsule: 1 cap(s) orally once a day (26 Aug 2023 00:06)      MEDICATIONS  (STANDING):  apixaban 5 milliGRAM(s) Oral two times a day  atorvastatin 80 milliGRAM(s) Oral at bedtime  dextrose 5%. 1000 milliLiter(s) (50 mL/Hr) IV Continuous <Continuous>  dextrose 5%. 1000 milliLiter(s) (100 mL/Hr) IV Continuous <Continuous>  dextrose 50% Injectable 25 Gram(s) IV Push once  dextrose 50% Injectable 25 Gram(s) IV Push once  dextrose 50% Injectable 12.5 Gram(s) IV Push once  digoxin     Tablet 125 MICROGram(s) Oral daily  enalapril 5 milliGRAM(s) Oral daily  fenofibrate Tablet 145 milliGRAM(s) Oral daily  glucagon  Injectable 1 milliGRAM(s) IntraMuscular once  hydrALAZINE 25 milliGRAM(s) Oral three times a day  insulin lispro (ADMELOG) corrective regimen sliding scale   SubCutaneous three times a day before meals  latanoprost 0.005% Ophthalmic Solution 1 Drop(s) Both EYES at bedtime  metoprolol succinate ER 50 milliGRAM(s) Oral daily  oxybutynin 5 milliGRAM(s) Oral two times a day      FAMILY HISTORY:  No pertinent family history in first degree relatives        SOCIAL HISTORY:    [x ] Non-smoker  [ ] Smoker  [ ] Alcohol    Allergies    procainamide (Other (Severe))    Intolerances    	    REVIEW OF SYSTEMS:  CONSTITUTIONAL: No fever, weight loss, or fatigue  EYES: No eye pain, visual disturbances, or discharge  ENMT:  No difficulty hearing, tinnitus, vertigo; No sinus or throat pain  NECK: No pain or stiffness  RESPIRATORY: No cough, wheezing, chills or hemoptysis; No Shortness of Breath  CARDIOVASCULAR: as HPI  GASTROINTESTINAL: No abdominal or epigastric pain. No nausea, vomiting, or hematemesis; No diarrhea or constipation. No melena or hematochezia.  GENITOURINARY: No dysuria, frequency, hematuria, or incontinence  NEUROLOGICAL: No headaches, memory loss, loss of strength, numbness, or tremors  SKIN: No itching, burning, rashes, or lesions   	  [ ] All others negative	  [ ] Unable to obtain    PHYSICAL EXAM:    T(C): 36.5 (08-26-23 @ 04:28), Max: 38.1 (08-25-23 @ 17:45)  HR: 80 (08-26-23 @ 10:32) (69 - 97)  BP: 210/66 (08-26-23 @ 10:32) (138/98 - 210/66)  RR: 16 (08-26-23 @ 10:32) (16 - 20)  SpO2: 100% (08-26-23 @ 10:32) (94% - 100%)  Wt(kg): --  I&O's Summary    Daily Height in cm: 187.96 (25 Aug 2023 15:17)    Daily     Appearance: Normal	  Psychiatry: A & O x 3, Mood & affect appropriate  HEENT:   Normal oral mucosa, PERRL, EOMI	  Lymphatic: No lymphadenopathy  Cardiovascular: Normal S1 S2,RRR, No JVD, No murmurs  Respiratory: Lungs clear to auscultation	  Gastrointestinal:  Soft, Non-tender, + BS	  Skin: No rashes, No ecchymoses, No cyanosis	  Neurologic: Non-focal  Extremities: Normal range of motion, No clubbing, cyanosis or edema  Vascular: Peripheral pulses palpable 2+ bilaterally    TELEMETRY: 	    ECG:  	not in chart   RADIOLOGY:  OTHER: 	  	  LABS:	 	    CARDIAC MARKERS:        proBNP:     Lipid Profile:   HgA1c:   TSH: Thyroid Stimulating Hormone, Serum: 3.23 uIU/mL (08-26-23 @ 07:10)                            14.1   13.49 )-----------( 209      ( 26 Aug 2023 07:10 )             42.3     08-25    136  |  99  |  24<H>  ----------------------------<  220<H>  4.0   |  21<L>  |  0.94    Ca    10.0      25 Aug 2023 17:53    TPro  7.1  /  Alb  4.5  /  TBili  0.4  /  DBili  x   /  AST  17  /  ALT  13  /  AlkPhos  52  08-25    PT/INR - ( 25 Aug 2023 17:53 )   PT: 14.0 sec;   INR: 1.28 ratio         PTT - ( 25 Aug 2023 17:53 )  PTT:34.2 sec    Creatinine: 0.94 mg/dL (08-25-23 @ 17:53)        ASSESSMENT/PLAN: 	              
Neurology Consult    Reason for Consult: Patient is a 89y old  Male who presents with a chief complaint of syncope (25 Aug 2023 23:15)      HPI:  89y/o M h/o AFib (on Eliquis), HTN, HLD, DM, PPM presenting after witnessed fall around 1 pm; usually walks with walker, and went to get up (no walker), and fell onto right side.  Aide does not think he hit head.  Aide also notes that he sounds congested since this morning.  No fever.  No cough. (25 Aug 2023 23:15)       PAST MEDICAL & SURGICAL HISTORY:  HLD (hyperlipidemia)      Pacemaker      AF (atrial fibrillation)      HTN (hypertension)      DM (diabetes mellitus)      Macular degeneration      BPH (benign prostatic hypertrophy)      S/P knee replacement, bilateral          Allergies: Allergies    procainamide (Other (Severe))    Intolerances        Social History: Denies toxic habits including tobacco, ETOH or illicit drugs.    Family History: FAMILY HISTORY:  No pertinent family history in first degree relatives    . No family history of strokes    Medications: MEDICATIONS  (STANDING):  apixaban 5 milliGRAM(s) Oral two times a day  atorvastatin 80 milliGRAM(s) Oral at bedtime  dextrose 5%. 1000 milliLiter(s) (100 mL/Hr) IV Continuous <Continuous>  dextrose 5%. 1000 milliLiter(s) (50 mL/Hr) IV Continuous <Continuous>  dextrose 50% Injectable 25 Gram(s) IV Push once  dextrose 50% Injectable 25 Gram(s) IV Push once  dextrose 50% Injectable 12.5 Gram(s) IV Push once  digoxin     Tablet 125 MICROGram(s) Oral daily  enalapril 5 milliGRAM(s) Oral daily  fenofibrate Tablet 145 milliGRAM(s) Oral daily  glucagon  Injectable 1 milliGRAM(s) IntraMuscular once  insulin lispro (ADMELOG) corrective regimen sliding scale   SubCutaneous three times a day before meals  latanoprost 0.005% Ophthalmic Solution 1 Drop(s) Both EYES at bedtime  metoprolol succinate ER 50 milliGRAM(s) Oral daily  oxybutynin 5 milliGRAM(s) Oral two times a day    MEDICATIONS  (PRN):  dextrose Oral Gel 15 Gram(s) Oral once PRN Blood Glucose LESS THAN 70 milliGRAM(s)/deciliter      Review of Systems:  CONSTITUTIONAL:  No weight loss, fever, chills, weakness or fatigue.  HEENT:  Eyes:  No visual loss, blurred vision, double vision or yellow sclera. Ears, Nose, Throat:  No hearing loss, sneezing, congestion, runny nose or sore throat.  SKIN:  No rash or itching.  CARDIOVASCULAR:  No chest pain, chest pressure or chest discomfort. No palpitations or edema.  RESPIRATORY:  No shortness of breath, cough or sputum.  GASTROINTESTINAL:  No anorexia, nausea, vomiting or diarrhea. No abdominal pain or blood.  GENITOURINARY:  No burning on urination or incontinence   NEUROLOGICAL:  No headache, dizziness, syncope, paralysis, ataxia, numbness or tingling in the extremities. No change in bowel or bladder control. no limb weakness. no vision changes.   MUSCULOSKELETAL:  pain on his side   HEMATOLOGIC:  No anemia, bleeding or bruising.  LYMPHATICS:  No enlarged nodes. No history of splenectomy.  PSYCHIATRIC:  No history of depression or anxiety.  ENDOCRINOLOGIC:  No reports of sweating, cold or heat intolerance. No polyuria or polydipsia.      Vitals:  Vital Signs Last 24 Hrs  T(C): 36.5 (26 Aug 2023 04:28), Max: 38.1 (25 Aug 2023 17:45)  T(F): 97.7 (26 Aug 2023 04:), Max: 100.6 (25 Aug 2023 17:45)  HR: 74 (26 Aug 2023 02:00) (69 - 97)  BP: 178/73 (26 Aug 2023 04:28) (138/98 - 197/76)  BP(mean): 81 (26 Aug 2023 02:00) (81 - 111)  RR: 18 (26 Aug 2023 04:) (16 - 20)  SpO2: 95% (26 Aug 2023 04:28) (94% - 96%)    Parameters below as of 26 Aug 2023 04:28  Patient On (Oxygen Delivery Method): room air        General Exam:   General Appearance: Appropriately dressed and in no acute distress       Head: Normocephalic, atraumatic and no dysmorphic features  Ear, Nose, and Throat: Moist mucous membranes  CVS: S1S2+  Resp: No SOB, no wheeze or rhonchi  GI: soft NT/ND  Extremities: No edema or cyanosis  Skin: No bruises or rashes     Neurological Exam:  Mental Status: Awake, alert and oriented x 2.  Able to follow simple and complex verbal commands. Able to name and repeat. fluent speech. No obvious aphasia but mild dysarthria noted.   Cranial Nerves: PERRL, EOMI, VFFC, sensation V1-V3 intact,  no obvious facial asymmetry, equal elevation of palate, scm/trap 5/5, tongue is midline on protrusion. no obvious papilledema on fundoscopic exam. hearing is grossly intact.   Motor:MAKI at least 4/5   Sensation: Intact to light touch and pinprick throughout. no right/left confusion. no extinction to tactile on DSS.    Reflexes: 1+ throughout at biceps, brachioradialis, triceps, patellars and ankles bilaterally and equal. No clonus. R toe and L toe were both downgoing.  Coordination: No dysmetria on FNF    Gait: walker baseline     Data/Labs/Imaging which I personally reviewed.     Labs:     CBC Full  -  ( 26 Aug 2023 07:10 )  WBC Count : 13.49 K/uL  RBC Count : 4.82 M/uL  Hemoglobin : 14.1 g/dL  Hematocrit : 42.3 %  Platelet Count - Automated : 209 K/uL  Mean Cell Volume : 87.8 fl  Mean Cell Hemoglobin : 29.3 pg  Mean Cell Hemoglobin Concentration : 33.3 gm/dL  Auto Neutrophil # : x  Auto Lymphocyte # : x  Auto Monocyte # : x  Auto Eosinophil # : x  Auto Basophil # : x  Auto Neutrophil % : x  Auto Lymphocyte % : x  Auto Monocyte % : x  Auto Eosinophil % : x  Auto Basophil % : x    08-25    136  |  99  |  24<H>  ----------------------------<  220<H>  4.0   |  21<L>  |  0.94    Ca    10.0      25 Aug 2023 17:53    TPro  7.1  /  Alb  4.5  /  TBili  0.4  /  DBili  x   /  AST  17  /  ALT  13  /  AlkPhos  52  08-25    LIVER FUNCTIONS - ( 25 Aug 2023 17:53 )  Alb: 4.5 g/dL / Pro: 7.1 g/dL / ALK PHOS: 52 U/L / ALT: 13 U/L / AST: 17 U/L / GGT: x           PT/INR - ( 25 Aug 2023 17:53 )   PT: 14.0 sec;   INR: 1.28 ratio         PTT - ( 25 Aug 2023 17:53 )  PTT:34.2 sec  Urinalysis Basic - ( 25 Aug 2023 20:26 )    Color: Light Yellow / Appearance: Clear / S.017 / pH: x  Gluc: x / Ketone: Negative  / Bili: Negative / Urobili: Negative   Blood: x / Protein: 100 mg/dl / Nitrite: Negative   Leuk Esterase: Negative / RBC: 6 /hpf / WBC 3 /HPF   Sq Epi: x / Non Sq Epi: x / Bacteria: Negative    < from: CT Head No Cont (23 @ 18:56) >    ACC: [id number] EXAM:  CT CERVICAL SPINE   ORDERED BY:  ANTONETTE COON     ACC: [id number] EXAM:  CT BRAIN   ORDERED BY:  ANTONETTE COON     PROCEDURE DATE:  2023          INTERPRETATION:  CT OF THE HEAD WITHOUT CONTRAST  CT CERVICAL SPINE WITHOUT CONTRAST    CLINICAL INDICATION: Trauma Code.    TECHNIQUE: Volumetric CT acquisition was performed through the brain and   reviewed using brain and bone window technique. Dose optimization   techniques were utilized including kVp/mA modulation along with iterative   reconstructions.  Thin section CT images were obtained through cervical spine with   overlapping reconstructions.  Sagittal, coronal and bilateral oblique 2D   reformats were then generated from the initial images.    COMPARISON: No prior studies have been submitted for comparison.    FINDINGS:  CT head:  The ventricles, cisterns and sulci are prominent, consistent with   generalized volume loss..   There is no acute loss of gray-white   differentiation. There are moderate patchy areas of hypodensity in the   periventricular and subcortical white matter which are likely related to   chronic microangiopathic changes.    There is no evidence of mass effect, midline shift, acute intracranial   hemorrhage, or extra-axial collections.     The calvarium is intact. The paranasal sinuses are clear.The mastoid air   cells are predominantly clear. There has been prior bilateral cataract   surgery.      CT cervical spine:      There is straightening of usual cervical lordosis. There isno   significant subluxation. Vertebral body height is preserved throughout   the cervical spine. Variable degree multilevel disc space narrowing most   advanced at C5-C6, C6-C7 and C7-T1.    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    There is advanced multilevel facet hypertrophy and uncovertebral spurring   which contribute to multilevel neural foraminal narrowing.    The paravertebral soft tissues areunremarkable.      IMPRESSION:  CT HEAD: There is no acute intracranial hemorrhage or depressed calvarial   fracture. Chronic findings as above.    CT CERVICAL SPINE: No fracture or acute traumatic malalignment. 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    --- End of Report ---            GATITO NEAL MD; Attending Radiologist  This document has been electronically signed. Aug 25 2023  7:09PM    < end of copied text >        
[facility], Division of Infectious Diseases  [facility] [address] [facility], Y. Patel, G. Zach  [phone]  ([phone] - weekdays after 5pm and weekends)    CHERY AMEZCUA  89y, Male  [id number]    HPI--  HPI:  89y/o M h/o AFib (on Eliquis), HTN, HLD, DM, PPM who presented after witnessed fall.  Aide does not think he hit head.  He was febrile to 100.6 in ED. Was given vanc 1g x 1 and ceftriaxone 1g x 1.  Patient unable to provide history therefore history obtained from chart review.     ROS: 10 point review of systems completed, pertinent positives and negatives as per HPI.    Allergies: procainamide (Other (Severe))    PMH -- HLD (hyperlipidemia)    Atrial fibrillation    Pacemaker    AF (atrial fibrillation)    HTN (hypertension)    DM (diabetes mellitus)    Macular degeneration    BPH (benign prostatic hypertrophy)      PSH -- S/P knee replacement, bilateral      FH -- No pertinent family history in first degree relatives      Social History -- unable to assess 2/2 patient's mentation     Physical Exam--  Vital Signs Last 24 Hrs  T(F): 98.3 (26 Aug 2023 12:05), Max: 100.6 (25 Aug 2023 17:45)  HR: 82 (26 Aug 2023 12:05) (69 - 97)  BP: 133/58 (26 Aug 2023 12:05) (133/58 - 210/66)  RR: 16 (26 Aug 2023 12:05) (16 - 20)  SpO2: 93% (26 Aug 2023 12:05) (93% - 100%)  General: nontoxic-appearing, no acute distress  HEENT: anicteric  Lungs: Clear bilaterally without rales  Heart: S1, S2, normal rate.   Abdomen: Soft. Nondistended. Nontender.   Neuro: no obvious focal deficits   Back: No costovertebral angle tenderness.  Extremities: trace LE edema.   Skin: Warm. Dry. No rash.  Psychiatric: flat affect    Laboratory & Imaging Data--  CBC:                       14.1   13.49 )-----------( 209      ( 26 Aug 2023 07:10 )             42.3     WBC Count: 13.49 K/uL (23 @ 07:10)  WBC Count: 17.56 K/uL (23 @ 17:53)    CMP:     136  |  99  |  24<H>  ----------------------------<  220<H>  4.0   |  21<L>  |  0.94    Ca    10.0      25 Aug 2023 17:53    TPro  7.1  /  Alb  4.5  /  TBili  0.4  /  DBili  x   /  AST  17  /  ALT  13  /  AlkPhos  52      LIVER FUNCTIONS - ( 25 Aug 2023 17:53 )  Alb: 4.5 g/dL / Pro: 7.1 g/dL / ALK PHOS: 52 U/L / ALT: 13 U/L / AST: 17 U/L / GGT: x           Urinalysis Basic - ( 25 Aug 2023 20:26 )    Color: Light Yellow / Appearance: Clear / S.017 / pH: x  Gluc: x / Ketone: Negative  / Bili: Negative / Urobili: Negative   Blood: x / Protein: 100 mg/dl / Nitrite: Negative   Leuk Esterase: Negative / RBC: 6 /hpf / WBC 3 /HPF   Sq Epi: x / Non Sq Epi: x / Bacteria: Negative      Microbiology:       Radiology--  ***  Active Medications--  apixaban 5 milliGRAM(s) Oral two times a day  atorvastatin 80 milliGRAM(s) Oral at bedtime  dextrose 5%. 1000 milliLiter(s) IV Continuous <Continuous>  dextrose 5%. 1000 milliLiter(s) IV Continuous <Continuous>  dextrose 50% Injectable 25 Gram(s) IV Push once  dextrose 50% Injectable 25 Gram(s) IV Push once  dextrose 50% Injectable 12.5 Gram(s) IV Push once  dextrose Oral Gel 15 Gram(s) Oral once PRN  digoxin     Tablet 125 MICROGram(s) Oral daily  enalapril 5 milliGRAM(s) Oral daily  fenofibrate Tablet 145 milliGRAM(s) Oral daily  glucagon  Injectable 1 milliGRAM(s) IntraMuscular once  hydrALAZINE 25 milliGRAM(s) Oral three times a day  insulin lispro (ADMELOG) corrective regimen sliding scale   SubCutaneous three times a day before meals  latanoprost 0.005% Ophthalmic Solution 1 Drop(s) Both EYES at bedtime  metoprolol succinate ER 50 milliGRAM(s) Oral daily  oxybutynin 5 milliGRAM(s) Oral two times a day    Antimicrobials:     Immunologic:

## 2023-08-27 NOTE — CHART NOTE - NSCHARTNOTEFT_GEN_A_CORE
CHERY AMEZCUA    Noticed by RN patient with 's and very confused.        Interventions taken     Additional BB: Toprol XL 50mg po x1 and Hydralazine prn  Haldol prn for agitation  psych consult  cont assess and monitor  f/u with am team.                     Mel Royal ANP-BC  Spectralink #[id number]

## 2023-08-28 NOTE — DIETITIAN INITIAL EVALUATION ADULT - PERTINENT MEDS FT
MEDICATIONS  (STANDING):  apixaban 5 milliGRAM(s) Oral two times a day  atorvastatin 80 milliGRAM(s) Oral at bedtime  dextrose 5%. 1000 milliLiter(s) (100 mL/Hr) IV Continuous <Continuous>  dextrose 5%. 1000 milliLiter(s) (50 mL/Hr) IV Continuous <Continuous>  dextrose 50% Injectable 25 Gram(s) IV Push once  dextrose 50% Injectable 12.5 Gram(s) IV Push once  dextrose 50% Injectable 25 Gram(s) IV Push once  digoxin     Tablet 125 MICROGram(s) Oral daily  enalapril 5 milliGRAM(s) Oral daily  fenofibrate Tablet 145 milliGRAM(s) Oral daily  glucagon  Injectable 1 milliGRAM(s) IntraMuscular once  hydrALAZINE 50 milliGRAM(s) Oral every 8 hours  insulin lispro (ADMELOG) corrective regimen sliding scale   SubCutaneous three times a day before meals  isosorbide   dinitrate Tablet (ISORDIL) 10 milliGRAM(s) Oral three times a day  latanoprost 0.005% Ophthalmic Solution 1 Drop(s) Both EYES at bedtime  metoprolol succinate ER 50 milliGRAM(s) Oral daily  oxybutynin 5 milliGRAM(s) Oral two times a day    MEDICATIONS  (PRN):  dextrose Oral Gel 15 Gram(s) Oral once PRN Blood Glucose LESS THAN 70 milliGRAM(s)/deciliter  haloperidol    Injectable 2 milliGRAM(s) IV Push every 6 hours PRN Agitation  hydrALAZINE Injectable 10 milliGRAM(s) IV Push every 4 hours PRN SBP >160

## 2023-08-28 NOTE — ADVANCED PRACTICE NURSE CONSULT - ASSESSMENT
Consult received & events noted to date. The pt was encountered on 6T-Mr. Sanchez was in bed, alert & oriented. Introduced self & role of CWOCN to pt and private aideAbril at bedside. Pt requires total assistance w/turning & repositioning & private aide at bedside to assist. Pt is on an alternating air with low air loss support surface. Pt is pleasant & cooperative. External condom catheter has been attempted due to urinary incontinence but "falls off" per staff. Private aide endorses private aides at home to assist with care & pt typically uses walker to ambulate. Pt is able to make needs known when needing to have BM per aide. On exam- after perineal/incontinent care provided.  Pt w/bony coccyx. Noted to sacrum/bilateral buttocks is a 8cm L x 4cm W x 0cm D area of intact, dusky maroon discoloration with an area of dark purple intact discoloration along with thin small area of epidermal slippage to R buttocks suggesting that there may be a component of deep tissue injury.  Findings suggest that this skin alteration appears to be a deep tissue injury present on admission. Will recommend  to continue to monitor & apply Shanae moisture barrier cream twice daily & prn soiling episodes. Private aide endorses using "Depends" at home and routinely change (5-6 x day). Noted to R hip is a 1 cm L x 2 cm W x 0 cm area of purplish intact discoloration; pt s/p witnessed fall and cannot rule out bruising vs deep tissue injury. Will monitor for changes. Will recommend- 3M Cavilon No sting barrier film wipe to provided a protective layer to skin. Aide endorses utilizing "donut" when pt OOB to ch.  As pt was admitted with a pressure injury, a dietician consult was completed.   Education was provided to pt regarding interventions to prevent pressure injuries including turning & repositioning every 2hr, use of seat cushion when OOB to ch, limiting time when in chair, mobility (as able), & nutrition. Pt expresses understanding using verbal teach back at this time.    Once consult was complete, patient and aide were educated regarding the need for routine turning and positioning to prevent pressure injuries and patient was placed in a ride side-lying position utilizing pillow positioner assistive devices.

## 2023-08-28 NOTE — ADVANCED PRACTICE NURSE CONSULT - REASON FOR CONSULT
Requested by nursing staff to assess skin status of pt admitted with skin breakdown to sacrum.  PMH is noted as obtained by chart review:  89y/o M h/o AFib (on Eliquis), HTN, HLD, DM, PPM presenting after witnessed fall around 1 pm; usually walks with walker, and went to get up (no walker), and fell onto right side.  Aide does not think he hit head.  Aide also notes that he sounds congested since this morning.  No fever.  No cough.

## 2023-08-28 NOTE — DIETITIAN INITIAL EVALUATION ADULT - ADD RECOMMEND
1) Recommend Consistent Carbohydrate, Low Sodium diet.  2) Recommend a multivitamin and Vitamin C, pending no medical contraindications, to aid in wound healing.  3) Monitor PO intake, GI tolerance, skin integrity, labs, weight, and bowel movement regularity.   4) Honor food preferences as feasible. Assist with meals PRN and encourage PO intake.  5) RD remains available upon request and will follow-up per protocol

## 2023-08-28 NOTE — DIETITIAN INITIAL EVALUATION ADULT - NSFNSNUTRHOMESUPPLEMENTFT_GEN_A_CORE
Pt's aide reports pt taking centrum multivitamin, Vitamin D3, and lutein PTA. Denies use of any additional oral nutrition supplements.

## 2023-08-28 NOTE — DIETITIAN INITIAL EVALUATION ADULT - REASON INDICATOR FOR ASSESSMENT
RD consult for Pressure Injury Stage 2 or >.  Source: pt's aide, medical record. Chart reviewed, events noted.  RD consult for Pressure Injury Stage 2 or >.  Source: pt's aide at bedside, medical record. Pt noted as confused, and away for echo at time of visit. Chart reviewed, events noted.

## 2023-08-28 NOTE — DIETITIAN INITIAL EVALUATION ADULT - NSFNSGIIOFT_GEN_A_CORE
No nausea, vomiting, constipation, diarrhea noted per chart. No BM yet noted per chart. Pt not currently ordered for bowel regimen.

## 2023-08-28 NOTE — DIETITIAN INITIAL EVALUATION ADULT - PERSON TAUGHT/METHOD
Reinforced importance of continued adequate protein-energy consumption to meet estimated nutrient needs and promote wound healing. Pt's aide noted with good comprehension and made aware RD remains available for further questions/concerns./verbal instruction/caregiver

## 2023-08-28 NOTE — DIETITIAN INITIAL EVALUATION ADULT - NSFNSADHERENCEPTAFT_GEN_A_CORE
Pt noted with hx of DM2. Aide reports pt taking Metformin and Januvia PTA. Denies pt checking fingersticks PTA. A1c 6.8% suggests good glycemic control with consideration for age.

## 2023-08-28 NOTE — DIETITIAN INITIAL EVALUATION ADULT - ORAL INTAKE PTA/DIET HISTORY
Pt's aide reports pt with very good appetite/PO intake PTA, was following a generally healthy diet PTA.   Confirms NKFA.

## 2023-08-28 NOTE — ADVANCED PRACTICE NURSE CONSULT - RECOMMEDATIONS
Will recommend:  1. Topical therapy- sacral/bilateral buttocks- cleanse w/incontinent cleanser, pat dry & apply Shanae moisture barrier ointment twice daily and prn for incontinent episodes.   1. Topical therapy- R hip- cleanse w/NS, pat dry & apply Cavilon No sting barrier film wipe daily  2. Incontinent management - incontinent cleanser, pads, pericare BID and as needed, reattempt condom catheter.  3. Maintain on an alternating air with low air loss surface   4. Continue turning & repositioning every 2 hr  6. Dexter-lock positioners; ensure that the soles of the feet are not resting on the foot board of the bed  7. Nutrition optimization  8. Seat cushion when OOB to ; limit time when seating in   Discussed treatment plan w/pt, private aideAbril, staff RNTata at bedside.

## 2023-08-28 NOTE — DIETITIAN INITIAL EVALUATION ADULT - HEIGHT FOR BMI (INCHES)
"Subjective:      Patient ID: Unique De Santiago is a 25 y.o. male.    Chief Complaint: Foot Injury (rt foot)    Unique is a 25 y.o. male who presents to the podiatry clinic  with complaint of  right foot injury. Onset of the symptoms was a couple days ago. Precipitating event: he was on the shoulders of a friend who was trying to squat him but he could not hold him up and he fall directly on his foot. . Current symptoms include: swelling, redness, bruising, pain. Aggravating factors: any weight bearing. Symptoms have progressed to a point and plateaued. Patient has had no prior foot problems. Evaluation to date: plain films: abnormal fracture at bases of metatarsals 3 and 4. Questionable injury to the Lisfranc ligament at the base of the 1st metatarsal. Treatment to date: crutches, CAM boot. . Patients rates pain 4/10 on pain scale. Patient is from [place] and is here for vacation. He will be going back in 3-4 days and would like to follow up in [place].     Vitals:    12/28/18 1022   BP: 135/86   Pulse: 101   Weight: 67.1 kg (148 lb)   Height: 5' 11" (1.803 m)   PainSc:   4     Chief Complaint   Patient presents with    Foot Injury     rt foot       Review of Systems   Constitution: Negative for chills and fever.   Cardiovascular: Positive for leg swelling (R foot). Negative for chest pain and claudication.   Respiratory: Negative for cough and shortness of breath.    Skin: Positive for color change (redness, burising to R midfoot).   Musculoskeletal:        R foot swelling, pain   Gastrointestinal: Negative for nausea and vomiting.   Neurological: Negative for numbness and paresthesias.   Psychiatric/Behavioral: Negative for altered mental status.           Objective:      Physical Exam   Constitutional: He is oriented to person, place, and time. He appears well-developed and well-nourished.   HENT:   Head: Normocephalic.   Cardiovascular: Intact distal pulses.   Pulses:       Dorsalis pedis pulses are 2+ on the " right side, and 2+ on the left side.        Posterior tibial pulses are 2+ on the right side, and 2+ on the left side.   CRT < 3 sec to tips of toes. R foot focal edema noted to midfoot   Pulmonary/Chest: No respiratory distress.   Musculoskeletal:        Feet:    + moderate edema and pain with palpation and ROM of R midfoot along tarso metatarsal joints 1-3. No crepitus noted on ROM. + pain with lateral squeeze of metatarsals 1-5.       Neurological: He is alert and oriented to person, place, and time. He has normal strength. No sensory deficit.   Light touch, proprioception, and sharp/dull sensation are all intact bilaterally.     Skin: Skin is warm, dry and intact. Bruising (R midfoot) and ecchymosis (R midfoot) noted. No erythema.   No open lesions, lacerations or wounds noted.    Psychiatric: He has a normal mood and affect. His behavior is normal. Judgment and thought content normal.   Vitals reviewed.            Assessment:       Encounter Diagnoses   Name Primary?    Closed nondisplaced fracture of fourth metatarsal bone of right foot, initial encounter Yes    Closed nondisplaced fracture of third metatarsal bone of right foot, initial encounter     Swelling of foot joint, right     Foot pain, right     Lisfranc's sprain, right, initial encounter          Plan:       Theja was seen today for foot injury.    Diagnoses and all orders for this visit:    Closed nondisplaced fracture of fourth metatarsal bone of right foot, initial encounter  -     WALKER FOR HOME USE    Closed nondisplaced fracture of third metatarsal bone of right foot, initial encounter  -     WALKER FOR HOME USE    Swelling of foot joint, right  -     WALKER FOR HOME USE    Foot pain, right  -     WALKER FOR HOME USE    Lisfranc's sprain, right, initial encounter  -     WALKER FOR HOME USE      I counseled the patient on his conditions, their implications and medical management.    Patient with fractures of metatarsal bases 3 and 4 with  questionable injury to the lisfranc ligament at base of 1st met. Patient is from [place] and would like to get any further treatment back home when he goes back on 1/2/19.     In the meantime, I reviewed his Xrays with him and advised him to continue wearing the CAM boot for protection. I also added and ACE wrap to the foot/ankle for improvement in swelling.     He is currently using crutches but this is causing axilla and hand pain. I wrote a prescription for a rolling knee walker which he will think about renting or buying. Gave him recommendations for locations nearby.     I recommended obtaining a CT scan to further evaluate the fractures and structure of the joints/bone but he stated he would like to get this done when he goes back home.     I recommended he follow up with a foot and ankle surgeon or podiatrist (Tanvir Mejia DPM in [place]) for further follow up as he may require surgery depending on CT results.     He has pain medication (From ER) and was advised to take as needed for pain.     Discussed proper and consistent elevation of lower extremities, above the level of the heart, while at rest, to help control/improve edema and pain.    He was advised to call with any further concerns prior to returning to [place].            5

## 2023-08-28 NOTE — DIETITIAN INITIAL EVALUATION ADULT - PERTINENT LABORATORY DATA
08-28    136  |  102  |  23  ----------------------------<  155<H>  3.9   |  20<L>  |  1.03    Ca    9.0      28 Aug 2023 07:32  Phos  2.8     08-27  Mg     1.7     08-27    POCT Blood Glucose.: 153 mg/dL (08-28-23 @ 07:28)  A1C with Estimated Average Glucose Result: 6.8 % (08-26-23 @ 07:10)   08-28    136  |  102  |  23  ----------------------------<  155<H>  3.9   |  20<L>  |  1.03    Ca    9.0      28 Aug 2023 07:32  Phos  2.8     08-27  Mg     1.7     08-27    CAPILLARY BLOOD GLUCOSE  POCT Blood Glucose.: 153 mg/dL (28 Aug 2023 07:28)  POCT Blood Glucose.: 199 mg/dL (27 Aug 2023 21:03)  POCT Blood Glucose.: 172 mg/dL (27 Aug 2023 17:08)  POCT Blood Glucose.: 238 mg/dL (27 Aug 2023 11:51)    A1C with Estimated Average Glucose Result: 6.8 % (08-26-23 @ 07:10)

## 2023-08-28 NOTE — DIETITIAN INITIAL EVALUATION ADULT - OTHER INFO
Reports  lbs. Denies recent wt changes.   Dosing wt: 179.8 lbs (08/25/23, stated)  Wt history per chart: 170 lbs (01/12/23, stated). RD to continue to monitor weight trends as able/available.     [facility] Aide reports pt's height as 5'5".    Per chart, pt currently ordered for admelog ISS, hydralazine, and atorvastatin in-house.

## 2023-08-28 NOTE — DIETITIAN INITIAL EVALUATION ADULT - REASON FOR ADMISSION
Leukocytosis     Leukocytosis    Per chart, 89y/o M h/o AFib (on Eliquis), HTN, HLD, DM, PPM presenting after witnessed fall around 1 pm; usually walks with walker, and went to get up (no walker), and fell onto right side. leukocytosis, seen by ID, watching off ABx, no source

## 2023-08-29 NOTE — PROGRESS NOTE ADULT - ASSESSMENT
89y/o M with AFib (on Eliquis), HTN, HLD, DM, macular degeneration, BPH, s/p PPM presenting after witnessed fall. usually walks with walker, and went to get up (no walker), and fell onto right side.  Aide does not think he hit head.  denies LOC. denies tongue bite or convulsions.  doesn't remember about incontienence   CTH neg: chronic findings   CT C spine with multilevel degnerative changes. no acute fracture   + fever 100.6 tmax   + leukocytosis   o/e AAOx2, mild dysarthria but non focal ; walker baseline   TTE as above   CD without significant stenosis     Impression:   Fall r/o syncope.  doesn't sound like seizure.  possible mechanical   may be related to dehydration given possible infection        - c/w eliquis 5mg BID for AF  - check orthostatics + trend   - infectious workup given fever and leukocytosis  -  tele  - doesn't sound like seizure can get rEEG outpatient   - PT/.OT--> home PT   - check FS, glucose control <180  - GI/DVT ppx   - Thank you for allowing me to participate in the care of this patient. Call with questions.   dc planning when able   David Mancuso MD  Vascular Neurology  Office: [phone] 
A/P    89y/o M h/o AFib (on Eliquis), HTN, HLD, DM, PPM presenting after witnessed fall around 1 pm; usually walks with walker, and went to get up (no walker), and fell onto right side.       #fall vs syncope   +orthostatic hyotension  -hydrate  -trend orthostatics  -hold hydral if sbp remains low   -check echo   -interrogate PPM  -neuro w/u  -infectious w/u    #AF, s/p ppm  -stable  -cont metoprolol, dig, a/c as ordered              35 minutes spent on total encounter; more than 50% of the visit was spent counseling and/or coordinating care by the attending physician.    
A/P    89y/o M h/o AFib (on Eliquis), HTN, HLD, DM, PPM presenting after witnessed fall around 1 pm; usually walks with walker, and went to get up (no walker), and fell onto right side.       #fall vs syncope   -Positive orthostatic hypotension - cont to trend  -check echo   -interrogate PPM  -neuro w/u  -infectious w/u  -PT eval  -Compression socks  -Allow permissive htn    #AF, s/p ppm  -stable  -cont metoprolol, dig, a/c as ordered    #HTN  -Elevated > 200  -Can increase hydralazine to 50mg tid  -Continue bb, imdur, enalapril  -Allow permissive htn    
89y/o M h/o AFib (on Eliquis), HTN, HLD, DM, PPM presenting after witnessed fall around 1 pm; usually walks with walker, and went to get up (no walker), and fell onto right side.  Aide does not think he hit head.  Aide also notes that he sounds congested since this morning.  No fever.  No cough.    s/p fall, orthostatics (+)  BP is elevated, on 8/28 Hydralazine raised to 50 mg tid, rpt , and this am 107  will drop the hydralazine dose back to 25 mg tid, SBP is too low, considering ptn has dysautonomia and orthostasis. SBP goal in 140s  cont Isordil and enalapril  Carotids without obstruction  TTE is stable RVP x 2 neg   UA negative  leukocytosis, seen by ID, watching off ABx, no source  cont full AC w ELIQUIS,   seen by ID, Card, Neuro  interrogate PPM  dash diet  dc home today as per daughter's wishes  
89y/o M h/o AFib (on Eliquis), HTN, HLD, DM, PPM who presented after witnessed fall.    SIRS, fall  RVP negative  UA negative, patient denies  symptoms  imaging w/o evidence of PNA, no hypoxia  CT abd/pelvis w/o acute findings  blood cultures- NGTD  no SSTI on exam  remains afebrile, non-toxic    suspect leukocytosis reactive 2/2 fall  Recommendations  continue off antibiotics    Wilver Serrano M.D.  [facility], Division of Infectious Diseases  [phone]  After 5pm on weekdays and all day on weekends - please call [phone] 
89y/o M h/o AFib (on Eliquis), HTN, HLD, DM, PPM who presented after witnessed fall.    sepsis vs SIRS, fall  RVP negative  UA negative, patient denies  symptoms  imaging w/o evidence of PNA, no hypoxia  CT abd/pelvis w/o acute findings  blood cultures- NGTD  no SSTI on exam  remains afebrile, non-toxic    suspect leukocytosis reactive 2/2 fall  Recommendations  continue off antibiotics    Wilver Serrano M.D.  [facility], Division of Infectious Diseases  [phone]  After 5pm on weekdays and all day on weekends - please call [phone] 
89y/o M with AFib (on Eliquis), HTN, HLD, DM, macular degeneration, BPH, s/p PPM presenting after witnessed fall. usually walks with walker, and went to get up (no walker), and fell onto right side.  Aide does not think he hit head.  denies LOC. denies tongue bite or convulsions.  doesn't remember about incontienence   CTH neg: chronic findings   CT C spine with multilevel degnerative changes. no acute fracture   + fever 100.6 tmax   + leukocytosis   o/e AAOx2, mild dysarthria but non focal ; walker baseline     Impression:   Fall r/o syncope.  doesn't sound like seizure.  possible mechanical   may be related to dehydration given possible infection        - c/w eliquis 5mg BID for AF  - check orthostatics + trend   - infectious workup given fever and leukocytosis  - TTE and tele  - check carotid duplex   - doesn't sound like seizure can get rEEG outpatient   - PT/.OT  - check FS, glucose control <180  - GI/DVT ppx   - Thank you for allowing me to participate in the care of this patient. Call with questions.     David Mancuso MD  Vascular Neurology  Office: [phone] 
89y/o M h/o AFib (on Eliquis), HTN, HLD, DM, PPM who presented after witnessed fall.    sepsis vs SIRS, fall  RVP negative  UA negative and patient denies  symptoms  imaging w/o evidence of PNA, no hypoxia  CT abd/pelvis w/o acute findings  afebrile, non-toxic  Recommendations  suspect reactive 2/2 fall  monitor off antibiotics  f/u blood cultures- NGTD  trend temps, wbc    Wilver Serrano M.D.  [facility], Division of Infectious Diseases  [phone]  After 5pm on weekdays and all day on weekends - please call [phone] 
89y/o M h/o AFib (on Eliquis), HTN, HLD, DM, PPM presenting after witnessed fall around 1 pm; usually walks with walker, and went to get up (no walker), and fell onto right side.  Aide does not think he hit head.  Aide also notes that he sounds congested since this morning.  No fever.  No cough.    s/p fall, orthostatics (+)  BP is elevated, Hydralazine raised to 50 mg tid, rpt , will trend  cont Isordil and enalapril  Carotids without obstruction  TTE pending, RVP x 2 neg   UA negative  leukocytosis, seen by ID, watching off ABx, no source  cont full AC w ELIQUIS,   seen by ID, Card, Neuro  interrogate PPM  dash diet  keep on tele  
89y/o M h/o AFib (on Eliquis), HTN, HLD, DM, PPM presenting after witnessed fall around 1 pm; usually walks with walker, and went to get up (no walker), and fell onto right side.  Aide does not think he hit head.  Aide also notes that he sounds congested since this morning.  No fever.  No cough.    s/p fall, orthostatics, are pending,  BP is elevated, improving, TTE and carotids pending, RVP x 2 neg   UA negative  leukocytosis, seen by ID, watching off ABx, no source  cont Enalapril, cont full AC w ELIQUIS, cont  hydralazine  25 tid  seen by ID, Card, Neuro  interrogate PPM  dash diet  keep on tele  
Echo 8/28/23: Nml LV fxn    A/P  89y/o M h/o AFib (on Eliquis), HTN, HLD, DM, PPM presenting after witnessed fall around 1 pm; usually walks with walker, and went to get up (no walker), and fell onto right side.       #fall vs syncope   -Positive orthostatic hypotension - cont to trend  -Echo with nml lv fxn  -neuro w/u  -infectious w/u  -PT eval  -Compression socks  -Allow permissive htn    #AF, s/p ppm  -stable  -cont metoprolol, dig, a/c as ordered    #HTN  -Stable  -Continue meds as ordered    
89y/o M h/o AFib (on Eliquis), HTN, HLD, DM, PPM presenting after witnessed fall around 1 pm; usually walks with walker, and went to get up (no walker), and fell onto right side.  Aide does not think he hit head.  Aide also notes that he sounds congested since this morning.  No fever.  No cough.    s/p fall, orthostatics, are pending,  BP is elevated, improving, TTE and carotids pending, RVP x 2 neg   UA negative  leukocytosis is improving  cont Enalapril, cont full AC w ELIQUIS add hydralazine  25 tid  seen by ID, Card, Neuro  interrogate PPM  dash diet  keep on tele  rpt cbc in am

## 2023-08-29 NOTE — DISCHARGE NOTE NURSING/CASE MANAGEMENT/SOCIAL WORK - PATIENT PORTAL LINK FT
You can access the FollowMyHealth Patient Portal offered by [facility] by registering at the following website: [email or web address]. By joining [employer]’s FollowMyHealth portal, you will also be able to view your health information using other applications (apps) compatible with our system.

## 2023-08-29 NOTE — DISCHARGE NOTE PROVIDER - NSDCCPCAREPLAN_GEN_ALL_CORE_FT
PRINCIPAL DISCHARGE DIAGNOSIS  Diagnosis: Syncope  Assessment and Plan of Treatment: -CTH neg: chronic findings   -CT C-spine with multilevel degnerative changes. no acute fracture   -leukocytosis - seen by ID - no evidence of infection  -Carotid dopplers without significant stenosis   TTE:    1. Technically difficult image quality.   2. Left ventricular endocardium is not well visualized; however, the left ventricular systolic function appears grossly normal.   3. Valves and atria could not be be seen or evaluated due to limited image quality.   4. Normal right ventricular cavity size and borderline reduced right ventricular systolic function.   5. No pericardial effusion seen.   6. Compared to the transthoracic echocardiogram performed on 10/9/2020 there have been no significant interval changes.  Found to have orthostatic hypotension- continue wearing compression stockings. Follow up with your PCP/cardiologist after hospital discharge.        SECONDARY DISCHARGE DIAGNOSES  Diagnosis: Hypertension  Assessment and Plan of Treatment: Continue with your blood pressure medications; eat a heart healthy diet with low salt diet; exercise regularly (consult with your physician or cardiologist first); maintain a heart healthy weight; if you are on medication to help you quit smoking, be sure to take it as prescribed. Continue to follow with your primary care physician or cardiologist.      Diagnosis: Hyperlipidemia  Assessment and Plan of Treatment: Low salt, low fat, low cholesterol, diabetic diet if appropriate  Continue medication as prescribed  Exercise, increase your activity level    Diagnosis: Type 2 diabetes mellitus  Assessment and Plan of Treatment: Continue to follow with your primary care MD or your endocrinologist.  Follow a heart healthy diabetic diet. If you check your fingerstick glucose at home, call your MD if it is greater than 250mg/dL on 2 occasions or less than 100mg/dL on 2 occasions. Know signs of low blood sugar, such as: dizziness, shakiness, sweating, confusion, hunger, nervousness-drink 4 ounces apple juice if occurs and call your doctor. Know early signs of high blood sugar, such as: frequent urination, increased thirst, blurry vision, fatigue, headache - call your doctor if this occurs. Follow with other practitioners to care for your diabetes, such as ophthamologist and podiatrist.

## 2023-08-29 NOTE — DISCHARGE NOTE PROVIDER - CARE PROVIDER_API CALL
Artis Monterroso  Cardiovascular Disease  [address], [address]  [place] [address]  Phone: [phone]  Fax: [phone]  Established Patient  Follow Up Time: 1 week

## 2023-08-29 NOTE — PROGRESS NOTE ADULT - PROVIDER SPECIALTY LIST ADULT
Cardiology
Infectious Disease
Cardiology
Internal Medicine
Cardiology
Infectious Disease
Infectious Disease
Internal Medicine
Neurology
Neurology

## 2023-08-29 NOTE — DISCHARGE NOTE PROVIDER - NSDCMRMEDTOKEN_GEN_ALL_CORE_FT
ascorbic acid 500 mg oral tablet: 1 tab(s) orally once a day  Crestor 20 mg oral tablet: 1 tab(s) orally once a day  digoxin 125 mcg (0.125 mg) oral tablet: 1 tab(s) orally once a day  Eliquis 5 mg oral tablet: 1 tab(s) orally 2 times a day  enalapril 5 mg oral tablet: 1 tab(s) orally once a day  fenofibrate 145 mg oral tablet: 1 tab(s) orally once a day  hydrALAZINE 25 mg oral tablet: 1 tab(s) orally every 8 hours  isosorbide dinitrate 10 mg oral tablet: 1 tab(s) orally 3 times a day  Januvia 50 mg oral tablet: 1 tab(s) orally once a day  latanoprost 0.005% ophthalmic solution: 1 drop(s) to each affected eye once a day (in the evening)  metFORMIN 750 mg oral tablet, extended release: 1 tab(s) orally once a day  metoprolol succinate 50 mg oral tablet, extended release: 1 tab(s) orally once a day  Multiple Vitamins oral tablet: 1 tab(s) orally once a day  Myrbetriq 25 mg oral tablet, extended release: 1 tab(s) orally once a day

## 2023-08-29 NOTE — PROGRESS NOTE ADULT - SUBJECTIVE AND OBJECTIVE BOX
Neurology    S: patient seen. doing okay       Medications: MEDICATIONS  (STANDING):  apixaban 5 milliGRAM(s) Oral two times a day  atorvastatin 80 milliGRAM(s) Oral at bedtime  dextrose 5%. 1000 milliLiter(s) (100 mL/Hr) IV Continuous <Continuous>  dextrose 5%. 1000 milliLiter(s) (50 mL/Hr) IV Continuous <Continuous>  dextrose 50% Injectable 25 Gram(s) IV Push once  dextrose 50% Injectable 12.5 Gram(s) IV Push once  dextrose 50% Injectable 25 Gram(s) IV Push once  digoxin     Tablet 125 MICROGram(s) Oral daily  enalapril 5 milliGRAM(s) Oral daily  fenofibrate Tablet 145 milliGRAM(s) Oral daily  glucagon  Injectable 1 milliGRAM(s) IntraMuscular once  hydrALAZINE 50 milliGRAM(s) Oral every 8 hours  insulin lispro (ADMELOG) corrective regimen sliding scale   SubCutaneous three times a day before meals  isosorbide   dinitrate Tablet (ISORDIL) 10 milliGRAM(s) Oral three times a day  latanoprost 0.005% Ophthalmic Solution 1 Drop(s) Both EYES at bedtime  metoprolol succinate ER 50 milliGRAM(s) Oral daily  oxybutynin 5 milliGRAM(s) Oral two times a day    MEDICATIONS  (PRN):  dextrose Oral Gel 15 Gram(s) Oral once PRN Blood Glucose LESS THAN 70 milliGRAM(s)/deciliter  haloperidol    Injectable 2 milliGRAM(s) IV Push every 6 hours PRN Agitation  hydrALAZINE Injectable 10 milliGRAM(s) IV Push every 4 hours PRN SBP >160       Vitals:  Vital Signs Last 24 Hrs  T(C): 36.6 (28 Aug 2023 04:18), Max: 36.9 (28 Aug 2023 00:00)  T(F): 97.9 (28 Aug 2023 04:18), Max: 98.5 (28 Aug 2023 00:00)  HR: 86 (28 Aug 2023 04:18) (74 - 95)  BP: 194/75 (28 Aug 2023 04:34) (90/52 - 211/83)  BP(mean): --  RR: 18 (28 Aug 2023 04:18) (18 - 18)  SpO2: 93% (28 Aug 2023 04:18) (93% - 95%)    Parameters below as of 28 Aug 2023 04:18  Patient On (Oxygen Delivery Method): room air        Orthostatic VS    08-27-23 @ 11:34  Lying BP: Orthostatic BP (Lying Systolic): 134/Orthostatic BP (Lying Diastolic (mm Hg)): 64 HR: Orthostatic Pulse (Heart Rate (beats/min)): 82   Sitting BP: Orthostatic BP (Sitting Systolic): 88/Orthostatic BP (Sitting Diastolic (mm Hg)): 49 HR: Orthostatic Pulse (Heart Rate (beats/min)): 77  Standing BP: --/-- HR: --  Site: --   Mode: --        General Exam:   General Appearance: Appropriately dressed and in no acute distress       Head: Normocephalic, atraumatic and no dysmorphic features  Ear, Nose, and Throat: Moist mucous membranes  CVS: S1S2+  Resp: No SOB, no wheeze or rhonchi  GI: soft NT/ND  Extremities: No edema or cyanosis  Skin: No bruises or rashes   Neurological Exam:  Mental Status: Awake, alert and oriented x 2.  Able to follow simple and complex verbal commands. Able to name and repeat. fluent speech. No obvious aphasia but mild dysarthria noted.   Cranial Nerves: PERRL, EOMI, VFFC, sensation V1-V3 intact,  no obvious facial asymmetry, equal elevation of palate, scm/trap 5/5, tongue is midline on protrusion. no obvious papilledema on fundoscopic exam. hearing is grossly intact.   Motor:MAKI at least 4/5   Sensation: Intact to light touch and pinprick throughout. no right/left confusion. no extinction to tactile on DSS.    Reflexes: 1+ throughout at biceps, brachioradialis, triceps, patellars and ankles bilaterally and equal. No clonus. R toe and L toe were both downgoing.  Coordination: No dysmetria on FNF    Gait: walker baseline     Data/Labs/Imaging which I personally reviewed.     Labs:       LABS:                          14.2   13.91 )-----------( 208      ( 28 Aug 2023 07:31 )             42.3     08-28    136  |  102  |  23  ----------------------------<  155<H>  3.9   |  20<L>  |  1.03    Ca    9.0      28 Aug 2023 07:32  Phos  2.8     08-27  Mg     1.7     08-27          Urinalysis Basic - ( 28 Aug 2023 07:32 )    Color: x / Appearance: x / SG: x / pH: x  Gluc: 155 mg/dL / Ketone: x  / Bili: x / Urobili: x   Blood: x / Protein: x / Nitrite: x   Leuk Esterase: x / RBC: x / WBC x   Sq Epi: x / Non Sq Epi: x / Bacteria: x          < from: CT Head No Cont (08.25.23 @ 18:56) >    ACC: [id number] EXAM:  CT CERVICAL SPINE   ORDERED BY:  ANTONETTE COON     ACC: [id number] EXAM:  CT BRAIN   ORDERED BY:  ANTONETTE COON     PROCEDURE DATE:  08/25/2023          INTERPRETATION:  CT OF THE HEAD WITHOUT CONTRAST  CT CERVICAL SPINE WITHOUT CONTRAST    CLINICAL INDICATION: Trauma Code.    TECHNIQUE: Volumetric CT acquisition was performed through the brain and   reviewed using brain and bone window technique. Dose optimization   techniques were utilized including kVp/mA modulation along with iterative   reconstructions.  Thin section CT images were obtained through cervical spine with   overlapping reconstructions.  Sagittal, coronal and bilateral oblique 2D   reformats were then generated from the initial images.    COMPARISON: No prior studies have been submitted for comparison.    FINDINGS:  CT head:  The ventricles, cisterns and sulci are prominent, consistent with   generalized volume loss..   There is no acute loss of gray-white   differentiation. There are moderate patchy areas of hypodensity in the   periventricular and subcortical white matter which are likely related to   chronic microangiopathic changes.    There is no evidence of mass effect, midline shift, acute intracranial   hemorrhage, or extra-axial collections.     The calvarium is intact. The paranasal sinuses are clear.The mastoid air   cells are predominantly clear. There has been prior bilateral cataract   surgery.      CT cervical spine:      There is straightening of usual cervical lordosis. There isno   significant subluxation. Vertebral body height is preserved throughout   the cervical spine. Variable degree multilevel disc space narrowing most   advanced at C5-C6, C6-C7 and C7-T1.    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    There is advanced multilevel facet hypertrophy and uncovertebral spurring   which contribute to multilevel neural foraminal narrowing.    The paravertebral soft tissues areunremarkable.      IMPRESSION:  CT HEAD: There is no acute intracranial hemorrhage or depressed calvarial   fracture. Chronic findings as above.    CT CERVICAL SPINE: No fracture or acute traumatic malalignment. 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    --- End of Report ---            GATITO NEAL MD; Attending Radiologist  This document has been electronically signed. Aug 25 2023  7:09PM    < end of copied text >        
[facility], Division of Infectious Diseases  [place] [address], [place] [place], [place] [place]  [phone]  ([phone] - weekdays after 5pm and weekends)    Name: CHERY AMEZCUA  Age/Gender: 89y Male  MRN: [id number]    Interval History:  Notes reviewed.   No concerning overnight events.  Afebrile.   denies any complaints  he is eager to go home  aide at bedside    Allergies: procainamide (Other (Severe))      Objective:  Vitals:   T(F): 97.7 (08-29-23 @ 11:20), Max: 98.3 (08-29-23 @ 04:00)  HR: 74 (08-29-23 @ 11:20) (74 - 90)  BP: 107/61 (08-29-23 @ 11:20) (107/61 - 184/64)  RR: 18 (08-29-23 @ 11:20) (17 - 18)  SpO2: 97% (08-29-23 @ 11:20) (93% - 97%)  Physical Examination:  General: no acute distress  HEENT: anicteric  Cardio: S1, S2, normal rate  Resp: clear to auscultation bilaterally  Abd: soft, NT, ND  Ext: no LE edema  Skin: warm, dry  Psych: flat affect    Laboratory Studies:  CBC:                       14.2   13.91 )-----------( 208      ( 28 Aug 2023 07:31 )             42.3     WBC Trend:  13.91 08-28-23 @ 07:31  16.58 08-27-23 @ 06:23  13.49 08-26-23 @ 07:10  17.56 08-25-23 @ 17:53    CMP: 08-28    136  |  102  |  23  ----------------------------<  155<H>  3.9   |  20<L>  |  1.03    Ca    9.0      28 Aug 2023 07:32            Urinalysis Basic - ( 28 Aug 2023 07:32 )    Color: x / Appearance: x / SG: x / pH: x  Gluc: 155 mg/dL / Ketone: x  / Bili: x / Urobili: x   Blood: x / Protein: x / Nitrite: x   Leuk Esterase: x / RBC: x / WBC x   Sq Epi: x / Non Sq Epi: x / Bacteria: x      Microbiology: reviewed     Culture - Blood (collected 08-25-23 @ 23:00)  Source: .Blood Blood  Preliminary Report (08-29-23 @ 03:01):    No growth at 72 Hours    Culture - Urine (collected 08-25-23 @ 20:29)  Source: Clean Catch Clean Catch (Midstream)  Final Report (08-26-23 @ 20:20):    <10,000 CFU/mL Normal Urogenital Shannan    Culture - Blood (collected 08-25-23 @ 18:05)  Source: .Blood Blood-Peripheral  Preliminary Report (08-28-23 @ 22:02):    No growth at 72 Hours    Culture - Blood (collected 08-25-23 @ 17:50)  Source: .Blood Blood-Peripheral  Preliminary Report (08-28-23 @ 22:02):    No growth at 72 Hours        Radiology: reviewed     Medications:  apixaban 5 milliGRAM(s) Oral two times a day  ascorbic acid 500 milliGRAM(s) Oral daily  atorvastatin 80 milliGRAM(s) Oral at bedtime  chlorhexidine 2% Cloths 1 Application(s) Topical daily  dextrose 5%. 1000 milliLiter(s) IV Continuous <Continuous>  dextrose 5%. 1000 milliLiter(s) IV Continuous <Continuous>  dextrose 50% Injectable 25 Gram(s) IV Push once  dextrose 50% Injectable 12.5 Gram(s) IV Push once  dextrose 50% Injectable 25 Gram(s) IV Push once  dextrose Oral Gel 15 Gram(s) Oral once PRN  digoxin     Tablet 125 MICROGram(s) Oral daily  enalapril 5 milliGRAM(s) Oral daily  fenofibrate Tablet 145 milliGRAM(s) Oral daily  glucagon  Injectable 1 milliGRAM(s) IntraMuscular once  haloperidol    Injectable 2 milliGRAM(s) IV Push every 6 hours PRN  insulin lispro (ADMELOG) corrective regimen sliding scale   SubCutaneous three times a day before meals  isosorbide   dinitrate Tablet (ISORDIL) 10 milliGRAM(s) Oral three times a day  latanoprost 0.005% Ophthalmic Solution 1 Drop(s) Both EYES at bedtime  metoprolol succinate ER 50 milliGRAM(s) Oral daily  multivitamin 1 Tablet(s) Oral daily  oxybutynin 5 milliGRAM(s) Oral two times a day    Antimicrobials:  
[facility], Division of Infectious Diseases  [facility] [address], [address] [facility], [place] [place]  [phone]  ([phone] - weekdays after 5pm and weekends)    Name: CHERY AMEZCUA  Age/Gender: 89y Male  MRN: [id number]    Interval History:  Notes reviewed.   No concerning overnight events.  Afebrile.   patient denies any complaints- no abd pain, SOB, dysuria  daughter at bedside    Allergies: procainamide (Other (Severe))      Objective:  Vitals:   T(F): 97.9 (08-28-23 @ 11:33), Max: 98.5 (08-28-23 @ 00:00)  HR: 79 (08-28-23 @ 11:33) (74 - 89)  BP: 147/69 (08-28-23 @ 11:33) (147/69 - 211/83)  RR: 18 (08-28-23 @ 11:33) (18 - 18)  SpO2: 92% (08-28-23 @ 11:33) (92% - 95%)  Physical Examination:  General: no acute distress  HEENT: anicteric  Cardio: S1, S2, normal rate  Resp: clear to auscultation bilaterally  Abd: soft, NT, ND  Ext: no LE edema  Skin: warm, dry  Psych: flat affect    Laboratory Studies:  CBC:                       14.2   13.91 )-----------( 208      ( 28 Aug 2023 07:31 )             42.3     WBC Trend:  13.91 08-28-23 @ 07:31  16.58 08-27-23 @ 06:23  13.49 08-26-23 @ 07:10  17.56 08-25-23 @ 17:53    CMP: 08-28    136  |  102  |  23  ----------------------------<  155<H>  3.9   |  20<L>  |  1.03    Ca    9.0      28 Aug 2023 07:32  Phos  2.8     08-27  Mg     1.7     08-27            Urinalysis Basic - ( 28 Aug 2023 07:32 )    Color: x / Appearance: x / SG: x / pH: x  Gluc: 155 mg/dL / Ketone: x  / Bili: x / Urobili: x   Blood: x / Protein: x / Nitrite: x   Leuk Esterase: x / RBC: x / WBC x   Sq Epi: x / Non Sq Epi: x / Bacteria: x      Microbiology: reviewed     Culture - Blood (collected 08-25-23 @ 23:00)  Source: .Blood Blood  Preliminary Report (08-28-23 @ 03:01):    No growth at 48 Hours    Culture - Urine (collected 08-25-23 @ 20:29)  Source: Clean Catch Clean Catch (Midstream)  Final Report (08-26-23 @ 20:20):    <10,000 CFU/mL Normal Urogenital Shannan    Culture - Blood (collected 08-25-23 @ 18:05)  Source: .Blood Blood-Peripheral  Preliminary Report (08-27-23 @ 22:03):    No growth at 48 Hours    Culture - Blood (collected 08-25-23 @ 17:50)  Source: .Blood Blood-Peripheral  Preliminary Report (08-27-23 @ 22:03):    No growth at 48 Hours        Radiology: reviewed     Medications:  apixaban 5 milliGRAM(s) Oral two times a day  atorvastatin 80 milliGRAM(s) Oral at bedtime  chlorhexidine 2% Cloths 1 Application(s) Topical daily  dextrose 5%. 1000 milliLiter(s) IV Continuous <Continuous>  dextrose 5%. 1000 milliLiter(s) IV Continuous <Continuous>  dextrose 50% Injectable 25 Gram(s) IV Push once  dextrose 50% Injectable 25 Gram(s) IV Push once  dextrose 50% Injectable 12.5 Gram(s) IV Push once  dextrose Oral Gel 15 Gram(s) Oral once PRN  digoxin     Tablet 125 MICROGram(s) Oral daily  enalapril 5 milliGRAM(s) Oral daily  fenofibrate Tablet 145 milliGRAM(s) Oral daily  glucagon  Injectable 1 milliGRAM(s) IntraMuscular once  haloperidol    Injectable 2 milliGRAM(s) IV Push every 6 hours PRN  hydrALAZINE 50 milliGRAM(s) Oral every 8 hours  hydrALAZINE Injectable 10 milliGRAM(s) IV Push every 4 hours PRN  insulin lispro (ADMELOG) corrective regimen sliding scale   SubCutaneous three times a day before meals  isosorbide   dinitrate Tablet (ISORDIL) 10 milliGRAM(s) Oral three times a day  latanoprost 0.005% Ophthalmic Solution 1 Drop(s) Both EYES at bedtime  metoprolol succinate ER 50 milliGRAM(s) Oral daily  oxybutynin 5 milliGRAM(s) Oral two times a day    Antimicrobials:  
Patient is a 89y old  Male who presents with a chief complaint of syncope (26 Aug 2023 22:06)      SUBJECTIVE / OVERNIGHT EVENTS: ptn looks comfortable, awaiting TTE, BP improved, cont watching closely    MEDICATIONS  (STANDING):  apixaban 5 milliGRAM(s) Oral two times a day  atorvastatin 80 milliGRAM(s) Oral at bedtime  dextrose 5%. 1000 milliLiter(s) (50 mL/Hr) IV Continuous <Continuous>  dextrose 5%. 1000 milliLiter(s) (100 mL/Hr) IV Continuous <Continuous>  dextrose 50% Injectable 25 Gram(s) IV Push once  dextrose 50% Injectable 12.5 Gram(s) IV Push once  dextrose 50% Injectable 25 Gram(s) IV Push once  digoxin     Tablet 125 MICROGram(s) Oral daily  enalapril 5 milliGRAM(s) Oral daily  fenofibrate Tablet 145 milliGRAM(s) Oral daily  glucagon  Injectable 1 milliGRAM(s) IntraMuscular once  hydrALAZINE 25 milliGRAM(s) Oral three times a day  insulin lispro (ADMELOG) corrective regimen sliding scale   SubCutaneous three times a day before meals  isosorbide   dinitrate Tablet (ISORDIL) 10 milliGRAM(s) Oral three times a day  latanoprost 0.005% Ophthalmic Solution 1 Drop(s) Both EYES at bedtime  metoprolol succinate ER 50 milliGRAM(s) Oral daily  oxybutynin 5 milliGRAM(s) Oral two times a day    MEDICATIONS  (PRN):  dextrose Oral Gel 15 Gram(s) Oral once PRN Blood Glucose LESS THAN 70 milliGRAM(s)/deciliter  hydrALAZINE Injectable 10 milliGRAM(s) IV Push every 4 hours PRN SBP >160      Vital Signs Last 24 Hrs  T(F): 97.9 (08-27-23 @ 04:19), Max: 98.3 (08-26-23 @ 12:05)  HR: 82 (08-27-23 @ 04:19) (78 - 92)  BP: 182/72 (08-27-23 @ 04:19) (133/58 - 211/96)  RR: 18 (08-27-23 @ 04:19) (16 - 20)  SpO2: 91% (08-27-23 @ 04:19) (91% - 100%)  Telemetry:   CAPILLARY BLOOD GLUCOSE      POCT Blood Glucose.: 163 mg/dL (26 Aug 2023 21:01)  POCT Blood Glucose.: 131 mg/dL (26 Aug 2023 17:17)  POCT Blood Glucose.: 251 mg/dL (26 Aug 2023 11:15)  POCT Blood Glucose.: 146 mg/dL (26 Aug 2023 07:52)    I&O's Summary      PHYSICAL EXAM:  GENERAL: NAD, well-developed  HEAD:  Atraumatic, Normocephalic  EYES: EOMI, PERRLA, conjunctiva and sclera clear  NECK: Supple, No JVD  CHEST/LUNG: Clear to auscultation bilaterally; No wheeze  HEART: Regular rate and rhythm; No murmurs, rubs, or gallops  ABDOMEN: Soft, Nontender, Nondistended; Bowel sounds present  EXTREMITIES:  2+ Peripheral Pulses, No clubbing, cyanosis, or edema  PSYCH: AAOx3  NEUROLOGY: non-focal  SKIN: No rashes or lesions    LABS:                        14.9   16.58 )-----------( 220      ( 27 Aug 2023 06:23 )             45.1     08-27    134<L>  |  101  |  16  ----------------------------<  165<H>  3.9   |  19<L>  |  0.70    Ca    9.0      27 Aug 2023 06:23  Phos  2.8     08-27  Mg     1.7     08-27    TPro  7.1  /  Alb  4.5  /  TBili  0.4  /  DBili  x   /  AST  17  /  ALT  13  /  AlkPhos  52  08-25    PT/INR - ( 25 Aug 2023 17:53 )   PT: 14.0 sec;   INR: 1.28 ratio         PTT - ( 25 Aug 2023 17:53 )  PTT:34.2 sec      Urinalysis Basic - ( 27 Aug 2023 06:23 )    Color: x / Appearance: x / SG: x / pH: x  Gluc: 165 mg/dL / Ketone: x  / Bili: x / Urobili: x   Blood: x / Protein: x / Nitrite: x   Leuk Esterase: x / RBC: x / WBC x   Sq Epi: x / Non Sq Epi: x / Bacteria: x        RADIOLOGY & ADDITIONAL TESTS:    Imaging Personally Reviewed:    Consultant(s) Notes Reviewed:      Care Discussed with Consultants/Other Providers:  
Patient is a 89y old  Male who presents with a chief complaint of syncope (29 Aug 2023 14:46)      SUBJECTIVE / OVERNIGHT EVENTS: will drop the hydralazine dose back to 25 mg tid, SBP is too low, considering ptn has dysautonomia and orthostasis. SBP goal in 140s    MEDICATIONS  (STANDING):  apixaban 5 milliGRAM(s) Oral two times a day  ascorbic acid 500 milliGRAM(s) Oral daily  atorvastatin 80 milliGRAM(s) Oral at bedtime  chlorhexidine 2% Cloths 1 Application(s) Topical daily  dextrose 5%. 1000 milliLiter(s) (50 mL/Hr) IV Continuous <Continuous>  dextrose 5%. 1000 milliLiter(s) (100 mL/Hr) IV Continuous <Continuous>  dextrose 50% Injectable 25 Gram(s) IV Push once  dextrose 50% Injectable 12.5 Gram(s) IV Push once  dextrose 50% Injectable 25 Gram(s) IV Push once  digoxin     Tablet 125 MICROGram(s) Oral daily  enalapril 5 milliGRAM(s) Oral daily  fenofibrate Tablet 145 milliGRAM(s) Oral daily  glucagon  Injectable 1 milliGRAM(s) IntraMuscular once  insulin lispro (ADMELOG) corrective regimen sliding scale   SubCutaneous three times a day before meals  isosorbide   dinitrate Tablet (ISORDIL) 10 milliGRAM(s) Oral three times a day  latanoprost 0.005% Ophthalmic Solution 1 Drop(s) Both EYES at bedtime  metoprolol succinate ER 50 milliGRAM(s) Oral daily  multivitamin 1 Tablet(s) Oral daily  oxybutynin 5 milliGRAM(s) Oral two times a day    MEDICATIONS  (PRN):  dextrose Oral Gel 15 Gram(s) Oral once PRN Blood Glucose LESS THAN 70 milliGRAM(s)/deciliter  haloperidol    Injectable 2 milliGRAM(s) IV Push every 6 hours PRN Agitation      Vital Signs Last 24 Hrs  T(F): 97.7 (08-29-23 @ 11:20), Max: 98.3 (08-29-23 @ 04:00)  HR: 74 (08-29-23 @ 11:20) (74 - 90)  BP: 107/61 (08-29-23 @ 11:20) (107/61 - 184/64)  RR: 18 (08-29-23 @ 11:20) (17 - 18)  SpO2: 97% (08-29-23 @ 11:20) (93% - 97%)  Telemetry:   CAPILLARY BLOOD GLUCOSE      POCT Blood Glucose.: 272 mg/dL (29 Aug 2023 11:33)  POCT Blood Glucose.: 154 mg/dL (29 Aug 2023 07:33)  POCT Blood Glucose.: 201 mg/dL (28 Aug 2023 21:11)    I&O's Summary    28 Aug 2023 07:01  -  29 Aug 2023 07:00  --------------------------------------------------------  IN: 480 mL / OUT: 989 mL / NET: -509 mL    29 Aug 2023 07:01  -  29 Aug 2023 19:49  --------------------------------------------------------  IN: 480 mL / OUT: 700 mL / NET: -220 mL        PHYSICAL EXAM:  GENERAL: NAD, well-developed  HEAD:  Atraumatic, Normocephalic  EYES: EOMI, PERRLA, conjunctiva and sclera clear  NECK: Supple, No JVD  CHEST/LUNG: Clear to auscultation bilaterally; No wheeze  HEART: Regular rate and rhythm; No murmurs, rubs, or gallops  ABDOMEN: Soft, Nontender, Nondistended; Bowel sounds present  EXTREMITIES:  2+ Peripheral Pulses, No clubbing, cyanosis, or edema  PSYCH: AAOx3  NEUROLOGY: non-focal  SKIN: No rashes or lesions    LABS:                        14.2   13.91 )-----------( 208      ( 28 Aug 2023 07:31 )             42.3     08-28    136  |  102  |  23  ----------------------------<  155<H>  3.9   |  20<L>  |  1.03    Ca    9.0      28 Aug 2023 07:32            Urinalysis Basic - ( 28 Aug 2023 07:32 )    Color: x / Appearance: x / SG: x / pH: x  Gluc: 155 mg/dL / Ketone: x  / Bili: x / Urobili: x   Blood: x / Protein: x / Nitrite: x   Leuk Esterase: x / RBC: x / WBC x   Sq Epi: x / Non Sq Epi: x / Bacteria: x        RADIOLOGY & ADDITIONAL TESTS:    Imaging Personally Reviewed:    Consultant(s) Notes Reviewed:      Care Discussed with Consultants/Other Providers:  
Neurology    S: patient seen. doing okay         Medications: MEDICATIONS  (STANDING):  apixaban 5 milliGRAM(s) Oral two times a day  ascorbic acid 500 milliGRAM(s) Oral daily  atorvastatin 80 milliGRAM(s) Oral at bedtime  chlorhexidine 2% Cloths 1 Application(s) Topical daily  dextrose 5%. 1000 milliLiter(s) (50 mL/Hr) IV Continuous <Continuous>  dextrose 5%. 1000 milliLiter(s) (100 mL/Hr) IV Continuous <Continuous>  dextrose 50% Injectable 25 Gram(s) IV Push once  dextrose 50% Injectable 12.5 Gram(s) IV Push once  dextrose 50% Injectable 25 Gram(s) IV Push once  digoxin     Tablet 125 MICROGram(s) Oral daily  enalapril 5 milliGRAM(s) Oral daily  fenofibrate Tablet 145 milliGRAM(s) Oral daily  glucagon  Injectable 1 milliGRAM(s) IntraMuscular once  hydrALAZINE 50 milliGRAM(s) Oral every 8 hours  insulin lispro (ADMELOG) corrective regimen sliding scale   SubCutaneous three times a day before meals  isosorbide   dinitrate Tablet (ISORDIL) 10 milliGRAM(s) Oral three times a day  latanoprost 0.005% Ophthalmic Solution 1 Drop(s) Both EYES at bedtime  metoprolol succinate ER 50 milliGRAM(s) Oral daily  multivitamin 1 Tablet(s) Oral daily  oxybutynin 5 milliGRAM(s) Oral two times a day    MEDICATIONS  (PRN):  dextrose Oral Gel 15 Gram(s) Oral once PRN Blood Glucose LESS THAN 70 milliGRAM(s)/deciliter  haloperidol    Injectable 2 milliGRAM(s) IV Push every 6 hours PRN Agitation       Vitals:  Vital Signs Last 24 Hrs  T(C): 36.8 (29 Aug 2023 04:00), Max: 36.8 (29 Aug 2023 04:00)  T(F): 98.3 (29 Aug 2023 04:00), Max: 98.3 (29 Aug 2023 04:00)  HR: 86 (29 Aug 2023 05:21) (79 - 90)  BP: 122/56 (29 Aug 2023 04:00) (114/62 - 184/64)  BP(mean): --  RR: 17 (29 Aug 2023 04:00) (17 - 18)  SpO2: 93% (29 Aug 2023 04:00) (92% - 94%)    Parameters below as of 29 Aug 2023 04:00  Patient On (Oxygen Delivery Method): room air          Orthostatic VS    08-27-23 @ 11:34  Lying BP: Orthostatic BP (Lying Systolic): 134/Orthostatic BP (Lying Diastolic (mm Hg)): 64 HR: Orthostatic Pulse (Heart Rate (beats/min)): 82   Sitting BP: Orthostatic BP (Sitting Systolic): 88/Orthostatic BP (Sitting Diastolic (mm Hg)): 49 HR: Orthostatic Pulse (Heart Rate (beats/min)): 77  Standing BP: --/-- HR: --  Site: --   Mode: --        General Exam:   General Appearance: Appropriately dressed and in no acute distress       Head: Normocephalic, atraumatic and no dysmorphic features  Ear, Nose, and Throat: Moist mucous membranes  CVS: S1S2+  Resp: No SOB, no wheeze or rhonchi  GI: soft NT/ND  Extremities: No edema or cyanosis  Skin: No bruises or rashes   Neurological Exam:  Mental Status: Awake, alert and oriented x 2.  Able to follow simple and complex verbal commands. Able to name and repeat. fluent speech. No obvious aphasia but mild dysarthria noted.   Cranial Nerves: PERRL, EOMI, VFFC, sensation V1-V3 intact,  no obvious facial asymmetry, equal elevation of palate, scm/trap 5/5, tongue is midline on protrusion. no obvious papilledema on fundoscopic exam. hearing is grossly intact.   Motor:MAKI at least 4/5   Sensation: Intact to light touch and pinprick throughout. no right/left confusion. no extinction to tactile on DSS.    Reflexes: 1+ throughout at biceps, brachioradialis, triceps, patellars and ankles bilaterally and equal. No clonus. R toe and L toe were both downgoing.  Coordination: No dysmetria on FNF    Gait: walker baseline     Data/Labs/Imaging which I personally reviewed.       LABS:                          14.2   13.91 )-----------( 208      ( 28 Aug 2023 07:31 )             42.3     08-28    136  |  102  |  23  ----------------------------<  155<H>  3.9   |  20<L>  |  1.03    Ca    9.0      28 Aug 2023 07:32          Urinalysis Basic - ( 28 Aug 2023 07:32 )    Color: x / Appearance: x / SG: x / pH: x  Gluc: 155 mg/dL / Ketone: x  / Bili: x / Urobili: x   Blood: x / Protein: x / Nitrite: x   Leuk Esterase: x / RBC: x / WBC x   Sq Epi: x / Non Sq Epi: x / Bacteria: x              < from: CT Head No Cont (08.25.23 @ 18:56) >    ACC: [id number] EXAM:  CT CERVICAL SPINE   ORDERED BY:  ANTONETTE COON     ACC: [id number] EXAM:  CT BRAIN   ORDERED BY:  ANTONETTE COON     PROCEDURE DATE:  08/25/2023          INTERPRETATION:  CT OF THE HEAD WITHOUT CONTRAST  CT CERVICAL SPINE WITHOUT CONTRAST    CLINICAL INDICATION: Trauma Code.    TECHNIQUE: Volumetric CT acquisition was performed through the brain and   reviewed using brain and bone window technique. Dose optimization   techniques were utilized including kVp/mA modulation along with iterative   reconstructions.  Thin section CT images were obtained through cervical spine with   overlapping reconstructions.  Sagittal, coronal and bilateral oblique 2D   reformats were then generated from the initial images.    COMPARISON: No prior studies have been submitted for comparison.    FINDINGS:  CT head:  The ventricles, cisterns and sulci are prominent, consistent with   generalized volume loss..   There is no acute loss of gray-white   differentiation. There are moderate patchy areas of hypodensity in the   periventricular and subcortical white matter which are likely related to   chronic microangiopathic changes.    There is no evidence of mass effect, midline shift, acute intracranial   hemorrhage, or extra-axial collections.     The calvarium is intact. The paranasal sinuses are clear.The mastoid air   cells are predominantly clear. There has been prior bilateral cataract   surgery.      CT cervical spine:      There is straightening of usual cervical lordosis. There isno   significant subluxation. Vertebral body height is preserved throughout   the cervical spine. Variable degree multilevel disc space narrowing most   advanced at C5-C6, C6-C7 and C7-T1.    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    There is advanced multilevel facet hypertrophy and uncovertebral spurring   which contribute to multilevel neural foraminal narrowing.    The paravertebral soft tissues areunremarkable.      IMPRESSION:  CT HEAD: There is no acute intracranial hemorrhage or depressed calvarial   fracture. Chronic findings as above.    CT CERVICAL SPINE: No fracture or acute traumatic malalignment. 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    --- End of Report ---       < from: TTE W or WO Ultrasound Enhancing Agent (08.28.23 @ 12:51) >    TRANSTHORACIC ECHOCARDIOGRAM REPORT  ________________________________________________________________________________                                      _______       Pt. Name:       CHERY AMEZCUA Study Date:    8/28/2023  MRN:            [id number]     YOB: 1926  Accession #:    [id number]      Age:           89 years  Account#:       [id number]   Gender:        M  Heart Rate:                    Height:        74.00 in (187.96 cm)  Rhythm:                        Weight:  180.00 lb (81.65 kg)  Blood Pressure: 194/75 mmHg    BSA/BMI:       2.08 m² / 23.11 kg/m²  ________________________________________________________________________________________  Referring Physician:    [id number] Alicia Stewart  Interpreting Physician: Sydni Muhammad  Primary Sonographer:    Jad Arredondo [facility]    CPT:                ECHO TTE WITH CON COMP W DOPP - .m;LUMASON ECHO                      CONTRAST PER ML - .m;LUMASON ECHO CONTRAST PER ML                      WASTED - .m  Indication(s):      Syncope and collapse - R55  Procedure:          Transthoracic echocardiogram with 2-D, M-mode and complete                      spectral and color flow Doppler.  Ordering Location:  [facility]  Contrast Injection: Verbal consent was obtained for injection of Ultrasonic                      Enhancing Agent following a discussion of risks and                      benefits.                      Endocardial visualization enhanced with 4 ml of Lumason                      Ultrasound enhancing agent (Lot#:[id number] Exp.Date:09/01/2024                      Discarded Dose:6ml).  UEA Reaction:       Patient had no adverse reaction after injection of                      Ultrasound Enhancing Agent.  Study Information:  Image quality for this study is technically difficult.    _______________________________________________________________________________________     CONCLUSIONS:      1. Technically difficult image quality.   2. Left ventricular endocardium is not well visualized; however, the left ventricular systolic function appears grossly normal.   3. Valves and atria could not be be seen or evaluated due to limited image quality.   4. Normal right ventricular cavity size and borderline reduced right ventricular systolic function.   5. No pericardial effusion seen.   6. Compared to the transthoracic echocardiogram performed on 10/9/2020 there have been no significant interval changes.    ________________________________________________________________________________________  FINDINGS:     Left Ventricle:  Unable to assess left ventricular diastolic function due to insufficient data. Left ventricular endocardium is not well visualized; however, the left ventricular systolic function appears grossly normal.     Right Ventricle:  The right ventricle is not well visualized. Normal right ventricular cavity size and borderline reduced right ventricular systolic function. Tricuspid annular plane systolic excursion (TAPSE) is 1.6 cm (normal >=1.7 cm). Tricuspid annular tissue Doppler S' is 9.0 cm/s (normal >10 cm/s).     Left Atrium:  The left atrium was not well visualized.     Right Atrium:  The right atrium was not well visualized.     Aortic Valve:  The aortic valve was not well visualized. There is calcification of the aortic valve leaflets.     Mitral Valve:  The mitral valve was not well visualized. There is calcification of the mitral valve annulus.     Tricuspid Valve:  The tricuspid valve was not well visualized.     Pulmonic Valve:  The pulmonic valve was not well visualized.     Pericardium:  No pericardial effusion seen.  ____________________________________________________________________  Quantitative Data:  Left Ventricle Measurements: (Indexed to BSA)     IVSd (2D):   1.1 cm  LVPWd (2D):  1.1 cm  LVIDd (2D):  5.8 cm  LVIDs (2D):  4.9 cm  LV Mass:     264 g  127.2 g/m²  Visualized LV EF%: 65 to 70%     MV E Vmax:   0.34 m/s  MV A Vmax:   0.49 m/s  MV E/A:      0.70  e' medial:   5.73 cm/s  E/e' medial: 5.95    Aorta Measurements: (normal range) (Indexed to BSA)     Sinuses of Valsalva: 3.60 cm (3.1 - 3.7 cm)cm  Ao Asc prox:         2.90 cm       Left Atrium Measurements: (Indexed to BSA)  LA Diam 2D: 6.50 cm    Right Ventricle Measurements:     TAPSE:           1.6 cm  TV Tabatha. S':      9.02 cm/s  RV Base (RVID1): 3.8 cm  RV Mid (RVID2):  2.8 cm       LVOT / RVOT/ Qp/Qs Data: (Indexed to BSA)  LVOT Diameter: 2.10 cm  LVOT Vmax:     0.92 m/s  LVOT VTI:      19.40 cm  LVOT SV:       67.2 ml  32.34 ml/m²    Mitral Valve Measurements:     MV E Vmax: 0.3 m/s  MV A Vmax: 0.5 m/s  MV E/A:    0.7    ________________________________________________________________________________________  Electronically signed on 8/29/2023 at 9:52:25 AM by Sydni Muhammad         *** Final ***    < end of copied text >      < from: VA Duplex Carotid, [place] (08.28.23 @ 10:35) >    ACC: [id number] EXAM:  CAROTID DUPLEX BILATERAL   ORDERED BY: CLAUDINE CHAVEZ     PROCEDURE DATE:  08/28/2023          INTERPRETATION:  CLINICAL INFORMATION: Syncope, altered mental status    COMPARISON: None available.    TECHNIQUE: Grayscale, color and spectral Doppler examination of both   carotid arteries was performed.    FINDINGS:    There is an irregular heart rate.    There is increased tortuosity to the right and left carotid arteries in   the neck.    Atheromatous plaque of varying severity is present along the course of   the right and left carotid arteries.    Peak systolic velocities are as follows:    RIGHT:  PROX CCA = 99  DIST CCA = 127  PROX ICA = 106  DIST ICA = 89  ECA = 66    LEFT:  PROX CCA = 192  DIST CCA = 176  PROX ICA = 99  DIST ICA = 67  ECA = 100    Antegrade flow is noted within both vertebral arteries.    IMPRESSION: No significant hemodynamic stenosis of either carotid artery.    Measurement of carotid stenosis is based on velocity parameters that   correlate the residual internal carotid diameter with that of the more   distal vessel in accordance with a method such as the [place]   Symptomatic Carotid Endarterectomy Trial (NASCET).    --- End of Report ---            PAULA MILNER MD; Attending Interventional Radiologist  This document has been electronically signed. Aug 28 2023 10:42AM    < end of copied text >  
Patient is a 89y old  Male who presents with a chief complaint of syncope (28 Aug 2023 15:10)      SUBJECTIVE / OVERNIGHT EVENTS: BP is now controlled, orthostatics are (+), SBP goal should be 140-150    MEDICATIONS  (STANDING):  apixaban 5 milliGRAM(s) Oral two times a day  ascorbic acid 500 milliGRAM(s) Oral daily  atorvastatin 80 milliGRAM(s) Oral at bedtime  chlorhexidine 2% Cloths 1 Application(s) Topical daily  dextrose 5%. 1000 milliLiter(s) (100 mL/Hr) IV Continuous <Continuous>  dextrose 5%. 1000 milliLiter(s) (50 mL/Hr) IV Continuous <Continuous>  dextrose 50% Injectable 25 Gram(s) IV Push once  dextrose 50% Injectable 12.5 Gram(s) IV Push once  dextrose 50% Injectable 25 Gram(s) IV Push once  digoxin     Tablet 125 MICROGram(s) Oral daily  enalapril 5 milliGRAM(s) Oral daily  fenofibrate Tablet 145 milliGRAM(s) Oral daily  glucagon  Injectable 1 milliGRAM(s) IntraMuscular once  hydrALAZINE 50 milliGRAM(s) Oral every 8 hours  insulin lispro (ADMELOG) corrective regimen sliding scale   SubCutaneous three times a day before meals  isosorbide   dinitrate Tablet (ISORDIL) 10 milliGRAM(s) Oral three times a day  latanoprost 0.005% Ophthalmic Solution 1 Drop(s) Both EYES at bedtime  metoprolol succinate ER 50 milliGRAM(s) Oral daily  multivitamin 1 Tablet(s) Oral daily  oxybutynin 5 milliGRAM(s) Oral two times a day    MEDICATIONS  (PRN):  dextrose Oral Gel 15 Gram(s) Oral once PRN Blood Glucose LESS THAN 70 milliGRAM(s)/deciliter  haloperidol    Injectable 2 milliGRAM(s) IV Push every 6 hours PRN Agitation  hydrALAZINE Injectable 10 milliGRAM(s) IV Push every 4 hours PRN SBP >160      Vital Signs Last 24 Hrs  T(F): 97.9 (08-28-23 @ 11:33), Max: 98.5 (08-28-23 @ 00:00)  HR: 87 (08-28-23 @ 16:45) (74 - 89)  BP: 115/69 (08-28-23 @ 16:45) (115/69 - 211/83)  RR: 18 (08-28-23 @ 11:33) (18 - 18)  SpO2: 92% (08-28-23 @ 11:33) (92% - 95%)  Telemetry:   CAPILLARY BLOOD GLUCOSE      POCT Blood Glucose.: 188 mg/dL (28 Aug 2023 17:15)  POCT Blood Glucose.: 212 mg/dL (28 Aug 2023 11:43)  POCT Blood Glucose.: 153 mg/dL (28 Aug 2023 07:28)  POCT Blood Glucose.: 199 mg/dL (27 Aug 2023 21:03)    I&O's Summary    27 Aug 2023 07:01  -  28 Aug 2023 07:00  --------------------------------------------------------  IN: 240 mL / OUT: 0 mL / NET: 240 mL    28 Aug 2023 07:01  -  28 Aug 2023 19:03  --------------------------------------------------------  IN: 480 mL / OUT: 0 mL / NET: 480 mL        PHYSICAL EXAM:  GENERAL: NAD, well-developed  HEAD:  Atraumatic, Normocephalic  EYES: EOMI, PERRLA, conjunctiva and sclera clear  NECK: Supple, No JVD  CHEST/LUNG: Clear to auscultation bilaterally; No wheeze  HEART: Regular rate and rhythm; No murmurs, rubs, or gallops  ABDOMEN: Soft, Nontender, Nondistended; Bowel sounds present  EXTREMITIES:  2+ Peripheral Pulses, No clubbing, cyanosis, or edema  PSYCH: AAOx3  NEUROLOGY: non-focal  SKIN: No rashes or lesions    LABS:                        14.2   13.91 )-----------( 208      ( 28 Aug 2023 07:31 )             42.3     08-28    136  |  102  |  23  ----------------------------<  155<H>  3.9   |  20<L>  |  1.03    Ca    9.0      28 Aug 2023 07:32  Phos  2.8     08-27  Mg     1.7     08-27            Urinalysis Basic - ( 28 Aug 2023 07:32 )    Color: x / Appearance: x / SG: x / pH: x  Gluc: 155 mg/dL / Ketone: x  / Bili: x / Urobili: x   Blood: x / Protein: x / Nitrite: x   Leuk Esterase: x / RBC: x / WBC x   Sq Epi: x / Non Sq Epi: x / Bacteria: x        RADIOLOGY & ADDITIONAL TESTS:    Imaging Personally Reviewed:    Consultant(s) Notes Reviewed:      Care Discussed with Consultants/Other Providers:  
Patient is a 89y old  Male who presents with a chief complaint of syncope (26 Aug 2023 12:28)      SUBJECTIVE / OVERNIGHT EVENTS: BP is elevated, improving, TTE and carotids pending, RVP x 2 neg     MEDICATIONS  (STANDING):  apixaban 5 milliGRAM(s) Oral two times a day  atorvastatin 80 milliGRAM(s) Oral at bedtime  dextrose 5%. 1000 milliLiter(s) (100 mL/Hr) IV Continuous <Continuous>  dextrose 5%. 1000 milliLiter(s) (50 mL/Hr) IV Continuous <Continuous>  dextrose 50% Injectable 25 Gram(s) IV Push once  dextrose 50% Injectable 25 Gram(s) IV Push once  dextrose 50% Injectable 12.5 Gram(s) IV Push once  digoxin     Tablet 125 MICROGram(s) Oral daily  enalapril 5 milliGRAM(s) Oral daily  fenofibrate Tablet 145 milliGRAM(s) Oral daily  glucagon  Injectable 1 milliGRAM(s) IntraMuscular once  hydrALAZINE 25 milliGRAM(s) Oral three times a day  insulin lispro (ADMELOG) corrective regimen sliding scale   SubCutaneous three times a day before meals  latanoprost 0.005% Ophthalmic Solution 1 Drop(s) Both EYES at bedtime  metoprolol succinate ER 50 milliGRAM(s) Oral daily  oxybutynin 5 milliGRAM(s) Oral two times a day    MEDICATIONS  (PRN):  dextrose Oral Gel 15 Gram(s) Oral once PRN Blood Glucose LESS THAN 70 milliGRAM(s)/deciliter      Vital Signs Last 24 Hrs  T(F): 97.7 (23 @ 20:39), Max: 98.3 (23 @ 12:05)  HR: 88 (23 @ 20:39) (69 - 92)  BP: 180/80 (23 @ 20:39) (133/58 - 210/66)  RR: 18 (23 @ 20:39) (16 - 20)  SpO2: 91% (23 @ 20:39) (91% - 100%)  Telemetry:   CAPILLARY BLOOD GLUCOSE      POCT Blood Glucose.: 163 mg/dL (26 Aug 2023 21:01)  POCT Blood Glucose.: 131 mg/dL (26 Aug 2023 17:17)  POCT Blood Glucose.: 251 mg/dL (26 Aug 2023 11:15)  POCT Blood Glucose.: 146 mg/dL (26 Aug 2023 07:52)    I&O's Summary      PHYSICAL EXAM:  GENERAL: NAD, well-developed  HEAD:  Atraumatic, Normocephalic  EYES: EOMI, PERRLA, conjunctiva and sclera clear  NECK: Supple, No JVD  CHEST/LUNG: Clear to auscultation bilaterally; No wheeze  HEART: Regular rate and rhythm; No murmurs, rubs, or gallops  ABDOMEN: Soft, Nontender, Nondistended; Bowel sounds present  EXTREMITIES:  2+ Peripheral Pulses, No clubbing, cyanosis, or edema  PSYCH: AAOx3  NEUROLOGY: non-focal  SKIN: No rashes or lesions    LABS:                        14.1   13.49 )-----------( 209      ( 26 Aug 2023 07:10 )             42.3     08-    136  |  99  |  24<H>  ----------------------------<  220<H>  4.0   |  21<L>  |  0.94    Ca    10.0      25 Aug 2023 17:53    TPro  7.1  /  Alb  4.5  /  TBili  0.4  /  DBili  x   /  AST  17  /  ALT  13  /  AlkPhos  52  08-25    PT/INR - ( 25 Aug 2023 17:53 )   PT: 14.0 sec;   INR: 1.28 ratio         PTT - ( 25 Aug 2023 17:53 )  PTT:34.2 sec      Urinalysis Basic - ( 25 Aug 2023 20:26 )    Color: Light Yellow / Appearance: Clear / S.017 / pH: x  Gluc: x / Ketone: Negative  / Bili: Negative / Urobili: Negative   Blood: x / Protein: 100 mg/dl / Nitrite: Negative   Leuk Esterase: Negative / RBC: 6 /hpf / WBC 3 /HPF   Sq Epi: x / Non Sq Epi: x / Bacteria: Negative        RADIOLOGY & ADDITIONAL TESTS:    Imaging Personally Reviewed:    Consultant(s) Notes Reviewed:      Care Discussed with Consultants/Other Providers:  
CARDIOLOGY FOLLOW UP - Dr. Siddiqi  DATE OF SERVICE: 8/28/23    CC  Events noted  No CV complaints - No HA, cp, sob, dizziness, palpitatons    REVIEW OF SYSTEMS:  CONSTITUTIONAL: No fever, weight loss, or fatigue  RESPIRATORY: No cough, wheezing, chills or hemoptysis; No Shortness of Breath  CARDIOVASCULAR: No chest pain, palpitations, passing out, dizziness, or leg swelling  GASTROINTESTINAL: No abdominal or epigastric pain. No nausea, vomiting, or hematemesis; No diarrhea or constipation. No melena or hematochezia.  VASCULAR: No edema     PHYSICAL EXAM:  T(C): 36.6 (08-28-23 @ 04:18), Max: 36.9 (08-28-23 @ 00:00)  HR: 86 (08-28-23 @ 04:18) (74 - 95)  BP: 194/75 (08-28-23 @ 04:34) (90/52 - 211/83)  RR: 18 (08-28-23 @ 04:18) (18 - 18)  SpO2: 93% (08-28-23 @ 04:18) (93% - 95%)  Wt(kg): --  I&O's Summary    27 Aug 2023 07:01  -  28 Aug 2023 07:00  --------------------------------------------------------  IN: 240 mL / OUT: 0 mL / NET: 240 mL        Appearance: Elderly male	  Cardiovascular: Normal S1 S2,RRR, No JVD, No murmurs  Respiratory: Lungs clear to auscultation b/l  Gastrointestinal:  Soft, Non-tender, + BS	  Extremities: Normal range of motion, No clubbing, cyanosis or edema      Home Medications:  Crestor 20 mg oral tablet: 1 tab(s) orally once a day (26 Aug 2023 00:06)  digoxin 125 mcg (0.125 mg) oral tablet: 1 tab(s) orally once a day (26 Aug 2023 00:06)  Eliquis 5 mg oral tablet: 1 tab(s) orally 2 times a day (26 Aug 2023 00:06)  enalapril 5 mg oral tablet: 1 tab(s) orally once a day (26 Aug 2023 00:06)  fenofibrate 145 mg oral tablet: 1 tab(s) orally once a day (26 Aug 2023 00:06)  Januvia 50 mg oral tablet: 1 tab(s) orally once a day (26 Aug 2023 00:06)  latanoprost 0.005% ophthalmic solution: 1 drop(s) to each affected eye once a day (in the evening) (26 Aug 2023 00:06)  metFORMIN 750 mg oral tablet, extended release: 1 tab(s) orally once a day (26 Aug 2023 00:06)  metoprolol succinate 50 mg oral tablet, extended release: 1 tab(s) orally once a day (26 Aug 2023 00:06)  Myrbetriq 25 mg oral tablet, extended release: 1 tab(s) orally once a day (26 Aug 2023 00:06)  silodosin 8 mg oral capsule: 1 cap(s) orally once a day (26 Aug 2023 00:06)      MEDICATIONS  (STANDING):  apixaban 5 milliGRAM(s) Oral two times a day  atorvastatin 80 milliGRAM(s) Oral at bedtime  dextrose 5%. 1000 milliLiter(s) (100 mL/Hr) IV Continuous <Continuous>  dextrose 5%. 1000 milliLiter(s) (50 mL/Hr) IV Continuous <Continuous>  dextrose 50% Injectable 25 Gram(s) IV Push once  dextrose 50% Injectable 12.5 Gram(s) IV Push once  dextrose 50% Injectable 25 Gram(s) IV Push once  digoxin     Tablet 125 MICROGram(s) Oral daily  enalapril 5 milliGRAM(s) Oral daily  fenofibrate Tablet 145 milliGRAM(s) Oral daily  glucagon  Injectable 1 milliGRAM(s) IntraMuscular once  hydrALAZINE 25 milliGRAM(s) Oral three times a day  insulin lispro (ADMELOG) corrective regimen sliding scale   SubCutaneous three times a day before meals  isosorbide   dinitrate Tablet (ISORDIL) 10 milliGRAM(s) Oral three times a day  latanoprost 0.005% Ophthalmic Solution 1 Drop(s) Both EYES at bedtime  metoprolol succinate ER 50 milliGRAM(s) Oral daily  oxybutynin 5 milliGRAM(s) Oral two times a day      TELEMETRY: Afib  	    ECG:  	  RADIOLOGY:   DIAGNOSTIC TESTING:  [ ] Echocardiogram:  [ ]  Catheterization:  [ ] Stress Test:    OTHER: 	    LABS:	 	                            14.2   13.91 )-----------( 208      ( 28 Aug 2023 07:31 )             42.3     08-28    136  |  102  |  23  ----------------------------<  155<H>  3.9   |  20<L>  |  1.03    Ca    9.0      28 Aug 2023 07:32  Phos  2.8     08-27  Mg     1.7     08-27              
CARDIOLOGY FOLLOW UP - Dr. Siddiqi  DATE OF SERVICE: 8/29/23    CC  No CV complaints    REVIEW OF SYSTEMS:  CONSTITUTIONAL: No fever, weight loss, or fatigue  RESPIRATORY: No cough, wheezing, chills or hemoptysis; No Shortness of Breath  CARDIOVASCULAR: No chest pain, palpitations, passing out, dizziness, or leg swelling  GASTROINTESTINAL: No abdominal or epigastric pain. No nausea, vomiting, or hematemesis; No diarrhea or constipation. No melena or hematochezia.  VASCULAR: No edema     PHYSICAL EXAM:  T(C): 36.5 (08-29-23 @ 11:20), Max: 36.8 (08-29-23 @ 04:00)  HR: 74 (08-29-23 @ 11:20) (74 - 90)  BP: 107/61 (08-29-23 @ 11:20) (107/61 - 184/64)  RR: 18 (08-29-23 @ 11:20) (17 - 18)  SpO2: 97% (08-29-23 @ 11:20) (93% - 97%)  Wt(kg): --  I&O's Summary    28 Aug 2023 07:01  -  29 Aug 2023 07:00  --------------------------------------------------------  IN: 480 mL / OUT: 989 mL / NET: -509 mL    29 Aug 2023 07:01  -  29 Aug 2023 14:46  --------------------------------------------------------  IN: 480 mL / OUT: 700 mL / NET: -220 mL        Appearance: Elderly male	  Cardiovascular: Normal S1 S2,RRR, No JVD, No murmurs  Respiratory: Lungs clear to auscultation b/l  Gastrointestinal:  Soft, Non-tender, + BS	  Extremities: Normal range of motion, No clubbing, cyanosis or edema      Home Medications:  Crestor 20 mg oral tablet: 1 tab(s) orally once a day (26 Aug 2023 00:06)  digoxin 125 mcg (0.125 mg) oral tablet: 1 tab(s) orally once a day (26 Aug 2023 00:06)  Eliquis 5 mg oral tablet: 1 tab(s) orally 2 times a day (26 Aug 2023 00:06)  enalapril 5 mg oral tablet: 1 tab(s) orally once a day (26 Aug 2023 00:06)  fenofibrate 145 mg oral tablet: 1 tab(s) orally once a day (26 Aug 2023 00:06)  Januvia 50 mg oral tablet: 1 tab(s) orally once a day (26 Aug 2023 00:06)  latanoprost 0.005% ophthalmic solution: 1 drop(s) to each affected eye once a day (in the evening) (26 Aug 2023 00:06)  metFORMIN 750 mg oral tablet, extended release: 1 tab(s) orally once a day (26 Aug 2023 00:06)  metoprolol succinate 50 mg oral tablet, extended release: 1 tab(s) orally once a day (26 Aug 2023 00:06)  Myrbetriq 25 mg oral tablet, extended release: 1 tab(s) orally once a day (26 Aug 2023 00:06)      MEDICATIONS  (STANDING):  apixaban 5 milliGRAM(s) Oral two times a day  ascorbic acid 500 milliGRAM(s) Oral daily  atorvastatin 80 milliGRAM(s) Oral at bedtime  chlorhexidine 2% Cloths 1 Application(s) Topical daily  dextrose 5%. 1000 milliLiter(s) (100 mL/Hr) IV Continuous <Continuous>  dextrose 5%. 1000 milliLiter(s) (50 mL/Hr) IV Continuous <Continuous>  dextrose 50% Injectable 25 Gram(s) IV Push once  dextrose 50% Injectable 12.5 Gram(s) IV Push once  dextrose 50% Injectable 25 Gram(s) IV Push once  digoxin     Tablet 125 MICROGram(s) Oral daily  enalapril 5 milliGRAM(s) Oral daily  fenofibrate Tablet 145 milliGRAM(s) Oral daily  glucagon  Injectable 1 milliGRAM(s) IntraMuscular once  insulin lispro (ADMELOG) corrective regimen sliding scale   SubCutaneous three times a day before meals  isosorbide   dinitrate Tablet (ISORDIL) 10 milliGRAM(s) Oral three times a day  latanoprost 0.005% Ophthalmic Solution 1 Drop(s) Both EYES at bedtime  metoprolol succinate ER 50 milliGRAM(s) Oral daily  multivitamin 1 Tablet(s) Oral daily  oxybutynin 5 milliGRAM(s) Oral two times a day      TELEMETRY: Afib 70-90s	    ECG:  	  RADIOLOGY:   DIAGNOSTIC TESTING:  [ ] Echocardiogram:  [ ]  Catheterization:  [ ] Stress Test:    OTHER: 	    LABS:	 	                            14.2   13.91 )-----------( 208      ( 28 Aug 2023 07:31 )             42.3     08-28    136  |  102  |  23  ----------------------------<  155<H>  3.9   |  20<L>  |  1.03    Ca    9.0      28 Aug 2023 07:32              
CARDIOLOGY FOLLOW UP NOTE - DR. PENN    Patient Name: CHERY AMEZCUA    Date of Service: 08-27-23 @ 12:01    Patient seen and examined  orthostatics +    Subjective:    cv: denies chest pain, dyspnea, palpitations, dizziness  pulmonary: denies cough  GI: denies abdominal pain, nausea, vomiting  vascular/legs: no edema   skin: no rash  ROS: otherwise negative   overnight events:      PHYSICAL EXAM:  T(C): 36.5 (08-27-23 @ 11:34), Max: 36.8 (08-26-23 @ 12:05)  HR: 95 (08-27-23 @ 11:34) (80 - 95)  BP: 90/52 (08-27-23 @ 11:34) (90/52 - 211/96)  RR: 18 (08-27-23 @ 11:34) (16 - 20)  SpO2: 93% (08-27-23 @ 11:34) (91% - 97%)  Wt(kg): --  I&O's Summary    Daily     Daily     Appearance: Normal	  Cardiovascular: Normal S1 S2,RRR, No JVD, No murmurs  Respiratory: Lungs clear to auscultation	  Gastrointestinal:  Soft, Non-tender, + BS	  Extremities: Normal range of motion, No clubbing, cyanosis or edema      Home Medications:  Crestor 20 mg oral tablet: 1 tab(s) orally once a day (26 Aug 2023 00:06)  digoxin 125 mcg (0.125 mg) oral tablet: 1 tab(s) orally once a day (26 Aug 2023 00:06)  Eliquis 5 mg oral tablet: 1 tab(s) orally 2 times a day (26 Aug 2023 00:06)  enalapril 5 mg oral tablet: 1 tab(s) orally once a day (26 Aug 2023 00:06)  fenofibrate 145 mg oral tablet: 1 tab(s) orally once a day (26 Aug 2023 00:06)  Januvia 50 mg oral tablet: 1 tab(s) orally once a day (26 Aug 2023 00:06)  latanoprost 0.005% ophthalmic solution: 1 drop(s) to each affected eye once a day (in the evening) (26 Aug 2023 00:06)  metFORMIN 750 mg oral tablet, extended release: 1 tab(s) orally once a day (26 Aug 2023 00:06)  metoprolol succinate 50 mg oral tablet, extended release: 1 tab(s) orally once a day (26 Aug 2023 00:06)  Myrbetriq 25 mg oral tablet, extended release: 1 tab(s) orally once a day (26 Aug 2023 00:06)  silodosin 8 mg oral capsule: 1 cap(s) orally once a day (26 Aug 2023 00:06)      MEDICATIONS  (STANDING):  apixaban 5 milliGRAM(s) Oral two times a day  atorvastatin 80 milliGRAM(s) Oral at bedtime  dextrose 5%. 1000 milliLiter(s) (50 mL/Hr) IV Continuous <Continuous>  dextrose 5%. 1000 milliLiter(s) (100 mL/Hr) IV Continuous <Continuous>  dextrose 50% Injectable 25 Gram(s) IV Push once  dextrose 50% Injectable 12.5 Gram(s) IV Push once  dextrose 50% Injectable 25 Gram(s) IV Push once  digoxin     Tablet 125 MICROGram(s) Oral daily  enalapril 5 milliGRAM(s) Oral daily  fenofibrate Tablet 145 milliGRAM(s) Oral daily  glucagon  Injectable 1 milliGRAM(s) IntraMuscular once  hydrALAZINE 25 milliGRAM(s) Oral three times a day  insulin lispro (ADMELOG) corrective regimen sliding scale   SubCutaneous three times a day before meals  isosorbide   dinitrate Tablet (ISORDIL) 10 milliGRAM(s) Oral three times a day  latanoprost 0.005% Ophthalmic Solution 1 Drop(s) Both EYES at bedtime  metoprolol succinate ER 50 milliGRAM(s) Oral daily  oxybutynin 5 milliGRAM(s) Oral two times a day      TELEMETRY: 	    ECG:  	  RADIOLOGY:   DIAGNOSTIC TESTING:  [ ] Echocardiogram:  [ ] Catheterization:  [ ] Stress Test:    OTHER: 	    LABS:	 	    CARDIAC MARKERS:                                      14.9   16.58 )-----------( 220      ( 27 Aug 2023 06:23 )             45.1     08-27    134<L>  |  101  |  16  ----------------------------<  165<H>  3.9   |  19<L>  |  0.70    Ca    9.0      27 Aug 2023 06:23  Phos  2.8     08-27  Mg     1.7     08-27    TPro  7.1  /  Alb  4.5  /  TBili  0.4  /  DBili  x   /  AST  17  /  ALT  13  /  AlkPhos  52  08-25    proBNP:   PT/INR - ( 25 Aug 2023 17:53 )   PT: 14.0 sec;   INR: 1.28 ratio         PTT - ( 25 Aug 2023 17:53 )  PTT:34.2 sec  Lipid Profile:   HgA1c:     Creatinine: 0.70 mg/dL (08-27-23 @ 06:23)  Creatinine: 0.94 mg/dL (08-25-23 @ 17:53)            
[facility], Division of Infectious Diseases  [facility] [address], S. Shah, G. [place]  [phone]  ([phone] - weekdays after 5pm and weekends)    Name: CHERY AMEZCUA  Age/Gender: 89y Male  MRN: [id number]    Interval History:  Notes reviewed.   No concerning overnight events.  Afebrile.   denies any pain    Allergies: procainamide (Other (Severe))      Objective:  Vitals:   T(F): 97.9 (08-27-23 @ 04:19), Max: 98.3 (08-26-23 @ 12:05)  HR: 90 (08-27-23 @ 10:20) (80 - 92)  BP: 195/71 (08-27-23 @ 10:20) (133/58 - 211/96)  RR: 18 (08-27-23 @ 04:19) (16 - 20)  SpO2: 91% (08-27-23 @ 04:19) (91% - 97%)  Physical Examination:  General: no acute distress  HEENT: anicteric  Cardio: S1, S2, normal rate,  Resp: clear to auscultation bilaterally  Abd: soft, NT, ND  Ext: no LE edema  Skin: warm, dry  Psych: flat affect    Laboratory Studies:  CBC:                       14.9   16.58 )-----------( 220      ( 27 Aug 2023 06:23 )             45.1     WBC Trend:  16.58 08-27-23 @ 06:23  13.49 08-26-23 @ 07:10  17.56 08-25-23 @ 17:53    CMP: 08-27    134<L>  |  101  |  16  ----------------------------<  165<H>  3.9   |  19<L>  |  0.70    Ca    9.0      27 Aug 2023 06:23  Phos  2.8     08-27  Mg     1.7     08-27    TPro  7.1  /  Alb  4.5  /  TBili  0.4  /  DBili  x   /  AST  17  /  ALT  13  /  AlkPhos  52  08-25      LIVER FUNCTIONS - ( 25 Aug 2023 17:53 )  Alb: 4.5 g/dL / Pro: 7.1 g/dL / ALK PHOS: 52 U/L / ALT: 13 U/L / AST: 17 U/L / GGT: x             Urinalysis Basic - ( 27 Aug 2023 06:23 )    Color: x / Appearance: x / SG: x / pH: x  Gluc: 165 mg/dL / Ketone: x  / Bili: x / Urobili: x   Blood: x / Protein: x / Nitrite: x   Leuk Esterase: x / RBC: x / WBC x   Sq Epi: x / Non Sq Epi: x / Bacteria: x      Microbiology: reviewed     Culture - Blood (collected 08-25-23 @ 23:00)  Source: .Blood Blood  Preliminary Report (08-27-23 @ 03:02):    No growth at 24 hours    Culture - Urine (collected 08-25-23 @ 20:29)  Source: Clean Catch Clean Catch (Midstream)  Final Report (08-26-23 @ 20:20):    <10,000 CFU/mL Normal Urogenital Shannan    Culture - Blood (collected 08-25-23 @ 18:05)  Source: .Blood Blood-Peripheral  Preliminary Report (08-26-23 @ 22:03):    No growth at 24 hours    Culture - Blood (collected 08-25-23 @ 17:50)  Source: .Blood Blood-Peripheral  Preliminary Report (08-26-23 @ 22:03):    No growth at 24 hours        Radiology: reviewed     Medications:  apixaban 5 milliGRAM(s) Oral two times a day  atorvastatin 80 milliGRAM(s) Oral at bedtime  dextrose 5%. 1000 milliLiter(s) IV Continuous <Continuous>  dextrose 5%. 1000 milliLiter(s) IV Continuous <Continuous>  dextrose 50% Injectable 25 Gram(s) IV Push once  dextrose 50% Injectable 12.5 Gram(s) IV Push once  dextrose 50% Injectable 25 Gram(s) IV Push once  dextrose Oral Gel 15 Gram(s) Oral once PRN  digoxin     Tablet 125 MICROGram(s) Oral daily  enalapril 5 milliGRAM(s) Oral daily  fenofibrate Tablet 145 milliGRAM(s) Oral daily  glucagon  Injectable 1 milliGRAM(s) IntraMuscular once  hydrALAZINE 25 milliGRAM(s) Oral three times a day  hydrALAZINE Injectable 10 milliGRAM(s) IV Push every 4 hours PRN  insulin lispro (ADMELOG) corrective regimen sliding scale   SubCutaneous three times a day before meals  isosorbide   dinitrate Tablet (ISORDIL) 10 milliGRAM(s) Oral three times a day  latanoprost 0.005% Ophthalmic Solution 1 Drop(s) Both EYES at bedtime  metoprolol succinate ER 50 milliGRAM(s) Oral daily  oxybutynin 5 milliGRAM(s) Oral two times a day    Antimicrobials:

## 2023-08-29 NOTE — DISCHARGE NOTE PROVIDER - HOSPITAL COURSE
89y/o M h/o AFib (on Eliquis), HTN, HLD, DM, PPM presenting after witnessed fall around 1 pm; usually walks with walker, and went to get up (no walker), and fell onto right side.  Aide does not think he hit head.  Aide also notes that he sounds congested since this morning.  No fever.  No cough.  Patient found to have orthostatic hypotension.   Cont Isordil and enalapril  Carotids without obstruction  TTE pending, RVP x 2 neg   UA negative  leukocytosis, seen by ID, watching off ABx, no source  cont full AC w ELIQUIS,   seen by ID, Card, Neuro    Patient now medically cleared for discharge and to follow up with PCP and cardiology after discharge.

## 2023-08-29 NOTE — PROGRESS NOTE ADULT - REASON FOR ADMISSION
syncope
Nsaids Counseling: NSAID Counseling: I discussed with the patient that NSAIDs should be taken with food. Prolonged use of NSAIDs can result in the development of stomach ulcers.  Patient advised to stop taking NSAIDs if abdominal pain occurs.  The patient verbalized understanding of the proper use and possible adverse effects of NSAIDs.  All of the patient's questions and concerns were addressed.

## 2023-08-29 NOTE — DISCHARGE NOTE PROVIDER - NSDCHHPTASSISTHOME_GEN_ALL_CORE
Patient Needs Assistance to Leave Residence... Oxybutynin Counseling:  I discussed with the patient the risks of oxybutynin including but not limited to skin rash, drowsiness, dry mouth, difficulty urinating, and blurred vision.

## 2023-08-29 NOTE — DISCHARGE NOTE NURSING/CASE MANAGEMENT/SOCIAL WORK - NSDCVIVACCINE_GEN_ALL_CORE_FT
Tdap; 02-Dec-2019 13:48; Najma Dash (RN); [employer]; [id number] (Exp. Date: 17-Sep-2021); IntraMuscular; Deltoid Left.; 0.5 milliLiter(s); VIS (VIS Published: 09-May-2013, VIS Presented: 02-Dec-2019);

## 2023-08-29 NOTE — PROGRESS NOTE ADULT - TIME BILLING
Patient seen and examined.  Agree with above PA note.  events noted  f/u echo   cont htn meds as ordered  trend bp, orthostatic vitals
Patient seen and examined.  Agree with above PA note.  events noted  Echo with nml lv fxn  cont htn meds as ordered  trend bp, orthostatic vitals

## 2023-10-12 NOTE — ED ADULT NURSE NOTE - OBJECTIVE STATEMENT
89 yr old male came in with urinary symptoms and abd pain with some confusion. pt has a pacemaker. his doctor took out his catheter buyt then he was unable to urinate so they placed it again today urine was cloudy they out him on oral antibiotics. but aid said hes more weak than normal. pt a and ox 3 lungs clear abd soft tender when palpated. no swelling in extremities no n/v/d no fevers.

## 2023-10-12 NOTE — ED ADULT NURSE NOTE - NSSEPSISCRITERIAMET_ED_A_ED
Abnormal Lactate Tranexamic Acid Pregnancy And Lactation Text: It is unknown if this medication is safe during pregnancy or breast feeding.

## 2023-10-12 NOTE — ED PROVIDER NOTE - CLINICAL SUMMARY MEDICAL DECISION MAKING FREE TEXT BOX
Patient is a 89-year-old male with history of A-fib on Eliquis, hypertension, hyperlipidemia, diabetes, pacemaker presents with increasing confusion.  Accompanied by a at bedside.  Reports that he had Palmer removed yesterday which was then replaced and was started on Levaquin for urinary tract infection.  However noted to be more confused today and thus brought to the emergency department.  Denies fever, vomiting, complains of pain.  Reports that he had a bowel movement yesterday.  Patient reports that he has no complaints.  On exam patient is not in acute respiratory distress.  Atraumatic and normocephalic, no signs of trauma appreciated on the head.  Active range of motion of the neck.  No nuchal rigidity.  Regular rate and rhythm.  Lungs clear to auscultation bilaterally.  Abdomen is soft but distended and tenderness noted in the left lower quadrant however difficult to localize given patient's mental status.  No lower extremity edema appreciated.  Alert and oriented to self, age and location.  Moving all 4 extremities.  Patient has a Palmer leg bag which is draining dark brown color urine.  In summary 89-year-old male presents with altered mental status and was recently started on Levaquin for urinary tract infection.  Likely infectious with urinary source contributing to altered mental status, however will evaluate for metabolic etiologies.  Patient is alert and oriented to self age and location and no focal neurological deficit, will defer CT head at this point.  Will obtain sepsis work-up and obtain a CT abdomen pelvis to evaluate for possible intra-abdominal pathologies.  Anticipate admission. Patient is a 89-year-old male with history of A-fib on Eliquis, hypertension, hyperlipidemia, diabetes, pacemaker presents with increasing confusion.  Accompanied by a at bedside.  Reports that he had Patel removed yesterday which was then replaced and was started on Levaquin for urinary tract infection.  However noted to be more confused today and thus brought to the emergency department.  Denies fever, vomiting, complains of pain.  Reports that he had a bowel movement yesterday.  Patient reports that he has no complaints.  On exam patient is not in acute respiratory distress.  Atraumatic and normocephalic, no signs of trauma appreciated on the head.  Active range of motion of the neck.  No nuchal rigidity.  Regular rate and rhythm.  Lungs clear to auscultation bilaterally.  Abdomen is soft but distended and tenderness noted in the left lower quadrant however difficult to localize given patient's mental status.  No lower extremity edema appreciated.  Alert and oriented to self, age and location.  Moving all 4 extremities.  Patient has a Patel leg bag which is draining dark brown color urine.  In summary 89-year-old male presents with altered mental status and was recently started on Levaquin for urinary tract infection.  Likely infectious with urinary source contributing to altered mental status, however will evaluate for metabolic etiologies.  Patient is alert and oriented to self age and location and no focal neurological deficit, will defer CT head at this point.  Will obtain sepsis work-up and obtain a CT abdomen pelvis to evaluate for possible intra-abdominal pathologies.  Anticipate admission.  Attending Angeline Villalobos: 89 yo male with multiple  medical issues presenting with weakness and sob. per aid pt found to have UTI and started on abx. today increased weakness and fatigue and noticed some difficulty breathing. upon arrival pt awake and following commands. nonfocal neurologic exam. pt moving all extremities. lungs ctab. pt with sig cardiac history. foey currently in place. pocus performed showing patel balloon within bladder. pt pan cultured. per aid pt started on levaquin by urologist for UTI. nonfocal neurologic exam and pt moving all extremities making intracranial pathology less likely. concern that weakness could be secondary to infection. pt a.so reports some sob. h/o cardiac disease. no reorts of sig weight gain. no pain with inspiration. pt placed on cardiac monitor. elizabet evalaute for viral cause of sob, vs chf exacerabation vs pna. pt cultured will likely need admission

## 2023-10-12 NOTE — H&P ADULT - ASSESSMENT
89y/o M h/o AFib (on Eliquis), HTN, HLD, DM, PPM , indwelling urinary catheter present w his aide 2/2 lethargy and UTI  Patient recently saw his urologist who took the catheter out and was having difficulty urinating and went  back to the urologist today. .  The urologist they placed a new Palmer catheter in and told the patient that he had a urinary tract infection and started Levaquin.  Per the aide he has been increasingly more weak today and having difficulty walking.  No falls or any trauma.  Also noticed he was having some shortness of breath.  No sick contacts.  Patient upon arrival is complaining of some abdominal pain.  Palmer catheter has been draining since it was placed today at the urologist office    A/P  metabolic encephalopathy 2/2 GERMAIN 2/2 volume depletion 2/2 UTI and cystitis  started CTX in the ED, will cont  hold Ace-I, start gentle IVF  PTN is DNR/DNI  cont rest of outptn meds  ptn is well known to me from previous admissions and sundowns while inptn. will place on prn IV haldol which he required in the past  tte done in aug was stable and unchanged comp to 202, will not repeat  check blood and urine cxs  tft s checked in august are in range

## 2023-10-12 NOTE — ED ADULT TRIAGE NOTE - GLASGOW COMA SCALE: BEST VERBAL RESPONSE, MLM
Patient has had a wart on toe on left foot since October. It is painful to touch. Seems like it is increasing in size. She is still able to walk. No signs of infection. They have tried OTC treatments with no success.     Scheduled appointment with Dr. Myrick for Friday. Mother aware time and location. FURI screen is negative.          
Pt has a wart on her toeon L foot.  It's about the size of an eraser.  She has been using an OTC product to freeze it off, and has used all doses of that.  It has not helped.  Mom is wondering if this is something that we can do in office, or should she see dermatology or podiatry?          
(V5) oriented

## 2023-10-12 NOTE — ED PROVIDER NOTE - PROGRESS NOTE DETAILS
Kevin PGY3  UA concerning for UTI; CTAP showed cystitis. Discussed with Dr. Facundo umanzor - dennys for further management. Attending Angeline Villalobos: UA concernig for UTI sob resolved. no sob currently or increased wob

## 2023-10-12 NOTE — ED PROVIDER NOTE - OBJECTIVE STATEMENT
Attending Angeline Villalobos: 89-year-old male past medical issues presenting with concern for difficulty urinating.  Patient recently saw the urologist they took the catheter out and was having difficulty urinating and went  back to the urologist today. .  To urologist they placed a Palmer catheter in and told the patient that he had a urinary tract infection and started Levaquin.  Per the aide he has been increasing more weak today and having difficulty walking.  No falls or any trauma.  Also noticed he was having some shortness of breath.  No sick contacts.  Patient upon arrival is complaining of some abdominal pain.  Palmer catheter has been draining since it was placed. Attending Angeline Villalobos: 89-year-old male past medical issues presenting with concern for difficulty urinating.  Patient recently saw the urologist they took the catheter out and was having difficulty urinating and went  back to the urologist today. .  To urologist they placed a Palmer catheter in and told the patient that he had a urinary tract infection and started Levaquin.  Per the aide he has been increasing more weak today and having difficulty walking.  No falls or any trauma.  Also noticed he was having some shortness of breath.  No sick contacts.  Patient upon arrival is complaining of some abdominal pain.  Palmer catheter has been draining since it was placed.

## 2023-10-12 NOTE — H&P ADULT - HIV OFFER
Anjum has requested home health visit for 1/6/2023 as an extra visit this week as inclimate weather, driving conditions are concerning to him. [facility] have scheduled an extra visit and order has been sent via fax.    Opt out Class III - visualization of the soft palate and the base of the uvula

## 2023-10-12 NOTE — H&P ADULT - NSHPPHYSICALEXAM_GEN_ALL_CORE
T(C): 36.6 (10-12-23 @ 18:20), Max: 36.6 (10-12-23 @ 18:20)  HR: 85 (10-12-23 @ 18:20) (85 - 85)  BP: 104/64 (10-12-23 @ 18:20) (104/64 - 104/64)  RR: 20 (10-12-23 @ 18:20) (20 - 20)  SpO2: 98% (10-12-23 @ 18:20) (98% - 98%)    PHYSICAL EXAM:  GENERAL: NAD, well-developed  HEAD:  Atraumatic, Normocephalic  EYES: EOMI, PERRLA, conjunctiva and sclera clear  NECK: Supple, No JVD  CHEST/LUNG: Clear to auscultation bilaterally; No wheeze  HEART: Regular rate and rhythm; No murmurs, rubs, or gallops  ABDOMEN: Soft, Nontender, Nondistended; Bowel sounds present  EXTREMITIES:  2+ Peripheral Pulses, No clubbing, cyanosis, or edema  PSYCH: AAOx3  NEUROLOGY: non-focal  SKIN: No rashes or lesions

## 2023-10-12 NOTE — ED PROVIDER NOTE - CARE PLAN
1 Principal Discharge DX:	Altered mental status  Secondary Diagnosis:	Urinary tract infection  Secondary Diagnosis:	GERMAIN (acute kidney injury)

## 2023-10-12 NOTE — ED ADULT NURSE NOTE - CAS ELECT INFOMATION PROVIDED
Detail Level: Detailed Plan: Pt advised to use Aquaphor or CeraVe' healing ointment. If lesion does not heal in a month, patient to contact office. Plan: Reassured of benignity DC instructions

## 2023-10-12 NOTE — ED PROVIDER NOTE - PHYSICAL EXAMINATION
see mdm see mdm  Attending Angeline Villalobos: Gen: NAD, heent: atrauamtic, eomi, perrla, mmm,, neck; nttp, no nuchal rigidity, chest: nttp, no crepitus, cv: rrr, , lungs: ctab, abd: soft, nontender, nondistended, no peritoneal signs,  no guarding, ext: wwp, skin: no rash, neuro: awake and alert, following commands, speech clear, sensation and strength intact, no focal deficits

## 2023-10-12 NOTE — ED PROVIDER NOTE - ATTENDING CONTRIBUTION TO CARE
Attending MD Angeline Villalobos:  I personally have seen and examined this patient.  Resident note reviewed and agree on plan of care and except where noted.  See HPI, PE, and MDM for details.

## 2023-10-12 NOTE — H&P ADULT - HISTORY OF PRESENT ILLNESS
89y/o M h/o AFib (on Eliquis), HTN, HLD, DM, PPM , indwelling urinary catheter present w his aide 2/2 lethargy and UTI  Patient recently saw his urologist who took the catheter out and was having difficulty urinating and went  back to the urologist today. .  The urologist they placed a new Palmer catheter in and told the patient that he had a urinary tract infection and started Levaquin.  Per the aide he has been increasingly more weak today and having difficulty walking.  No falls or any trauma.  Also noticed he was having some shortness of breath.  No sick contacts.  Patient upon arrival is complaining of some abdominal pain.  Palmer catheter has been draining since it was placed today at the urologist office

## 2023-10-12 NOTE — ED ADULT NURSE NOTE - NSFALLHARMRISKINTERV_ED_ALL_ED
Assistance OOB with selected safe patient handling equipment if applicable/Assistance with ambulation/Communicate risk of Fall with Harm to all staff, patient, and family/Monitor gait and stability/Monitor for mental status changes and reorient to person, place, and time, as needed/Provide visual cue: red socks, yellow wristband, yellow gown, etc/Reinforce activity limits and safety measures with patient and family/Toileting schedule using arm’s reach rule for commode and bathroom/Use of alarms - bed, stretcher, chair and/or video monitoring/Bed in lowest position, wheels locked, appropriate side rails in place/Call bell, personal items and telephone in reach/Instruct patient to call for assistance before getting out of bed/chair/stretcher/Non-slip footwear applied when patient is off stretcher/[place] to call system/Physically safe environment - no spills, clutter or unnecessary equipment/Purposeful Proactive Rounding/Room/bathroom lighting operational, light cord in reach

## 2023-10-13 NOTE — CONSULT NOTE ADULT - ASSESSMENT
Patient is a 89 year old male with PMH of AFib (on Eliquis), HTN, HLD, DM, PPM, indwelling urinary catheter who presented with his aide for lethargy and UTI.    Sepsis due to acute cystitis/UTI  Urinary retention s/p patel, failed TOV and noted with cloudy urine   - patel removed 10/11 and replaced by urologist on 10/12, started on levofloxacin   -- noted with clear yellow urine in tubing this am  - febrile to 101.8F 10/13 am with lethargy and leukocytosis     -- aid and daughter note mental status improved now  - UA here with pyuria - >50 WBC, large LE, no bacteria   - CTAP with acute cystitis, no s/o pyelonephritis   - Renal/kidney US with mild R hydronephrosis, no L hydronephrosis   - RVP negative   - prior cultures reviewed, no positive cultures noted   - s/p ceftriaxone > broadened to pip-tazo this am    Recommendations:  Follow blood and urine cultures  Continue on pip-tazo for now   Monitor temps/WBC  Continue patel care      Shane Henry M.D.  [facility], Division of Infectious Diseases  [phone]  After 5pm on weekdays and all day on weekends - please call [phone]  
  A/p  89y/o M h/o AFib (on Eliquis), HTN, HLD, DM, PPM , indwelling urinary catheter present w his aide 2/2 lethargy and UTI.      #AF, s/p ppm  -Rate controlled, stable  -cont metoprolol, dig, a/c as ordered    #HTN  -Stable  -Continue meds as ordered    #Lethargy  #UTI  -CXR clear  -F/u cultures  -Abx per med          
89M with urinary retention and suspected UTI. UCx pending. Catheter draining yellow urine    -continue empiric abx, await culture  -imaging reviewed - no hydronephrosis on CT  -maintain catheter drainage    Shahzad Estrada MD  [facility]  [address], [address], [place] [address]  Office: [phone] Fax: [phone]

## 2023-10-13 NOTE — CONSULT NOTE ADULT - TIME BILLING
Patient seen and examined, agree with the above assessment and plan by PA  89y/o M h/o AFib (on Eliquis), HTN, HLD, DM, PPM , indwelling urinary catheter present w his aide 2/2 lethargy and UTI.      #AF, s/p ppm  -Rate controlled, stable  -cont metoprolol, dig, a/c as ordered    #HTN  -Stable  -Continue meds as ordered    #Lethargy  #UTI  -CXR clear  -F/u cultures  -Abx per med

## 2023-10-13 NOTE — CONSULT NOTE ADULT - REASON FOR ADMISSION
metabolic encephalopathy, UTI, urinary retention

## 2023-10-13 NOTE — PROVIDER CONTACT NOTE (OTHER) - ASSESSMENT
Pt is AxOx2, , satting 88% on RA, RR 22, /75, temp 98.4 orally. Temp 101.8 rectally. Pt is currently rigoring stating that he is feeling cold. Pt denies chest pain, no headache, no palpitations.

## 2023-10-13 NOTE — PROVIDER CONTACT NOTE (OTHER) - BACKGROUND
Pt admitted with UTI and lethargy as well as septic workup. Pt has been having AMS and an GERMAIN. Pt has a history of HTN, HLD, chronic patel.

## 2023-10-13 NOTE — CONSULT NOTE ADULT - SUBJECTIVE AND OBJECTIVE BOX
Patient is a 89y old  Male who presents with a chief complaint of metabolic encephalopathy, UTI, urinary retention (13 Oct 2023 11:16)      HPI:  89y/o M h/o AFib (on Eliquis), HTN, HLD, DM, PPM , indwelling urinary catheter present w his aide 2/2 lethargy and UTI  Patient recently saw me and failed TOV after which catheter was replaced. He developed some AMS and a UTI was suspected. Levaquin was started empirically.  Per the aide he has been increasingly more weak today and having difficulty walking.  No falls or any trauma.  Also noticed he was having some shortness of breath.  No sick contacts.  Patient upon arrival is complaining of some abdominal pain.  Patel catheter has been draining since it was placed yesterday at my office        PAST MEDICAL & SURGICAL HISTORY:  HLD (hyperlipidemia)      Pacemaker      AF (atrial fibrillation)      HTN (hypertension)      DM (diabetes mellitus)      Macular degeneration      BPH (benign prostatic hypertrophy)      S/P knee replacement, bilateral          MEDICATIONS  (STANDING):  apixaban 5 milliGRAM(s) Oral two times a day  ascorbic acid 500 milliGRAM(s) Oral daily  atorvastatin 80 milliGRAM(s) Oral at bedtime  digoxin     Tablet 125 MICROGram(s) Oral daily  fenofibrate Tablet 145 milliGRAM(s) Oral daily  hydrALAZINE 25 milliGRAM(s) Oral three times a day  isosorbide   dinitrate Tablet (ISORDIL) 10 milliGRAM(s) Oral three times a day  latanoprost 0.005% Ophthalmic Solution 1 Drop(s) Both EYES at bedtime  metoprolol succinate ER 50 milliGRAM(s) Oral daily  multivitamin 1 Tablet(s) Oral daily  piperacillin/tazobactam IVPB.. 3.375 Gram(s) IV Intermittent every 8 hours  sodium chloride 0.45%. 1000 milliLiter(s) (75 mL/Hr) IV Continuous <Continuous>    MEDICATIONS  (PRN):  haloperidol    Injectable 2 milliGRAM(s) IV Push every 6 hours PRN agitation      Allergies    procainamide (Other (Severe))    Intolerances        SOCIAL HISTORY: No illicit drug use    FAMILY HISTORY:  No pertinent family history in first degree relatives        Vital Signs Last 24 Hrs  T(C): 36.4 (13 Oct 2023 11:23), Max: 38.8 (13 Oct 2023 07:19)  T(F): 97.5 (13 Oct 2023 11:23), Max: 101.8 (13 Oct 2023 07:19)  HR: 87 (13 Oct 2023 11:23) (85 - 130)  BP: 125/69 (13 Oct 2023 11:23) (104/64 - 170/72)  BP(mean): --  RR: 17 (13 Oct 2023 11:23) (17 - 20)  SpO2: 96% (13 Oct 2023 11:23) (94% - 98%)    Parameters below as of 13 Oct 2023 11:23  Patient On (Oxygen Delivery Method): room air        PHYSICAL EXAM:    Constitutional: NAD, well-developed  Neck: No JVD  Back: No CVA tenderness  Respiratory: No accessory respiratory muscle use  Abd: Soft, NT/ND  : patel draining yellow urine  Extremities: No calf tenderness  Neurological: A/O x 1  Skin: No rashes    I&O's Summary      LABS:                        12.1   12.25 )-----------( 204      ( 13 Oct 2023 06:58 )             35.9     10-13    135  |  100  |  30<H>  ----------------------------<  135<H>  4.0   |  22  |  1.30    Ca    9.8      13 Oct 2023 06:58    TPro  6.2  /  Alb  3.8  /  TBili  0.6  /  DBili  x   /  AST  10  /  ALT  11  /  AlkPhos  42  10-12    PT/INR - ( 12 Oct 2023 19:21 )   PT: 20.8 sec;   INR: 2.02 ratio         PTT - ( 12 Oct 2023 19:21 )  PTT:37.3 sec  Urinalysis Basic - ( 13 Oct 2023 06:58 )    Color: x / Appearance: x / SG: x / pH: x  Gluc: 135 mg/dL / Ketone: x  / Bili: x / Urobili: x   Blood: x / Protein: x / Nitrite: x   Leuk Esterase: x / RBC: x / WBC x   Sq Epi: x / Non Sq Epi: x / Bacteria: x      Urine Culture: pending    RADIOLOGY & ADDITIONAL STUDIES: CT 10/12/23 Bladder wall thickening and perivesicular inflammation may represent   acute cystitis. Please correlate with urinalysis. No CT evidence for   acute pyelonephritis.    Diverticulosis. No evidence for active bowel inflammation.
CARDIOLOGY CONSULT - Dr. Siddiqi         HPI:  89y/o M h/o AFib (on Eliquis), HTN, HLD, DM, PPM , indwelling urinary catheter present w his aide 2/2 lethargy and UTI  Patient recently saw his urologist who took the catheter out and was having difficulty urinating and went  back to the urologist today. .  The urologist they placed a new Palmer catheter in and told the patient that he had a urinary tract infection and started Levaquin.  Per the aide he has been increasingly more weak today and having difficulty walking.  No falls or any trauma.  Also noticed he was having some shortness of breath.  No sick contacts.  Patient upon arrival is complaining of some abdominal pain.  Palmer catheter has been draining since it was placed today at the urologist office     (12 Oct 2023 22:43)      PAST MEDICAL & SURGICAL HISTORY:  HLD (hyperlipidemia)      Pacemaker      AF (atrial fibrillation)      HTN (hypertension)      DM (diabetes mellitus)      Macular degeneration      BPH (benign prostatic hypertrophy)      S/P knee replacement, bilateral              PREVIOUS DIAGNOSTIC TESTING:    [x] Echocardiogram:  < from: TTE W or WO Ultrasound Enhancing Agent (08.28.23 @ 12:51) >  CONCLUSIONS:      1. Technically difficult image quality.   2. Left ventricular endocardium is not well visualized; however, the left ventricular systolic function appears grossly normal.   3. Valves and atria could not be be seen or evaluated due to limited image quality.   4. Normal right ventricular cavity size and borderline reduced right ventricular systolic function.   5. No pericardial effusion seen.   6. Compared to the transthoracic echocardiogram performed on 10/9/2020 there have been no significant interval changes.    < end of copied text >    [ ]  Catheterization:  [ ] Stress Test:  	    MEDICATIONS:  Home Medications:  Crestor 20 mg oral tablet: 1 tab(s) orally once a day (12 Oct 2023 22:49)  digoxin 125 mcg (0.125 mg) oral tablet: 1 tab(s) orally once a day (12 Oct 2023 22:49)  Eliquis 5 mg oral tablet: 1 tab(s) orally 2 times a day (12 Oct 2023 22:49)  enalapril 5 mg oral tablet: 1 tab(s) orally once a day (12 Oct 2023 22:49)  fenofibrate 145 mg oral tablet: 1 tab(s) orally once a day (12 Oct 2023 22:49)  Januvia 50 mg oral tablet: 1 tab(s) orally once a day (12 Oct 2023 22:49)  latanoprost 0.005% ophthalmic solution: 1 drop(s) to each affected eye once a day (in the evening) (12 Oct 2023 22:49)  metFORMIN 750 mg oral tablet, extended release: 1 tab(s) orally once a day (12 Oct 2023 22:49)  metoprolol succinate 50 mg oral tablet, extended release: 1 tab(s) orally once a day (12 Oct 2023 22:49)  Myrbetriq 25 mg oral tablet, extended release: 1 tab(s) orally once a day (12 Oct 2023 22:49)      MEDICATIONS  (STANDING):  apixaban 5 milliGRAM(s) Oral two times a day  ascorbic acid 500 milliGRAM(s) Oral daily  atorvastatin 80 milliGRAM(s) Oral at bedtime  digoxin     Tablet 125 MICROGram(s) Oral daily  fenofibrate Tablet 145 milliGRAM(s) Oral daily  hydrALAZINE 25 milliGRAM(s) Oral three times a day  isosorbide   dinitrate Tablet (ISORDIL) 10 milliGRAM(s) Oral three times a day  latanoprost 0.005% Ophthalmic Solution 1 Drop(s) Both EYES at bedtime  metoprolol succinate ER 50 milliGRAM(s) Oral daily  multivitamin 1 Tablet(s) Oral daily  piperacillin/tazobactam IVPB.- 3.375 Gram(s) IV Intermittent once  piperacillin/tazobactam IVPB.. 3.375 Gram(s) IV Intermittent every 8 hours  sodium chloride 0.45%. 1000 milliLiter(s) (75 mL/Hr) IV Continuous <Continuous>      FAMILY HISTORY:  No pertinent family history in first degree relatives        SOCIAL HISTORY:    [x] Non-smoker  [ ] Smoker  [ ] Alcohol    Allergies    procainamide (Other (Severe))    Intolerances    	    REVIEW OF SYSTEMS:  CONSTITUTIONAL: No fever, weight loss, or fatigue  EYES: No eye pain, visual disturbances, or discharge  ENMT:  No difficulty hearing, tinnitus, vertigo; No sinus or throat pain  NECK: No pain or stiffness  RESPIRATORY: No cough, wheezing, chills or hemoptysis; No Shortness of Breath  CARDIOVASCULAR: No chest pain, palpitations, passing out, dizziness, or leg swelling  GASTROINTESTINAL: No abdominal or epigastric pain. No nausea, vomiting, or hematemesis; No diarrhea or constipation. No melena or hematochezia.  GENITOURINARY: No dysuria, frequency, hematuria, or incontinence  NEUROLOGICAL: No headaches, memory loss, loss of strength, numbness, or tremors  SKIN: No itching, burning, rashes, or lesions   	    [ ] All others negative	  [x] Unable to obtain    PHYSICAL EXAM:  T(C): 37.9 (10-13-23 @ 08:20), Max: 38.8 (10-13-23 @ 07:19)  HR: 130 (10-13-23 @ 06:55) (85 - 130)  BP: 143/75 (10-13-23 @ 06:55) (104/64 - 170/72)  RR: 18 (10-13-23 @ 06:55) (17 - 20)  SpO2: 95% (10-13-23 @ 06:55) (94% - 98%)  Wt(kg): --  I&O's Summary      Appearance: Normal	  Psychiatry: A & O x 3, Mood & affect appropriate  HEENT:   Normal oral mucosa, PERRL, EOMI	  Lymphatic: No lymphadenopathy  Cardiovascular: Normal S1 S2,RRR, No JVD, No murmurs  Respiratory: Lungs clear to auscultation b/l	  Gastrointestinal:  Soft, Non-tender, + BS	  Skin: No rashes, No ecchymoses, No cyanosis	  Neurologic: Non-focal  Extremities: Normal range of motion, No clubbing, cyanosis or edema  Vascular: Peripheral pulses palpable 2+ bilaterally    TELEMETRY: 	    ECG:  Afib 76bpm  RADIOLOGY:  < from: Xray Chest 1 View AP/PA (10.12.23 @ 19:18) >  FINDINGS:  Left chest wall pacemaker with lead terminating in the right ventricle.  Calcification of thoracic aorta.  The heart is not accurately assessed in this AP projection.  No focal consolidation.  There is no pneumothorax or pleural effusion.  No acute bony abnormality.  Chronic fracture deformity of the left clavicle and right fourth and   fifth rib.    IMPRESSION:  No focal consolidation.    --- End of Report ---    < end of copied text >    OTHER: 	  	  LABS:	 	    CARDIAC MARKERS:  Troponin T, High Sensitivity Result: 35 ng/L (10-12 @ 22:01)  Troponin T, High Sensitivity Result: 42 ng/L (10-12 @ 19:21)                                  12.1   12.25 )-----------( 204      ( 13 Oct 2023 06:58 )             35.9     10-13    135  |  100  |  30<H>  ----------------------------<  135<H>  4.0   |  22  |  1.30    Ca    9.8      13 Oct 2023 06:58    TPro  6.2  /  Alb  3.8  /  TBili  0.6  /  DBili  x   /  AST  10  /  ALT  11  /  AlkPhos  42  10-12    PT/INR - ( 12 Oct 2023 19:21 )   PT: 20.8 sec;   INR: 2.02 ratio         PTT - ( 12 Oct 2023 19:21 )  PTT:37.3 sec  proBNP:   Lipid Profile:   HgA1c:   TSH:       
[facility]  [facility] [address] [facility], Y. Patel, [facility]  [phone]  ([phone] - weekdays after 5pm and weekends)    CHERY AMEZCUA  89y, Male  [id number]    HPI:  Patient is a 89 year old male with PMH of AFib (on Eliquis), HTN, HLD, DM, PPM, indwelling urinary catheter who presented with his aide for lethargy and UTI. Patient recently saw his urologist who took the catheter out and was having difficulty urinating and went back to the urologist today. The urologist they placed a new Patel catheter in and told the patient that he had a urinary tract infection and started Levaquin.  Per the aide he has been increasingly more weak today and having difficulty walking.  No falls or any trauma.  Also noticed he was having some shortness of breath.  No sick contacts.  Patient upon arrival is complaining of some abdominal pain.  Patel catheter has been draining since it was placed today at the urologist office (12 Oct 2023 22:43)  Patient seen and examined at bedside this morning in the ER, daughter and aid at bedside. Patients daughter reports patient has indwelling catheter that was removed Wed am and then was replaced by urologist yesterday morning due to retention. He was noted with cloudy urine and was started on levofloxacin but patient was more lethargic yesterday and so he was brought into the ER.   ROS: 14 point review of systems completed, pertinent positives and negatives as per HPI.    Allergies: procainamide (Other (Severe))    PMH -- HLD (hyperlipidemia)  Atrial fibrillation  Pacemaker  AF (atrial fibrillation)  HTN (hypertension)  DM (diabetes mellitus)  Macular degeneration  BPH (benign prostatic hypertrophy)    PSH -- S/P knee replacement, bilateral  FH -- No pertinent family history in first degree relatives  Social History -- denies tobacco, alcohol or illicit drug use    Physical Exam--  Vital Signs Last 24 Hrs  T(F): 97.5 (13 Oct 2023 11:23), Max: 101.8 (13 Oct 2023 07:19)  HR: 87 (13 Oct 2023 11:23) (85 - 130)  BP: 125/69 (13 Oct 2023 11:23) (104/64 - 170/72)  RR: 17 (13 Oct 2023 11:23) (17 - 20)  SpO2: 96% (13 Oct 2023 11:23) (94% - 98%)  General: no acute distress  HEENT: NC/AT, EOMI, anicteric, neck supple  Lungs: Clear bilaterally without rales, wheezing or rhonchi  Heart: S1, S2 present, RRR. No murmur, rub or gallop.  Abdomen: Soft. Nondistended. Nontender. BS present.   Neuro: awake, alert, [place], no obvious focal deficits   Extremities: No cyanosis or clubbing. No edema.   Skin: Warm. Dry. Good turgor. No visible rash.   Psychiatric: Appropriate affect and mood for situation.   Lines: PIV; patel with clear urine noted in tubing     Laboratory & Imaging Data--  CBC:                       12.1   12.25 )-----------( 204      ( 13 Oct 2023 06:58 )             35.9     WBC Count: 12.25 K/uL (10-13-23 @ 06:58)  WBC Count: 14.00 K/uL (10-12-23 @ 19:21)    CMP: 10-13    135  |  100  |  30<H>  ----------------------------<  135<H>  4.0   |  22  |  1.30    Ca    9.8      13 Oct 2023 06:58    TPro  6.2  /  Alb  3.8  /  TBili  0.6  /  DBili  x   /  AST  10  /  ALT  11  /  AlkPhos  42  10-12    LIVER FUNCTIONS - ( 12 Oct 2023 19:21 )  Alb: 3.8 g/dL / Pro: 6.2 g/dL / ALK PHOS: 42 U/L / ALT: 11 U/L / AST: 10 U/L / GGT: x           Urinalysis (10.12.23 @ 21:02)    Blood, Urine: Large   pH Urine: 5.5   Glucose Qualitative, Urine: Negative   Color: Light Orange   Urine Appearance: Turbid   Bilirubin: Negative   Ketone - Urine: Negative   Specific Gravity: 1.009   Protein, Urine: 30 mg/dL   Urobilinogen: Negative   Nitrite: Negative   Leukocyte Esterase Concentration: Large    Microbiology: reviewed  Respiratory Viral Panel with COVID-19 by REGAN (10.12.23 @ 21:01)    Rapid RVP Result: NotDetec   SARS-CoV-2: [facility]: This Respiratory Panel uses polymerase chain reaction (PCR) to detect for  adenovirus; coronavirus (HKU1, NL63, 229E, OC43); human metapneumovirus  (hMPV); human enterovirus/rhinovirus (Entero/RV); influenza A; influenza  A/H1; influenza A/H3; influenza A/H1-2009; influenza B; parainfluenza  viruses 1, 2, 3, 4; respiratory syncytial virus; Mycoplasma pneumoniae;  Chlamydophila pneumoniae; and SARS-CoV-2.    Radiology--reviewed  < from: POCUS ED Kidney and Bladder (10.12.23 @ 23:33) >  IMPRESSION:  No hydronephrosis present in the left kidney.  mild hydronephrosis of the right kidney.    < end of copied text >    < from: CT Abdomen and Pelvis w/ IV Cont (10.12.23 @ 21:53) >  IMPRESSION:  Bladder wall thickening and perivesicular inflammation may represent   acute cystitis. Please correlate with urinalysis. No CT evidence for   acute pyelonephritis.    Diverticulosis. No evidence for active bowel inflammation.    < end of copied text >    < from: Xray Chest 1 View AP/PA (10.12.23 @ 19:18) >  IMPRESSION:  No focal consolidation.    < end of copied text >    Active Medications--  apixaban 5 milliGRAM(s) Oral two times a day  ascorbic acid 500 milliGRAM(s) Oral daily  atorvastatin 80 milliGRAM(s) Oral at bedtime  digoxin     Tablet 125 MICROGram(s) Oral daily  fenofibrate Tablet 145 milliGRAM(s) Oral daily  haloperidol    Injectable 2 milliGRAM(s) IV Push every 6 hours PRN  hydrALAZINE 25 milliGRAM(s) Oral three times a day  isosorbide   dinitrate Tablet (ISORDIL) 10 milliGRAM(s) Oral three times a day  latanoprost 0.005% Ophthalmic Solution 1 Drop(s) Both EYES at bedtime  metoprolol succinate ER 50 milliGRAM(s) Oral daily  multivitamin 1 Tablet(s) Oral daily  piperacillin/tazobactam IVPB.. 3.375 Gram(s) IV Intermittent every 8 hours  sodium chloride 0.45%. 1000 milliLiter(s) IV Continuous <Continuous>    Current Antimicrobials:   piperacillin/tazobactam IVPB.. 3.375 Gram(s) IV Intermittent every 8 hours    Prior/Completed Antimicrobials:  cefTRIAXone   IVPB  piperacillin/tazobactam IVPB.  piperacillin/tazobactam IVPB.-

## 2023-10-14 NOTE — PATIENT PROFILE ADULT - FUNCTIONAL ASSESSMENT - BASIC MOBILITY 6.
1-calculated by average/Not able to assess (calculate score using [facility] averaging method)

## 2023-10-14 NOTE — SWALLOW BEDSIDE ASSESSMENT ADULT - SWALLOW EVAL: DIAGNOSIS
Pt is a 89yoM p/w UTI. Pt p/w overtly functional swallow profile. Swallow characterized by functional oral management, timely swallow, no overt s/sx of aspiration/penetration w/ Regular texture solids/thin liquids .Diet modification not indicated. Further intervention not warranted. Please re-consult if clinically indicated

## 2023-10-14 NOTE — PATIENT PROFILE ADULT - FALL HARM RISK - HARM RISK INTERVENTIONS

## 2023-10-14 NOTE — SWALLOW BEDSIDE ASSESSMENT ADULT - SLP GENERAL OBSERVATIONS
Pt received upright at bedside; Aox2-3, on alex air, following all directives, verballymaking wants/needs known. Slightly [place] and benefits from increased volume. Personal aide at bedside.

## 2023-10-14 NOTE — SWALLOW BEDSIDE ASSESSMENT ADULT - SLP PERTINENT HISTORY OF CURRENT PROBLEM
89y/o M h/o AFib (on Eliquis), HTN, HLD, DM, PPM , indwelling urinary catheter present w his aide 2/2 lethargy and UTI. metabolic encephalopathy 2/2 GERMAIN 2/2 volume depletion 2/2 UTI and cystitis. On abx. pt DNR/DNI. ID and urology following for UTI.

## 2023-10-16 NOTE — DIETITIAN INITIAL EVALUATION ADULT - NSFNSPHYEXAMSKINFT_GEN_A_CORE
Pressure Injury 1: sacrum, none  Pressure Injury 2: Bilateral:, buttocks, Suspected deep tissue injury  Pressure Injury 3: none, none  Pressure Injury 4: none, none  Pressure Injury 5: none, none  Pressure Injury 6: none, none  Pressure Injury 7: none, none  Pressure Injury 8: none, none  Pressure Injury 9: none, none  Pressure Injury 10: none, none  Pressure Injury 11: none, none

## 2023-10-16 NOTE — DISCHARGE NOTE PROVIDER - NSDCCPCAREPLAN_GEN_ALL_CORE_FT
PRINCIPAL DISCHARGE DIAGNOSIS  Diagnosis: Urinary tract infection  Assessment and Plan of Treatment: HOME CARE INSTRUCTIONS  f you were prescribed antibiotics, take them exactly as your caregiver instructs you. Finish the medication even if you feel better after you have only taken some of the medication.  Drink enough water and fluids to keep your urine clear or pale yellow.  Avoid caffeine, tea, and carbonated beverages. They tend to irritate your bladder.  Empty your bladder often. Avoid holding urine for long periods of time.  Empty your bladder before and after sexual intercourse.  After a bowel movement, women should cleanse from front to back. Use each tissue only once.  SEEK MEDICAL CARE IF:  You have back pain.  You develop a fever.  Your symptoms do not begin to resolve within 3 days.  SEEK IMMEDIATE MEDICAL CARE IF:  You have severe back pain or lower abdominal pain.  You develop chills.  You have nausea or vomiting.  You have continued burning or discomfort with urination.        SECONDARY DISCHARGE DIAGNOSES  Diagnosis: GERMAIN (acute kidney injury)  Assessment and Plan of Treatment: resolved after receiving IV fluids    Diagnosis: Altered mental status  Assessment and Plan of Treatment: resolved , likely in the setting of infection

## 2023-10-16 NOTE — PROGRESS NOTE ADULT - ASSESSMENT
A/p  89y/o M h/o AFib (on Eliquis), HTN, HLD, DM, PPM , indwelling urinary catheter present w his aide 2/2 lethargy and UTI.      #AF, s/p ppm  -Rate controlled, stable  -cont metoprolol, dig, a/c as ordered    #HTN  -elevated this am despite taking all meds  -Increase lisinopril to 20mg - monitor Cr     #Lethargy  #UTI  -CXR clear  -Mental status improved  -Urology following  -Abx per med          
89M with urinary retention and suspected UTI. UCx neg but was on empiric levofloxacin prior. Now on zosyn. Catheter draining yellow urine    -continue abx per ID  -imaging reviewed - no hydronephrosis on CT  -maintain catheter drainage    Shahzad Estrada MD  [facility]  [address], [address], [place] [address]  Office: [phone] Fax: [phone]  
Patient is a 89 year old male with PMH of AFib (on Eliquis), HTN, HLD, DM, PPM, indwelling urinary catheter who presented with his aide for lethargy and UTI.    Sepsis due to acute cystitis/UTI  Urinary retention s/p patel, failed TOV and noted with cloudy urine   - patel removed 10/11 and replaced by urologist on 10/12, started on levofloxacin  - febrile to 101.8F 10/13 am with lethargy and leukocytosis     -- mental status improved, afebrile >24h, WBC improving   - UA here with pyuria, Ucx negative -- likely due to recent antibiotic   - Bcx NGTD x2   - CTAP with acute cystitis, no s/o pyelonephritis   - Renal/kidney US with mild R hydronephrosis, no L hydronephrosis   - RVP negative   - prior cultures reviewed, no positive cultures noted   - s/p ceftriaxone > s/p pip-tazo 10/13 am-10/15    Recommendations:  Urology following -- noted outpt cx with Klebsiella S to all except ampicillin and nitrofurantoin  Continue Ceftriaxone 1g IV Q24h to complete total 7d course 10/18   - on discharge, can transition to cefuroxime 500mg PO Q12  Continue patel care  Stable from ID standpoint at this time  Discharge planning per primary team    D/w daughter at bedside; Dr. Raz Henry M.D.  [facility], Division of Infectious Diseases  [phone]  After 5pm on weekdays and all day on weekends - please call [phone]  
  A/p  89y/o M h/o AFib (on Eliquis), HTN, HLD, DM, PPM , indwelling urinary catheter present w his aide 2/2 lethargy and UTI.      #AF, s/p ppm  -Rate controlled, stable  -cont metoprolol, dig, a/c as ordered    #HTN  -Stable  -Continue meds as ordered    #Lethargy  #UTI  -CXR clear  -Abx per med          
  A/p  89y/o M h/o AFib (on Eliquis), HTN, HLD, DM, PPM , indwelling urinary catheter present w his aide 2/2 lethargy and UTI.      #AF, s/p ppm  -Rate controlled, stable  -cont metoprolol, dig, a/c as ordered    #HTN  -elevated this am -- continue post meds- if still elevated increase lisinopril dose to 20 mg daily    #Lethargy  #UTI  -CXR clear  -Abx per med          
Patient is a 89 year old male with PMH of AFib (on Eliquis), HTN, HLD, DM, PPM, indwelling urinary catheter who presented with his aide for lethargy and UTI.    Sepsis due to acute cystitis/UTI  Urinary retention s/p patel, failed TOV and noted with cloudy urine   - patel removed 10/11 and replaced by urologist on 10/12, started on levofloxacin  - febrile to 101.8F 10/13 am with lethargy and leukocytosis     -- aid and daughter note mental status improved now, afebrile x24h, WBC stable   - UA here with pyuria - >50 WBC, large LE, no bacteria     -- Ucx negative -- likely due to recent antibiotic   - CTAP with acute cystitis, no s/o pyelonephritis   - Renal/kidney US with mild R hydronephrosis, no L hydronephrosis   - RVP negative   - prior cultures reviewed, no positive cultures noted   - s/p ceftriaxone > broadened to pip-tazo this am    Recommendations:  Follow blood cultures - NGTD x2   Continue on pip-tazo for now   Urology following  Monitor temps/WBC  Continue patel care      Shane Henry M.D.  [facility], Division of Infectious Diseases  [phone]  After 5pm on weekdays and all day on weekends - please call [phone]  
89y/o M h/o AFib (on Eliquis), HTN, HLD, DM, PPM , indwelling urinary catheter present w his aide 2/2 lethargy and UTI  Patient recently saw his urologist who took the catheter out and was having difficulty urinating and went  back to the urologist today. .  The urologist they placed a new Patel catheter in and told the patient that he had a urinary tract infection and started Levaquin.  Per the aide he has been increasingly more weak today and having difficulty walking.  No falls or any trauma.  Also noticed he was having some shortness of breath.  No sick contacts.  Patient upon arrival is complaining of some abdominal pain.  Patel catheter has been draining since it was placed today at the urologist office    A/P  metabolic encephalopathy 2/2 GERMAIN 2/2 volume depletion 2/2 UTI and cystitis RESOLVED  started CTX in the ED, today changed to ZOSYN 2/2 fever, rigors  chronic urinary retention, patel indwelling  GERMAIN resolved  cont gentle hydration  PTN is DNR/DNI  cont rest of outptn meds  on prn IV haldol for agitation  tte done in aug was stable and unchanged comp to 2020, will not repeat  check blood and urine cxs  tft s checked in august are in range
89y/o M h/o AFib (on Eliquis), HTN, HLD, DM, PPM , indwelling urinary catheter present w his aide 2/2 lethargy and UTI  Patient recently saw his urologist who took the catheter out and was having difficulty urinating and went  back to the urologist today. .  The urologist they placed a new Patel catheter in and told the patient that he had a urinary tract infection and started Levaquin.  Per the aide he has been increasingly more weak today and having difficulty walking.  No falls or any trauma.  Also noticed he was having some shortness of breath.  No sick contacts.  Patient upon arrival is complaining of some abdominal pain.  Patel catheter has been draining since it was placed today at the urologist office    A/P  metabolic encephalopathy 2/2 GERMAIN 2/2 volume depletion 2/2 UTI and cystitis RESOLVED  started CTX in the ED, today changed to ZOSYN 2/2 fever, rigors  chronic urinary retention, patel indwelling  GERMAIN resolved, will resume LISINOPRIL  cont gentle hydration  PTN is DNR/DNI  cont rest of outptn meds  on prn IV haldol for agitation  tte done in aug was stable and unchanged comp to 202, will not repeat  check blood and urine cxs  tft s checked in august are in range
89y/o M h/o AFib (on Eliquis), HTN, HLD, DM, PPM , indwelling urinary catheter present w his aide 2/2 lethargy and UTI  Patient recently saw his urologist who took the catheter out and was having difficulty urinating and went  back to the urologist today. .  The urologist they placed a new Patel catheter in and told the patient that he had a urinary tract infection and started Levaquin.  Per the aide he has been increasingly more weak today and having difficulty walking.  No falls or any trauma.  Also noticed he was having some shortness of breath.  No sick contacts.  Patient upon arrival is complaining of some abdominal pain.  Patel catheter has been draining since it was placed today at the urologist office    A/P  metabolic encephalopathy 2/2 GERMAIN 2/2 volume depletion 2/2 UTI and cystitis RESOLVED  on  ZOSYN   chronic urinary retention, patel indwelling  GERMAIN resolved  cont gentle hydration  PTN is DNR/DNI  cont rest of outptn meds  on prn IV haldol for agitation  tte done in aug was stable and unchanged comp to 2020, will not repeat  check blood and urine cxs  tft s checked in august are in range
89y/o M h/o AFib (on Eliquis), HTN, HLD, DM, PPM , indwelling urinary catheter present w his aide 2/2 lethargy and UTI  Patient recently saw his urologist who took the catheter out and was having difficulty urinating and went  back to the urologist today. .  The urologist they placed a new Patel catheter in and told the patient that he had a urinary tract infection and started Levaquin.  Per the aide he has been increasingly more weak today and having difficulty walking.  No falls or any trauma.  Also noticed he was having some shortness of breath.  No sick contacts.  Patient upon arrival is complaining of some abdominal pain.  Patel catheter has been draining since it was placed today at the urologist office    A/P  metabolic encephalopathy 2/2 GERMAIN 2/2 volume depletion 2/2 UTI and cystitis RESOLVED  on  ZOSYN   chronic urinary retention, patel indwelling  GERMAIN resolved  cont gentle hydration  PTN is DNR/DNI  cont rest of outptn meds  on prn IV haldol for agitation  tte done in aug was stable and unchanged comp to 2020, will not repeat  check blood and urine cxs  tft s checked in august are in range
Patient is a 89 year old male with PMH of AFib (on Eliquis), HTN, HLD, DM, PPM, indwelling urinary catheter who presented with his aide for lethargy and UTI.    Sepsis due to acute cystitis/UTI  Urinary retention s/p patel, failed TOV and noted with cloudy urine   - patel removed 10/11 and replaced by urologist on 10/12, started on levofloxacin  - febrile to 101.8F 10/13 am with lethargy and leukocytosis     -- mental status improved, afebrile >24h, WBC improving   - UA here with pyuria, Ucx negative -- likely due to recent antibiotic   - Bcx NGTD x2   - CTAP with acute cystitis, no s/o pyelonephritis   - Renal/kidney US with mild R hydronephrosis, no L hydronephrosis   - RVP negative   - prior cultures reviewed, no positive cultures noted   - s/p ceftriaxone > broadened to pip-tazo 10/13 am    Recommendations:  Urology following -- noted outpt cx with Klebsiella S to all except ampicillin and nitrofurantoin  Discontinued pip-tazo  Started on ceftriaxone 1g IV Q24h to complete total 7d course 10/18   - on discharge, can transition to cefpodoxime 100mg PO Q12h  Monitor temps/WBC  Continue patel care      Shane Henry M.D.  [facility], Division of Infectious Diseases  [phone]  After 5pm on weekdays and all day on weekends - please call [phone]

## 2023-10-16 NOTE — DIETITIAN INITIAL EVALUATION ADULT - [id number]_GEN_A_CORE
Pt was assessed by SLP on 10/14/23, with Recommended Consistencies: Regular texture solids/thin liquids.

## 2023-10-16 NOTE — DIETITIAN INITIAL EVALUATION ADULT - PATIENT MEETS CRITERIA FOR MALNUTRITION
Daily Progress Note    Assessment/Plan:  54 year old male with past medical history of alcohol abuse, hypertension, DM-II, multiple abdominal surgeries who was admitted on 1/15/22 with alcohol withdrawl, possible aspiration pneumonia leading to respiratory failure requiring intubation on 1/16, successfully extubated on 1/23/22 and now transferred out of ICU on 1/26/22 for floor care.  Tested positive for Covid on 1/22/2022 in the hospital  Did improve initially and was pending TCU placement until patient got worsening respiratory condition on 2/11/2022 and CT chest showed worsening pneumonia so restarted on IV antibiotic with Vanco and Zosyn.  Now white count up with elevated lactate and hypotension, ID consulted and now on just meropenem.      Acute respiratory failure with hypoxia.  -Thought secondary to aspiration pneumonia/ COVID/hospital-acquired pneumonia  -Was intubated 1/16, extubated on 1/23.   Required BiPAP for a while but now on oxygen mask oxygen  - Cont albuterol nebs and pulm hygiene  - CT PE was negative for PE on admission  S/b speech therapy     Leukocytosis: ID consulted and now on meropenem.  White count much improved today since starting IV antibiotics but it is unclear what source we are treating.?  Recurrent aspiration     Recovered Covid  -- Weekly COVID (per protocol) came back positive. He was negative on admission on 1/22. Check COVID labs. Start Covid special precaution.   - Finished IV remdesivir and Dexamethasone course. -  - Received dose of tocilizumab/per pulmonology recommendations on 1/31/2022.  -Try to taper off oxygen; thus discontinue dexamethasone on 2/5     DM type 2  Very labile blood sugars  - Hemoglobin A1c was 7.0 on 1/15/22  - Very sensitive to Lantus as patient goes hypoglycemic even with 5 units  Stop Metformin due to lactic acidosis  Continue sliding scale insulin and carb counting      Septic shock-resolved  -- Likely due to multifocal pneumonia.  -- Metoprolol  started on 1/25 for sinus tachycardia  -- Patient is having intermittent hypotension.   - Echo shows EF of 65% with mild concentric left ventricular hypertrophy  - Started on midodrine for persistent low blood pressure     Acute toxic metabolic encephalopathy  - Patient has significant alcohol withdrawal during the intubation and was on Precedex  - Precedex weaned off. Cont with thiamine. Ativan prn.  - Alcohol level less than 10 on admission.    - Collateral information from wife-he did not find any evidence of patient drinking recently.    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  Most recent chemical dependency treatment in 2001.  Since then patient tried to quit drinking numerous times, ending up in the hospital with withdrawal.  He is on disability for 2 years, in the past was a nicole.  - Reconsult psychiatry who are recommending:    Duloxetine 60 mg daily.  Continue gabapentin 200 mg twice a day  Seroquel 12.5 mg TID x 6 doses   Seroquel 75 mg at bedtime continue.  Rozerem 8 mg at bedtime.  Continue to utilize olanzapine 5-10 mg every 6 hours as needed for agitation, psychosis.  Efforts at sleep regulation.  For daytime anxiety would recommend adding Seroquel 6.25 mg in the morning and afternoon.     Hypokalemia/hypomagnesia  Replace per protocol     Diarrhea  This is resolved     Significant weakness and deconditioning  - Secondary to above  - Pending TCU placement     Severe malnutrition  - Protein and calories  - Dietitian consult, continue supplement  - Patient still has poor oral intake     S/P fall   - Patient had incident of fall this morning 2/12/2022, unwitnessed  - Evaluated by house officer and a CT head is negative for significant changes  - Continue fall precautions     Goals of care  -Previous rounder discussed details goals of care with patient's wife Daina on 2/14/2022  - Patient has poor quality of life prior to admission  because of his alcohol use and previous CVAs  - Wife declined going through aggressive care again as what happened on admission with intubation.  -Palliative care following  -He is now DNR and his wife desires no further aggressive interventions.  If his blood pressure goes low or he would not be transferred back to the ICU, he would not get pressors.  I reaffirmed this with her again today.    Patient deteriorating clinically, hypoxic, hypotensive, per wife and family recommendations previously will consult hospice today     VTE prophylaxis:  Enoxaparin (Lovenox) SQ  DIET: Orders Placed This Encounter      Combination Diet Moderate Consistent Carb (60 g CHO per Meal) Diet; Easy to Chew (level 7); Mildly Thick (level 2)        Disposition/Barriers to discharge: Improve mental condition, IV antibiotic, elevated white count  Code Status: No CPR- Do NOT Intubate     Code status:No CPR- Do NOT Intubate      Subjective:  Awake alert, still short of breath      Current Medications Reviewed via EHR List    Objective:  Vital signs in last 24 hours:  [unfilled]  .prog  Weight:   @THISENCWEIGHTS(1)@  Weight change:   Body mass index is 20.68 kg/m .    Intake/Output last 3 shifts:  I/O last 3 completed shifts:  In: 620 [P.O.:120; I.V.:500]  Out: 100 [Urine:100]  Intake/Output this shift:  No intake/output data recorded.    Physical Exam:  General: No apparent distress  CV: Regular rate and rhythm  Lungs: Clear to auscultation  Abdomen: Soft, nontender        Imaging:  Personally Reviewed.  MR Brain w/o Contrast    Result Date: 1/22/2022  EXAM: MR BRAIN W/O CONTRAST LOCATION: [facility] DATE/TIME: 1/22/2022 1:05 PM INDICATION: Encephalopathy COMPARISON: CT head 01/18/2022, MRI head 11/23/2020 TECHNIQUE: Routine multiplanar multisequence head MRI without intravenous contrast. FINDINGS: INTRACRANIAL CONTENTS: Please note study is significantly degraded by motion artifact. Suggestion of a few small foci  of restricted diffusion at the left temporal occipital region; best appreciated on repeat axial diffusion weighted sequence series 20.2 image 12, image 11; as well as repeat coronal diffusion weighted sequence series 24.2 image 9. No definite associated hemorrhage or significant mass effect. Redemonstration of small chronic infarction left precentral gyrus. Mild generalized cerebral atrophy. No hydrocephalus. Normal position of the cerebellar tonsils. Small chronic infarction lateral aspect left cerebellum. A few additional tiny infarctions demonstrated on prior exam are not well demonstrated on the current. SELLA: Not well-visualized, grossly normal. OSSEOUS STRUCTURES/SOFT TISSUES: Normal marrow signal. Flow voids are not well-visualized. ORBITS: Not well-visualized. SINUSES/MASTOIDS: The complete opacification left maxillary and left sphenoid sinus, similar to prior. Small amount of opacification right mastoid air cells.     IMPRESSION: 1.  Please note study is significantly degraded by motion artifact. 2.  Suggestion of a few small foci of acute/early subacute infarction at the left temporal occipital region. 3.  No definite associated hemorrhage or significant mass effect. 4.  Small chronic infarction left precentral gyrus, similar to prior. 5.  Probable few small chronic infarctions cerebellar hemispheres. 6.  Mild atrophy.    Echocardiogram Complete    Result Date: 2022  [id number]^ALEYDA^LYN^[place] [address]  Name: KAREY LIVINGSTON MRN: [id number] : 1967 Study Date: 2022 07:37 AM Age: 54 yrs Gender: Male Patient Location: [facility] Reason For Study: Hypertension (HTN) Ordering Physician: LYN MAYNARD Performed By: TAYLER  BSA: 1.9 m2 Height: 72 in Weight: 147 lb HR: 123 BP: 97/59 mmHg ______________________________________________________________________________ Procedure Complete Portable Echo Adult. Technically difficult  study.Extremely poor acoustic windows. Compared to the prior study dated 11/5/2021, there have been no changes. No hemodynamically significant valvular abnormalities on 2D or color flow imaging. ______________________________________________________________________________ Interpretation Summary  1.Left ventricular size, wall motion and function are normal. The ejection fraction is > 65%. 2.There is mild concentric left ventricular hypertrophy. 3.Normal right ventricle size and systolic function. 4.No hemodynamically significant valvular abnormalities on 2D or color flow imaging. Compared to the prior study dated 11/5/2021, there have been no changes. ______________________________________________________________________________ I      WMSI = 1.00     % Normal = 100  X - Cannot   0 -                      (2) - Mildly 2 -          Segments  Size Interpret    Hyperkinetic 1 - Normal  Hypokinetic  Hypokinetic  1-2     small                                                    7 -          3-5    moderate 3 - Akinetic 4 -          5 -         6 - Akinetic Dyskinetic   6-14    large              Dyskinetic   Aneurysmal  w/scar       w/scar       15-16   diffuse  Left Ventricle Left ventricular size, wall motion and function are normal. The ejection fraction is > 65%. There is mild concentric left ventricular hypertrophy. Left ventricular diastolic function is normal. No regional wall motion abnormalities noted.  Right Ventricle Normal right ventricle size and systolic function. TAPSE is normal, which is consistent with normal right ventricular systolic function.  Atria Normal left atrial size. Right atrial size is normal. There is no color Doppler evidence of an atrial shunt.  Mitral Valve Mitral valve leaflets appear normal. There is no evidence of mitral stenosis or clinically significant mitral regurgitation. There is no mitral regurgitation noted. There is no mitral valve stenosis.  Tricuspid Valve Tricuspid valve  leaflets appear normal. Right ventricle systolic pressure estimate normal. There is trace tricuspid regurgitation. There is no tricuspid stenosis.  Aortic Valve Aortic valve leaflets appear normal. There is no evidence of aortic stenosis or clinically significant aortic regurgitation. The aortic valve is trileaflet. No aortic regurgitation is present. No aortic stenosis is present.  Pulmonic Valve The pulmonic valve is not well seen, but is grossly normal. This degree of valvular regurgitation is within normal limits. There is no pulmonic valvular stenosis.  Vessels The aorta root is normal. Normal size ascending aorta. IVC diameter <2.1 cm collapsing >50% with sniff suggests a normal RA pressure of 3 mmHg.  Pericardium There is no pericardial effusion.  Rhythm The rhythm was sinus tachycardia.  ______________________________________________________________________________ MMode/2D Measurements & Calculations IVSd: 1.3 cm LVIDd: 4.1 cm LVIDs: 2.3 cm LVPWd: 1.2 cm FS: 43.4 %  LV mass(C)d: 182.6 grams LV mass(C)dI: 97.7 grams/m2 Ao root diam: 3.3 cm LA dimension: 3.6 cm asc Aorta Diam: 3.8 cm LA/Ao: 1.1 LVOT diam: 2.2 cm LVOT area: 3.8 cm2 LA Volume (BP): 29.3 ml LA Volume Index (BP): 15.7 ml/m2  LA Volume Indexed (AL/bp): 17.3 ml/m2 RWT: 0.61  Doppler Measurements & Calculations MV E max gustavo: 132.0 cm/sec MV A max gustavo: 137.1 cm/sec MV E/A: 0.96 MV dec slope: 1104 cm/sec2 MV dec time: 0.12 sec Ao V2 max: 143.6 cm/sec Ao max P.0 mmHg Ao V2 mean: 108.6 cm/sec Ao mean P.2 mmHg Ao V2 VTI: 26.8 cm LELO(I,D): 3.5 cm2 LELO(V,D): 3.9 cm2 LV V1 max P.6 mmHg LV V1 max: 146.7 cm/sec LV V1 VTI: 24.2 cm SV(LVOT): 92.9 ml SI(LVOT): 49.7 ml/m2 PA V2 max: 107.4 cm/sec PA max P.6 mmHg PA acc time: 0.07 sec AV Gustavo Ratio (DI): 1.0 LELO Index (cm2/m2): 1.9 E/E' av.4  Lateral E/e': 18.6 Medial E/e': 20.2  ______________________________________________________________________________ Report approved by: Juan R Meyers,  Randal 02/11/2022 09:14 AM       XR Chest Port 1 View    Result Date: 2/16/2022  EXAM: XR CHEST PORT 1 VIEW LOCATION: [facility] DATE/TIME: 2/16/2022 8:33 AM INDICATION: dyspnea COMPARISON: CT pulmonary angiogram 02/11/2022     IMPRESSION: Abnormal increased lung attenuation diffusely through both lungs. There are subsegmental foci of more focal airspace opacity in the peripheral third of the left mid and lower lung, similar to the prior CT. Subpleural emphysema is present in the apices. No new focal airspace opacity or volume loss. Symmetric apical and left lateral pleural thickening has not increased. No pleural effusion or pneumothorax. Cardiac silhouette is not enlarged. Unchanged aortic and other mediastinal interfaces. There are metallic fragments projecting in the left upper quadrant. Chronic left posterolateral rib fracture deformities are ununited.    XR Chest Port 1 View    Result Date: 1/29/2022  EXAM: XR CHEST PORT 1 VIEW LOCATION: [facility] DATE/TIME: 1/29/2022 6:18 AM INDICATION: Pneumonia follow up. COMPARISON: 01/28/2022 chest CT.01/19/2022 radiograph.     IMPRESSION: Relatively diffuse left lung opacities have increased since the 01/19/2022 chest radiograph, and perhaps slightly increased since the 01/20/2022 chest CT. Differentials for the increased opacities include asymmetric pulmonary edema, infectious / inflammatory process or layering pleural fluid. Small left pleural effusion is present. Right lung is hypoexpanded and may have a few subtle scattered opacities. No pneumothorax. Normal heart size. Atherosclerotic aorta.     XR Chest Port 1 View    Result Date: 1/19/2022  EXAM: XR CHEST PORT 1 VIEW LOCATION: [facility] DATE/TIME: 1/19/2022 10:52 AM INDICATION: after advancing et tube COMPARISON: 1/18/2022.     IMPRESSION: Endotracheal tube is been advanced. Tip is 4 cm above the igor in good position. There our  persistent bibasilar pulmonary opacities with partial clearing at the right base from the prior study and no change at the left base. No pneumothorax. Enteric tube tip is not visualized tip is below the diaphragm. PICC catheter from right upper extremity approach is unchanged with tip at the junction of the superior vena cava and right atrium. Left posterior rib fractures are again noted as well as deformity of the right humeral head.    XR Chest Port 1 View    Result Date: 1/18/2022  EXAM: XR CHEST PORT 1 VIEW LOCATION: [facility] DATE/TIME: 1/18/2022 6:06 AM INDICATION: Location of ET tube COMPARISON: 01/16/2022.     IMPRESSION:Endotracheal tube is in the trachea at the level of the clavicular heads with tip 6 cm above the igor. PICC catheter from right upper extremity approach is in the distal superior vena cava near its junction with the right atrium. Enteric tube tip is below the diaphragm and not visualized. There is no pneumothorax. Again noted are bilateral patchy airspace opacities there has been increased consolidation or atelectasis at the right medial lung base. There are old healed left posterior rib  fractures no acute fractures are identified.    CT Abdomen Pelvis w Contrast    Result Date: 2/15/2022  EXAM: CT ABDOMEN PELVIS W CONTRAST LOCATION: [facility] DATE/TIME: 2/15/2022 9:50 PM INDICATION: Leukocytosis. COMPARISON: 10/05/2020 TECHNIQUE: CT scan of the abdomen and pelvis was performed following injection of IV contrast. Multiplanar reformats were obtained. Dose reduction techniques were used. CONTRAST: ISOVUE 370 100ML FINDINGS: LOWER CHEST: Bibasilar infiltrates. Small right and trace left pleural effusion. HEPATOBILIARY: Hepatic steatosis. Cholelithiasis and gallbladder distention. PANCREAS: Pancreatic calcifications suggesting chronic pancreatitis. Prominent calcification at the neck of the pancreas. Difficult to tell if this is parenchymal  or intraductal. The distal pancreas is atrophic. SPLEEN: Splenectomy with accessory splenule. ADRENAL GLANDS: Normal. KIDNEYS/BLADDER: Small nonobstructing renal calculi. BOWEL: Gastric and small bowel wall thickening. Central mesenteric congestion. Trace amount of free fluid. Diverticulosis with presumed retained barium. Wall thickening of the proximal colon. Rectal wall thickening versus underdistention. LYMPH NODES: Subcentimeter mesenteric and peripancreatic lymph nodes. VASCULATURE: Atherosclerotic vascular calcification. PELVIC ORGANS: Small amount of free fluid. MUSCULOSKELETAL: Degenerative change osseous structures. Anasarca. Remote and subacute rib fractures.     IMPRESSION: 1.  Wall thickening of stomach, small bowel and proximal colon. Correlate for gastroenteritis/enterocolitis. 2.  Mesenteric congestion and small amount of free fluid. 3.  Bilateral pulmonary infiltrates. Small right and trace left pleural effusion. 4.  Focal rectal wall thickening versus underdistention. Follow-up recommended to exclude underlying lesion. 5.  Hepatic steatosis. 6.  Diverticulosis. 7.  Cholelithiasis and mild gallbladder distention. 8.  Nonobstructing renal calculi. 9.  Coarse pancreatic calcifications again seen    CT Chest Pulmonary Embolism w Contrast    Result Date: 2/11/2022  EXAM: CT CHEST PULMONARY EMBOLISM W CONTRAST LOCATION: [facility] DATE/TIME: 2/11/2022 1:42 PM INDICATION: PE suspected, low/intermediate prob, positive D-dimer, hypoxia, history of Covid COMPARISON: 01/28/2022 TECHNIQUE: CT chest pulmonary angiogram during arterial phase injection of IV contrast. Multiplanar reformats and MIP reconstructions were performed. Dose reduction techniques were used. CONTRAST: IsoVue 370 100mL FINDINGS: ANGIOGRAM CHEST: No pulmonary artery filling defects. Normal caliber aorta. LUNGS AND PLEURA: Moderate emphysema. Persistent but increased patchy groundglass and consolidative pulmonary  opacities with areas of interlobular septal thickening. Mild bronchiectasis and bronchial wall thickening have increased when compared to prior exam. Trace effusions have decreased. No pneumothorax. MEDIASTINUM/AXILLAE: Heart size is normal. No adenopathy. CORONARY ARTERY CALCIFICATION: None. UPPER ABDOMEN: Hepatic steatosis. Cholelithiasis. There is diffuse wall thickening of the stomach. Small volume ascites. Nonobstructing bilateral renal calculi. Prominent calcification in the area of the pancreatic neck is unchanged. Splenectomy with small accessory splenule. MUSCULOSKELETAL: Degenerative changes of the spine. Prominent L1 Schmorl's node. Similar appearance of the bilateral rib fractures. Mild anasarca.     IMPRESSION: 1.  No pulmonary embolus. 2.  Interval worsening of bilateral pulmonary opacities, bronchiectasis, bronchial wall thickening. 3.  Hepatic steatosis. 4.  Cholelithiasis. 5.  Diffuse wall thickening of the visualized stomach may indicate gastritis. 6.  Manifestations of third spacing.    CT Chest w/o Contrast    Result Date: 1/28/2022  EXAM: CT CHEST W/O CONTRAST LOCATION: [facility] DATE/TIME: 1/28/2022 2:36 PM INDICATION: Cough, persistent COMPARISON: 10/15/2022 TECHNIQUE: CT chest without IV contrast. Multiplanar reformats were obtained. Dose reduction techniques were used. CONTRAST: None. FINDINGS: LUNGS AND PLEURA: Upper lung predominant centrilobular and paraseptal emphysema. Improvement in groundglass and interstitial opacities in the right lung. Slight worsening of groundglass and interstitial opacities in the left upper lobe. New airway wall thickening in the left upper lobe and left lower lobe and new consolidation in the dependent portion of the left upper lobe and at the left lung base. Mucoid impaction in the left lower lobe airways. Unchanged left pleural thickening and trace right pleural effusion. MEDIASTINUM/AXILLAE: No thoracic aortic aneurysm. No  lymphadenopathy. CORONARY ARTERY CALCIFICATION: None. UPPER ABDOMEN: Hepatic steatosis. Cholelithiasis. MUSCULOSKELETAL: There are demonstrated bilateral rib fractures.     IMPRESSION: 1.  New airway wall thickening in the left upper and lower lobes with new consolidation in the dependent portion of the left upper lobe and at the left lung base, and mucoid impaction in left lower lobe airways, suspicious for aspiration. 2.  Overall improvement in groundglass and interstitial opacities in the right lung and slight worsening in the left upper lobe.     CT Head w/o Contrast    Result Date: 2/12/2022  EXAM: CT HEAD W/O CONTRAST LOCATION: [facility] DATE/TIME: 2/12/2022 9:20 AM INDICATION: Trauma - Head Injury. Unwitnessed fall. COMPARISON: CT head 01/18/2022 TECHNIQUE: Routine CT Head without IV contrast. Multiplanar reformats. Dose reduction techniques were used. FINDINGS: INTRACRANIAL CONTENTS: No intracranial hemorrhage, extraaxial collection, or mass effect.  No CT evidence of acute infarct. Small focus of chronic cortical encephalomalacia and adjacent gliosis involving the superior left frontal lobe as seen previously.  Mild presumed chronic small vessel ischemic changes. Mild generalized volume loss. No hydrocephalus. VISUALIZED ORBITS/SINUSES/MASTOIDS: No intraorbital abnormality. Complete opacification of the left maxillary sinus with some chronic appearing mural hyperostosis similar to the previous exam. Additional subtotal opacification of the left sphenoid sinus by polypoid mucosal thickening as seen previously. No middle ear or mastoid effusion. BONES/SOFT TISSUES: Mild right parietal scalp swelling. No skull fracture.     IMPRESSION: 1.  No CT evidence for acute intracranial process. 2.  Mild right parietal scalp contusion without associated fracture. 3.  Brain atrophy and presumed chronic microvascular ischemic changes as above. 4.  Inflammatory changes of the paranasal sinuses as  seen previously.    CT Head w/o Contrast    Result Date: 1/18/2022  EXAM: CT HEAD W/O CONTRAST LOCATION: [facility] DATE/TIME: 1/18/2022 3:45 AM INDICATION: Headache, intracranial hemorrhage suspected. COMPARISON: 11/4/2021 TECHNIQUE: Routine CT Head without IV contrast. Multiplanar reformats. Dose reduction techniques were used. FINDINGS: INTRACRANIAL CONTENTS: No intracranial hemorrhage, extraaxial collection, or mass effect. No CT evidence of acute infarct. Mild presumed chronic small vessel ischemic changes. Mild to moderate generalized volume loss. No hydrocephalus. Stable calcified dural based lesion along the right frontal convexity measuring 1 cm in diameter, presumably reflecting a small meningioma. VISUALIZED ORBITS/SINUSES/MASTOIDS: No intraorbital abnormality. Complete opacification of the left maxillary sinus with chronic mucoperiosteal reaction. Mucous retention cyst in the left sphenoid sinus. Mild mucosal thickening along the floor of the left frontal sinus. Opacified left frontoethmoidal recess. No middle ear or mastoid effusion. BONES/SOFT TISSUES: No acute abnormality.     IMPRESSION: 1.  No CT evidence for acute intracranial process. 2.  Stable chronic changes as above.    XR Video Swallow with SLP or OT    Result Date: 2/8/2022  EXAM: XR VIDEO SWALLOW WITH SLP OR OT LOCATION: [facility] DATE/TIME: 2/8/2022 8:52 AM INDICATION: Difficulty swallowing. COMPARISON: 01/31/2022. TECHNIQUE: Routine swallow study with speech pathology using multiple barium thicknesses. FINDINGS: FLUOROSCOPIC TIME: 1.2 minutes NUMBER OF IMAGES: 0 Swallow study with Speech Pathology using multiple barium thicknesses. Small amount of silent aspiration with thin liquid. Penetration with mildly thickened (nectar) liquid. There was persistent mild penetration with chin Tech technique and mildly thickened liquid. No aspiration was solid consistency.     IMPRESSION: 1.  Silent  aspiration with thin liquid. Penetration with mildly thickened liquid.     XR Video Swallow with SLP or OT    Result Date: 2022  EXAM: XR VIDEO SWALLOW WITH SLP OR OT LOCATION: [facility] DATE/TIME: 2022 11:30 AM INDICATION: Difficulty swallowing. COMPARISON: 2022. TECHNIQUE: Routine swallow study with speech pathology using multiple barium thicknesses. FINDINGS: FLUOROSCOPIC TIME: 3.3 minutes NUMBER OF IMAGES: 0 Swallow study with Speech Pathology using multiple barium thicknesses. Tracheal aspiration with thin liquid consistency barium with weak ineffective cough. Penetration into the laryngeal vestibule with mildly thickened (nectar) liquid consistency barium. No penetration or aspiration with honey and pudding consistency barium. Please refer to speech therapy report for additional detail.    XR Video Swallow with SLP or OT    Result Date: 2022  EXAM: XR VIDEO SWALLOW WITH SLP OR OT LOCATION: [facility] DATE/TIME: 2022 3:27 PM INDICATION: Difficulty swallowing. COMPARISON: None. TECHNIQUE: Routine swallow study with speech pathology using multiple barium thicknesses. FINDINGS: FLUOROSCOPIC TIME: 1.5 minutes NUMBER OF IMAGES: 9 Swallow study with Speech Pathology using multiple barium thicknesses. Moderate to deep penetration with thin liquid (no cough reflex). Small amount of penetration with mildly thick consistency. No other penetration or aspiration.     EEG Coma or Sleep Only    Result Date: 2022  ELECTROENCEPHALOGRAM (EEG) REPORT [facility] NEUROLOGY [address] [place] [address] Tel: [phone]  Fax: [phone] [email or web address] RENUKA Yap 1967, MRN [id number] PCP: Sarah Canseco Date: 2022 Principal Diagnosis: Altered mental status History : Patient is being evaluated for altered mental status. Medication listed includes Ativan Description of the Recording:  This  is a multichannel digital EEG recording done using the international 10-20 placement system. Background of the recording consists of irregular 1 to 3 Hz polymorphic delta activity with amplitudes ranging between 20 to 30  V.  Alerting procedure produce minimal change.  Photic stimulation performed with standard protocol produced minimal change.  No transient sleep patterns were recorded. During this recording, no sharp discharges, spikes or electrographic seizure activity was recorded. Impression: This is an abnormal EEG due to diffusely slow polymorphic delta activity that minimally attenuates with alerting procedures.  This EEG suggests nonspecific generalized cerebral dysfunction.  No focal slowing or periodic discharges were seen to suggest seizures. Classification: Delta grade I generalized Tiburcio Gallagher MD [facility] (Formerly, [facility] This note was dictated using voice recognition software.  Any grammatical or context distortions are unintentional and inherent to the software.       Lab Results:  Personally Reviewed.   Fingerstick Blood Glucose: @[id number](POCGLUFGR:10)@    Last Hbg A1C: No results found for: HGBA1C   Lab Results   Component Value Date    INR 1.67 (H) 01/30/2022    INR 1.72 (H) 01/16/2022    INR 1.69 (H) 01/15/2022     Recent Results (from the past 24 hour(s))   Glucose by meter    Collection Time: 02/18/22  4:29 PM   Result Value Ref Range    GLUCOSE BY METER POCT 128 (H) 70 - 99 mg/dL   Glucose by meter    Collection Time: 02/18/22  8:42 PM   Result Value Ref Range    GLUCOSE BY METER POCT 119 (H) 70 - 99 mg/dL   Magnesium    Collection Time: 02/19/22  5:06 AM   Result Value Ref Range    Magnesium 1.6 (L) 1.8 - 2.6 mg/dL   Potassium    Collection Time: 02/19/22  5:06 AM   Result Value Ref Range    Potassium 4.1 3.5 - 5.0 mmol/L   Lactic Acid STAT    Collection Time: 02/19/22  5:06 AM   Result Value Ref Range    Lactic Acid  1.3 0.7 - 2.0 mmol/L   Extra Purple Top Tube    Collection Time: 02/19/22  5:06 AM   Result Value Ref Range    Hold Specimen JIC    Glucose by meter    Collection Time: 02/19/22  7:43 AM   Result Value Ref Range    GLUCOSE BY METER POCT 134 (H) 70 - 99 mg/dL   Glucose by meter    Collection Time: 02/19/22 11:58 AM   Result Value Ref Range    GLUCOSE BY METER POCT 182 (H) 70 - 99 mg/dL           Advanced Care Planning    Time > 35 minutes with greater than 50% of time spent in counseling and coordination of care.       no

## 2023-10-16 NOTE — DIETITIAN INITIAL EVALUATION ADULT - REASON INDICATOR FOR ASSESSMENT
MST Score 2 or > and Stage 2 Pressure Injury or greater  Information obtained from: Review of pt's current medical record, interview with pt's private aid in his assigned room on [id number]

## 2023-10-16 NOTE — ADVANCED PRACTICE NURSE CONSULT - ASSESSMENT
arrived on unit, patient was found lying in a low air loss pressure redistribution support surface style bed.  patient  is unable to turn independently and staff assistance x 1was provided. Once turned,   incontinence of stool was apparent and betzy care was provided. Indwelling urethral cathter present   bilateral sacrum/ buttock area with erythremia  and scaly and  indurated consistent with incontinence dermatitis with fungal component   bilateral sacrum/buttocks non-blanchable deep red,   discoloration and hyperpigmentation  consistent with deep tissue injury with  size approximately  10 cm x 10 cm x 0 cm.  present  on admission.  Patient family was educated on the importance for patient to  turning and positioning every 2 hours. the use of waffle cushion when out of bed to chair and, to shift his  weight every 2 hours while in chair. the importance of keeping skin clean and dry and, to offload his heels/feet .and optimal nutrition.

## 2023-10-16 NOTE — DIETITIAN INITIAL EVALUATION ADULT - REASON FOR ADMISSION
Chart Reviewed, Events Noted  "89y/o M h/o AFib (on Eliquis), HTN, HLD, DM, PPM , indwelling urinary catheter present w his aide 2/2 lethargy and UTI Patient recently saw his urologist who took the catheter out and was having difficulty urinating and went  back to the urologist today. The urologist they placed a new Palmer catheter in and told the patient that he had a urinary tract infection and started Levaquin.  Per the aide he has been increasingly more weak today and having difficulty walking.  No falls or any trauma.  Also noticed he was having some shortness of breath.  No sick contacts.  Patient upon arrival is complaining of some abdominal pain.  Palmer catheter has been draining since it was placed today at the urologist office."

## 2023-10-16 NOTE — DIETITIAN INITIAL EVALUATION ADULT - PHYSCIAL ASSESSMENT
Weights:  --Drug Dosing Weight: 117.9 kg/260 pounds  (10/11/23)  -- Weight History per [facility]: 179. 9 pounds (9/8/23), 260 pounds (10/12/23)  -- Pt noted with a ~80 pounds weight gain X 1 month, Question accuracy of weight history?, will continue to monitor weight status as able  -- IBW: 190 pounds , %IBW: 137%

## 2023-10-16 NOTE — DISCHARGE NOTE PROVIDER - CARE PROVIDER_API CALL
Artis Monterroso  Cardiovascular Disease  [address], [address]  [place] [address]  Phone: [phone]  Fax: [phone]  Follow Up Time:     Shahzad Estrada  Urology  [address]  [place] [address]  Phone: [phone]  Fax: [phone]  Follow Up Time:

## 2023-10-16 NOTE — DISCHARGE NOTE NURSING/CASE MANAGEMENT/SOCIAL WORK - PATIENT PORTAL LINK FT
You can access the FollowMyHealth Patient Portal offered by [facility] by registering at the following website: [email or web address]. By joining [employer]’s FollowMyHealth portal, you will also be able to view your health information using other applications (apps) compatible with our system.

## 2023-10-16 NOTE — DIETITIAN INITIAL EVALUATION ADULT - ORAL INTAKE PTA/DIET HISTORY
As per Private Aide:  No known food allergies/intolerances. Reports having a good appetite and PO intakes at home. Did not follow any specific dietary restrictions. Pt denies nausea, vomiting, diarrhea, or constipation.

## 2023-10-16 NOTE — DIETITIAN INITIAL EVALUATION ADULT - NS FNS DIET ORDER
Diet, Regular:   Consistent Carbohydrate {No Snacks} (CSTCHO)  Low Sodium (10-12-23 @ 23:08) [Active]

## 2023-10-16 NOTE — PROGRESS NOTE ADULT - TIME BILLING
Agree with above PA note.  cv stable   cont current tx  cont rate control meds, a/c
agree with above  CV stable  cont current cv meds
agree with above  CV stable  cont current cv meds

## 2023-10-16 NOTE — DIETITIAN INITIAL EVALUATION ADULT - PERTINENT MEDS FT
MEDICATIONS  (STANDING):  apixaban 5 milliGRAM(s) Oral two times a day  ascorbic acid 500 milliGRAM(s) Oral daily  atorvastatin 80 milliGRAM(s) Oral at bedtime  cefTRIAXone   IVPB 1000 milliGRAM(s) IV Intermittent every 24 hours  chlorhexidine 2% Cloths 1 Application(s) Topical daily  digoxin     Tablet 125 MICROGram(s) Oral daily  fenofibrate Tablet 145 milliGRAM(s) Oral daily  hydrALAZINE 25 milliGRAM(s) Oral three times a day  isosorbide   dinitrate Tablet (ISORDIL) 10 milliGRAM(s) Oral three times a day  latanoprost 0.005% Ophthalmic Solution 1 Drop(s) Both EYES at bedtime  lisinopril 10 milliGRAM(s) Oral daily  metoprolol succinate ER 50 milliGRAM(s) Oral daily  multivitamin 1 Tablet(s) Oral daily  sodium chloride 0.45%. 1000 milliLiter(s) (75 mL/Hr) IV Continuous <Continuous>    MEDICATIONS  (PRN):  acetaminophen     Tablet .. 650 milliGRAM(s) Oral every 6 hours PRN Mild Pain (1 - 3)  haloperidol    Injectable 2 milliGRAM(s) IV Push every 6 hours PRN agitation

## 2023-10-16 NOTE — PROGRESS NOTE ADULT - PROVIDER SPECIALTY LIST ADULT
Cardiology
Infectious Disease
Internal Medicine
Infectious Disease
Infectious Disease
Internal Medicine
Cardiology
Urology
Urology
Cardiology
Internal Medicine
Internal Medicine

## 2023-10-16 NOTE — ADVANCED PRACTICE NURSE CONSULT - REASON FOR CONSULT
Consult to assess patient skin initiated by RN bilateral sacrum/ buttock deep tissue injury present on admission.    History of Present Illness:  Reason for Admission: metabolic encephalopathy, UTI, urinary retention  History of Present Illness:   89y/o M h/o AFib (on Eliquis), HTN, HLD, DM, PPM , indwelling urinary catheter present w his aide 2/2 lethargy and UTI  Patient recently saw his urologist who took the catheter out and was having difficulty urinating and went  back to the urologist today. .  The urologist they placed a new Palmer catheter in and told the patient that he had a urinary tract infection and started Levaquin.  Per the aide he has been increasingly more weak today and having difficulty walking.  No falls or any trauma.  Also noticed he was having some shortness of breath.  No sick contacts.  Patient upon arrival is complaining of some abdominal pain.  Palmer catheter has been draining since it was placed today at the urologist office

## 2023-10-16 NOTE — DIETITIAN INITIAL EVALUATION ADULT - OTHER CALCULATIONS
-- Defer fluid needs to medical team  -- Estimated Calorie/Protein needs based on lower IBW range of 171 pounds

## 2023-10-16 NOTE — DISCHARGE NOTE PROVIDER - NSDCFUSCHEDAPPT_GEN_ALL_CORE_FT
Per mom child seen in [address] [place] [address] yesterday and given dose of decadron. RSV positive. Mom wondering what she should be doing now. Child doing well today however still with cough and nasal congestion. Having wet diapers, no resp difficulty. Advised to watch for any resp difficulty: retractions, stridor. If occurs, then to go to ER. Otherwise to observe and to call with any questions, worsening symptoms. Mom verbalized understanding. Dr. Chris Chao please advise if other recommendations. [facility] Physician [facility]  WOUND[facility] [address]  Scheduled Appointment: 11/03/2023

## 2023-10-16 NOTE — DISCHARGE NOTE NURSING/CASE MANAGEMENT/SOCIAL WORK - NSDCPEFALRISK_GEN_ALL_CORE
For information on Fall & Injury Prevention, visit: [email or web address] OR  [email or web address] OR  https://www.cdc.gov/steadi/patient.html

## 2023-10-16 NOTE — ADVANCED PRACTICE NURSE CONSULT - RECOMMEDATIONS
Impression   bilateral sacrum/ buttocks deep tissue injury present on admission  bilateral sacrum/ buttock  incontinence dermatitis with fungal rash     Recommendations  1. Bilateral  , sacral / buttocks  deep tissue injury   betzy rectal incontinence dermatitis with fungal component    Topical therapy- sacral/bilateral buttocks- cleanse w/incontinent cleanser, pat dry & apply argenis moisture barrier cream twice daily & prn soiling .monitor    for changes .   2. Elevate heels; apply Complete Cair air fluidized boots; ensure that the soles of the feet are not resting on the foot board of the bed.  3.  Incontinent management - incontinent cleanser, pads,  betzy care  BID  4. Maintain on an alternating air with low air loss surface   5. Turn & reposition every 2 hr; Use positioning pillow to turn and reposition, soft pillow between bony prominences; continue measures to decrease friction/shear/pressure.  6. Nutrition optimization.  7.  waffle cushion when out of bed to chair .  plan of care reviewed with covering staff MOE Ugalde   Impression   bilateral sacrum/ buttocks deep tissue injury present on admission  bilateral sacrum/ buttock  incontinence dermatitis with fungal rash     Recommendations  1. Bilateral  , sacral / buttocks  deep tissue injury   betzy rectal incontinence dermatitis with fungal component    Topical therapy- sacral/bilateral buttocks- cleanse w/incontinent cleanser, pat dry & apply argenis Antifungal moisture barrier cream twice daily & prn soiling .monitor    for changes .   2. Elevate heels; apply Complete Cair air fluidized boots; ensure that the soles of the feet are not resting on the foot board of the bed.  3.  Incontinent management - incontinent cleanser, pads,  betzy care  BID  4. Maintain on an alternating air with low air loss surface   5. Turn & reposition every 2 hr; Use positioning pillow to turn and reposition, soft pillow between bony prominences; continue measures to decrease friction/shear/pressure.  6. Nutrition optimization.  7.  waffle cushion when out of bed to chair .  plan of care reviewed with covering staff MOE Ugalde

## 2023-10-16 NOTE — PROGRESS NOTE ADULT - REASON FOR ADMISSION
metabolic encephalopathy, UTI, urinary retention
metabolic encephalopathy, UTI, urinary rectention
metabolic encephalopathy, UTI, urinary retention

## 2023-10-16 NOTE — PROGRESS NOTE ADULT - SUBJECTIVE AND OBJECTIVE BOX
Patient is a 89y old  Male who presents with a chief complaint of metabolic encephalopathy, UTI, urinary retention (13 Oct 2023 14:45)      SUBJECTIVE / OVERNIGHT EVENTS: ptn w rigors this am, Abx changed to Zosyn. ptn required Haldol IV today at 11 am 2/2 agitation    MEDICATIONS  (STANDING):  apixaban 5 milliGRAM(s) Oral two times a day  ascorbic acid 500 milliGRAM(s) Oral daily  atorvastatin 80 milliGRAM(s) Oral at bedtime  digoxin     Tablet 125 MICROGram(s) Oral daily  fenofibrate Tablet 145 milliGRAM(s) Oral daily  hydrALAZINE 25 milliGRAM(s) Oral three times a day  isosorbide   dinitrate Tablet (ISORDIL) 10 milliGRAM(s) Oral three times a day  latanoprost 0.005% Ophthalmic Solution 1 Drop(s) Both EYES at bedtime  metoprolol succinate ER 50 milliGRAM(s) Oral daily  multivitamin 1 Tablet(s) Oral daily  piperacillin/tazobactam IVPB.. 3.375 Gram(s) IV Intermittent every 8 hours  sodium chloride 0.45%. 1000 milliLiter(s) (75 mL/Hr) IV Continuous <Continuous>    MEDICATIONS  (PRN):  haloperidol    Injectable 2 milliGRAM(s) IV Push every 6 hours PRN agitation      Vital Signs Last 24 Hrs  T(F): 98.1 (10-13-23 @ 16:34), Max: 101.8 (10-13-23 @ 07:19)  HR: 104 (10-13-23 @ 16:34) (85 - 130)  BP: 178/76 (10-13-23 @ 16:34) (104/64 - 178/76)  RR: 18 (10-13-23 @ 16:34) (17 - 20)  SpO2: 97% (10-13-23 @ 16:34) (94% - 98%)  Telemetry:   CAPILLARY BLOOD GLUCOSE      POCT Blood Glucose.: 161 mg/dL (13 Oct 2023 11:47)  POCT Blood Glucose.: 165 mg/dL (13 Oct 2023 07:34)    I&O's Summary      PHYSICAL EXAM:  GENERAL: NAD, well-developed  HEAD:  Atraumatic, Normocephalic  EYES: EOMI, PERRLA, conjunctiva and sclera clear  NECK: Supple, No JVD  CHEST/LUNG: Clear to auscultation bilaterally; No wheeze  HEART: Regular rate and rhythm; No murmurs, rubs, or gallops  ABDOMEN: Soft, Nontender, Nondistended; Bowel sounds present  EXTREMITIES:  2+ Peripheral Pulses, No clubbing, cyanosis, or edema  PSYCH: AAOx3  NEUROLOGY: non-focal  SKIN: No rashes or lesions    LABS:                        12.1   12.25 )-----------( 204      ( 13 Oct 2023 06:58 )             35.9     10-13    135  |  100  |  30<H>  ----------------------------<  135<H>  4.0   |  22  |  1.30    Ca    9.8      13 Oct 2023 06:58    TPro  6.2  /  Alb  3.8  /  TBili  0.6  /  DBili  x   /  AST  10  /  ALT  11  /  AlkPhos  42  10-12    PT/INR - ( 12 Oct 2023 19:21 )   PT: 20.8 sec;   INR: 2.02 ratio         PTT - ( 12 Oct 2023 19:21 )  PTT:37.3 sec  CARDIAC MARKERS ( 12 Oct 2023 22:01 )  x     / x     / 49 U/L / x     / 2.5 ng/mL      Urinalysis Basic - ( 13 Oct 2023 06:58 )    Color: x / Appearance: x / SG: x / pH: x  Gluc: 135 mg/dL / Ketone: x  / Bili: x / Urobili: x   Blood: x / Protein: x / Nitrite: x   Leuk Esterase: x / RBC: x / WBC x   Sq Epi: x / Non Sq Epi: x / Bacteria: x        RADIOLOGY & ADDITIONAL TESTS:    Imaging Personally Reviewed:    Consultant(s) Notes Reviewed:      Care Discussed with Consultants/Other Providers:  
Patient is a 89y old  Male who presents with a chief complaint of metabolic encephalopathy, UTI, urinary retention (16 Oct 2023 12:22)      SUBJECTIVE / OVERNIGHT EVENTS: no new events    MEDICATIONS  (STANDING):  apixaban 5 milliGRAM(s) Oral two times a day  ascorbic acid 500 milliGRAM(s) Oral daily  atorvastatin 80 milliGRAM(s) Oral at bedtime  cefTRIAXone   IVPB 1000 milliGRAM(s) IV Intermittent every 24 hours  chlorhexidine 2% Cloths 1 Application(s) Topical daily  digoxin     Tablet 125 MICROGram(s) Oral daily  fenofibrate Tablet 145 milliGRAM(s) Oral daily  hydrALAZINE 25 milliGRAM(s) Oral three times a day  isosorbide   dinitrate Tablet (ISORDIL) 10 milliGRAM(s) Oral three times a day  latanoprost 0.005% Ophthalmic Solution 1 Drop(s) Both EYES at bedtime  lisinopril 10 milliGRAM(s) Oral daily  metoprolol succinate ER 50 milliGRAM(s) Oral daily  multivitamin 1 Tablet(s) Oral daily  sodium chloride 0.45%. 1000 milliLiter(s) (75 mL/Hr) IV Continuous <Continuous>    MEDICATIONS  (PRN):  acetaminophen     Tablet .. 650 milliGRAM(s) Oral every 6 hours PRN Mild Pain (1 - 3)  haloperidol    Injectable 2 milliGRAM(s) IV Push every 6 hours PRN agitation      Vital Signs Last 24 Hrs  T(F): 98 (10-16-23 @ 09:09), Max: 98.5 (10-16-23 @ 01:03)  HR: 79 (10-16-23 @ 09:09) (79 - 89)  BP: 154/63 (10-16-23 @ 09:09) (154/63 - 187/82)  RR: 18 (10-16-23 @ 09:09) (18 - 18)  SpO2: 96% (10-16-23 @ 09:09) (95% - 96%)  Telemetry:   CAPILLARY BLOOD GLUCOSE        I&O's Summary    15 Oct 2023 07:01  -  16 Oct 2023 07:00  --------------------------------------------------------  IN: 100 mL / OUT: 1600 mL / NET: -1500 mL        PHYSICAL EXAM:  GENERAL: NAD, well-developed  HEAD:  Atraumatic, Normocephalic  EYES: EOMI, PERRLA, conjunctiva and sclera clear  NECK: Supple, No JVD  CHEST/LUNG: Clear to auscultation bilaterally; No wheeze  HEART: Regular rate and rhythm; No murmurs, rubs, or gallops  ABDOMEN: Soft, Nontender, Nondistended; Bowel sounds present  EXTREMITIES:  2+ Peripheral Pulses, No clubbing, cyanosis, or edema  PSYCH: AAOx3  NEUROLOGY: non-focal  SKIN: No rashes or lesions    LABS:                        11.8   11.94 )-----------( 221      ( 15 Oct 2023 07:14 )             36.4     10-15    137  |  102  |  37<H>  ----------------------------<  128<H>  3.6   |  20<L>  |  1.35<H>    Ca    8.9      15 Oct 2023 07:11    TPro  6.4  /  Alb  3.3  /  TBili  0.4  /  DBili  x   /  AST  24  /  ALT  17  /  AlkPhos  48  10-15          Urinalysis Basic - ( 15 Oct 2023 07:11 )    Color: x / Appearance: x / SG: x / pH: x  Gluc: 128 mg/dL / Ketone: x  / Bili: x / Urobili: x   Blood: x / Protein: x / Nitrite: x   Leuk Esterase: x / RBC: x / WBC x   Sq Epi: x / Non Sq Epi: x / Bacteria: x        RADIOLOGY & ADDITIONAL TESTS:    Imaging Personally Reviewed:    Consultant(s) Notes Reviewed:      Care Discussed with Consultants/Other Providers:  
Patient is a 89y old  Male who presents with a chief complaint of metabolic encephalopathy, UTI, urinary retention    SUBJECTIVE: no new events    MEDICATIONS  (STANDING):  apixaban 5 milliGRAM(s) Oral two times a day  ascorbic acid 500 milliGRAM(s) Oral daily  atorvastatin 80 milliGRAM(s) Oral at bedtime  chlorhexidine 2% Cloths 1 Application(s) Topical daily  digoxin     Tablet 125 MICROGram(s) Oral daily  fenofibrate Tablet 145 milliGRAM(s) Oral daily  hydrALAZINE 25 milliGRAM(s) Oral three times a day  isosorbide   dinitrate Tablet (ISORDIL) 10 milliGRAM(s) Oral three times a day  latanoprost 0.005% Ophthalmic Solution 1 Drop(s) Both EYES at bedtime  lisinopril 10 milliGRAM(s) Oral daily  metoprolol succinate ER 50 milliGRAM(s) Oral daily  multivitamin 1 Tablet(s) Oral daily  piperacillin/tazobactam IVPB.. 3.375 Gram(s) IV Intermittent every 8 hours  sodium chloride 0.45%. 1000 milliLiter(s) (75 mL/Hr) IV Continuous <Continuous>    T(C): 36.8 (10-15-23 @ 04:33), Max: 37.2 (10-14-23 @ 17:00)  HR: 91 (10-15-23 @ 04:33) (88 - 91)  BP: 172/69 (10-15-23 @ 04:33) (130/64 - 172/69)  RR: 18 (10-15-23 @ 04:33) (18 - 18)  SpO2: 95% (10-15-23 @ 04:33) (94% - 98%)    PHYSICAL EXAM:  GENERAL: NAD, well-developed  HEAD:  Atraumatic, Normocephalic  EYES: EOMI, PERRLA, conjunctiva and sclera clear  NECK: Supple, No JVD  CHEST/LUNG: Clear to auscultation bilaterally; No wheeze  HEART: Regular rate and rhythm; No murmurs, rubs, or gallops  ABDOMEN: Soft, Nontender, Nondistended; Bowel sounds present  EXTREMITIES:  2+ Peripheral Pulses, No clubbing, cyanosis, or edema  PSYCH: AAOx3  NEUROLOGY: non-focal  SKIN: No rashes or lesions    LABS:                        11.8   11.94 )-----------( 221      ( 15 Oct 2023 07:14 )             36.4     10-15    137  |  102  |  37<H>  ----------------------------<  128<H>  3.6   |  20<L>  |  1.35<H>    Ca    8.9      15 Oct 2023 07:11    TPro  6.4  /  Alb  3.3  /  TBili  0.4  /  DBili  x   /  AST  24  /  ALT  17  /  AlkPhos  48  10-15          Urinalysis Basic - ( 15 Oct 2023 07:11 )    Color: x / Appearance: x / SG: x / pH: x  Gluc: 128 mg/dL / Ketone: x  / Bili: x / Urobili: x   Blood: x / Protein: x / Nitrite: x   Leuk Esterase: x / RBC: x / WBC x   Sq Epi: x / Non Sq Epi: x / Bacteria: x    
Urine culture from office 10/12/23 returned with Klebsiella SENSITIVE to all BUT ampicillin and nitrofurantoin.
CARDIOLOGY FOLLOW UP - Dr. Siddiqi  DATE OF SERVICE: 10/14/23     no acute cv events       REVIEW OF SYSTEMS:  CONSTITUTIONAL: No fever, weight loss, or fatigue  RESPIRATORY: No cough, wheezing, chills or hemoptysis; No Shortness of Breath  CARDIOVASCULAR: No chest pain, palpitations, passing out, dizziness, or leg swelling  GASTROINTESTINAL: No abdominal or epigastric pain. No nausea, vomiting, or hematemesis; No diarrhea or constipation. No melena or hematochezia.  VASCULAR: No edema     PHYSICAL EXAM:  T(C): 37.2 (10-14-23 @ 08:43), Max: 37.2 (10-14-23 @ 08:43)  HR: 95 (10-14-23 @ 08:43) (85 - 104)  BP: 128/55 (10-14-23 @ 08:43) (119/79 - 178/76)  RR: 18 (10-14-23 @ 08:43) (17 - 18)  SpO2: 98% (10-14-23 @ 08:43) (96% - 98%)  Wt(kg): --  I&O's Summary    13 Oct 2023 07:01  -  14 Oct 2023 07:00  --------------------------------------------------------  IN: 0 mL / OUT: 800 mL / NET: -800 mL        Appearance: Normal	  Cardiovascular: Normal S1 S2,RRR, No JVD, No murmurs  Respiratory: Lungs clear to auscultation	  Gastrointestinal:  Soft, Non-tender, + BS	  Extremities: Normal range of motion, No clubbing, cyanosis or edema      Home Medications:  Crestor 20 mg oral tablet: 1 tab(s) orally once a day (12 Oct 2023 22:49)  digoxin 125 mcg (0.125 mg) oral tablet: 1 tab(s) orally once a day (12 Oct 2023 22:49)  Eliquis 5 mg oral tablet: 1 tab(s) orally 2 times a day (12 Oct 2023 22:49)  enalapril 5 mg oral tablet: 1 tab(s) orally once a day (12 Oct 2023 22:49)  fenofibrate 145 mg oral tablet: 1 tab(s) orally once a day (12 Oct 2023 22:49)  Januvia 50 mg oral tablet: 1 tab(s) orally once a day (12 Oct 2023 22:49)  latanoprost 0.005% ophthalmic solution: 1 drop(s) to each affected eye once a day (in the evening) (12 Oct 2023 22:49)  metFORMIN 750 mg oral tablet, extended release: 1 tab(s) orally once a day (12 Oct 2023 22:49)  metoprolol succinate 50 mg oral tablet, extended release: 1 tab(s) orally once a day (12 Oct 2023 22:49)  Myrbetriq 25 mg oral tablet, extended release: 1 tab(s) orally once a day (12 Oct 2023 22:49)      MEDICATIONS  (STANDING):  apixaban 5 milliGRAM(s) Oral two times a day  ascorbic acid 500 milliGRAM(s) Oral daily  atorvastatin 80 milliGRAM(s) Oral at bedtime  chlorhexidine 2% Cloths 1 Application(s) Topical daily  digoxin     Tablet 125 MICROGram(s) Oral daily  fenofibrate Tablet 145 milliGRAM(s) Oral daily  hydrALAZINE 25 milliGRAM(s) Oral three times a day  isosorbide   dinitrate Tablet (ISORDIL) 10 milliGRAM(s) Oral three times a day  latanoprost 0.005% Ophthalmic Solution 1 Drop(s) Both EYES at bedtime  lisinopril 10 milliGRAM(s) Oral daily  metoprolol succinate ER 50 milliGRAM(s) Oral daily  multivitamin 1 Tablet(s) Oral daily  piperacillin/tazobactam IVPB.. 3.375 Gram(s) IV Intermittent every 8 hours  sodium chloride 0.45%. 1000 milliLiter(s) (75 mL/Hr) IV Continuous <Continuous>      TELEMETRY: 	    ECG:  	  RADIOLOGY:   DIAGNOSTIC TESTING:  [ ] Echocardiogram:  [ ]  Catheterization:  [ ] Stress Test:    OTHER: 	    LABS:	 	    Creatine Kinase, Serum: 49 U/L [30 - 200] (10-12 @ 22:01)  CKMB Units: 2.5 ng/mL [0.0 - 6.7] (10-12 @ 22:01)  Troponin T, High Sensitivity Result: 35 ng/L [0 - 51] (10-12 @ 22:01)  Troponin T, High Sensitivity Result: 42 ng/L [0 - 51] (10-12 @ 19:21)                          12.0   12.74 )-----------( 219      ( 14 Oct 2023 07:00 )             37.0     10-13    135  |  100  |  30<H>  ----------------------------<  135<H>  4.0   |  22  |  1.30    Ca    9.8      13 Oct 2023 06:58    TPro  6.2  /  Alb  3.8  /  TBili  0.6  /  DBili  x   /  AST  10  /  ALT  11  /  AlkPhos  42  10-12    PT/INR - ( 12 Oct 2023 19:21 )   PT: 20.8 sec;   INR: 2.02 ratio         PTT - ( 12 Oct 2023 19:21 )  PTT:37.3 sec        
CARDIOLOGY FOLLOW UP - Dr. Siddiqi  DATE OF SERVICE: 10/15/23     CC no cp or sob       REVIEW OF SYSTEMS:  CONSTITUTIONAL: No fever, weight loss, or fatigue  RESPIRATORY: No cough, wheezing, chills or hemoptysis; No Shortness of Breath  CARDIOVASCULAR: No chest pain, palpitations, passing out, dizziness, or leg swelling  GASTROINTESTINAL: No abdominal or epigastric pain. No nausea, vomiting, or hematemesis; No diarrhea or constipation. No melena or hematochezia.  VASCULAR: No edema     PHYSICAL EXAM:  T(C): 36.8 (10-15-23 @ 04:33), Max: 37.2 (10-14-23 @ 17:00)  HR: 91 (10-15-23 @ 04:33) (88 - 91)  BP: 172/69 (10-15-23 @ 04:33) (130/64 - 172/69)  RR: 18 (10-15-23 @ 04:33) (18 - 18)  SpO2: 95% (10-15-23 @ 04:33) (94% - 98%)  Wt(kg): --  I&O's Summary    14 Oct 2023 07:01  -  15 Oct 2023 07:00  --------------------------------------------------------  IN: 100 mL / OUT: 1300 mL / NET: -1200 mL        Appearance: Normal	  Cardiovascular: Normal S1 S2,RRR, No JVD, No murmurs  Respiratory: Lungs clear to auscultation	  Gastrointestinal:  Soft, Non-tender, + BS	  Extremities: Normal range of motion, No clubbing, cyanosis or edema      Home Medications:  Crestor 20 mg oral tablet: 1 tab(s) orally once a day (12 Oct 2023 22:49)  digoxin 125 mcg (0.125 mg) oral tablet: 1 tab(s) orally once a day (12 Oct 2023 22:49)  Eliquis 5 mg oral tablet: 1 tab(s) orally 2 times a day (12 Oct 2023 22:49)  enalapril 5 mg oral tablet: 1 tab(s) orally once a day (12 Oct 2023 22:49)  fenofibrate 145 mg oral tablet: 1 tab(s) orally once a day (12 Oct 2023 22:49)  Januvia 50 mg oral tablet: 1 tab(s) orally once a day (12 Oct 2023 22:49)  latanoprost 0.005% ophthalmic solution: 1 drop(s) to each affected eye once a day (in the evening) (12 Oct 2023 22:49)  metFORMIN 750 mg oral tablet, extended release: 1 tab(s) orally once a day (12 Oct 2023 22:49)  metoprolol succinate 50 mg oral tablet, extended release: 1 tab(s) orally once a day (12 Oct 2023 22:49)  Myrbetriq 25 mg oral tablet, extended release: 1 tab(s) orally once a day (12 Oct 2023 22:49)      MEDICATIONS  (STANDING):  apixaban 5 milliGRAM(s) Oral two times a day  ascorbic acid 500 milliGRAM(s) Oral daily  atorvastatin 80 milliGRAM(s) Oral at bedtime  chlorhexidine 2% Cloths 1 Application(s) Topical daily  digoxin     Tablet 125 MICROGram(s) Oral daily  fenofibrate Tablet 145 milliGRAM(s) Oral daily  hydrALAZINE 25 milliGRAM(s) Oral three times a day  isosorbide   dinitrate Tablet (ISORDIL) 10 milliGRAM(s) Oral three times a day  latanoprost 0.005% Ophthalmic Solution 1 Drop(s) Both EYES at bedtime  lisinopril 10 milliGRAM(s) Oral daily  metoprolol succinate ER 50 milliGRAM(s) Oral daily  multivitamin 1 Tablet(s) Oral daily  piperacillin/tazobactam IVPB.. 3.375 Gram(s) IV Intermittent every 8 hours  sodium chloride 0.45%. 1000 milliLiter(s) (75 mL/Hr) IV Continuous <Continuous>      TELEMETRY: 	    ECG:  	  RADIOLOGY:   DIAGNOSTIC TESTING:  [ ] Echocardiogram:  [ ]  Catheterization:  [ ] Stress Test:    OTHER: 	    LABS:	 	    Creatine Kinase, Serum: 49 U/L [30 - 200] (10-12 @ 22:01)  CKMB Units: 2.5 ng/mL [0.0 - 6.7] (10-12 @ 22:01)  Troponin T, High Sensitivity Result: 35 ng/L [0 - 51] (10-12 @ 22:01)  Troponin T, High Sensitivity Result: 42 ng/L [0 - 51] (10-12 @ 19:21)                          11.8   11.94 )-----------( 221      ( 15 Oct 2023 07:14 )             36.4     10-15    137  |  102  |  37<H>  ----------------------------<  128<H>  3.6   |  20<L>  |  1.35<H>    Ca    8.9      15 Oct 2023 07:11    TPro  6.4  /  Alb  3.3  /  TBili  0.4  /  DBili  x   /  AST  24  /  ALT  17  /  AlkPhos  48  10-15            
CARDIOLOGY FOLLOW UP - Dr. Siddiqi  DATE OF SERVICE: 10/16/23    CC  No CV complaints    REVIEW OF SYSTEMS:  CONSTITUTIONAL: No fever, weight loss, or fatigue  RESPIRATORY: No cough, wheezing, chills or hemoptysis; No Shortness of Breath  CARDIOVASCULAR: No chest pain, palpitations, passing out, dizziness, or leg swelling  GASTROINTESTINAL: No abdominal or epigastric pain. No nausea, vomiting, or hematemesis; No diarrhea or constipation. No melena or hematochezia.  VASCULAR: No edema     PHYSICAL EXAM:  T(C): 36.7 (10-16-23 @ 09:09), Max: 36.9 (10-15-23 @ 22:07)  HR: 79 (10-16-23 @ 09:09) (76 - 89)  BP: 154/63 (10-16-23 @ 09:09) (141/75 - 187/82)  RR: 18 (10-16-23 @ 09:09) (18 - 18)  SpO2: 96% (10-16-23 @ 09:09) (95% - 98%)  Wt(kg): --  I&O's Summary    15 Oct 2023 07:01  -  16 Oct 2023 07:00  --------------------------------------------------------  IN: 100 mL / OUT: 1600 mL / NET: -1500 mL        Appearance: Elderly male	  Cardiovascular: Normal S1 S2,RRR, No JVD, No murmurs  Respiratory: Lungs clear to auscultation b/l  Gastrointestinal:  Soft, Non-tender, + BS	  Extremities: Normal range of motion, No clubbing, cyanosis or edema      Home Medications:  Crestor 20 mg oral tablet: 1 tab(s) orally once a day (12 Oct 2023 22:49)  digoxin 125 mcg (0.125 mg) oral tablet: 1 tab(s) orally once a day (12 Oct 2023 22:49)  Eliquis 5 mg oral tablet: 1 tab(s) orally 2 times a day (12 Oct 2023 22:49)  enalapril 5 mg oral tablet: 1 tab(s) orally once a day (12 Oct 2023 22:49)  fenofibrate 145 mg oral tablet: 1 tab(s) orally once a day (12 Oct 2023 22:49)  Januvia 50 mg oral tablet: 1 tab(s) orally once a day (12 Oct 2023 22:49)  latanoprost 0.005% ophthalmic solution: 1 drop(s) to each affected eye once a day (in the evening) (12 Oct 2023 22:49)  metFORMIN 750 mg oral tablet, extended release: 1 tab(s) orally once a day (12 Oct 2023 22:49)  metoprolol succinate 50 mg oral tablet, extended release: 1 tab(s) orally once a day (12 Oct 2023 22:49)  Myrbetriq 25 mg oral tablet, extended release: 1 tab(s) orally once a day (12 Oct 2023 22:49)      MEDICATIONS  (STANDING):  apixaban 5 milliGRAM(s) Oral two times a day  ascorbic acid 500 milliGRAM(s) Oral daily  atorvastatin 80 milliGRAM(s) Oral at bedtime  cefTRIAXone   IVPB 1000 milliGRAM(s) IV Intermittent every 24 hours  chlorhexidine 2% Cloths 1 Application(s) Topical daily  digoxin     Tablet 125 MICROGram(s) Oral daily  fenofibrate Tablet 145 milliGRAM(s) Oral daily  hydrALAZINE 25 milliGRAM(s) Oral three times a day  isosorbide   dinitrate Tablet (ISORDIL) 10 milliGRAM(s) Oral three times a day  latanoprost 0.005% Ophthalmic Solution 1 Drop(s) Both EYES at bedtime  lisinopril 10 milliGRAM(s) Oral daily  metoprolol succinate ER 50 milliGRAM(s) Oral daily  multivitamin 1 Tablet(s) Oral daily  sodium chloride 0.45%. 1000 milliLiter(s) (75 mL/Hr) IV Continuous <Continuous>      TELEMETRY: 	    ECG:  	  RADIOLOGY:   DIAGNOSTIC TESTING:  [ ] Echocardiogram:  [ ]  Catheterization:  [ ] Stress Test:    OTHER: 	    LABS:	 	    Creatine Kinase, Serum: 49 U/L [30 - 200] (10-12 @ 22:01)  CKMB Units: 2.5 ng/mL [0.0 - 6.7] (10-12 @ 22:01)  Troponin T, High Sensitivity Result: 35 ng/L [0 - 51] (10-12 @ 22:01)  Troponin T, High Sensitivity Result: 42 ng/L [0 - 51] (10-12 @ 19:21)                          11.8   11.94 )-----------( 221      ( 15 Oct 2023 07:14 )             36.4     10-15    137  |  102  |  37<H>  ----------------------------<  128<H>  3.6   |  20<L>  |  1.35<H>    Ca    8.9      15 Oct 2023 07:11    TPro  6.4  /  Alb  3.3  /  TBili  0.4  /  DBili  x   /  AST  24  /  ALT  17  /  AlkPhos  48  10-15            
[facility]  [facility] [address] Patel, S. Shah, [place]. [place]  [phone]  ([phone] - weekdays after 5pm and weekends)    Name: CHERY AMEZCUA  Age/Gender: 89y Male  MRN: [id number]    Interval History:  Patient seen and examined this morning.   Resting comfortably, no pain or new complaints.   Notes reviewed. No concerning overnight events  Afebrile. Aid at bedside (first day with pt)  Allergies: procainamide (Other (Severe))      Objective:  Vitals:   T(F): 98.9 (10-14-23 @ 17:00), Max: 99 (10-14-23 @ 08:43)  HR: 89 (10-14-23 @ 17:00) (85 - 95)  BP: 130/64 (10-14-23 @ 17:00) (119/79 - 177/67)  RR: 18 (10-14-23 @ 17:00) (18 - 18)  SpO2: 98% (10-14-23 @ 17:00) (96% - 98%)  Physical Examination:  General: no acute distress, nontoxic, [place]  HEENT: NC/AT, anicteric, neck supple  Respiratory: no acc muscle use, breathing comfortably  Cardiovascular: S1 and S2 present  Gastrointestinal: normal appearing, nondistended  Extremities: no edema, no cyanosis  Skin: no visible rash    Laboratory Studies:  CBC:                       12.0   12.74 )-----------( 219      ( 14 Oct 2023 07:00 )             37.0     WBC Trend:  12.74 10-14-23 @ 07:00  12.25 10-13-23 @ 06:58  14.00 10-12-23 @ 19:21    CMP: 10-13    135  |  100  |  30<H>  ----------------------------<  135<H>  4.0   |  22  |  1.30    Ca    9.8      13 Oct 2023 06:58    TPro  6.2  /  Alb  3.8  /  TBili  0.6  /  DBili  x   /  AST  10  /  ALT  11  /  AlkPhos  42  10-12    Creatinine: 1.30 mg/dL (10-13-23 @ 06:58)  Creatinine: 1.60 mg/dL (10-12-23 @ 19:21)    LIVER FUNCTIONS - ( 12 Oct 2023 19:21 )  Alb: 3.8 g/dL / Pro: 6.2 g/dL / ALK PHOS: 42 U/L / ALT: 11 U/L / AST: 10 U/L / GGT: x           Microbiology: reviewed   Culture - Urine (collected 10-12-23 @ 21:02)  Source: Clean Catch Clean Catch (Midstream)  Final Report (10-14-23 @ 06:42):    <10,000 CFU/mL Normal Urogenital Shannan    Culture - Blood (collected 10-12-23 @ 18:35)  Source: .Blood Blood-Peripheral  Preliminary Report (10-14-23 @ 03:02):    No growth at 24 hours    Culture - Blood (collected 10-12-23 @ 18:30)  Source: .Blood Blood-Peripheral  Preliminary Report (10-14-23 @ 03:02):    No growth at 24 hours    Radiology: reviewed     Medications:  acetaminophen     Tablet .. 650 milliGRAM(s) Oral every 6 hours PRN  apixaban 5 milliGRAM(s) Oral two times a day  ascorbic acid 500 milliGRAM(s) Oral daily  atorvastatin 80 milliGRAM(s) Oral at bedtime  chlorhexidine 2% Cloths 1 Application(s) Topical daily  digoxin     Tablet 125 MICROGram(s) Oral daily  fenofibrate Tablet 145 milliGRAM(s) Oral daily  haloperidol    Injectable 2 milliGRAM(s) IV Push every 6 hours PRN  hydrALAZINE 25 milliGRAM(s) Oral three times a day  isosorbide   dinitrate Tablet (ISORDIL) 10 milliGRAM(s) Oral three times a day  latanoprost 0.005% Ophthalmic Solution 1 Drop(s) Both EYES at bedtime  lisinopril 10 milliGRAM(s) Oral daily  metoprolol succinate ER 50 milliGRAM(s) Oral daily  multivitamin 1 Tablet(s) Oral daily  piperacillin/tazobactam IVPB.. 3.375 Gram(s) IV Intermittent every 8 hours  sodium chloride 0.45%. 1000 milliLiter(s) IV Continuous <Continuous>    Current Antimicrobials:  piperacillin/tazobactam IVPB.. 3.375 Gram(s) IV Intermittent every 8 hours    Prior/Completed Antimicrobials:  cefTRIAXone   IVPB  piperacillin/tazobactam IVPB.  piperacillin/tazobactam IVPB.-  
[facility]  [facility] [place] [address] Patel, S. Shah, G. [place]  [phone]  ([phone] - weekdays after 5pm and weekends)    Name: CHERY AMEZCUA  Age/Gender: 89y Male  MRN: [id number]    Interval History:  Patient seen and examined this morning.   Feels better, no new complaints, wants to go home.   Daughter and aid at bedside.   Notes reviewed.   No concerning overnight events.  Afebrile.   Allergies: procainamide (Other (Severe))      Objective:  Vitals:   T(F): 98 (10-16-23 @ 09:09), Max: 98.5 (10-16-23 @ 01:03)  HR: 79 (10-16-23 @ 09:09) (76 - 89)  BP: 154/63 (10-16-23 @ 09:09) (146/74 - 187/82)  RR: 18 (10-16-23 @ 09:09) (18 - 18)  SpO2: 96% (10-16-23 @ 09:09) (95% - 96%)  Physical Examination:  General: no acute distress, nontoxic appearing   HEENT: NC/AT, EOMI, anicteric, neck supple  Cardio: S1, S2 present, normal rate  Resp: clear to auscultation bilaterally  Abd: soft, NT, ND, + BS  Neuro: awake, alert, answers/follows  Ext: no edema, no cyanosis, moving extremities  Skin: warm, dry, no visible rash  Psych: appropriate affect and mood for situation  Lines: PIV; patel draining yellow urine     Laboratory Studies:  CBC:                       11.8   11.94 )-----------( 221      ( 15 Oct 2023 07:14 )             36.4     WBC Trend:  11.94 10-15-23 @ 07:14  12.74 10-14-23 @ 07:00  12.25 10-13-23 @ 06:58  14.00 10-12-23 @ 19:21    CMP: 10-15    137  |  102  |  37<H>  ----------------------------<  128<H>  3.6   |  20<L>  |  1.35<H>    Ca    8.9      15 Oct 2023 07:11    TPro  6.4  /  Alb  3.3  /  TBili  0.4  /  DBili  x   /  AST  24  /  ALT  17  /  AlkPhos  48  10-15    Creatinine: 1.35 mg/dL (10-15-23 @ 07:11)  Creatinine: 1.30 mg/dL (10-13-23 @ 06:58)  Creatinine: 1.60 mg/dL (10-12-23 @ 19:21)      LIVER FUNCTIONS - ( 15 Oct 2023 07:11 )  Alb: 3.3 g/dL / Pro: 6.4 g/dL / ALK PHOS: 48 U/L / ALT: 17 U/L / AST: 24 U/L / GGT: x             Urinalysis Basic - ( 15 Oct 2023 07:11 )    Color: x / Appearance: x / SG: x / pH: x  Gluc: 128 mg/dL / Ketone: x  / Bili: x / Urobili: x   Blood: x / Protein: x / Nitrite: x   Leuk Esterase: x / RBC: x / WBC x   Sq Epi: x / Non Sq Epi: x / Bacteria: x        Microbiology: reviewed     Culture - Urine (collected 10-12-23 @ 21:02)  Source: Clean Catch Clean Catch (Midstream)  Final Report (10-14-23 @ 06:42):    <10,000 CFU/mL Normal Urogenital Shannan    Culture - Blood (collected 10-12-23 @ 18:35)  Source: .Blood Blood-Peripheral  Preliminary Report (10-16-23 @ 03:01):    No growth at 72 Hours    Culture - Blood (collected 10-12-23 @ 18:30)  Source: .Blood Blood-Peripheral  Preliminary Report (10-16-23 @ 03:01):    No growth at 72 Hours          Radiology: reviewed     Medications:  acetaminophen     Tablet .. 650 milliGRAM(s) Oral every 6 hours PRN  apixaban 5 milliGRAM(s) Oral two times a day  ascorbic acid 500 milliGRAM(s) Oral daily  atorvastatin 80 milliGRAM(s) Oral at bedtime  cefTRIAXone   IVPB 1000 milliGRAM(s) IV Intermittent every 24 hours  chlorhexidine 2% Cloths 1 Application(s) Topical daily  digoxin     Tablet 125 MICROGram(s) Oral daily  fenofibrate Tablet 145 milliGRAM(s) Oral daily  haloperidol    Injectable 2 milliGRAM(s) IV Push every 6 hours PRN  hydrALAZINE 25 milliGRAM(s) Oral three times a day  isosorbide   dinitrate Tablet (ISORDIL) 10 milliGRAM(s) Oral three times a day  latanoprost 0.005% Ophthalmic Solution 1 Drop(s) Both EYES at bedtime  lisinopril 10 milliGRAM(s) Oral daily  metoprolol succinate ER 50 milliGRAM(s) Oral daily  multivitamin 1 Tablet(s) Oral daily  sodium chloride 0.45%. 1000 milliLiter(s) IV Continuous <Continuous>    Current Antimicrobials:  cefTRIAXone   IVPB 1000 milliGRAM(s) IV Intermittent every 24 hours    Prior/Completed Antimicrobials:  cefTRIAXone   IVPB  piperacillin/tazobactam IVPB.  piperacillin/tazobactam IVPB.-  
[facility]  [facility] [place] [address] Patel, S. Shah, G. Zach  [phone]  ([phone] - weekdays after 5pm and weekends)    Name: CHERY AMEZCUA  Age/Gender: 89y Male  MRN: [id number]    Interval History:  Patient seen and examined this morning.   Feels better, no new complaints noted.  Notes reviewed  No concerning overnight events  Afebrile. Aid at bedside  Allergies: procainamide (Other (Severe))      Objective:  Vitals:   T(F): 98.1 (10-15-23 @ 16:41), Max: 98.5 (10-15-23 @ 00:06)  HR: 76 (10-15-23 @ 16:41) (76 - 91)  BP: 146/74 (10-15-23 @ 16:41) (141/75 - 172/69)  RR: 18 (10-15-23 @ 16:41) (18 - 18)  SpO2: 96% (10-15-23 @ 16:41) (94% - 98%)  Physical Examination:  General: no acute distress, nontoxic appearing   HEENT: NC/AT, anicteric, neck supple  Respiratory: clear to auscultation bilaterally  Cardiovascular: S1 and S2 present  Gastrointestinal: soft, nontender, nondistended  Extremities: no edema, no cyanosis  Skin: no visible rash    Laboratory Studies:  CBC:                       11.8   11.94 )-----------( 221      ( 15 Oct 2023 07:14 )             36.4     WBC Trend:  11.94 10-15-23 @ 07:14  12.74 10-14-23 @ 07:00  12.25 10-13-23 @ 06:58  14.00 10-12-23 @ 19:21    CMP: 10-15    137  |  102  |  37<H>  ----------------------------<  128<H>  3.6   |  20<L>  |  1.35<H>    Ca    8.9      15 Oct 2023 07:11    TPro  6.4  /  Alb  3.3  /  TBili  0.4  /  DBili  x   /  AST  24  /  ALT  17  /  AlkPhos  48  10-15    Creatinine: 1.35 mg/dL (10-15-23 @ 07:11)  Creatinine: 1.30 mg/dL (10-13-23 @ 06:58)  Creatinine: 1.60 mg/dL (10-12-23 @ 19:21)    LIVER FUNCTIONS - ( 15 Oct 2023 07:11 )  Alb: 3.3 g/dL / Pro: 6.4 g/dL / ALK PHOS: 48 U/L / ALT: 17 U/L / AST: 24 U/L / GGT: x           Microbiology: reviewed   Culture - Urine (collected 10-12-23 @ 21:02)  Source: Clean Catch Clean Catch (Midstream)  Final Report (10-14-23 @ 06:42):    <10,000 CFU/mL Normal Urogenital Shannan    Culture - Blood (collected 10-12-23 @ 18:35)  Source: .Blood Blood-Peripheral  Preliminary Report (10-15-23 @ 03:01):    No growth at 48 Hours    Culture - Blood (collected 10-12-23 @ 18:30)  Source: .Blood Blood-Peripheral  Preliminary Report (10-15-23 @ 03:01):    No growth at 48 Hours    Radiology: reviewed     Medications:  acetaminophen     Tablet .. 650 milliGRAM(s) Oral every 6 hours PRN  apixaban 5 milliGRAM(s) Oral two times a day  ascorbic acid 500 milliGRAM(s) Oral daily  atorvastatin 80 milliGRAM(s) Oral at bedtime  chlorhexidine 2% Cloths 1 Application(s) Topical daily  digoxin     Tablet 125 MICROGram(s) Oral daily  fenofibrate Tablet 145 milliGRAM(s) Oral daily  haloperidol    Injectable 2 milliGRAM(s) IV Push every 6 hours PRN  hydrALAZINE 25 milliGRAM(s) Oral three times a day  isosorbide   dinitrate Tablet (ISORDIL) 10 milliGRAM(s) Oral three times a day  latanoprost 0.005% Ophthalmic Solution 1 Drop(s) Both EYES at bedtime  lisinopril 10 milliGRAM(s) Oral daily  metoprolol succinate ER 50 milliGRAM(s) Oral daily  multivitamin 1 Tablet(s) Oral daily  piperacillin/tazobactam IVPB.. 3.375 Gram(s) IV Intermittent every 8 hours  sodium chloride 0.45%. 1000 milliLiter(s) IV Continuous <Continuous>    Current Antimicrobials:  piperacillin/tazobactam IVPB.. 3.375 Gram(s) IV Intermittent every 8 hours    Prior/Completed Antimicrobials:  cefTRIAXone   IVPB  piperacillin/tazobactam IVPB.  piperacillin/tazobactam IVPB.-  
Patient is a 89y old  Male who presents with a chief complaint of metabolic encephalopathy, UTI, urinary retention (14 Oct 2023 18:53)      SUBJECTIVE / OVERNIGHT EVENTS: afebrile, MS at baseline    MEDICATIONS  (STANDING):  apixaban 5 milliGRAM(s) Oral two times a day  ascorbic acid 500 milliGRAM(s) Oral daily  atorvastatin 80 milliGRAM(s) Oral at bedtime  chlorhexidine 2% Cloths 1 Application(s) Topical daily  digoxin     Tablet 125 MICROGram(s) Oral daily  fenofibrate Tablet 145 milliGRAM(s) Oral daily  hydrALAZINE 25 milliGRAM(s) Oral three times a day  isosorbide   dinitrate Tablet (ISORDIL) 10 milliGRAM(s) Oral three times a day  latanoprost 0.005% Ophthalmic Solution 1 Drop(s) Both EYES at bedtime  lisinopril 10 milliGRAM(s) Oral daily  metoprolol succinate ER 50 milliGRAM(s) Oral daily  multivitamin 1 Tablet(s) Oral daily  piperacillin/tazobactam IVPB.. 3.375 Gram(s) IV Intermittent every 8 hours  sodium chloride 0.45%. 1000 milliLiter(s) (75 mL/Hr) IV Continuous <Continuous>    MEDICATIONS  (PRN):  acetaminophen     Tablet .. 650 milliGRAM(s) Oral every 6 hours PRN Mild Pain (1 - 3)  haloperidol    Injectable 2 milliGRAM(s) IV Push every 6 hours PRN agitation      Vital Signs Last 24 Hrs  T(F): 98.9 (10-14-23 @ 17:00), Max: 99 (10-14-23 @ 08:43)  HR: 89 (10-14-23 @ 17:00) (85 - 95)  BP: 130/64 (10-14-23 @ 17:00) (119/79 - 177/67)  RR: 18 (10-14-23 @ 17:00) (18 - 18)  SpO2: 98% (10-14-23 @ 17:00) (96% - 98%)  Telemetry:   CAPILLARY BLOOD GLUCOSE      POCT Blood Glucose.: 134 mg/dL (14 Oct 2023 00:09)    I&O's Summary    13 Oct 2023 07:01  -  14 Oct 2023 07:00  --------------------------------------------------------  IN: 0 mL / OUT: 800 mL / NET: -800 mL    14 Oct 2023 07:01  -  14 Oct 2023 20:02  --------------------------------------------------------  IN: 100 mL / OUT: 0 mL / NET: 100 mL        PHYSICAL EXAM:  GENERAL: NAD, well-developed  HEAD:  Atraumatic, Normocephalic  EYES: EOMI, PERRLA, conjunctiva and sclera clear  NECK: Supple, No JVD  CHEST/LUNG: Clear to auscultation bilaterally; No wheeze  HEART: Regular rate and rhythm; No murmurs, rubs, or gallops  ABDOMEN: Soft, Nontender, Nondistended; Bowel sounds present  EXTREMITIES:  2+ Peripheral Pulses, No clubbing, cyanosis, or edema  PSYCH: AAOx3  NEUROLOGY: non-focal  SKIN: No rashes or lesions    LABS:                        12.0   12.74 )-----------( 219      ( 14 Oct 2023 07:00 )             37.0     10-13    135  |  100  |  30<H>  ----------------------------<  135<H>  4.0   |  22  |  1.30    Ca    9.8      13 Oct 2023 06:58        CARDIAC MARKERS ( 12 Oct 2023 22:01 )  x     / x     / 49 U/L / x     / 2.5 ng/mL      Urinalysis Basic - ( 13 Oct 2023 06:58 )    Color: x / Appearance: x / SG: x / pH: x  Gluc: 135 mg/dL / Ketone: x  / Bili: x / Urobili: x   Blood: x / Protein: x / Nitrite: x   Leuk Esterase: x / RBC: x / WBC x   Sq Epi: x / Non Sq Epi: x / Bacteria: x        RADIOLOGY & ADDITIONAL TESTS:    Imaging Personally Reviewed:    Consultant(s) Notes Reviewed:      Care Discussed with Consultants/Other Providers:  
The patient is feeling improved. UCx neg.    Vital Signs Last 24 Hrs  T(C): 37.2 (14 Oct 2023 17:00), Max: 37.2 (14 Oct 2023 08:43)  T(F): 98.9 (14 Oct 2023 17:00), Max: 99 (14 Oct 2023 08:43)  HR: 89 (14 Oct 2023 17:00) (85 - 95)  BP: 130/64 (14 Oct 2023 17:00) (119/79 - 177/67)  BP(mean): --  RR: 18 (14 Oct 2023 17:00) (18 - 18)  SpO2: 98% (14 Oct 2023 17:00) (96% - 98%)    Parameters below as of 14 Oct 2023 17:00  Patient On (Oxygen Delivery Method): room air        PHYSICAL EXAM:    NAD, well-developed  Abd: soft, NT/ND, No CVAT  Urine: patel with yellow urine    I&O's Summary    13 Oct 2023 07:01  -  14 Oct 2023 07:00  --------------------------------------------------------  IN: 0 mL / OUT: 800 mL / NET: -800 mL    14 Oct 2023 07:01  -  14 Oct 2023 18:53  --------------------------------------------------------  IN: 100 mL / OUT: 0 mL / NET: 100 mL        LABS:                        12.0   12.74 )-----------( 219      ( 14 Oct 2023 07:00 )             37.0     10-13    135  |  100  |  30<H>  ----------------------------<  135<H>  4.0   |  22  |  1.30    Ca    9.8      13 Oct 2023 06:58    TPro  6.2  /  Alb  3.8  /  TBili  0.6  /  DBili  x   /  AST  10  /  ALT  11  /  AlkPhos  42  10-12    Urinalysis Basic - ( 13 Oct 2023 06:58 )    Color: x / Appearance: x / SG: x / pH: x  Gluc: 135 mg/dL / Ketone: x  / Bili: x / Urobili: x   Blood: x / Protein: x / Nitrite: x   Leuk Esterase: x / RBC: x / WBC x   Sq Epi: x / Non Sq Epi: x / Bacteria: x      Urine Culture: neg    Prior notes/chart reviewed.

## 2023-10-16 NOTE — DIETITIAN INITIAL EVALUATION ADULT - PERTINENT LABORATORY DATA
10-15    137  |  102  |  37<H>  ----------------------------<  128<H>  3.6   |  20<L>  |  1.35<H>    Ca    8.9      15 Oct 2023 07:11    TPro  6.4  /  Alb  3.3  /  TBili  0.4  /  DBili  x   /  AST  24  /  ALT  17  /  AlkPhos  48  10-15      A1C with Estimated Average Glucose Result: 6.8 % (08-26-23 @ 07:10)

## 2023-10-16 NOTE — DISCHARGE NOTE PROVIDER - NSDCMRMEDTOKEN_GEN_ALL_CORE_FT
acetaminophen 325 mg oral tablet: 2 tab(s) orally every 6 hours As needed Mild Pain (1 - 3)  ascorbic acid 500 mg oral tablet: 1 tab(s) orally once a day  cefuroxime 500 mg oral tablet: 1 tab(s) orally 2 times a day  Crestor 20 mg oral tablet: 1 tab(s) orally once a day  digoxin 125 mcg (0.125 mg) oral tablet: 1 tab(s) orally once a day  Eliquis 5 mg oral tablet: 1 tab(s) orally 2 times a day  enalapril 5 mg oral tablet: 1 tab(s) orally once a day  fenofibrate 145 mg oral tablet: 1 tab(s) orally once a day  hydrALAZINE 25 mg oral tablet: 1 tab(s) orally every 8 hours  isosorbide dinitrate 10 mg oral tablet: 1 tab(s) orally 3 times a day  Januvia 50 mg oral tablet: 1 tab(s) orally once a day  latanoprost 0.005% ophthalmic solution: 1 drop(s) to each affected eye once a day (in the evening)  metFORMIN 750 mg oral tablet, extended release: 1 tab(s) orally once a day  metoprolol succinate 50 mg oral tablet, extended release: 1 tab(s) orally once a day  Multiple Vitamins oral tablet: 1 tab(s) orally once a day  Physical therapy: as directed

## 2023-10-16 NOTE — DIETITIAN INITIAL EVALUATION ADULT - ADD RECOMMEND
1. Continue Low Sodium Consistent Carbohydrate diet. Defer diet/texture modification to medical team/SLP as indicated   2. Continue daily multivitamin and vitamin C supplements if no medical contraindications to aid in wound healing.   3. Will add Diet Mighty Shakes 3x/day to help meet pt's increased nutrient needs   4. Encourage PO intakes as tolerated. Honor food preferences as appropriate and available. Staff to provide assistance with meal times as warranted.  5. Monitor PO intake, GI tolerance, skin integrity and labs. RD remains available if needed.

## 2023-10-16 NOTE — DIETITIAN INITIAL EVALUATION ADULT - NSFNSNUTRHOMESUPPLEMENTFT_GEN_A_CORE
As per private aide, pt drank ensure plus supplements at home  As per H&P, took Multivitamin 1x/Day and ascorbic acid 500 mg 1x/Day at home

## 2023-10-16 NOTE — DISCHARGE NOTE PROVIDER - HOSPITAL COURSE
HPI:  89y/o M h/o AFib (on Eliquis), HTN, HLD, DM, PPM , indwelling urinary catheter present w his aide 2/2 lethargy and UTI  Patient recently saw his urologist who took the catheter out and was having difficulty urinating and went  back to the urologist today. .  The urologist they placed a new Palmer catheter in and told the patient that he had a urinary tract infection and started Levaquin.  Per the aide he has been increasingly more weak today and having difficulty walking.  No falls or any trauma.  Also noticed he was having some shortness of breath.  No sick contacts.  Patient upon arrival is complaining of some abdominal pain.  Palmer catheter has been draining since it was placed today at the urologist office. (12 Oct 2023 22:43)    Hospital Course: Pt presenting with metabolic encephalopathy 2/2 GERMAIN in the setting of volume depletion 2/2 UTI and cystitis.  Mental status improved, afebrile >24h, WBC improving UA here with pyuria, Ucx negative -- likely due to recent antibiotic . Bcx NGTD x2 , CTAP with acute cystitis, no s/o pyelonephritis,  Renal/kidney US with mild R hydronephrosis, no L hydronephrosis - RVP negative , prior cultures reviewed, no positive cultures noted. ID following -> pt  started on  ceftriaxone than  pip-tazo 10/13 am-10/15 due to fevers and rigors , than switch back to ceftriaxone .  Of note Urology following -- noted outpt cx with Klebsiella S to all except ampicillin and nitrofurantoin. Continue Ceftriaxone 1g IV Q24h to complete total 7d course 10/18-> on discharge, can transition to cefuroxime 500mg PO Q12. GERMAIN resolved after gentle hydration. Will continue with chronic Palmer. Pt is medically stable for discharge as discussed with Dr. Crandall .        Important Medication Changes and Reason: To be discharge with Ceftin to complete 7 days course   Active or Pending Issues Requiring Follow-up: Follow up with PCP and Urology     Advanced Directives:   [ ] Full code  [ x] DNR  [ ] Hospice    Discharge Diagnoses:  UTI  AFib   HTN   HLD   DM  PPM        Non-Graft Cartilage Fenestration Text: The cartilage was fenestrated with a 2mm punch biopsy to help facilitate healing.

## 2023-11-03 PROBLEM — L30.8 DERMATITIS ASSOCIATED WITH MOISTURE: Status: ACTIVE | Noted: 2023-01-01

## 2023-11-03 PROBLEM — L89.616 PRESSURE INJURY OF DEEP TISSUE OF RIGHT HEEL: Status: ACTIVE | Noted: 2023-01-01

## 2023-11-03 PROBLEM — B36.9 FUNGAL DERMATITIS: Status: ACTIVE | Noted: 2023-01-01

## 2023-12-27 NOTE — CONSULT NOTE ADULT - PROBLEM SELECTOR RECOMMENDATION 2
initial CT scan reviewed  not a NSGY candidate, re-eval CT to decide if admit to neuro/stroke vs. NSCU  functional status will dictate next steps in care  on nicardipine gtt

## 2023-12-27 NOTE — CONSULT NOTE ADULT - CONVERSATION DETAILS
Met with daughter at bedside, introduced self and role  Reviewed initial CT scan findings and plan of care- plan for admission and to see repeat CT head as well as how functional status stands after next few days. Explained that functional status will dictate future aspects of care, specifically mobility and swallowing which would dictate prognosis. Confirmed MOLST findings- DNR/DNI, limited medical interventions.  Assured ongoing availability of our team and contact information provided. Will continue to follow pending clinical course to help with medical decision making

## 2023-12-27 NOTE — CONSULT NOTE ADULT - ASSESSMENT
Phu Sanchez  DNR/DNI 89M h/o afib on Eliquis reportedly high functioning at baseline, lives at home with 24h nursing care, p/f code stroke for acute onset slurred speech and R weakness o/n. 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 Minimal shift. CTA w/ spot sign, no obvious aneurysm or vasc malf pending read. INR 1.47, plts 219. Exam: Sleepy but arousable, EOV, dysarthric, Ox2 w/ choice, FC, Lt side strong, Rt side brisk wd.  -Patient is not a neurosurgical candidate  -Getting reversed in the ED with [facility]  -4h rpt CTH pending  -No contraindication to admit to stroke unit, q2h neuro checks

## 2023-12-27 NOTE — CONSULT NOTE ADULT - SUBJECTIVE AND OBJECTIVE BOX
89y (01-Dec-1926) M w/ PMHx significant for A.fib s/p PPM on Eliquis digoxin & metoprolol, HTN, HLD, DM, BPH w/ indwelling urinary catheter, macular degeneration presents to the ED due to R sided weakness and aphasia. Stroke code called. LKW 3 AM 12/27/2023. Patient was awake and spoke to his overnight home nursing aid at his apartment in [facility]. Later in the morning at 6 AM when his morning nursing aid came to help him out of bed, it was noted that his right side was weak and he was not speaking to the nursing aid. Aid became concerned and called EMS. Patient took his Eliquis the same day. CT head done in the ED revealed a L thalamic hemorrhage with intraventricular extension. CTA was without AVM or aneurysm. Patient's PT, PTT & INR were elevated and he was given Kcentra to reverse anticoagulation from his Eliquis. On exam patient was attempting to answer questions with grunts and moans. He expressed understanding by following verbal commands. Patient's nursing aid arrived who explained that he is normally A&Ox 2 to self and location. He requires 24 hrs home nursing assistance and is not able to get out of bed on his own. He walks with a walker. Aid is unsure of overnight events though it was reported to her by the overnight aid that the patient was up and able to talk and walk at 3 AM the same day. Comprehensive ROS unobtainable due to patient's aphasia.    NIHSS: 12  Patient was not a tenecteplase candidate as he presented outside the time window and hemorrhage was identified on CT  Patient was not a thrombectomy candidate as no LVO was identified on imaging.    Allergies: procainamide: Severe hemolysis    HOME MEDICATIONS:   acetaminophen 325 mg oral tablet: Last Dose Taken:  , 2 tab(s) orally every 6 hours As needed Mild Pain (1 - 3)  isosorbide dinitrate 10 mg oral tablet: Last Dose Taken:  , 1 tab(s) orally 3 times a day  Multiple Vitamins oral tablet: Last Dose Taken:  , 1 tab(s) orally once a day  hydrALAZINE 25 mg oral tablet: Last Dose Taken:  , 1 tab(s) orally every 8 hours  Eliquis 5 mg oral tablet: Last Dose Taken:  , 1 tab(s) orally 2 times a day  digoxin 125 mcg (0.125 mg) oral tablet: Last Dose Taken:  , 1 tab(s) orally once a day  latanoprost 0.005% ophthalmic solution: Last Dose Taken:  , 1 drop(s) to each affected eye once a day (in the evening)  Januvia 50 mg oral tablet: Last Dose Taken:  , 1 tab(s) orally once a day  Crestor 20 mg oral tablet: Last Dose Taken:  , 1 tab(s) orally once a day  metoprolol succinate 50 mg oral tablet, extended release: Last Dose Taken:  , 1 tab(s) orally once a day  metFORMIN 750 mg oral tablet, extended release: Last Dose Taken:  , 1 tab(s) orally once a day  fenofibrate 145 mg oral tablet: Last Dose Taken:  , 1 tab(s) orally once a day  enalapril 5 mg oral tablet: Last Dose Taken:  , 1 tab(s) orally once a day    MEDICATIONS  (STANDING):  ascorbic acid 500 milliGRAM(s) Oral daily  atorvastatin 80 milliGRAM(s) Oral at bedtime  dextrose 5%. 1000 milliLiter(s) (50 mL/Hr) IV Continuous <Continuous>  dextrose 5%. 1000 milliLiter(s) (100 mL/Hr) IV Continuous <Continuous>  dextrose 50% Injectable 25 Gram(s) IV Push once  dextrose 50% Injectable 25 Gram(s) IV Push once  dextrose 50% Injectable 12.5 Gram(s) IV Push once  digoxin     Tablet 125 MICROGram(s) Oral daily  glucagon  Injectable 1 milliGRAM(s) IntraMuscular once  insulin lispro (ADMELOG) corrective regimen sliding scale   SubCutaneous three times a day before meals  multivitamin 1 Tablet(s) Oral daily  niCARdipine Infusion 5 mG/Hr (25 mL/Hr) IV Continuous <Continuous>    MEDICATIONS  (PRN):  acetaminophen     Tablet .. 650 milliGRAM(s) Oral every 6 hours PRN Mild Pain (1 - 3)  dextrose Oral Gel 15 Gram(s) Oral once PRN Blood Glucose LESS THAN 70 milliGRAM(s)/deciliter      Vital Signs Last 24 Hrs  T(F): 97.9 (12-27-23 @ 15:00), Max: 97.9 (12-27-23 @ 12:00)  HR: 80 (12-27-23 @ 15:00) (66 - 84)  BP: 182/82 (12-27-23 @ 15:00) (132/75 - 182/82)  RR: 20 (12-27-23 @ 15:00) (16 - 23)  SpO2: 98% (12-27-23 @ 15:00) (93% - 99%)    PHYSICAL EXAM:  GENERAL: NAD, well-developed  HEAD:  Atraumatic, Normocephalic  EYES: EOMI, PERRLA, conjunctiva and sclera clear  NECK: Supple, No JVD  CHEST/LUNG: Clear to auscultation bilaterally; No wheeze  HEART: Regular rate and rhythm; No murmurs, rubs, or gallops  ABDOMEN: Soft, Nontender, Nondistended; Bowel sounds present  EXTREMITIES:  2+ Peripheral Pulses, No clubbing, cyanosis, or edema  PSYCH: AAOx3  NEUROLOGY:  Difficult to assess orientation due to aphasia. Speaks in groans, attempts to make full words but unable to do so, nods and grunts to express understanding. Follows simple and complex commands. OVERALL non-focal  SKIN: No rashes or lesions    LABS:                        13.2   10.80 )-----------( 219      ( 27 Dec 2023 07:55 )             39.1     12-27    138  |  104  |  16  ----------------------------<  141<H>  4.4   |  22  |  0.85    Ca    9.0      27 Dec 2023 07:55    TPro  6.7  /  Alb  4.2  /  TBili  0.5  /  DBili  x   /  AST  18  /  ALT  11  /  AlkPhos  53  12-27    PT/INR - ( 27 Dec 2023 10:04 )   PT: 11.5 sec;   INR: 1.05 ratio         PTT - ( 27 Dec 2023 10:04 )  PTT:40.1 sec      Urinalysis Basic - ( 27 Dec 2023 07:55 )    Color: x / Appearance: x / SG: x / pH: x  Gluc: 141 mg/dL / Ketone: x  / Bili: x / Urobili: x   Blood: x / Protein: x / Nitrite: x   Leuk Esterase: x / RBC: x / WBC x   Sq Epi: x / Non Sq Epi: x / Bacteria: x                                                   89y (01-Dec-1926) M w/ PMHx significant for A.fib s/p PPM on Eliquis digoxin & metoprolol, HTN, HLD, DM, BPH w/ indwelling urinary catheter, macular degeneration presents to the ED due to R sided weakness and aphasia. Stroke code called. LKW 3 AM 12/27/2023. Patient was awake and spoke to his overnight home nursing aid at his apartment in [facility]. Later in the morning at 6 AM when his morning nursing aid came to help him out of bed, it was noted that his right side was weak and he was not speaking to the nursing aid. Aid became concerned and called EMS. Patient took his Eliquis the same day. CT head done in the ED revealed a L thalamic hemorrhage with intraventricular extension. CTA was without AVM or aneurysm. Patient's PT, PTT & INR were elevated and he was given Kcentra to reverse anticoagulation from his Eliquis. On exam patient was attempting to answer questions with grunts and moans. He expressed understanding by following verbal commands. Patient's nursing aid arrived who explained that he is normally A&Ox 2 to self and location. He requires 24 hrs home nursing assistance and is not able to get out of bed on his own. He walks with a walker. Aid is unsure of overnight events though it was reported to her by the overnight aid that the patient was up and able to talk and walk at 3 AM the same day. Comprehensive ROS unobtainable due to patient's aphasia.    NIHSS: 12  Patient was not a tenecteplase candidate as he presented outside the time window and hemorrhage was identified on CT  Patient was not a thrombectomy candidate as no LVO was identified on imaging.    Allergies: procainamide: Severe hemolysis    HOME MEDICATIONS:   acetaminophen 325 mg oral tablet: Last Dose Taken:  , 2 tab(s) orally every 6 hours As needed Mild Pain (1 - 3)  isosorbide dinitrate 10 mg oral tablet: Last Dose Taken:  , 1 tab(s) orally 3 times a day  Multiple Vitamins oral tablet: Last Dose Taken:  , 1 tab(s) orally once a day  hydrALAZINE 25 mg oral tablet: Last Dose Taken:  , 1 tab(s) orally every 8 hours  Eliquis 5 mg oral tablet: Last Dose Taken:  , 1 tab(s) orally 2 times a day  digoxin 125 mcg (0.125 mg) oral tablet: Last Dose Taken:  , 1 tab(s) orally once a day  latanoprost 0.005% ophthalmic solution: Last Dose Taken:  , 1 drop(s) to each affected eye once a day (in the evening)  Januvia 50 mg oral tablet: Last Dose Taken:  , 1 tab(s) orally once a day  Crestor 20 mg oral tablet: Last Dose Taken:  , 1 tab(s) orally once a day  metoprolol succinate 50 mg oral tablet, extended release: Last Dose Taken:  , 1 tab(s) orally once a day  metFORMIN 750 mg oral tablet, extended release: Last Dose Taken:  , 1 tab(s) orally once a day  fenofibrate 145 mg oral tablet: Last Dose Taken:  , 1 tab(s) orally once a day  enalapril 5 mg oral tablet: Last Dose Taken:  , 1 tab(s) orally once a day    MEDICATIONS  (STANDING):  ascorbic acid 500 milliGRAM(s) Oral daily  atorvastatin 80 milliGRAM(s) Oral at bedtime  dextrose 5%. 1000 milliLiter(s) (50 mL/Hr) IV Continuous <Continuous>  dextrose 5%. 1000 milliLiter(s) (100 mL/Hr) IV Continuous <Continuous>  dextrose 50% Injectable 25 Gram(s) IV Push once  dextrose 50% Injectable 25 Gram(s) IV Push once  dextrose 50% Injectable 12.5 Gram(s) IV Push once  digoxin     Tablet 125 MICROGram(s) Oral daily  glucagon  Injectable 1 milliGRAM(s) IntraMuscular once  insulin lispro (ADMELOG) corrective regimen sliding scale   SubCutaneous three times a day before meals  multivitamin 1 Tablet(s) Oral daily  niCARdipine Infusion 5 mG/Hr (25 mL/Hr) IV Continuous <Continuous>    MEDICATIONS  (PRN):  acetaminophen     Tablet .. 650 milliGRAM(s) Oral every 6 hours PRN Mild Pain (1 - 3)  dextrose Oral Gel 15 Gram(s) Oral once PRN Blood Glucose LESS THAN 70 milliGRAM(s)/deciliter      Vital Signs Last 24 Hrs  T(F): 97.9 (12-27-23 @ 15:00), Max: 97.9 (12-27-23 @ 12:00)  HR: 80 (12-27-23 @ 15:00) (66 - 84)  BP: 182/82 (12-27-23 @ 15:00) (132/75 - 182/82)  RR: 20 (12-27-23 @ 15:00) (16 - 23)  SpO2: 98% (12-27-23 @ 15:00) (93% - 99%)    PHYSICAL EXAM:  GENERAL: NAD, well-developed  HEAD:  Atraumatic, Normocephalic  EYES: EOMI, PERRLA, conjunctiva and sclera clear  NECK: Supple, No JVD  CHEST/LUNG: Clear to auscultation bilaterally; No wheeze  HEART: Regular rate and rhythm; No murmurs, rubs, or gallops  ABDOMEN: Soft, Nontender, Nondistended; Bowel sounds present  EXTREMITIES:  2+ Peripheral Pulses, No clubbing, cyanosis, or edema  PSYCH: AAOx3  NEUROLOGY:  Difficult to assess orientation due to aphasia. Speaks in groans, attempts to make full words but unable to do so, nods and grunts to express understanding. Follows simple and complex commands. OVERALL non-focal  SKIN: No rashes or lesions    LABS:                        13.2   10.80 )-----------( 219      ( 27 Dec 2023 07:55 )             39.1     12-27    138  |  104  |  16  ----------------------------<  141<H>  4.4   |  22  |  0.85    Ca    9.0      27 Dec 2023 07:55    TPro  6.7  /  Alb  4.2  /  TBili  0.5  /  DBili  x   /  AST  18  /  ALT  11  /  AlkPhos  53  12-27    PT/INR - ( 27 Dec 2023 10:04 )   PT: 11.5 sec;   INR: 1.05 ratio         PTT - ( 27 Dec 2023 10:04 )  PTT:40.1 sec      Urinalysis Basic - ( 27 Dec 2023 07:55 )    Color: x / Appearance: x / SG: x / pH: x  Gluc: 141 mg/dL / Ketone: x  / Bili: x / Urobili: x   Blood: x / Protein: x / Nitrite: x   Leuk Esterase: x / RBC: x / WBC x   Sq Epi: x / Non Sq Epi: x / Bacteria: x

## 2023-12-27 NOTE — CONSULT NOTE ADULT - TIME BILLING
Imaging review, plan formulation
care coordination, chart review, discussion with family and note composition.

## 2023-12-27 NOTE — ED PROVIDER NOTE - PHYSICAL EXAMINATION
CONSTITUTIONAL: Chronically ill appearing.  ENT: Airway patent, dry mucous membranes.   EYES: Pupils equal, round and reactive to light. EOMI.   CARDIAC: Normal rate, regular rhythm.  Heart sounds S1, S2.    RESPIRATORY: Breath sounds clear and equal bilaterally.   GASTROINTESTINAL: Abdomen soft, non-tender.  MUSCULOSKELETAL: No obv deformities.   NEUROLOGICAL: Alert x1. Following some commands. Severe aphasia. Decreased strength RUE.

## 2023-12-27 NOTE — CONSULT NOTE ADULT - SUBJECTIVE AND OBJECTIVE BOX
Neurology - Consult Note    -  Spectra: [phone] ([facility]), [phone] ([facility])  -    HPI: CHERY AMEZCUA, 89y (01-Dec-1926) M w/ PMHx significant for A.fib s/p PPM on Eliquis digoxin & metoprolol, HTN, HLD, DM, BPH w/ indwelling urinary catheter, macular degeneration presents to the ED due to R sided weakness and aphasia. Stroke code called. LKW 3 AM 12/27/2023. Patient was awake and spoke to his overnight home nursing aid at his apartment in [facility]. Later in the morning at 6 AM when his morning nursing aid came to help him out of bed, it was noted that his right side was weak and he was not speaking to the nursing aid. Aid became concerned and called EMS. Patient took his Eliquis the same day. CT head done in the ED revealed a L thalamic hemorrhage with intraventricular extension. CTA was without AVM or aneurysm. Patient's PT, PTT & INR were elevated and he was given Kcentra to reverse anticoagulation from his Eliquis. On exam patient was attempting to answer questions with grunts and moans. He expressed understanding by following verbal commands. Patient's nursing aid arrived who explained that he is normally A&Ox 2 to self and location. He requires 24 hrs home nursing assistance and is not able to get out of bed on his own. He walks with a walker. Aid is unsure of overnight events though it was reported to her by the overnight aid that the patient was up and able to talk and walk at 3 AM the same day. Comprehensive ROS unobtainable due to patient's aphasia.    NIHSS: 12  PreMRS: 3  Patient was not a tenecteplase candidate as he presented outside the time window and hemorrhage was identified on CT  Patient was not a thrombectomy candidate as no LVO was identified on imaging.          Review of Systems:  All other review of systems is negative unless indicated above.    Allergies:  procainamide (Other (Severe))      PMHx/PSHx/Family Hx: As above, otherwise see below   HLD (hyperlipidemia)    Atrial fibrillation    Pacemaker    AF (atrial fibrillation)    HTN (hypertension)    DM (diabetes mellitus)    Macular degeneration    BPH (benign prostatic hypertrophy)        Social Hx:  No current use of tobacco, alcohol, or illicit drugs    Medications:  MEDICATIONS  (STANDING):  niCARdipine Infusion 5 mG/Hr (25 mL/Hr) IV Continuous <Continuous>    MEDICATIONS  (PRN):      Vitals:  T(C): 36.2 (12-27-23 @ 09:30), Max: 36.4 (12-27-23 @ 08:30)  HR: 80 (12-27-23 @ 09:30) (66 - 80)  BP: 150/59 (12-27-23 @ 09:30) (150/59 - 172/80)  RR: 18 (12-27-23 @ 09:30) (16 - 22)  SpO2: 95% (12-27-23 @ 09:30) (95% - 96%)    Physical Examination:  General - laying in bed with mouth persistently open  Cardiovascular - Peripheral pulses palpable, no edema  Eyes - Fundoscopy not performed due to safety precautions in the setting of infection risk    Neurologic Exam:  Mental status - Difficult to assess orientation due to aphasia. Speaks in groans, attempts to make full words but unable to do so, nods and grunts to express understanding. Follows simple and complex commands.    Cranial nerves - PERRLA, VFF, EOMI, face sensation (V1-V3) intact b/l, facial strength intact without asymmetry b/l, hard of hearing at baseline, trapezius 5/5 strength b/l    Motor - Normal bulk and tone throughout. No pronator drift.  Strength testing            R        Deltoid:  2   Biceps:  2    Triceps:  2    Wrist Extension:  2    Wrist Flexion:  2    Interossei:  2    :  2    Hip Flexion:  0    Hip Extension:  0    Knee Flexion:  0    Knee Extension:  0    Dorsiflexion:  1    Plantar Flexion:  1        L        Deltoid:  4    Biceps:  4    Triceps:  4-    Wrist Extension:  4-    Wrist Flexion:  4    Interossei:  3    :  3    Hip Flexion:  3    Hip Extension:  3    Knee Flexion:  4-    Knee Extension:  4-    Dorsiflexion:  4-    Plantar Flexion:  4-      Sensation - Pain/vibration intact throughout    DTR's -               R  Biceps:  0    Triceps:  0    Brachioradialis:  0    Patellar:  1+    Ankle:  1+    L  Biceps:  2+    Triceps:  1+    Brachioradialis:  2+    Patellar:  1+    Ankle:  1+    Coordination - Unable to instruct through FTN or HTS    Gait and station - Gait not tested due to patient safety concerns    Labs:                        13.2   10.80 )-----------( 219      ( 27 Dec 2023 07:55 )             39.1     12-27    138  |  104  |  16  ----------------------------<  141<H>  4.4   |  22  |  0.85    Ca    9.0      27 Dec 2023 07:55    TPro  6.7  /  Alb  4.2  /  TBili  0.5  /  DBili  x   /  AST  18  /  ALT  11  /  AlkPhos  53  12-27    CAPILLARY BLOOD GLUCOSE      POCT Blood Glucose.: 135 mg/dL (27 Dec 2023 07:48)    LIVER FUNCTIONS - ( 27 Dec 2023 07:55 )  Alb: 4.2 g/dL / Pro: 6.7 g/dL / ALK PHOS: 53 U/L / ALT: 11 U/L / AST: 18 U/L / GGT: x             PT/INR - ( 27 Dec 2023 07:55 )   PT: 16.0 sec;   INR: 1.47 ratio         PTT - ( 27 Dec 2023 07:55 )  PTT:37.3 sec  CSF:                  Radiology:    < from: CT Brain Stroke Protocol (12.27.23 @ 08:05) >  CT brain:  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    Similar minimal rightward midline shift since 8/25/2023, with no   effacement of the basal cisterns.    Mass effect upon the posterior left lateral ventricle. The ventricles are   otherwise similar in size to 8/25/2023.    Similar-appearing 1.2 x 1.2 cm (since 9/26/2021) extra-axial mass within   the left anterolateral foramen magnum cistern. Mild mass effect upon the   cervicomedullary junction. This may represent a meningioma.    CTA brain:  No flow-limiting stenosis or vascular aneurysm.    No evidence for AVM. Please note that this does not exclude thepresence   of a micro-AVM, which may be compressed by hemorrhage.    CTA neck:  No flow-limiting stenosis, evidence for arterial dissection, or vascular   aneurysm.    < end of copied text >   Neurology - Consult Note    -  Spectra: [phone] ([facility]), [phone] ([facility])  -    HPI: CHERY AMEZCUA, 89y (01-Dec-1926) M w/ PMHx significant for A.fib s/p PPM on Eliquis digoxin & metoprolol, HTN, HLD, DM, BPH w/ indwelling urinary catheter, macular degeneration presents to the ED due to R sided weakness and aphasia. Stroke code called. LKW 3 AM 12/27/2023. Patient was awake and spoke to his overnight home nursing aid at his apartment in [facility]. Later in the morning at 6 AM when his morning nursing aid came to help him out of bed, it was noted that his right side was weak and he was not speaking to the nursing aid. Aid became concerned and called EMS. Patient took his Eliquis the same day. CT head done in the ED revealed a L thalamic hemorrhage with intraventricular extension. CTA was without AVM or aneurysm. Patient's PT, PTT & INR were elevated and he was given Kcentra to reverse anticoagulation from his Eliquis. On exam patient was attempting to answer questions with grunts and moans. He expressed understanding by following verbal commands. Patient's nursing aid arrived who explained that he is normally A&Ox 2 to self and location. He requires 24 hrs home nursing assistance and is not able to get out of bed on his own. He walks with a walker. Aid is unsure of overnight events though it was reported to her by the overnight aid that the patient was up and able to talk and walk at 3 AM the same day. Comprehensive ROS unobtainable due to patient's aphasia.    NIHSS: 12  PreMRS: 3  Patient was not a tenecteplase candidate as he presented outside the time window and hemorrhage was identified on CT  Patient was not a thrombectomy candidate as no LVO was identified on imaging.          Review of Systems:  All other review of systems is negative unless indicated above.    Allergies:  procainamide (Other (Severe))      PMHx/PSHx/Family Hx: As above, otherwise see below   HLD (hyperlipidemia)    Atrial fibrillation    Pacemaker    AF (atrial fibrillation)    HTN (hypertension)    DM (diabetes mellitus)    Macular degeneration    BPH (benign prostatic hypertrophy)        Social Hx:  No current use of tobacco, alcohol, or illicit drugs    Medications:  MEDICATIONS  (STANDING):  niCARdipine Infusion 5 mG/Hr (25 mL/Hr) IV Continuous <Continuous>    MEDICATIONS  (PRN):      Vitals:  T(C): 36.2 (12-27-23 @ 09:30), Max: 36.4 (12-27-23 @ 08:30)  HR: 80 (12-27-23 @ 09:30) (66 - 80)  BP: 150/59 (12-27-23 @ 09:30) (150/59 - 172/80)  RR: 18 (12-27-23 @ 09:30) (16 - 22)  SpO2: 95% (12-27-23 @ 09:30) (95% - 96%)    Physical Examination:  General - laying in bed with mouth persistently open  Cardiovascular - Peripheral pulses palpable, no edema  Eyes - Fundoscopy not performed due to safety precautions in the setting of infection risk    Neurologic Exam:  Mental status - Difficult to assess orientation due to aphasia. Speaks in groans, attempts to make full words but unable to do so, nods and grunts to express understanding. Follows simple and complex commands.    Cranial nerves - PERRLA, VFF, EOMI, face sensation (V1-V3) intact b/l, facial strength intact without asymmetry b/l, hard of hearing at baseline, trapezius 5/5 strength b/l    Motor - Normal bulk and tone throughout. No pronator drift.  Strength testing            R        Deltoid:  2   Biceps:  2    Triceps:  2    Wrist Extension:  2    Wrist Flexion:  2    Interossei:  2    :  2    Hip Flexion:  0    Hip Extension:  0    Knee Flexion:  0    Knee Extension:  0    Dorsiflexion:  1    Plantar Flexion:  1        L        Deltoid:  4    Biceps:  4    Triceps:  4-    Wrist Extension:  4-    Wrist Flexion:  4    Interossei:  3    :  3    Hip Flexion:  3    Hip Extension:  3    Knee Flexion:  4-    Knee Extension:  4-    Dorsiflexion:  4-    Plantar Flexion:  4-      Sensation - Pain/vibration intact throughout    DTR's -               R  Biceps:  0    Triceps:  0    Brachioradialis:  0    Patellar:  1+    Ankle:  1+    L  Biceps:  2+    Triceps:  1+    Brachioradialis:  2+    Patellar:  1+    Ankle:  1+    Coordination - Unable to instruct through FTN or HTS    Gait and station - Gait not tested due to patient safety concerns    Labs:                        13.2   10.80 )-----------( 219      ( 27 Dec 2023 07:55 )             39.1     12-27    138  |  104  |  16  ----------------------------<  141<H>  4.4   |  22  |  0.85    Ca    9.0      27 Dec 2023 07:55    TPro  6.7  /  Alb  4.2  /  TBili  0.5  /  DBili  x   /  AST  18  /  ALT  11  /  AlkPhos  53  12-27    CAPILLARY BLOOD GLUCOSE      POCT Blood Glucose.: 135 mg/dL (27 Dec 2023 07:48)    LIVER FUNCTIONS - ( 27 Dec 2023 07:55 )  Alb: 4.2 g/dL / Pro: 6.7 g/dL / ALK PHOS: 53 U/L / ALT: 11 U/L / AST: 18 U/L / GGT: x             PT/INR - ( 27 Dec 2023 07:55 )   PT: 16.0 sec;   INR: 1.47 ratio         PTT - ( 27 Dec 2023 07:55 )  PTT:37.3 sec  CSF:                  Radiology:    < from: CT Brain Stroke Protocol (12.27.23 @ 08:05) >  CT brain:  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    Similar minimal rightward midline shift since 8/25/2023, with no   effacement of the basal cisterns.    Mass effect upon the posterior left lateral ventricle. The ventricles are   otherwise similar in size to 8/25/2023.    Similar-appearing 1.2 x 1.2 cm (since 9/26/2021) extra-axial mass within   the left anterolateral foramen magnum cistern. Mild mass effect upon the   cervicomedullary junction. This may represent a meningioma.    CTA brain:  No flow-limiting stenosis or vascular aneurysm.    No evidence for AVM. Please note that this does not exclude thepresence   of a micro-AVM, which may be compressed by hemorrhage.    CTA neck:  No flow-limiting stenosis, evidence for arterial dissection, or vascular   aneurysm.    < end of copied text >

## 2023-12-27 NOTE — CONSULT NOTE ADULT - ASSESSMENT
Decision to admit stroke unit under Dr. Doug Manuel pending 4 hr stability scan    Impression: Acute onset R hemiparesis with expressive aphasia. Localizes to L cerebral hemisphere. CT head reveals L thalamic hemorrhage with intraventricular extension without AVM or aneurysm on CTA. Per location, history of HTN and elevated BP on arrival suspect hypertensive bleed.     ICH score: 3    Stroke acute management:  - s/p Kcentra to reverse Eliquis  - Frequent neuro-checks q2h and VS q2h; STAT CTH for change in neuro exam  - Permissive HTN up to 140/100 for 24-48h from symptom onset followed by gradual normotension over 2-3 days   - NPO unless passes dysphagia screen; swallow eval if fails  - DVT ppx: SCDs - pending decision to re-start Eliquis 48 hours after initial presentation depending on stability scans    Secondary prevention of stroke:  - Hold AC/AP 48 hrs pending stability CT scans  - Atorvastatin 80 mg PO daily (long-term goal LDL < 70)  - Tight glucose control (long-term goal HgbA1c < 6%)  - Rehabilitation: PT/OT/S+S    Stroke workup:  - CT head 4 hrs & 24 hrs from initial presentation to assess bleed stability  - MRI brain w/o contrast  - Telemetry to monitor for arrhythmia  - Check HgbA1C, fasting lipid panel, utox    Case discussed with stroke fellow Dr. Price Woods, under supervision of attending Dr. Doug Manuel   Decision to admit stroke unit under Dr. Doug Manuel pending 4 hr stability scan    Impression: Acute onset R hemiparesis with expressive aphasia. Localizes to L cerebral hemisphere. CT head reveals L thalamic hemorrhage with intraventricular extension without AVM or aneurysm on CTA. Per location, history of HTN and elevated BP on arrival suspect hypertensive bleed.     ICH score: 3    Stroke acute management:  - s/p Kcentra to reverse Eliquis  - Frequent neuro-checks q2h and VS q2h; STAT CTH for change in neuro exam  - Permissive HTN up to 140/100 for 24-48h from symptom onset followed by gradual normotension over 2-3 days, titrate Cardene drip as needed for goal BP  - NPO unless passes dysphagia screen; swallow eval if fails  - DVT ppx: SCDs - pending decision to re-start Eliquis 48 hours after initial presentation depending on stability scans    Secondary prevention of stroke:  - Hold AC/AP 48 hrs pending stability CT scans  - Atorvastatin 80 mg PO daily (long-term goal LDL < 70)  - Tight glucose control (long-term goal HgbA1c < 6%)  - Rehabilitation: PT/OT/S+S    Stroke workup:  - CT head 4 hrs & 24 hrs from initial presentation to assess bleed stability  - MRI brain w/o contrast  - Telemetry to monitor for arrhythmia  - Check HgbA1C, fasting lipid panel, utox    Case discussed with stroke fellow Dr. Price Woods, under supervision of attending Dr. Doug Manuel

## 2023-12-27 NOTE — ED ADULT NURSE REASSESSMENT NOTE - NS ED NURSE REASSESS COMMENT FT1
Report received from Gold team. Pt pending repeat head CT at noon. Family at bedside. Updated MOLST form. Next neuro check at 1130

## 2023-12-27 NOTE — ED PROVIDER NOTE - OBJECTIVE STATEMENT
89M with PMH  AFib (on Eliquis), HTN, HLD, DM, PPM , indwelling urinary catheter is BIBA as a code stroke. Pt was last seen well by aide at 3am, then was noted at around 6:30am to be unresponsive, not answering questions or able to speak. Per EMS, pt with R-sided weakness. Code stroke activated on arrival. CTH demonstrates ICH, NSG consulted. Per MOLST and d/w daughter, pt is DNR/DNI. Will reverse eliquis with Kcentra, start hypertonic saline, keep SBP<140 on cardene drip and admit to stroke unit, per d/w NSG and Neurology, continue to monitor hemodynamics and neurologic exam. Pt's daughter is on the way to see patient.

## 2023-12-27 NOTE — CONSULT NOTE ADULT - PROBLEM SELECTOR RECOMMENDATION 4
contact information provided to family  we will continue to follow. discussed with ED provider.     [phone] contact information provided to family  we will continue to follow. discussed with ED provider.     [phone]

## 2023-12-27 NOTE — H&P ADULT - HISTORY OF PRESENT ILLNESS
CHERY AMEZCUA, 89y (01-Dec-1926) M w/ PMHx significant for A.fib s/p PPM on Eliquis digoxin & metoprolol, HTN, HLD, DM, BPH w/ indwelling urinary catheter, macular degeneration presents to the ED due to R sided weakness and aphasia. Stroke code called. LKW 3 AM 12/27/2023. Patient was awake and spoke to his overnight home nursing aid at his apartment in [facility]. Later in the morning at 6 AM when his morning nursing aid came to help him out of bed, it was noted that his right side was weak and he was not speaking to the nursing aid. Aid became concerned and called EMS. Patient took his Eliquis the same day. CT head done in the ED revealed a L thalamic hemorrhage with intraventricular extension. CTA was without AVM or aneurysm. Patient's PT, PTT & INR were elevated and he was given Kcentra to reverse anticoagulation from his Eliquis. On exam patient was attempting to answer questions with grunts and moans. He expressed understanding by following verbal commands. Patient's nursing aid arrived who explained that he is normally A&Ox 2 to self and location. He requires 24 hrs home nursing assistance and is not able to get out of bed on his own. He walks with a walker. Aid is unsure of overnight events though it was reported to her by the overnight aid that the patient was up and able to talk and walk at 3 AM the same day. Comprehensive ROS unobtainable due to patient's aphasia.    NIHSS: 12  PreMRS: 3  Patient was not a tenecteplase candidate as he presented outside the time window and hemorrhage was identified on CT  Patient was not a thrombectomy candidate as no LVO was identified on imaging. CHERY AMEZCUA, 89y (01-Dec-1926) M w/ PMHx significant for A.fib s/p PPM on Eliquis digoxin & metoprolol, HTN, HLD, DM, BPH w/ indwelling urinary catheter, macular degeneration presents to the ED due to R sided weakness and aphasia. Stroke code called. LKW 3 AM 12/27/2023. Patient was awake and spoke to his overnight home nursing aid at his apartment in [facility]. Later in the morning at 6 AM when his morning nursing aid came to help him out of bed, it was noted that his right side was weak and he was not speaking to the nursing aid. Aid became concerned and called EMS. Patient took his Eliquis the same day. CT head done in the ED revealed a L thalamic hemorrhage with intraventricular extension. CTA was without AVM or aneurysm. Patient's PT, PTT & INR were elevated and he was given Kcentra to reverse anticoagulation from his Eliquis. On exam patient was attempting to answer questions with grunts and moans. He expressed understanding by following verbal commands. Patient's nursing aid arrived who explained that he is normally A&Ox 2 to self and location. He requires 24 hrs home nursing assistance and is not able to get out of bed on his own. He walks with a walker. Aid is unsure of overnight events though it was reported to her by the overnight aid that the patient was up and able to talk and walk at 3 AM the same day. Comprehensive ROS unobtainable due to patient's aphasia.    NIHSS: 12  PreMRS: 3  Patient was not a tenecteplase candidate as he presented outside the time window and hemorrhage was identified on CT  Patient was not a thrombectomy candidate as no LVO was identified on imaging.

## 2023-12-27 NOTE — ED PROVIDER NOTE - NSICDXPASTMEDICALHX_GEN_ALL_CORE_FT
PAST MEDICAL HISTORY:  AF (atrial fibrillation) on Eliquis    BPH (benign prostatic hypertrophy)     DM (diabetes mellitus)     HLD (hyperlipidemia)     HTN (hypertension)     Macular degeneration     Pacemaker

## 2023-12-27 NOTE — ED ADULT NURSE REASSESSMENT NOTE - NS ED NURSE REASSESS COMMENT FT1
[facility]

## 2023-12-27 NOTE — STROKE CODE NOTE - NIH STROKE SCALE DATE
3/28/2023 Mrs. Roman 
27-Dec-2023 08:00

## 2023-12-27 NOTE — ED PROVIDER NOTE - DIFFERENTIAL DIAGNOSIS
Differential Diagnosis Ddx includes, however, is not limited to: CVA, ICH, metabolic encephalopathy, infectious encephalopathy, other

## 2023-12-27 NOTE — ED ADULT NURSE NOTE - OBJECTIVE STATEMENT
89 yo M presents to ED A+Ox1 (oriented to self)  but lethargic via EMS from home c/o weakness. As per EMS, patient went to sleep at 8pm, called home aide at 3am and woke up at 630am with slurred speech and weakness to R side. Patient arrives lethargic but responsive to painful and verbal stimuli. R side arm and leg weakness noted. Breathing spontaneous. Skin warm pink and dry. As per home aide, patient is on Eliquis twice a day, denies patient having any recent falls. Code stroke called upon arrival. Afib on cardiac monitor. Arrives with patel catheter draining cloudy yellow urine, per home aide catheter was changed yesterday at PCP appointment.

## 2023-12-27 NOTE — ED ADULT NURSE NOTE - NSICDXPASTMEDICALHX_GEN_ALL_CORE_FT
Sutures removed in clinic today, see procedure note.   Plan for liquid bandage daily to area, await dead skin from skin flap to fall off, if worsening opening of wound, bleeding , erythema or swelling occur, follow up in clinic  F/u 1 week         PAST MEDICAL HISTORY:  AF (atrial fibrillation)     BPH (benign prostatic hypertrophy)     DM (diabetes mellitus)     HLD (hyperlipidemia)     HTN (hypertension)     Macular degeneration     Pacemaker

## 2023-12-27 NOTE — ED ADULT NURSE NOTE - NSFALLHARMRISKINTERV_ED_ALL_ED

## 2023-12-27 NOTE — CONSULT NOTE ADULT - PROBLEM SELECTOR RECOMMENDATION 3
DNR/DNI, MOLST in chart. limited medical interventions  introduction to palliative team today  discussed that medical decision making will be needed based on functional status: i.e. mobility and ability to swallow which will be assessed over coming days

## 2023-12-27 NOTE — CONSULT NOTE ADULT - ASSESSMENT
89y M WELL KNOWN TO ME FROM PREVIOUS MULTIPLE ADMISSIONS AT [facility]  ptn presents with aphasia and R sided weakness  Stroke code called. LKW 3 AM 12/27/2023. Patient was awake and spoke to his overnight home nursing aide at his apartment in [facility]. Later in the morning at 6 AM when this morning nursing aide came to help him out of bed, it was noted that his right side was weak and he was not speaking to the nursing aide. The aide became concerned and called EMS. Patient took his Eliquis the same day.   CT head done in the ED revealed a L thalamic hemorrhage with intraventricular extension. CTA was without AVM or aneurysm. Patient's PT, PTT & INR were elevated and he was given Kcentra to reverse anticoagulation from Eliquis.   On exam patient was attempting to answer questions with grunts and moans. He expressed understanding by following verbal commands. Patient's nursing aide arrived who explained that he is normally A&Ox 2 to self and location. He requires 24 hrs home nursing assistance and is not able to get out of bed on his own. He walks with a walker. Aide is unsure of overnight events.  though it was reported to her by the overnight aide that the patient was up and able to talk and walk at 3 AM the same day.   Comprehensive ROS unobtainable due to patient's aphasia.    PMHx significant for A.fib s/p PPM on Eliquis digoxin & metoprolol, HTN, HLD, DM, BPH w/ indwelling urinary catheter, macular degeneration       NIHSS: 12  Patient was not a tenecteplase candidate as he presented outside the time window and hemorrhage was identified on CT  Patient was not a thrombectomy candidate as no LVO was identified on imaging.    Acute onset R hemiparesis with expressive aphasia. Localized to L cerebral hemisphere. CT head reveals L thalamic hemorrhage with intraventricular extension without AVM or aneurysm on CTA.     Ptn admitted to NEURO service  As per Neuro: Per location, history of HTN and elevated BP on arrival suspect hypertensive bleed.     ICH score: 3    Stroke acute management:  - s/p Kcentra to reverse Eliquis  - Frequent neuro-checks q2h and VS q2h; STAT CTH for change in neuro exam  - Permissive HTN up to 140/100 for 24-48h from symptom onset followed by gradual normotension over 2-3 days, titrate Cardene drip as needed for goal BP  - NPO unless passes dysphagia screen; swallow eval if fails  - DVT ppx: SCDs - pending decision to re-start Eliquis 48 hours after initial presentation depending on stability scans    Secondary prevention of stroke:  - Hold AC/AP 48 hrs pending stability CT scans  - Atorvastatin 80 mg PO daily (long-term goal LDL < 70)  - Tight glucose control (long-term goal HgbA1c < 6%)  - ISS for glucose control  - Rehabilitation: PT/OT/S+S    Stroke workup:  - CT head 4 hrs & 24 hrs from initial presentation to assess bleed stability  - MRI brain w/o contrast  - Telemetry to monitor for arrhythmia  - Check HgbA1C, fasting lipid panel, utox    Afib- cardiology called Continue home Digoxin 125 for A.fib  - ptn has h/o chronic UTIs, indwelling patel, would check UA, Cx. Latest admission for UTI w metabolic encephalopathy in 10/2023  GOC- ptn is DNR/DNI at baseline   89y M WELL KNOWN TO ME FROM PREVIOUS MULTIPLE ADMISSIONS AT [facility]  ptn presents with aphasia and R sided weakness  Stroke code called. LKW 3 AM 12/27/2023. Patient was awake and spoke to his overnight home nursing aide at his apartment in [facility]. Later in the morning at 6 AM when this morning nursing aide came to help him out of bed, it was noted that his right side was weak and he was not speaking to the nursing aide. The aide became concerned and called EMS. Patient took his Eliquis the same day.   CT head done in the ED revealed a L thalamic hemorrhage with intraventricular extension. CTA was without AVM or aneurysm. Patient's PT, PTT & INR were elevated and he was given Kcentra to reverse anticoagulation from Eliquis.   On exam patient was attempting to answer questions with grunts and moans. He expressed understanding by following verbal commands. Patient's nursing aide arrived who explained that he is normally A&Ox 2 to self and location. He requires 24 hrs home nursing assistance and is not able to get out of bed on his own. He walks with a walker. Aide is unsure of overnight events.  though it was reported to her by the overnight aide that the patient was up and able to talk and walk at 3 AM the same day.   Comprehensive ROS unobtainable due to patient's aphasia.    PMHx significant for A.fib s/p PPM on Eliquis digoxin & metoprolol, HTN, HLD, DM, BPH w/ indwelling urinary catheter, macular degeneration       NIHSS: 12  Patient was not a tenecteplase candidate as he presented outside the time window and hemorrhage was identified on CT  Patient was not a thrombectomy candidate as no LVO was identified on imaging.    Acute onset R hemiparesis with expressive aphasia. Localized to L cerebral hemisphere. CT head reveals L thalamic hemorrhage with intraventricular extension without AVM or aneurysm on CTA.     Ptn admitted to NEURO service  As per Neuro: Per location, history of HTN and elevated BP on arrival suspect hypertensive bleed.     ICH score: 3    Stroke acute management:  - s/p Kcentra to reverse Eliquis  - Frequent neuro-checks q2h and VS q2h; STAT CTH for change in neuro exam  - Permissive HTN up to 140/100 for 24-48h from symptom onset followed by gradual normotension over 2-3 days, titrate Cardene drip as needed for goal BP  - NPO unless passes dysphagia screen; swallow eval if fails  - DVT ppx: SCDs - pending decision to re-start Eliquis 48 hours after initial presentation depending on stability scans    Secondary prevention of stroke:  - Hold AC/AP 48 hrs pending stability CT scans  - Atorvastatin 80 mg PO daily (long-term goal LDL < 70)  - Tight glucose control (long-term goal HgbA1c < 6%)  - ISS for glucose control  - Rehabilitation: PT/OT/S+S    Stroke workup:  - CT head 4 hrs & 24 hrs from initial presentation to assess bleed stability  - MRI brain w/o contrast  - Telemetry to monitor for arrhythmia  - Check HgbA1C, fasting lipid panel, utox    Afib- cardiology called Continue home Digoxin 125 for A.fib  - ptn has h/o chronic UTIs, indwelling patel, would check UA, Cx. Latest admission for UTI w metabolic encephalopathy in 10/2023  GOC- ptn is DNR/DNI at baseline

## 2023-12-27 NOTE — CONSULT NOTE ADULT - ASSESSMENT
A/P  89M h/o afib on Eliquis, PPM, HTN, HLD, BPH,  reportedly high functioning at baseline, lives at home with 24h nursing care, p/f code stroke for acute onset slurred speech and R weakness o/n. 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 Minimal shift. CTA w/ spot sign, no obvious aneurysm or vasc malf pending read. INR 1.47, plts 219. Exam: Sleepy but arousable, EOV, dysarthric, Ox2 w/ choice, FC, Lt side strong, Rt side brisk wd.      #acute ICH  -s/p reversal in ED  -neuro/neurosx f/u  -no neurosx intervention planned    #AF  -a/c on hold in setting of ICH  -continue dig    #HTN  -permissive htn per neuro      dnr/dni    care per neuro      77 minutes spent on total encounter; more than 50% of the visit was spent counseling and/or coordinating care by the attending physician.

## 2023-12-27 NOTE — ED PROVIDER NOTE - UNABLE TO OBTAIN
Severe Illness/Injury hx obtained from [facility] and [facility] hx obtained from [facility] and [facility]

## 2023-12-27 NOTE — CONSULT NOTE ADULT - SUBJECTIVE AND OBJECTIVE BOX
HPI: 89yo M with hx of afib on AC, HTN, DM, BPH, HLD who presented with unresponsiveness this morning. Found to have L thalamic hemorrhage. Palliative consulted for GOC    PERTINENT PM/SXH:   HLD (hyperlipidemia)    Atrial fibrillation    Pacemaker    AF (atrial fibrillation)    HTN (hypertension)    DM (diabetes mellitus)    Macular degeneration    BPH (benign prostatic hypertrophy)      S/P knee replacement, bilateral      FAMILY HISTORY:  No pertinent family history in first degree relatives      Family Hx substance abuse [ ]yes [x ]no  ITEMS NOT CHECKED ARE NOT PRESENT    SOCIAL HISTORY:   Significant other/partner[ ]  Children[x ]  Amish/Spirituality:  Substance hx:  [ ]   Tobacco hx:  [ ]   Alcohol hx: [ ]   Home Opioid hx:  [ ] I-Stop Reference No:  Living Situation: [ x]Home with 24 hr aide  [ ]Long term care  [ ]Rehab [ ]Other    ADVANCE DIRECTIVES:    DNR/MOLST  [ ]  Living Will  [ ]   DECISION MAKER(s):  [ ] Health Care Proxy(s)  [x ] Surrogate(s)  [ ] Guardian           Name(s): Phone Number(s): Mary, daughter, number per EMR    BASELINE (I)ADL(s) (prior to admission):  [place]: [ ]Total  [x ] Moderate [ ]Dependent    Allergies    procainamide (Other (Severe))    Intolerances    MEDICATIONS  (STANDING):  niCARdipine Infusion 5 mG/Hr (25 mL/Hr) IV Continuous <Continuous>    MEDICATIONS  (PRN):    PRESENT SYMPTOMS: [ x]Unable to self-report  [ ] CPOT [ x] PAINADs [x ] RDOS  Source if other than patient:  [ ]Family   [ ]Team     Pain: [ ]yes [ ]no  QOL impact -   Location -                    Aggravating factors -  Quality -  Radiation -  Timing-  Severity (0-10 scale):  Minimal acceptable level (0-10 scale):     CPOT:    https://www.sccm.org/getattachment/xlu53e19-9b9t-1r3i-8v0z-9035d0191t7i/Critical-Care-Pain-Observation-Tool-(CPOT)    PAIN AD Score:   http://geriatrictoolkit.[facility]/cog/painad.pdf (press ctrl +  left click to view)    Dyspnea:                           [ ]Mild [ ]Moderate [ ]Severe      RDOS:  0 to 2  minimal or no respiratory distress   3  mild distress  4 to 6 moderate distress  >7 severe distress  https://homecareinformation.net/handouts/hen/Respiratory_Distress_Observation_Scale.pdf (Ctrl +  left click to view)     Anxiety:                             [ ]Mild [ ]Moderate [ ]Severe  Fatigue:                             [ ]Mild [ ]Moderate [ ]Severe  Nausea:                             [ ]Mild [ ]Moderate [ ]Severe  Loss of appetite:              [ ]Mild [ ]Moderate [ ]Severe  Constipation:                    [ ]Mild [ ]Moderate [ ]Severe    PCSSQ[Palliative Care Spiritual Screening Question]   Severity (0-10):  Score of 4 or > indicate consideration of Chaplaincy referral.  Chaplaincy Referral: [ ] yes [ ] refused [ ] following [ x] Deferred     Caregiver [place]? : [ ] yes [ ] no [ x] Deferred [ ] Declined             Social work referral [ ] Patient & Family Centered Care Referral [ ]     Anticipatory Grief present?:  [ ] yes [ ] no  [x ] Deferred                  Social work referral [ ] Chaplaincy Referral[ ]      Other Symptoms:  [ ]All other review of systems negative     Palliative Performance Status Version 2:  20     %    http://npcrc.org/files/news/palliative_performance_scale_ppsv2.pdf  PHYSICAL EXAM:  Vital Signs Last 24 Hrs  T(C): 36.6 (27 Dec 2023 12:00), Max: 36.6 (27 Dec 2023 12:00)  T(F): 97.9 (27 Dec 2023 12:00), Max: 97.9 (27 Dec 2023 12:00)  HR: 84 (27 Dec 2023 12:00) (66 - 84)  BP: 154/90 (27 Dec 2023 12:00) (132/75 - 172/80)  BP(mean): 90 (27 Dec 2023 10:15) (86 - 102)  RR: 22 (27 Dec 2023 12:00) (16 - 23)  SpO2: 97% (27 Dec 2023 12:00) (93% - 97%)    Parameters below as of 27 Dec 2023 12:00  Patient On (Oxygen Delivery Method): room air     I&O's Summary    GENERAL: [ ]Cachexia    [ x]Alert  [ ]Oriented x   [ ]Lethargic  [ ]Unarousable  [x ]Verbal- minimal  [ ]Non-Verbal  Behavioral:   [ ] Anxiety  [ ] Delirium [ ] Agitation [ ] Other  HEENT:  [ ]Normal   [x ]Dry mouth   [ ]ET Tube/Trach  [ ]Oral lesions  PULMONARY:   [ x]Clear [ ]Tachypnea  [ ]Audible excessive secretions   [ ]Rhonchi        [ ]Right [ ]Left [ ]Bilateral  [ ]Crackles        [ ]Right [ ]Left [ ]Bilateral  [ ]Wheezing     [ ]Right [ ]Left [ ]Bilateral  [ ]Diminished breath sounds [ ]right [ ]left [ ]bilateral  CARDIOVASCULAR:    [ ]Regular [x ]Irregular [ ]Tachy  [ ]Jose J [ ]Murmur [ ]Other  GASTROINTESTINAL:  [ x]Soft  [ ]Distended   [x ]+BS  [x ]Non tender [ ]Tender  [ ]Other [ ]PEG [ ]OGT/ NGT  Last BM:  GENITOURINARY:  [ ]Normal [ ] Incontinent   [ ]Oliguria/Anuria   [x ][place]  MUSCULOSKELETAL:   [ ]Normal   [x ]Weakness  [x ]Bed/Wheelchair bound [ ]Edema  NEUROLOGIC: 2/5 RUE and RLE, 4/5 LUE and LLE   [ ]No focal deficits  [ ]Cognitive impairment  [ ]Dysphagia [ ]Dysarthria [ ]Paresis [ x]Other   SKIN:   [ ]Normal  [ ]Rash  [ ]Other  [ ]Pressure ulcer(s)       Present on admission [ ]y [ ]n    CRITICAL CARE:  [ ] Shock Present  [ ]Septic [ ]Cardiogenic [ ]Neurologic [ ]Hypovolemic  [ ]  Vasopressors [ ]  Inotropes   [ ]Respiratory failure present [ ]Mechanical ventilation [ ]Non-invasive ventilatory support [ ]High flow    [ ]Acute  [ ]Chronic [ ]Hypoxic  [ ]Hypercarbic [ ]Other  [ ]Other organ failure     LABS:                        13.2   10.80 )-----------( 219      ( 27 Dec 2023 07:55 )             39.1   12-27    138  |  104  |  16  ----------------------------<  141<H>  4.4   |  22  |  0.85    Ca    9.0      27 Dec 2023 07:55    TPro  6.7  /  Alb  4.2  /  TBili  0.5  /  DBili  x   /  AST  18  /  ALT  11  /  AlkPhos  53  12-27  PT/INR - ( 27 Dec 2023 10:04 )   PT: 11.5 sec;   INR: 1.05 ratio         PTT - ( 27 Dec 2023 10:04 )  PTT:40.1 sec    Urinalysis Basic - ( 27 Dec 2023 07:55 )    Color: x / Appearance: x / SG: x / pH: x  Gluc: 141 mg/dL / Ketone: x  / Bili: x / Urobili: x   Blood: x / Protein: x / Nitrite: x   Leuk Esterase: x / RBC: x / WBC x   Sq Epi: x / Non Sq Epi: x / Bacteria: x      RADIOLOGY & ADDITIONAL STUDIES:  < from: CT Angio Neck Stroke Protocol w/ IV Cont (12.27.23 @ 08:06) >    ACC: [id number] EXAM:  CT ANGIO BRAIN STROKE PROTC IC   ORDERED BY:    RADHA CABRERA     ACC: [id number] EXAM:  CT ANGIO NECK STROKE PROTCL IC   ORDERED BY:    RADHA CABRERA     ACC: [id number] EXAM:  CT BRAIN STROKE PROTOCOL   ORDERED BY:  RADHA CABRERA     PROCEDURE DATE:  12/27/2023          INTERPRETATION:  CT angiography of the brain and neck. CT brain perfusion.    CLINICAL INDICATION: Code stroke    TECHNIQUE: Direct axial CT scanning of the brain and neck was obtained   from the vertex to the level of the clavicular heads after the dynamic   intravenous injection of 70 cc of Omnipaque 300. 30 cc discarded.   Sagittal and coronal maximum intensity projection reformats were   provided.  Three-dimensional reconstructions were performed by the   radiologist using the [employer] workstation.    Additionally, noncontrast axial CT scanning of the brain was obtained   from the skull base to the vertex. Sagittal and coronal reformats were   provided.    COMPARISON: CT brain 8/25/2023 and, 9/26/2021, 2/11/2019.    FINDINGS: . Minimal traumatic arrest well with the cases T1 dictate any    CT BRAIN:    Acute 2.7 x 1.8 x 2.7 cm (maximal AP x TV x CC dimensions) parenchymal   hemorrhage centered in the left thalamus, extending superiorly into the   left corona radiata with surrounding edema.    Intraventricular extension of hemorrhage involving the posterior left   ventricular body, left ventricular atrium, and left temporal and   occipital horns.    Mass effect upon the posterior left ventricular body and ventricular   atrium. The ventricles are otherwise similar in size.    Minimal rightward midline shift at the level of the septum pellucidum is   similar. No effacement of basal cisterns.    Mild white matter microvascular ischemic disease.    Similar-appearing (since 9/26/2021) 1.2 x 1.2 cm (AP by TV) isointense   extra-axial lesion in the left anterolateral aspect of the foramen magnum   cistern, grossly similar in appearance to CT brain 9/26/2021, possibly   representing the presence of a meningioma. Mild mass effect upon the   cervicomedullary junction.    The visualized paranasal sinuses and mastoid air cells are clear.    No displaced calvarial fracture or scalp hematoma.    CTA BRAIN:    Normal flow-related enhancement of the skull base/intracranial internal   carotid arteries.    Normal flow-related enhancement of the bilateral anterior, middle, and   posterior cerebral arteries. The right PCA demonstrates a fetal origin.    Anterior and left posterior communicating arteries are visualized.    Normal flow-related enhancement of the bilateral intradural vertebral   arteries and the basilar artery. The right vertebral artery ends in the   right PICA.    No flow-limiting stenosis or vascular aneurysm. No evidence for AVM.    CTA NECK:    Normal flow-related enhancement of the bilateral common and internal   carotid arteries.    Calcified plaque involving the distal right common carotid and proximal   right internal carotid arteries does not result in significant luminal   stenosis.    Normal flow-related enhancement of the bilateral vertebral arteries. The   left vertebral artery arises directly from the aorta and is dominant.    No flow-limiting stenosis, evidence for arterial dissection,or vascular   aneurysm.    Some right-sided fissural fluid and bronchiectasis.    IMPRESSION:    CT brain:  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    Similar minimal rightward midline shift since 8/25/2023, with no   effacement of the basal cisterns.    Mass effect upon the posterior left lateral ventricle. The ventricles are   otherwise similar in size to 8/25/2023.    Similar-appearing 1.2 x 1.2 cm (since 9/26/2021) extra-axial mass within   the left anterolateral foramen magnum cistern. Mild mass effect upon the   cervicomedullary junction. This may represent a meningioma.    CTA brain:  No flow-limiting stenosis or vascular aneurysm.    No evidence for AVM. Please note that this does not exclude thepresence   of a micro-AVM, which may be compressed by hemorrhage.    CTA neck:  No flow-limiting stenosis, evidence for arterial dissection, or vascular   aneurysm.    Dr. Covington discussed these findings with neurology consult team on   12/27/2023 8:18 AM with read back.    --- End of Report ---            SMITH COVINGTON MD; Attending Radiologist  This document has been electronically signed. Dec 27 2023  9:00AM    < end of copied text >      PROTEIN CALORIE MALNUTRITION PRESENT: [ ]mild [ ]moderate [ ]severe [ ]underweight [ ]morbid obesity  https://www.andeal.org/vault/2440/web/files/ONC/Table_Clinical%20Characteristics%20to%20Document%20Malnutrition-White%20JV%20et%20al%202012.pdf    Height (cm): 188 (10-12-23 @ 18:20), 188 (10-11-23 @ 18:18), 188 (08-25-23 @ 15:17)  Weight (kg): 70.9 (12-27-23 @ 08:28), 117.9 (10-11-23 @ 18:18), 81.6 (08-25-23 @ 15:17)  BMI (kg/m2): 20.1 (12-27-23 @ 08:28), 33.4 (10-12-23 @ 18:20), 33.4 (10-11-23 @ 18:18)    [ x]PPSV2 < or = to 30% [ ]significant weight loss  [ ]poor nutritional intake  [ ]anasarca[ ]Artificial Nutrition      Other REFERRALS:  [ ]Hospice  [ ]Child Life  [ ]Social Work  [x ]Case management [ ]Holistic Therapy     Goals of Care Document:  HPI: 89yo M with hx of afib on AC, HTN, DM, BPH, HLD who presented with unresponsiveness this morning. Found to have L thalamic hemorrhage. Palliative consulted for GOC    PERTINENT PM/SXH:   HLD (hyperlipidemia)    Atrial fibrillation    Pacemaker    AF (atrial fibrillation)    HTN (hypertension)    DM (diabetes mellitus)    Macular degeneration    BPH (benign prostatic hypertrophy)      S/P knee replacement, bilateral      FAMILY HISTORY:  No pertinent family history in first degree relatives      Family Hx substance abuse [ ]yes [x ]no  ITEMS NOT CHECKED ARE NOT PRESENT    SOCIAL HISTORY:   Significant other/partner[ ]  Children[x ]  Catholic/Spirituality:  Substance hx:  [ ]   Tobacco hx:  [ ]   Alcohol hx: [ ]   Home Opioid hx:  [ ] I-Stop Reference No:  Living Situation: [ x]Home with 24 hr aide  [ ]Long term care  [ ]Rehab [ ]Other    ADVANCE DIRECTIVES:    DNR/MOLST  [ ]  Living Will  [ ]   DECISION MAKER(s):  [ ] Health Care Proxy(s)  [x ] Surrogate(s)  [ ] Guardian           Name(s): Phone Number(s): Mary, daughter, number per EMR    BASELINE (I)ADL(s) (prior to admission):  [place]: [ ]Total  [x ] Moderate [ ]Dependent    Allergies    procainamide (Other (Severe))    Intolerances    MEDICATIONS  (STANDING):  niCARdipine Infusion 5 mG/Hr (25 mL/Hr) IV Continuous <Continuous>    MEDICATIONS  (PRN):    PRESENT SYMPTOMS: [ x]Unable to self-report  [ ] CPOT [ x] PAINADs [x ] RDOS  Source if other than patient:  [ ]Family   [ ]Team     Pain: [ ]yes [ ]no  QOL impact -   Location -                    Aggravating factors -  Quality -  Radiation -  Timing-  Severity (0-10 scale):  Minimal acceptable level (0-10 scale):     CPOT:    https://www.sccm.org/getattachment/ohy27d29-7l2s-5h3s-2t4m-7164d0139f8o/Critical-Care-Pain-Observation-Tool-(CPOT)    PAIN AD Score:   http://geriatrictoolkit.[facility]/cog/painad.pdf (press ctrl +  left click to view)    Dyspnea:                           [ ]Mild [ ]Moderate [ ]Severe      RDOS:  0 to 2  minimal or no respiratory distress   3  mild distress  4 to 6 moderate distress  >7 severe distress  https://homecareinformation.net/handouts/hen/Respiratory_Distress_Observation_Scale.pdf (Ctrl +  left click to view)     Anxiety:                             [ ]Mild [ ]Moderate [ ]Severe  Fatigue:                             [ ]Mild [ ]Moderate [ ]Severe  Nausea:                             [ ]Mild [ ]Moderate [ ]Severe  Loss of appetite:              [ ]Mild [ ]Moderate [ ]Severe  Constipation:                    [ ]Mild [ ]Moderate [ ]Severe    PCSSQ[Palliative Care Spiritual Screening Question]   Severity (0-10):  Score of 4 or > indicate consideration of Chaplaincy referral.  Chaplaincy Referral: [ ] yes [ ] refused [ ] following [ x] Deferred     Caregiver [place]? : [ ] yes [ ] no [ x] Deferred [ ] Declined             Social work referral [ ] Patient & Family Centered Care Referral [ ]     Anticipatory Grief present?:  [ ] yes [ ] no  [x ] Deferred                  Social work referral [ ] Chaplaincy Referral[ ]      Other Symptoms:  [ ]All other review of systems negative     Palliative Performance Status Version 2:  20     %    http://npcrc.org/files/news/palliative_performance_scale_ppsv2.pdf  PHYSICAL EXAM:  Vital Signs Last 24 Hrs  T(C): 36.6 (27 Dec 2023 12:00), Max: 36.6 (27 Dec 2023 12:00)  T(F): 97.9 (27 Dec 2023 12:00), Max: 97.9 (27 Dec 2023 12:00)  HR: 84 (27 Dec 2023 12:00) (66 - 84)  BP: 154/90 (27 Dec 2023 12:00) (132/75 - 172/80)  BP(mean): 90 (27 Dec 2023 10:15) (86 - 102)  RR: 22 (27 Dec 2023 12:00) (16 - 23)  SpO2: 97% (27 Dec 2023 12:00) (93% - 97%)    Parameters below as of 27 Dec 2023 12:00  Patient On (Oxygen Delivery Method): room air     I&O's Summary    GENERAL: [ ]Cachexia    [ x]Alert  [ ]Oriented x   [ ]Lethargic  [ ]Unarousable  [x ]Verbal- minimal  [ ]Non-Verbal  Behavioral:   [ ] Anxiety  [ ] Delirium [ ] Agitation [ ] Other  HEENT:  [ ]Normal   [x ]Dry mouth   [ ]ET Tube/Trach  [ ]Oral lesions  PULMONARY:   [ x]Clear [ ]Tachypnea  [ ]Audible excessive secretions   [ ]Rhonchi        [ ]Right [ ]Left [ ]Bilateral  [ ]Crackles        [ ]Right [ ]Left [ ]Bilateral  [ ]Wheezing     [ ]Right [ ]Left [ ]Bilateral  [ ]Diminished breath sounds [ ]right [ ]left [ ]bilateral  CARDIOVASCULAR:    [ ]Regular [x ]Irregular [ ]Tachy  [ ]Jose J [ ]Murmur [ ]Other  GASTROINTESTINAL:  [ x]Soft  [ ]Distended   [x ]+BS  [x ]Non tender [ ]Tender  [ ]Other [ ]PEG [ ]OGT/ NGT  Last BM:  GENITOURINARY:  [ ]Normal [ ] Incontinent   [ ]Oliguria/Anuria   [x ][place]  MUSCULOSKELETAL:   [ ]Normal   [x ]Weakness  [x ]Bed/Wheelchair bound [ ]Edema  NEUROLOGIC: 2/5 RUE and RLE, 4/5 LUE and LLE   [ ]No focal deficits  [ ]Cognitive impairment  [ ]Dysphagia [ ]Dysarthria [ ]Paresis [ x]Other   SKIN:   [ ]Normal  [ ]Rash  [ ]Other  [ ]Pressure ulcer(s)       Present on admission [ ]y [ ]n    CRITICAL CARE:  [ ] Shock Present  [ ]Septic [ ]Cardiogenic [ ]Neurologic [ ]Hypovolemic  [ ]  Vasopressors [ ]  Inotropes   [ ]Respiratory failure present [ ]Mechanical ventilation [ ]Non-invasive ventilatory support [ ]High flow    [ ]Acute  [ ]Chronic [ ]Hypoxic  [ ]Hypercarbic [ ]Other  [ ]Other organ failure     LABS:                        13.2   10.80 )-----------( 219      ( 27 Dec 2023 07:55 )             39.1   12-27    138  |  104  |  16  ----------------------------<  141<H>  4.4   |  22  |  0.85    Ca    9.0      27 Dec 2023 07:55    TPro  6.7  /  Alb  4.2  /  TBili  0.5  /  DBili  x   /  AST  18  /  ALT  11  /  AlkPhos  53  12-27  PT/INR - ( 27 Dec 2023 10:04 )   PT: 11.5 sec;   INR: 1.05 ratio         PTT - ( 27 Dec 2023 10:04 )  PTT:40.1 sec    Urinalysis Basic - ( 27 Dec 2023 07:55 )    Color: x / Appearance: x / SG: x / pH: x  Gluc: 141 mg/dL / Ketone: x  / Bili: x / Urobili: x   Blood: x / Protein: x / Nitrite: x   Leuk Esterase: x / RBC: x / WBC x   Sq Epi: x / Non Sq Epi: x / Bacteria: x      RADIOLOGY & ADDITIONAL STUDIES:  < from: CT Angio Neck Stroke Protocol w/ IV Cont (12.27.23 @ 08:06) >    ACC: [id number] EXAM:  CT ANGIO BRAIN STROKE PROTC IC   ORDERED BY:    RADHA CABRERA     ACC: [id number] EXAM:  CT ANGIO NECK STROKE PROTCL IC   ORDERED BY:    RADHA CABRERA     ACC: [id number] EXAM:  CT BRAIN STROKE PROTOCOL   ORDERED BY:  RADHA CABRERA     PROCEDURE DATE:  12/27/2023          INTERPRETATION:  CT angiography of the brain and neck. CT brain perfusion.    CLINICAL INDICATION: Code stroke    TECHNIQUE: Direct axial CT scanning of the brain and neck was obtained   from the vertex to the level of the clavicular heads after the dynamic   intravenous injection of 70 cc of Omnipaque 300. 30 cc discarded.   Sagittal and coronal maximum intensity projection reformats were   provided.  Three-dimensional reconstructions were performed by the   radiologist using the [employer] workstation.    Additionally, noncontrast axial CT scanning of the brain was obtained   from the skull base to the vertex. Sagittal and coronal reformats were   provided.    COMPARISON: CT brain 8/25/2023 and, 9/26/2021, 2/11/2019.    FINDINGS: . Minimal traumatic arrest well with the cases T1 dictate any    CT BRAIN:    Acute 2.7 x 1.8 x 2.7 cm (maximal AP x TV x CC dimensions) parenchymal   hemorrhage centered in the left thalamus, extending superiorly into the   left corona radiata with surrounding edema.    Intraventricular extension of hemorrhage involving the posterior left   ventricular body, left ventricular atrium, and left temporal and   occipital horns.    Mass effect upon the posterior left ventricular body and ventricular   atrium. The ventricles are otherwise similar in size.    Minimal rightward midline shift at the level of the septum pellucidum is   similar. No effacement of basal cisterns.    Mild white matter microvascular ischemic disease.    Similar-appearing (since 9/26/2021) 1.2 x 1.2 cm (AP by TV) isointense   extra-axial lesion in the left anterolateral aspect of the foramen magnum   cistern, grossly similar in appearance to CT brain 9/26/2021, possibly   representing the presence of a meningioma. Mild mass effect upon the   cervicomedullary junction.    The visualized paranasal sinuses and mastoid air cells are clear.    No displaced calvarial fracture or scalp hematoma.    CTA BRAIN:    Normal flow-related enhancement of the skull base/intracranial internal   carotid arteries.    Normal flow-related enhancement of the bilateral anterior, middle, and   posterior cerebral arteries. The right PCA demonstrates a fetal origin.    Anterior and left posterior communicating arteries are visualized.    Normal flow-related enhancement of the bilateral intradural vertebral   arteries and the basilar artery. The right vertebral artery ends in the   right PICA.    No flow-limiting stenosis or vascular aneurysm. No evidence for AVM.    CTA NECK:    Normal flow-related enhancement of the bilateral common and internal   carotid arteries.    Calcified plaque involving the distal right common carotid and proximal   right internal carotid arteries does not result in significant luminal   stenosis.    Normal flow-related enhancement of the bilateral vertebral arteries. The   left vertebral artery arises directly from the aorta and is dominant.    No flow-limiting stenosis, evidence for arterial dissection,or vascular   aneurysm.    Some right-sided fissural fluid and bronchiectasis.    IMPRESSION:    CT brain:  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    Similar minimal rightward midline shift since 8/25/2023, with no   effacement of the basal cisterns.    Mass effect upon the posterior left lateral ventricle. The ventricles are   otherwise similar in size to 8/25/2023.    Similar-appearing 1.2 x 1.2 cm (since 9/26/2021) extra-axial mass within   the left anterolateral foramen magnum cistern. Mild mass effect upon the   cervicomedullary junction. This may represent a meningioma.    CTA brain:  No flow-limiting stenosis or vascular aneurysm.    No evidence for AVM. Please note that this does not exclude thepresence   of a micro-AVM, which may be compressed by hemorrhage.    CTA neck:  No flow-limiting stenosis, evidence for arterial dissection, or vascular   aneurysm.    Dr. Covington discussed these findings with neurology consult team on   12/27/2023 8:18 AM with read back.    --- End of Report ---            SMITH COVINGTON MD; Attending Radiologist  This document has been electronically signed. Dec 27 2023  9:00AM    < end of copied text >      PROTEIN CALORIE MALNUTRITION PRESENT: [ ]mild [ ]moderate [ ]severe [ ]underweight [ ]morbid obesity  https://www.andeal.org/vault/2440/web/files/ONC/Table_Clinical%20Characteristics%20to%20Document%20Malnutrition-White%20JV%20et%20al%202012.pdf    Height (cm): 188 (10-12-23 @ 18:20), 188 (10-11-23 @ 18:18), 188 (08-25-23 @ 15:17)  Weight (kg): 70.9 (12-27-23 @ 08:28), 117.9 (10-11-23 @ 18:18), 81.6 (08-25-23 @ 15:17)  BMI (kg/m2): 20.1 (12-27-23 @ 08:28), 33.4 (10-12-23 @ 18:20), 33.4 (10-11-23 @ 18:18)    [ x]PPSV2 < or = to 30% [ ]significant weight loss  [ ]poor nutritional intake  [ ]anasarca[ ]Artificial Nutrition      Other REFERRALS:  [ ]Hospice  [ ]Child Life  [ ]Social Work  [x ]Case management [ ]Holistic Therapy     Goals of Care Document:

## 2023-12-27 NOTE — ED PROVIDER NOTE - CLINICAL SUMMARY MEDICAL DECISION MAKING FREE TEXT BOX
ARCHIE Holbrook MD: 89M with PMH  AFib (on Eliquis), HTN, HLD, DM, PPM , indwelling urinary catheter is BIBA as a code stroke. Pt was last seen well by aide at 3am, then was noted at around 6:30am to be unresponsive, not answering questions or able to speak. Per EMS, pt with R-sided weakness. Code stroke activated on arrival. CTH demonstrates ICH, NSG consulted. Per MOLST and d/w daughter, pt is DNR/DNI. Will reverse eliquis with Kcentra, start hypertonic saline, keep SBP<140 on cardene drip and admit to stroke unit, per d/w NSG and Neurology, continue to monitor hemodynamics and neurologic exam. Pt's daughter is on the way to see patient.

## 2023-12-27 NOTE — PATIENT PROFILE ADULT - FALL HARM RISK - HARM RISK INTERVENTIONS
Assistance with ambulation/Assistance OOB with selected safe patient handling equipment/Communicate Risk of Fall with Harm to all staff/Discuss with provider need for PT consult/Monitor gait and stability/Provide patient with walking aids - walker, cane, crutches/Reinforce activity limits and safety measures with patient and family/Tailored Fall Risk Interventions/Visual Cue: Yellow wristband and red socks/Bed in lowest position, wheels locked, appropriate side rails in place/Call bell, personal items and telephone in reach/Instruct patient to call for assistance before getting out of bed or chair/Non-slip footwear when patient is out of bed/[place] to call system/Physically safe environment - no spills, clutter or unnecessary equipment/Purposeful Proactive Rounding/Room/bathroom lighting operational, light cord in reach Assistance with ambulation/Assistance OOB with selected safe patient handling equipment/Communicate Risk of Fall with Harm to all staff/Discuss with provider need for PT consult/Monitor gait and stability/Provide patient with walking aids - walker, cane, crutches/Reinforce activity limits and safety measures with patient and family/Tailored Fall Risk Interventions/Visual Cue: Yellow wristband and red socks/Bed in lowest position, wheels locked, appropriate side rails in place/Call bell, personal items and telephone in reach/Instruct patient to call for assistance before getting out of bed or chair/Non-slip footwear when patient is out of bed/[place] to call system/Physically safe environment - no spills, clutter or unnecessary equipment/Purposeful Proactive Rounding/Room/bathroom lighting operational, light cord in reach

## 2023-12-27 NOTE — CONSULT NOTE ADULT - SUBJECTIVE AND OBJECTIVE BOX
CARDIOLOGY CONSULT NOTE - DR. PENN        Date of Service: 12-27-23 @ 17:37      HPI:  CHERY AMEZCUA, 89y (01-Dec-1926) M w/ PMHx significant for A.fib s/p PPM on Eliquis digoxin & metoprolol, HTN, HLD, DM, BPH w/ indwelling urinary catheter, macular degeneration presents to the ED due to R sided weakness and aphasia. Stroke code called. LKW 3 AM 12/27/2023. Patient was awake and spoke to his overnight home nursing aid at his apartment in [facility]. Later in the morning at 6 AM when his morning nursing aid came to help him out of bed, it was noted that his right side was weak and he was not speaking to the nursing aid. Aid became concerned and called EMS. Patient took his Eliquis the same day. CT head done in the ED revealed a L thalamic hemorrhage with intraventricular extension. CTA was without AVM or aneurysm. Patient's PT, PTT & INR were elevated and he was given Kcentra to reverse anticoagulation from his Eliquis. On exam patient was attempting to answer questions with grunts and moans. He expressed understanding by following verbal commands. Patient's nursing aid arrived who explained that he is normally A&Ox 2 to self and location. He requires 24 hrs home nursing assistance and is not able to get out of bed on his own. He walks with a walker. Aid is unsure of overnight events though it was reported to her by the overnight aid that the patient was up and able to talk and walk at 3 AM the same day. Comprehensive ROS unobtainable due to patient's aphasia.    NIHSS: 12  PreMRS: 3  Patient was not a tenecteplase candidate as he presented outside the time window and hemorrhage was identified on CT  Patient was not a thrombectomy candidate as no LVO was identified on imaging. (27 Dec 2023 14:11)      event snoted      PAST MEDICAL & SURGICAL HISTORY:  HLD (hyperlipidemia)      Pacemaker      AF (atrial fibrillation)  on Eliquis      HTN (hypertension)      DM (diabetes mellitus)      Macular degeneration      BPH (benign prostatic hypertrophy)      S/P knee replacement, bilateral            PREVIOUS DIAGNOSTIC TESTING:    [ ] Echocardiogram:  [ ]  Catheterization:  [ ] Stress Test:  	    MEDICATIONS:    Home Medications:  acetaminophen 325 mg oral tablet: 2 tab(s) orally every 6 hours As needed Mild Pain (1 - 3) (27 Dec 2023 09:26)  Crestor 20 mg oral tablet: 1 tab(s) orally once a day (27 Dec 2023 09:26)  digoxin 125 mcg (0.125 mg) oral tablet: 1 tab(s) orally once a day (27 Dec 2023 09:26)  Eliquis 5 mg oral tablet: 1 tab(s) orally 2 times a day (27 Dec 2023 09:26)  enalapril 5 mg oral tablet: 1 tab(s) orally once a day (27 Dec 2023 09:26)  fenofibrate 145 mg oral tablet: 1 tab(s) orally once a day (27 Dec 2023 09:26)  Januvia 50 mg oral tablet: 1 tab(s) orally once a day (27 Dec 2023 09:26)  latanoprost 0.005% ophthalmic solution: 1 drop(s) to each affected eye once a day (in the evening) (27 Dec 2023 09:26)  metFORMIN 750 mg oral tablet, extended release: 1 tab(s) orally once a day (27 Dec 2023 09:26)  metoprolol succinate 50 mg oral tablet, extended release: 1 tab(s) orally once a day (27 Dec 2023 09:26)      MEDICATIONS  (STANDING):  acetaminophen   IVPB .. 1000 milliGRAM(s) IV Intermittent once  ascorbic acid 500 milliGRAM(s) Oral daily  dextrose 5%. 1000 milliLiter(s) (50 mL/Hr) IV Continuous <Continuous>  dextrose 5%. 1000 milliLiter(s) (100 mL/Hr) IV Continuous <Continuous>  dextrose 50% Injectable 25 Gram(s) IV Push once  dextrose 50% Injectable 25 Gram(s) IV Push once  dextrose 50% Injectable 12.5 Gram(s) IV Push once  digoxin     Tablet 125 MICROGram(s) Oral daily  glucagon  Injectable 1 milliGRAM(s) IntraMuscular once  insulin lispro (ADMELOG) corrective regimen sliding scale   SubCutaneous three times a day before meals  multivitamin 1 Tablet(s) Oral daily  niCARdipine Infusion 5 mG/Hr (25 mL/Hr) IV Continuous <Continuous>      FAMILY HISTORY:  No pertinent family history in first degree relatives        SOCIAL HISTORY:    [ ] Non-smoker  [ ] Smoker  [ ] Alcohol    Allergies    procainamide (Other (Severe))    Intolerances    	    REVIEW OF SYSTEMS:  CONSTITUTIONAL: No fever, weight loss, or fatigue  EYES: No eye pain, visual disturbances, or discharge  ENMT:  No difficulty hearing, tinnitus, vertigo; No sinus or throat pain  NECK: No pain or stiffness  RESPIRATORY: No cough, wheezing, chills or hemoptysis; No Shortness of Breath  CARDIOVASCULAR: as HPI  GASTROINTESTINAL: No abdominal or epigastric pain. No nausea, vomiting, or hematemesis; No diarrhea or constipation. No melena or hematochezia.  GENITOURINARY: No dysuria, frequency, hematuria, or incontinence  NEUROLOGICAL: No headaches, memory loss, loss of strength, numbness, or tremors  SKIN: No itching, burning, rashes, or lesions   	  [ ] All others negative	  [ ] Unable to obtain    PHYSICAL EXAM:    T(C): 36.6 (12-27-23 @ 15:00), Max: 36.6 (12-27-23 @ 12:00)  HR: 83 (12-27-23 @ 16:45) (66 - 84)  BP: 128/61 (12-27-23 @ 16:45) (128/61 - 182/82)  RR: 23 (12-27-23 @ 16:45) (16 - 25)  SpO2: 97% (12-27-23 @ 16:45) (93% - 99%)  Wt(kg): --  I&O's Summary    Daily     Daily     Appearance: Normal	  Psychiatry: A & O x 2, Mood & affect appropriate  HEENT:   Normal oral mucosa, PERRL, EOMI	  Lymphatic: No lymphadenopathy  Cardiovascular: Normal S1 S2,RRR, No JVD, No murmurs  Respiratory: Lungs clear to auscultation	  Gastrointestinal:  Soft, Non-tender, + BS	  Skin: No rashes, No ecchymoses, No cyanosis	  Neurologic: Non-focal  Extremities: Normal range of motion, No clubbing, cyanosis or edema  Vascular: Peripheral pulses palpable 2+ bilaterally    TELEMETRY: 	    ECG:  	no ischemic abln   RADIOLOGY:  OTHER: 	  	  LABS:	 	    CARDIAC MARKERS:        proBNP:     Lipid Profile:   HgA1c:   TSH:                           13.2   10.80 )-----------( 219      ( 27 Dec 2023 07:55 )             39.1     12-27    138  |  104  |  16  ----------------------------<  141<H>  4.4   |  22  |  0.85    Ca    9.0      27 Dec 2023 07:55    TPro  6.7  /  Alb  4.2  /  TBili  0.5  /  DBili  x   /  AST  18  /  ALT  11  /  AlkPhos  53  12-27    PT/INR - ( 27 Dec 2023 10:04 )   PT: 11.5 sec;   INR: 1.05 ratio         PTT - ( 27 Dec 2023 10:04 )  PTT:40.1 sec    Creatinine: 0.85 mg/dL (12-27-23 @ 07:55)        ASSESSMENT/PLAN: 	               CARDIOLOGY CONSULT NOTE - DR. PENN        Date of Service: 12-27-23 @ 17:37      HPI:  CHERY AMEZCUA, 89y (01-Dec-1926) M w/ PMHx significant for A.fib s/p PPM on Eliquis digoxin & metoprolol, HTN, HLD, DM, BPH w/ indwelling urinary catheter, macular degeneration presents to the ED due to R sided weakness and aphasia. Stroke code called. LKW 3 AM 12/27/2023. Patient was awake and spoke to his overnight home nursing aid at his apartment in [facility]. Later in the morning at 6 AM when his morning nursing aid came to help him out of bed, it was noted that his right side was weak and he was not speaking to the nursing aid. Aid became concerned and called EMS. Patient took his Eliquis the same day. CT head done in the ED revealed a L thalamic hemorrhage with intraventricular extension. CTA was without AVM or aneurysm. Patient's PT, PTT & INR were elevated and he was given Kcentra to reverse anticoagulation from his Eliquis. On exam patient was attempting to answer questions with grunts and moans. He expressed understanding by following verbal commands. Patient's nursing aid arrived who explained that he is normally A&Ox 2 to self and location. He requires 24 hrs home nursing assistance and is not able to get out of bed on his own. He walks with a walker. Aid is unsure of overnight events though it was reported to her by the overnight aid that the patient was up and able to talk and walk at 3 AM the same day. Comprehensive ROS unobtainable due to patient's aphasia.    NIHSS: 12  PreMRS: 3  Patient was not a tenecteplase candidate as he presented outside the time window and hemorrhage was identified on CT  Patient was not a thrombectomy candidate as no LVO was identified on imaging. (27 Dec 2023 14:11)      event snoted      PAST MEDICAL & SURGICAL HISTORY:  HLD (hyperlipidemia)      Pacemaker      AF (atrial fibrillation)  on Eliquis      HTN (hypertension)      DM (diabetes mellitus)      Macular degeneration      BPH (benign prostatic hypertrophy)      S/P knee replacement, bilateral            PREVIOUS DIAGNOSTIC TESTING:    [ ] Echocardiogram:  [ ]  Catheterization:  [ ] Stress Test:  	    MEDICATIONS:    Home Medications:  acetaminophen 325 mg oral tablet: 2 tab(s) orally every 6 hours As needed Mild Pain (1 - 3) (27 Dec 2023 09:26)  Crestor 20 mg oral tablet: 1 tab(s) orally once a day (27 Dec 2023 09:26)  digoxin 125 mcg (0.125 mg) oral tablet: 1 tab(s) orally once a day (27 Dec 2023 09:26)  Eliquis 5 mg oral tablet: 1 tab(s) orally 2 times a day (27 Dec 2023 09:26)  enalapril 5 mg oral tablet: 1 tab(s) orally once a day (27 Dec 2023 09:26)  fenofibrate 145 mg oral tablet: 1 tab(s) orally once a day (27 Dec 2023 09:26)  Januvia 50 mg oral tablet: 1 tab(s) orally once a day (27 Dec 2023 09:26)  latanoprost 0.005% ophthalmic solution: 1 drop(s) to each affected eye once a day (in the evening) (27 Dec 2023 09:26)  metFORMIN 750 mg oral tablet, extended release: 1 tab(s) orally once a day (27 Dec 2023 09:26)  metoprolol succinate 50 mg oral tablet, extended release: 1 tab(s) orally once a day (27 Dec 2023 09:26)      MEDICATIONS  (STANDING):  acetaminophen   IVPB .. 1000 milliGRAM(s) IV Intermittent once  ascorbic acid 500 milliGRAM(s) Oral daily  dextrose 5%. 1000 milliLiter(s) (50 mL/Hr) IV Continuous <Continuous>  dextrose 5%. 1000 milliLiter(s) (100 mL/Hr) IV Continuous <Continuous>  dextrose 50% Injectable 25 Gram(s) IV Push once  dextrose 50% Injectable 25 Gram(s) IV Push once  dextrose 50% Injectable 12.5 Gram(s) IV Push once  digoxin     Tablet 125 MICROGram(s) Oral daily  glucagon  Injectable 1 milliGRAM(s) IntraMuscular once  insulin lispro (ADMELOG) corrective regimen sliding scale   SubCutaneous three times a day before meals  multivitamin 1 Tablet(s) Oral daily  niCARdipine Infusion 5 mG/Hr (25 mL/Hr) IV Continuous <Continuous>      FAMILY HISTORY:  No pertinent family history in first degree relatives        SOCIAL HISTORY:    [ ] Non-smoker  [ ] Smoker  [ ] Alcohol    Allergies    procainamide (Other (Severe))    Intolerances    	    REVIEW OF SYSTEMS:  CONSTITUTIONAL: No fever, weight loss, or fatigue  EYES: No eye pain, visual disturbances, or discharge  ENMT:  No difficulty hearing, tinnitus, vertigo; No sinus or throat pain  NECK: No pain or stiffness  RESPIRATORY: No cough, wheezing, chills or hemoptysis; No Shortness of Breath  CARDIOVASCULAR: as HPI  GASTROINTESTINAL: No abdominal or epigastric pain. No nausea, vomiting, or hematemesis; No diarrhea or constipation. No melena or hematochezia.  GENITOURINARY: No dysuria, frequency, hematuria, or incontinence  NEUROLOGICAL: No headaches, memory loss, loss of strength, numbness, or tremors  SKIN: No itching, burning, rashes, or lesions   	  [ ] All others negative	  [ ] Unable to obtain    PHYSICAL EXAM:    T(C): 36.6 (12-27-23 @ 15:00), Max: 36.6 (12-27-23 @ 12:00)  HR: 83 (12-27-23 @ 16:45) (66 - 84)  BP: 128/61 (12-27-23 @ 16:45) (128/61 - 182/82)  RR: 23 (12-27-23 @ 16:45) (16 - 25)  SpO2: 97% (12-27-23 @ 16:45) (93% - 99%)  Wt(kg): --  I&O's Summary    Daily     Daily     Appearance: Normal	  Psychiatry: A & O x 2, Mood & affect appropriate  HEENT:   Normal oral mucosa, PERRL, EOMI	  Lymphatic: No lymphadenopathy  Cardiovascular: Normal S1 S2,RRR, No JVD, No murmurs  Respiratory: Lungs clear to auscultation	  Gastrointestinal:  Soft, Non-tender, + BS	  Skin: No rashes, No ecchymoses, No cyanosis	  Neurologic: Non-focal  Extremities: Normal range of motion, No clubbing, cyanosis or edema  Vascular: Peripheral pulses palpable 2+ bilaterally    TELEMETRY: 	    ECG:  	no ischemic abln   RADIOLOGY:  OTHER: 	  	  LABS:	 	    CARDIAC MARKERS:        proBNP:     Lipid Profile:   HgA1c:   TSH:                           13.2   10.80 )-----------( 219      ( 27 Dec 2023 07:55 )             39.1     12-27    138  |  104  |  16  ----------------------------<  141<H>  4.4   |  22  |  0.85    Ca    9.0      27 Dec 2023 07:55    TPro  6.7  /  Alb  4.2  /  TBili  0.5  /  DBili  x   /  AST  18  /  ALT  11  /  AlkPhos  53  12-27    PT/INR - ( 27 Dec 2023 10:04 )   PT: 11.5 sec;   INR: 1.05 ratio         PTT - ( 27 Dec 2023 10:04 )  PTT:40.1 sec    Creatinine: 0.85 mg/dL (12-27-23 @ 07:55)        ASSESSMENT/PLAN:

## 2023-12-27 NOTE — CONSULT NOTE ADULT - TREATMENT GUIDELINE COMMENT
await follow up CT scan  clinical course - functional status and ability to swallow will help dictate next steps

## 2023-12-27 NOTE — CONSULT NOTE ADULT - SUBJECTIVE AND OBJECTIVE BOX
p (1480)     HPI: DNR/DNI 89M h/o afib on Eliquis reportedly high functioning at baseline, lives at home with 24h nursing care, p/f code stroke for acute onset slurred speech and R weakness o/n. 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 ICH score 2. Minimal shift. CTA w/ spot sign, no obvious aneurysm or vasc malf pending read. INR 1.47, plts 219.     Exam: Sleepy but arousable, EOV, dysarthric, Ox2 w/ choice, FC, Lt side strong, Rt side brisk wd.      Imaging:    Exam:    --Anticoagulation:    =====================  PAST MEDICAL HISTORY   HLD (hyperlipidemia)    Pacemaker    AF (atrial fibrillation)    HTN (hypertension)    DM (diabetes mellitus)    Macular degeneration    BPH (benign prostatic hypertrophy)      PAST SURGICAL HISTORY   S/P knee replacement, bilateral      procainamide (Other (Severe))      MEDICATIONS:  Antibiotics:    Neuro:    Other:  niCARdipine Infusion 5 mG/Hr IV Continuous <Continuous>      SOCIAL HISTORY:   Occupation:   Marital Status:     FAMILY HISTORY:  No pertinent family history in first degree relatives        ROS: Negative except per HPI    LABS:  PT/INR - ( 27 Dec 2023 07:55 )   PT: 16.0 sec;   INR: 1.47 ratio         PTT - ( 27 Dec 2023 07:55 )  PTT:37.3 sec                        13.2   10.80 )-----------( 219      ( 27 Dec 2023 07:55 )             39.1     12-27    138  |  104  |  16  ----------------------------<  141<H>  4.4   |  22  |  0.85    Ca    9.0      27 Dec 2023 07:55    TPro  6.7  /  Alb  4.2  /  TBili  0.5  /  DBili  x   /  AST  18  /  ALT  11  /  AlkPhos  53  12-27

## 2023-12-27 NOTE — CONSULT NOTE ADULT - ASSESSMENT
89yo M with hx of afib on AC, HTN, DM, BPH, HLD who presented with unresponsiveness this morning. Found to have L thalamic hemorrhage. Palliative consulted for C 89yo M with hx of afib on AC, HTN, DM, BPH, HLD who presented with unresponsiveness this morning. Found to have L thalamic hemorrhage. Palliative consulted for C

## 2023-12-27 NOTE — H&P ADULT - NSHPPHYSICALEXAM_GEN_ALL_CORE
Physical Examination:  General - laying in bed with mouth persistently open  Cardiovascular - Peripheral pulses palpable, no edema  Eyes - Fundoscopy not performed due to safety precautions in the setting of infection risk    Neurologic Exam:  Mental status - Difficult to assess orientation due to aphasia. Speaks in groans, attempts to make full words but unable to do so, nods and grunts to express understanding. Follows simple and complex commands.    Cranial nerves - PERRLA, VFF, EOMI, face sensation (V1-V3) intact b/l, facial strength intact without asymmetry b/l, hard of hearing at baseline, trapezius 5/5 strength b/l    Motor - Normal bulk and tone throughout. No pronator drift.  Strength testing            R        Deltoid:  2   Biceps:  2    Triceps:  2    Wrist Extension:  2    Wrist Flexion:  2    Interossei:  2    :  2    Hip Flexion:  0    Hip Extension:  0    Knee Flexion:  0    Knee Extension:  0    Dorsiflexion:  1    Plantar Flexion:  1        L        Deltoid:  4    Biceps:  4    Triceps:  4-    Wrist Extension:  4-    Wrist Flexion:  4    Interossei:  3    :  3    Hip Flexion:  3    Hip Extension:  3    Knee Flexion:  4-    Knee Extension:  4-    Dorsiflexion:  4-    Plantar Flexion:  4-      Sensation - Pain/vibration intact throughout    DTR's -               R  Biceps:  0    Triceps:  0    Brachioradialis:  0    Patellar:  1+    Ankle:  1+    L  Biceps:  2+    Triceps:  1+    Brachioradialis:  2+    Patellar:  1+    Ankle:  1+    Coordination - Unable to instruct through FTN or HTS    Gait and station - Gait not tested due to patient safety concerns

## 2023-12-27 NOTE — ED PROVIDER NOTE - PROGRESS NOTE DETAILS
Neurosx aware, will come eval. Reina Sorensen PA-C Spoke with pts daughter and HCP, Mary ([phone]) made aware of CTH results and prognosis at this time. She confirmed that pt is DNR and DNI. She is on her way to hospital now. Reina Sorensen PA-C Spoke with pts daughter and HCP, Mary ([phone]) made aware of CTH results and prognosis at this time. She confirmed that pt is DNR and DNI. She is on her way to hospital now. Reina Sorensen PA-C Spoke with neuro, recommending repeat 4hr CTH to determine dispo, stroke unit vs NSCU. Reina Sorensen PA-C Ronny PATRICK: Patient signed out pending repeat CT Head and Nsgy recommendations for disposition.   Patient has CT that shows: 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 Similar minimal rightward midline shift since 8/25/2023, with no effacement of the basal cisterns. Mass effect upon the posterior left lateral ventricle. The ventricles are  otherwise similar in size to 8/25/2023. Similar-appearing 1.2 x 1.2 cm (since 9/26/2021) extra-axial mass within the left anterolateral foramen magnum cistern. Mild mass effect upon the cervicomedullary junction. This may represent a meningioma.    Patient reassessed. He is stable with no complaints. Daughter is at bedside. She was updated on plan. Patient is on Cardene drip with goal with SBP < 140. Repeat CT is at 12 pm. Ronny PATRICK: Patient had repeat CT Scan. Nsx reviewing. Ronny PATRICK: Per neurology, admit to stroke unit under Dr. Blum.

## 2023-12-27 NOTE — CONSULT NOTE ADULT - ATTENDING COMMENTS
89 year old male with history of atrial fibrillation on Eliquis living at home with 24 hour nursing care. Found to have left basal ganglia intraparenchymal hemorrhage with intraventricular hemorrhage.  - no neurosurgical intervention indicated  - recommend anticoagulation reversal with KCentra  - repeat CT brain for stability

## 2023-12-27 NOTE — H&P ADULT - ASSESSMENT
Impression: Acute onset R hemiparesis with expressive aphasia. Localizes to L cerebral hemisphere. CT head reveals L thalamic hemorrhage with intraventricular extension without AVM or aneurysm on CTA. Per location, history of HTN and elevated BP on arrival suspect hypertensive bleed.     ICH score: 3    Stroke acute management:  - s/p Kcentra to reverse Eliquis  - Frequent neuro-checks q2h and VS q2h; STAT CTH for change in neuro exam  - Permissive HTN up to 140/100 for 24-48h from symptom onset followed by gradual normotension over 2-3 days, titrate Cardene drip as needed for goal BP  - NPO unless passes dysphagia screen; swallow eval if fails  - DVT ppx: SCDs - pending decision to re-start Eliquis 48 hours after initial presentation depending on stability scans    Secondary prevention of stroke:  - Hold AC/AP 48 hrs pending stability CT scans  - Atorvastatin 80 mg PO daily (long-term goal LDL < 70)  - Tight glucose control (long-term goal HgbA1c < 6%)  - Rehabilitation: PT/OT/S+S    Stroke workup:  - CT head 4 hrs & 24 hrs from initial presentation to assess bleed stability  - MRI brain w/o contrast  - Telemetry to monitor for arrhythmia  - Check HgbA1C, fasting lipid panel, utox    Home medications:  - Continue home Digoxin 125 for A.fib    Case discussed with stroke fellow Dr. Price Woods, under supervision of attending Dr. Doug Manuel Impression: Acute onset R hemiparesis with expressive aphasia. Localizes to L cerebral hemisphere. CT head reveals L thalamic hemorrhage with intraventricular extension without AVM or aneurysm on CTA. Per location, history of HTN and elevated BP on arrival suspect hypertensive bleed.     ICH score: 3    Stroke acute management:  - s/p Kcentra to reverse Eliquis  - Frequent neuro-checks q2h and VS q2h; STAT CTH for change in neuro exam  - Permissive HTN up to 140/100 for 24-48h from symptom onset followed by gradual normotension over 2-3 days, titrate Cardene drip as needed for goal BP  - NPO unless passes dysphagia screen; swallow eval if fails  - DVT ppx: SCDs - pending decision to re-start Eliquis 48 hours after initial presentation depending on stability scans    Secondary prevention of stroke:  - Hold AC/AP 48 hrs pending stability CT scans  - Atorvastatin 80 mg PO daily (long-term goal LDL < 70)  - Tight glucose control (long-term goal HgbA1c < 6%)  - ISS for glucose control  - Rehabilitation: PT/OT/S+S    Stroke workup:  - CT head 4 hrs & 24 hrs from initial presentation to assess bleed stability  - MRI brain w/o contrast  - Telemetry to monitor for arrhythmia  - Check HgbA1C, fasting lipid panel, utox    Home medications:  - Continue home Digoxin 125 for A.fib    Case discussed with stroke fellow Dr. Price Woods, under supervision of attending Dr. Doug Manuel

## 2023-12-27 NOTE — CONSULT NOTE ADULT - PROBLEM SELECTOR RECOMMENDATION 9
PPS 20%, requires total care  home with 24 hr aide at baseline  now with RUE and RLE weakness, mental status not at baseline, minimally verbal at present

## 2023-12-27 NOTE — ED ADULT NURSE REASSESSMENT NOTE - NS ED NURSE REASSESS COMMENT FT1
VSS documented. BP is elevated and nicardipine to be titrated is not over 140 SBP. Pt is MARTHA. Pt right sided weakness improved as right hand  strength is 3/5. Pt is still unable to keep right arm elevated on his own. Pt moves right leg but is unable to keep right leg elevated on his own. PT left arm and leg strength is 5/5

## 2023-12-27 NOTE — ED ADULT NURSE NOTE - NS ED NURSE LEVEL OF CONSCIOUSNESS SPEECH
[id number] [facility] Surgery Consultation for: recurrent incisional hernia    Requesting Physician: Dr Ngoc Mclean    History of Present Illness:      Kristie Brizuela is a 64 y.o. male who has an extensive PSH of multiple laparotomies bowel resections, R colectomy for Crohn's disease who under went a surgery last night for intussusception of the ileocolonic anastomosis by Dr Melody Li. He presented with bowel obstruction from this intussusception and underwent extensive lysis of adhesions and reduction of the intussusception. He has a large recurrent incisional hernia which I had been seeing him for months ago. The hernia could not be fixed at the same time of the operation due to the very large size of the hernia. His rectus muscles are about 12-13 cm apart and the fascia could understandably not be closed. His skin and hernia sac were closed and has staples on the skin. He is resting now post op with NGT, NPO. He is otherwise feeling well but have some expected post op pain. Past Medical History:   Diagnosis Date    Abdominal wall hernia 6/15/2012    Anal fissure     Anemia     Anemia     Anxiety and depression     Arthritis     Related to the Crohn's disease.  Cancer ([address]     MELANOMA HEAD AND FACE    Chronic pain     GENERALIZED    COPD (chronic obstructive pulmonary disease) (HCC)     Crohn disease (HCC)     Diagnosed at age 16.  Echocardiogram normal (EF: 55-60%) 07/2015    Esophageal ulcer     GERD (gastroesophageal reflux disease)     H/O blood transfusion 2013    [address] [facility]    Hypertension     denies    Migraine     Short bowel syndrome     Ventral hernia without obstruction or gangrene        Past Surgical History:   Procedure Laterality Date    ABDOMEN SURGERY PROC UNLISTED      33 abdominal operations for treatment of Crohn's disease and its complications.     HC NDL CEJA      ceja needle, takes 1 inch needle    HX APPENDECTOMY      HX CHOLECYSTECTOMY      HX GI      colectomy x2, one ileostomy    HX GI  7/28/14    exp lap,partial colectomy with end ileostomy by Dr Governor Lopez      neck fusion    HX ORTHOPAEDIC  1980s    wrist right,     HX ORTHOPAEDIC  2006, 11/2013    neck, L4 L5 L6    HX ORTHOPAEDIC  1990s    right knee scope    HX OTHER SURGICAL      surgical repair from spider bite    HX OTHER SURGICAL  12/1/14    Incision and drainage of posterior right subcutaneous shoulder abscess    HX OTHER SURGICAL  2014    LEFT INDEX FINGER SPIDER BITE    HX OTHER SURGICAL  2014    RECTAL FISTULA    HX OTHER SURGICAL  3/17/2015    Ileostomy takedown with extensive lysis of adhesions (greater than three hours), mobilization of the splenic flexure, left colon resection, ileocolic anastomosis, and excision of scar; Dr. Ronda Allen.   OTHER SURGICAL  3/22/2015    Exploratory laparotomy with washout of abdomen, suture repair of small bowel/anastomosis, and diverting protective loop ileostomy; Princess Molina MD.    HX OTHER SURGICAL  4/9/2015    Laparotomy, extensive lysis of adhesions, abdominal lavage, and resection of ileocolic anastomosis (including the efferent limb of the loop ileostomy) with creation of Demetrius's pouch; Dr. Ronda Allen.   OTHER SURGICAL  7/14/2015    Cystoscopy and placement of bilateral ureteral catheters; Kyle Katz MD.     OTHER SURGICAL  7/14/2015    Ileostomy takedown with extensive lysis of adhesions, small bowel resection, and enterocolostomy; Dr. Ronda Allen.   OTHER SURGICAL  9/29/2015    CT-guided percutaneous drainage of intraabdominal abscess; Dr. Martha Teran.   OTHER SURGICAL  11/16/2015    CT-guided percutaneous aspiration of abdominal wall cavity; Dr. Foreign Schneider.   OTHER SURGICAL  12/1/2015    Incision and drainage of abdominal wall abscess; Dr. Ronda Allen.   OTHER SURGICAL  2/11/2016    Incision and drainage of abdominal wall abscess; Dr. Ronda Allen.     HX OTHER SURGICAL  2/23/2016    Cystoscopy and placement of bilateral temporary ureteral catheters; Dr. Olivia Ayala.  HX OTHER SURGICAL  2/23/2016    Exploratory laparotomy, extensive lysis of adhesions, removal of mesh, and repair of enterocutaneous fistula; Dr. Sherri Combs.          Current Facility-Administered Medications:     sodium chloride (NS) flush 5-10 mL, 5-10 mL, IntraVENous, Q8H, Yao Renteria MD, 10 mL at 11/03/17 0800    sodium chloride (NS) flush 5-10 mL, 5-10 mL, IntraVENous, PRN, Yao Renteria MD    naloxone [facility]) injection 0.4 mg, 0.4 mg, IntraVENous, PRN, Yao Renteria MD    ondansetron [facility]) injection 4 mg, 4 mg, IntraVENous, Q4H PRN, Yao Renteria MD    LORazepam (ATIVAN) injection 1 mg, 1 mg, IntraVENous, Q8H PRN, Yao Renteria MD    HYDROmorphone (PF) 15 mg/30 ml (DILAUDID) PCA, , IntraVENous, CONTINUOUS, Yao Renteria MD    dextrose 5% - 0.9% NaCl with KCl 20 mEq/L infusion, 100 mL/hr, IntraVENous, CONTINUOUS, Yao Renteria MD, Last Rate: 100 mL/hr at 11/03/17 0629, 100 mL/hr at 11/03/17 0629    TPN ADULT - CENTRAL, , IntraVENous, CONTINUOUS, Yao Renteria MD    insulin lispro (HUMALOG) injection, , SubCUTAneous, Q6H, Yao Renteria MD, Stopped at 11/03/17 1200    glucose chewable tablet 16 g, 4 Tab, Oral, PRN, Yao Renteria MD    dextrose (D50W) injection syrg 12.5-25 g, 12.5-25 g, IntraVENous, PRN, Yao Renteria MD    glucagon (GLUCAGEN) injection 1 mg, 1 mg, IntraMUSCular, PRN, Yao Renteria MD    sodium chloride (NS) flush 20 mL, 20 mL, InterCATHeter, PRN, Yao Renteria MD    sodium chloride (NS) flush 10 mL, 10 mL, InterCATHeter, Q24H, Yao Renteria MD    sodium chloride (NS) flush 10 mL, 10 mL, InterCATHeter, PRN, Yao Renteria MD    sodium chloride (NS) flush 10 mL, 10 mL, InterCATHeter, Q8H, Yao Renteria MD    alteplase (CATHFLO) 1 mg in sterile water (preservative free) 1 mL injection, 1 mg, InterCATHeter, PRN, Brandi Overton MD    bacitracin 500 unit/gram packet 1 Packet, 1 Packet, Topical, PRN, Brandi Overton MD    butalbital-acetaminophen-caffeine (FIORICET, ESGIC) -40 mg per tablet 1 Tab, 1 Tab, Oral, Q6H PRN, Brandi Overton MD, 1 Tab at 11/03/17 1450    Allergies   Allergen Reactions    Honey Anaphylaxis    Remicade [Infliximab] Anaphylaxis    Crestor [Rosuvastatin] Myalgia    Other Plant, Animal, Environmental Other (comments)     Developed \"boils\" on right arm that required I &D after receiving FENTANYL patch. Is able to take FENTANYL orally; states is allergic to fentanyl patch GLUE    Imuran [Azathioprine] Diarrhea and Nausea Only    Mercaptopurine Diarrhea    Sulfa (Sulfonamide Antibiotics) Other (comments)     Causes diarrhea       Social History     Social History    Marital status: LEGALLY      Spouse name: N/A    Number of children: N/A    Years of education: N/A     Occupational History    Not on file.      Social History Main Topics    Smoking status: Current Every Day Smoker     Packs/day: 1.00     Years: 44.00    Smokeless tobacco: Current User      Comment: CURRENT E CIG[employer]    Alcohol use No      Comment: RARELY    Drug use: No    Sexual activity: Not on file     Other Topics Concern    Not on file     Social History Narrative       Family History   Problem Relation Age of Onset    Stroke Mother     Heart Disease Mother     Kidney Disease Mother     Anesth Problems Mother      TAKES A LONG TIME TO WAKE UP    Heart Disease Father     Thyroid Disease Sister        ROS   Constitutional: negative  Ears, Nose, Mouth, Throat, and Face: negative  Respiratory: negative  Cardiovascular: negative  Gastrointestinal: positive for abdominal pain  Genitourinary:negative  Integument/Breast: negative  Hematologic/Lymphatic: negative  Behavioral/Psychiatric: negative  Allergic/Immunologic: negative      Physical Exam:     Visit Vitals    /71 (BP 1 Location: Right arm, BP Patient Position: At rest)    Pulse 92    Temp 99.1 °F (37.3 °C)    Resp 18    Ht 5' 11\" (1.803 m)    Wt 153 lb 6.4 oz (69.6 kg)    SpO2 96%    BMI 21.39 kg/m2       General - alert and oriented, no apparent distress, well developed  HEENT - no jaundice, no hearing imparement  Pulm - CTAB, no C/W/R  CV - RRR, no M/R/G  Abd - soft, mild distention, BS present, incision closed, no drainage, large abdominal hernia  Ext - pulses intact in UE and LE bilaterally, no edema  Skin - supple and no rashes  Psychiatric - normal affect, good mood    Labs  Recent Results (from the past 12 hour(s))   CBC WITH AUTOMATED DIFF    Collection Time: 11/03/17  5:45 AM   Result Value Ref Range    WBC 7.4 4.1 - 11.1 K/uL    RBC 4.93 4.10 - 5.70 M/uL    HGB 14.0 12.1 - 17.0 g/dL    HCT 43.6 36.6 - 50.3 %    MCV 88.4 80.0 - 99.0 FL    MCH 28.4 26.0 - 34.0 PG    MCHC 32.1 30.0 - 36.5 g/dL    RDW 15.9 (H) 11.5 - 14.5 %    PLATELET 112 998 - 896 K/uL    NEUTROPHILS 76 (H) 32 - 75 %    LYMPHOCYTES 19 12 - 49 %    MONOCYTES 3 (L) 5 - 13 %    EOSINOPHILS 2 0 - 7 %    BASOPHILS 0 0 - 1 %    ABS. NEUTROPHILS 5.6 1.8 - 8.0 K/UL    ABS. LYMPHOCYTES 1.4 0.8 - 3.5 K/UL    ABS. MONOCYTES 0.3 0.0 - 1.0 K/UL    ABS. EOSINOPHILS 0.1 0.0 - 0.4 K/UL    ABS.  BASOPHILS 0.0 0.0 - 0.1 K/UL   METABOLIC PANEL, BASIC    Collection Time: 11/03/17  5:45 AM   Result Value Ref Range    Sodium 139 136 - 145 mmol/L    Potassium 3.8 3.5 - 5.1 mmol/L    Chloride 104 97 - 108 mmol/L    CO2 25 21 - 32 mmol/L    Anion gap 10 5 - 15 mmol/L    Glucose 107 (H) 65 - 100 mg/dL    BUN 8 6 - 20 MG/DL    Creatinine 1.33 (H) 0.70 - 1.30 MG/DL    BUN/Creatinine ratio 6 (L) 12 - 20      GFR est AA >60 >60 ml/min/1.73m2    GFR est non-AA 56 (L) >60 ml/min/1.73m2    Calcium 8.1 (L) 8.5 - 10.1 MG/DL   MAGNESIUM    Collection Time: 11/03/17  5:45 AM   Result Value Ref Range    Magnesium 2.4 1.6 - 2.4 mg/dL   PHOSPHORUS    Collection Time: 11/03/17  5:45 AM   Result Value Ref Range    Phosphorus 3.2 2.6 - 4.7 MG/DL   POST EXPOSURE PROFILE    Collection Time: 11/03/17  5:45 AM   Result Value Ref Range    p24 Antigen NONREACTIVE NR      HIV-1,2 Ab NONREACTIVE NR      Hep C  virus Ab Interp. NONREACTIVE NR      Hep C  virus Ab comment Method used is Siemens Advia Centaur      Hepatitis B surface Ag <0.10 Index    Hep B surface Ag Interp. NEGATIVE  NEG     GLUCOSE, POC    Collection Time: 11/03/17  1:18 PM   Result Value Ref Range    Glucose (POC) 113 (H) 65 - 100 mg/dL    Performed by [facility]          Imaging  CT - Colonic intussusception in the sigmoid with resultant large bowel  obstruction  I have personally reviewed all of the pertinent images     Assessment:     Kristie Brizuela is a 64 y.o. male with large recurrent incisional hernia and s/p laparotomy with extensive lysis of adhesions and reduction of intussusception    Recommendations:     1. He does still have a large abdominal hernia and will need repair of the hernia in the future. When I had seen him a few months ago I wanted him to be smoke free for a month prior to surgery to decrease the risk of infection and recurrence of the hernia. He was not able to do so then. He is smoking currently but is smoking less, about 1/3 a pack a day. Doing a major abdominal reconstruction now I believe is not the best timing. His skin and wound should stay closed and heal up with time. The staples will need to stay in place for about 6wks or longer to make sure the skin has fully healed. Doing an operation now is possible but with his small bowel dilated currently post op, he will likely get an ileus and any increased pressure on the repair with the decreased volume his abdominal cavity has increases his post op risks much higher. Also post op complications due to the smoking are higher.   I would recommend keeping the abdominal binder in place and the staples, let the wound heal and plan for repair in 3-6 months. I have discussed this with Dr Prachi Martinez. I will follow along during his hospitalization to monitor his wound. Thank you for the consultation.     Karis Horn MD Incoherent speech

## 2023-12-28 NOTE — PROGRESS NOTE ADULT - SUBJECTIVE AND OBJECTIVE BOX
Patient is a 89y old  Male who presents with a chief complaint of L thalamic hemorrhage (28 Dec 2023 13:38)      SUBJECTIVE / OVERNIGHT EVENTS: ptn is lethargic tmax 101.8F    MEDICATIONS  (STANDING):  ascorbic acid 500 milliGRAM(s) Oral daily  cefTRIAXone   IVPB 1000 milliGRAM(s) IV Intermittent every 24 hours  dextrose 5%. 1000 milliLiter(s) (50 mL/Hr) IV Continuous <Continuous>  dextrose 5%. 1000 milliLiter(s) (100 mL/Hr) IV Continuous <Continuous>  dextrose 50% Injectable 25 Gram(s) IV Push once  dextrose 50% Injectable 25 Gram(s) IV Push once  dextrose 50% Injectable 12.5 Gram(s) IV Push once  digoxin     Tablet 125 MICROGram(s) Oral daily  glucagon  Injectable 1 milliGRAM(s) IntraMuscular once  insulin lispro (ADMELOG) corrective regimen sliding scale   SubCutaneous every 6 hours  multivitamin 1 Tablet(s) Oral daily  niCARdipine Infusion 5 mG/Hr (25 mL/Hr) IV Continuous <Continuous>    MEDICATIONS  (PRN):  dextrose Oral Gel 15 Gram(s) Oral once PRN Blood Glucose LESS THAN 70 milliGRAM(s)/deciliter      Vital Signs Last 24 Hrs  T(F): 101.8 (12-28-23 @ 20:00), Max: 101.8 (12-28-23 @ 20:00)  HR: 112 (12-28-23 @ 20:45) (74 - 114)  BP: 126/60 (12-28-23 @ 20:45) (102/52 - 160/72)  RR: 37 (12-28-23 @ 20:45) (16 - 39)  SpO2: 95% (12-28-23 @ 20:45) (93% - 99%)    POCT Blood Glucose.: 204 mg/dL (28 Dec 2023 16:48)  POCT Blood Glucose.: 192 mg/dL (28 Dec 2023 11:51)  POCT Blood Glucose.: 150 mg/dL (28 Dec 2023 05:07)  POCT Blood Glucose.: 131 mg/dL (27 Dec 2023 23:22)    I&O's Summary    27 Dec 2023 07:01  -  28 Dec 2023 07:00  --------------------------------------------------------  IN: 0 mL / OUT: 700 mL / NET: -700 mL        PHYSICAL EXAM:  GENERAL: NAD, well-developed  HEAD:  Atraumatic, Normocephalic  EYES: EOMI, PERRLA, conjunctiva and sclera clear  NECK: Supple, No JVD  CHEST/LUNG: Clear to auscultation bilaterally; No wheeze  HEART: Regular rate and rhythm; No murmurs, rubs, or gallops  ABDOMEN: Soft, Nontender, Nondistended; Bowel sounds present  EXTREMITIES:  2+ Peripheral Pulses, No clubbing, cyanosis, or edema  PSYCH: AAOx3  NEUROLOGY: non-focal  SKIN: No rashes or lesions    LABS:                        13.3   14.51 )-----------( 246      ( 28 Dec 2023 20:47 )             39.8     12-28    141  |  105  |  28<H>  ----------------------------<  214<H>  4.4   |  17<L>  |  1.36<H>    Ca    9.3      28 Dec 2023 20:47  Phos  3.8     12-28  Mg     2.0     12-28    TPro  6.7  /  Alb  4.2  /  TBili  0.5  /  DBili  x   /  AST  18  /  ALT  11  /  AlkPhos  53  12-27    PT/INR - ( 27 Dec 2023 10:04 )   PT: 11.5 sec;   INR: 1.05 ratio         PTT - ( 27 Dec 2023 10:04 )  PTT:40.1 sec      Urinalysis Basic - ( 28 Dec 2023 20:47 )    Color: x / Appearance: x / SG: x / pH: x  Gluc: 214 mg/dL / Ketone: x  / Bili: x / Urobili: x   Blood: x / Protein: x / Nitrite: x   Leuk Esterase: x / RBC: x / WBC x   Sq Epi: x / Non Sq Epi: x / Bacteria: x

## 2023-12-28 NOTE — SWALLOW BEDSIDE ASSESSMENT ADULT - SWALLOW EVAL: PATIENT/FAMILY GOALS STATEMENT
Pt cannot state goals. Family goals for nutrition remain undecided; NGT vs total comfort approach. Pt/family do not want PEG.

## 2023-12-28 NOTE — PHYSICAL THERAPY INITIAL EVALUATION ADULT - ADDITIONAL COMMENTS
per stroke team, pt with decreased vision due to macular degeneration per stroke team, pt with decreased vision due to macular degeneration    per daughter: lives in apartment with 24hr aides, uses rollator, required assistance with dressing and bathing, has shower stall with grab bars, was able to feed himself (but couldn't see what the food was at times), wears glasses

## 2023-12-28 NOTE — SWALLOW BEDSIDE ASSESSMENT ADULT - ORAL PHASE
Absent attempts at bolus transfer/ no palpable swallow trigger. PO suctioned from oral cavity. Delayed oral transit time

## 2023-12-28 NOTE — PROGRESS NOTE ADULT - ASSESSMENT
A/p  89y/o M h/o AFib (on Eliquis), HTN, HLD, DM, PPM , indwelling urinary catheter present w his aide 2/2 lethargy and UTI.      #AF, s/p ppm  -Rate controlled, stable  -cont metoprolol if needed\  -cont dig  -a/c on hold    #HTN  -controlled on cardene gtt  -mgmt per neuro     #ICH  -mgmt per neuro sx  -ac on hold      35 minutes spent on total encounter; more than 50% of the visit was spent counseling and/or coordinating care by the attending physician.

## 2023-12-28 NOTE — SWALLOW BEDSIDE ASSESSMENT ADULT - SLP PERTINENT HISTORY OF CURRENT PROBLEM
89y (01-Dec-1926) M w/ PMHx significant for A.fib s/p PPM on Eliquis digoxin & metoprolol, HTN, HLD, DM, BPH w/ indwelling urinary catheter, macular degeneration presents to the ED due to R sided weakness and aphasia. Stroke code called. LKW 3 AM 12/27/2023. Patient was awake and spoke to his overnight home nursing aid at his apartment in [facility]. Later in the morning at 6 AM when his morning nursing aid came to help him out of bed, it was noted that his right side was weak and he was not speaking to the nursing aid. Aid became concerned and called EMS. Patient took his Eliquis the same day. CT head done in the ED revealed a L thalamic hemorrhage with intraventricular extension.  Per location, history of HTN and elevated BP on arrival suspect hypertensive bleed. CTA was without AVM or aneurysm. 89y (01-Dec-1926) M w/ PMHx significant for A.fib s/p PPM on Eliquis digoxin & metoprolol, HTN, HLD, DM, BPH w/ indwelling urinary catheter, macular degeneration presents to the ED due to R sided weakness and aphasia. Stroke code called. LKW 3 AM 12/27/2023. Patient was awake and spoke to his overnight home nursing aid at his apartment in [facility]. Later in the morning at 6 AM when his morning nursing aid came to help him out of bed, it was noted that his right side was weak and he was not speaking to the nursing aid. Aid became concerned and called EMS. Patient took his Eliquis the same day. CT head done in the ED revealed a L thalamic hemorrhage with intraventricular extension.  Per location, history of HTN and elevated BP on arrival suspect hypertensive bleed. CTA was without AVM or aneurysm.

## 2023-12-28 NOTE — PROGRESS NOTE ADULT - SUBJECTIVE AND OBJECTIVE BOX
Indication for Geriatrics and Palliative Care Services/INTERVAL HPI: goals of care  SUBJECTIVE AND OBJECTIVE: Patient seen and examined. Able to say he is "fine" and says "no" when asked about pain. not able to participate in S&S eval    OVERNIGHT EVENTS: no acute overnight events. repeat CT stable    DNR on chart:DNI  DNI      Allergies    procainamide (Other (Severe))    Intolerances    MEDICATIONS  (STANDING):  ascorbic acid 500 milliGRAM(s) Oral daily  cefTRIAXone   IVPB 1000 milliGRAM(s) IV Intermittent every 24 hours  dextrose 5%. 1000 milliLiter(s) (50 mL/Hr) IV Continuous <Continuous>  dextrose 5%. 1000 milliLiter(s) (100 mL/Hr) IV Continuous <Continuous>  dextrose 50% Injectable 25 Gram(s) IV Push once  dextrose 50% Injectable 12.5 Gram(s) IV Push once  dextrose 50% Injectable 25 Gram(s) IV Push once  digoxin     Tablet 125 MICROGram(s) Oral daily  glucagon  Injectable 1 milliGRAM(s) IntraMuscular once  insulin lispro (ADMELOG) corrective regimen sliding scale   SubCutaneous every 6 hours  multivitamin 1 Tablet(s) Oral daily  niCARdipine Infusion 5 mG/Hr (25 mL/Hr) IV Continuous <Continuous>    MEDICATIONS  (PRN):  dextrose Oral Gel 15 Gram(s) Oral once PRN Blood Glucose LESS THAN 70 milliGRAM(s)/deciliter      ITEMS UNCHECKED ARE NOT PRESENT    PRESENT SYMPTOMS: [ x]Unable to self-report - see [ ] CPOT [x ] PAINADS [x ] RDOS  Source if other than patient:  [ ]Family   [ ]Team     Pain:  [ ]yes [x ]no  QOL impact -   Location -                    Aggravating factors -  Quality -  Radiation -  Timing-  Severity (0-10 scale):  Minimal acceptable level (0-10 scale):     CPOT:    https://www.sccm.org/getattachment/grx10a49-7c4t-2w7e-2z1y-1425f0461l9q/Critical-Care-Pain-Observation-Tool-(CPOT)    Dyspnea:                           [ ]Mild [ ]Moderate [ ]Severe  Anxiety:                             [ ]Mild [ ]Moderate [ ]Severe  Fatigue:                             [ ]Mild [ ]Moderate [ ]Severe  Nausea:                             [ ]Mild [ ]Moderate [ ]Severe  Loss of appetite:              [ ]Mild [ ]Moderate [ ]Severe  Constipation:                    [ ]Mild [ ]Moderate [ ]Severe    PCSSQ[Palliative Care Spiritual Screening Question]   Severity (0-10):  Score of 4 or > indicate consideration of Chaplaincy referral.  Chaplaincy Referral: [ ] yes [ ] refused [ ] following [ x] Deferred     Caregiver [place]? : [ x] yes [ ] no [ ] Deferred [ ] Declined             Social work referral [ ] Patient & Family Centered Care Referral [ x]     Anticipatory Grief present?:  [ ] yes [ x] no  [ ] Deferred                  Social work referral [ ] Chaplaincy Referral[ ]      Other Symptoms:  [x ]All other review of systems negative   [ ]Unable to obtain due to poor mentation    Palliative Performance Status Version 2:       20  %      http://npcrc.org/files/news/palliative_performance_scale_ppsv2.pdf  PHYSICAL EXAM:  Vital Signs Last 24 Hrs  T(C): 37.2 (28 Dec 2023 12:00), Max: 37.2 (28 Dec 2023 12:00)  T(F): 98.9 (28 Dec 2023 12:00), Max: 98.9 (28 Dec 2023 12:00)  HR: 102 (28 Dec 2023 12:45) (70 - 102)  BP: 133/62 (28 Dec 2023 12:45) (102/52 - 184/77)  BP(mean): 89 (28 Dec 2023 12:45) (74 - 111)  RR: 23 (28 Dec 2023 12:45) (16 - 34)  SpO2: 99% (28 Dec 2023 12:45) (93% - 99%)    Parameters below as of 28 Dec 2023 12:45  Patient On (Oxygen Delivery Method): room air     I&O's Summary    27 Dec 2023 07:01  -  28 Dec 2023 07:00  --------------------------------------------------------  IN: 0 mL / OUT: 700 mL / NET: -700 mL       GENERAL: [ ]Cachexia    [ ]Alert  [ ]Oriented x   [ x]Lethargic  [ ]Unarousable  [ ]Verbal  [ ]Non-Verbal  Behavioral:   [ ]Anxiety  [ ]Delirium [ ]Agitation [ ]Other  HEENT:  [ ]Normal   [ x]Dry mouth   [ ]ET Tube/Trach  [ ]Oral lesions  PULMONARY:   [x ]Clear [ ]Tachypnea  [ ]Audible excessive secretions   [ ]Rhonchi        [ ]Right [ ]Left [ ]Bilateral  [ ]Crackles        [ ]Right [ ]Left [ ]Bilateral  [ ]Wheezing     [ ]Right [ ]Left [ ]Bilateral  [ ]Diminished BS [ ] Right [ ]Left [ ]Bilateral  CARDIOVASCULAR:    [ ]Regular [x ]Irregular [ ]Tachy  [ ]Jose J [ ]Murmur [ ]Other  GASTROINTESTINAL:  [x ]Soft  [ ]Distended   [ ]+BS  [ x]Non tender [ ]Tender  [ ]Other [ ]PEG [ ]OGT/ NGT   Last BM:   GENITOURINARY:  [ ]Normal [ x]Incontinent   [ ]Oliguria/Anuria   [ ]Palmer  MUSCULOSKELETAL:   [ ]Normal   [x ]Weakness  [x ]Bed/Wheelchair bound [ ]Edema  NEUROLOGIC:   [ ]No focal deficits  [ x] Cognitive impairment  [ x] Dysphagia [ ]Dysarthria [ ] Paresis [ ]Other   SKIN:   [ ]Normal  [ ]Rash  [ ]Other  [ ]Pressure ulcer(s) [ ]y [ ]n present on admission    CRITICAL CARE:  [ ]Shock Present  [ ]Septic [ ]Cardiogenic [ ]Neurologic [ ]Hypovolemic  [ ]Vasopressors [ ]Inotropes  [ ]Respiratory failure present [ ]Mechanical Ventilation [ ]Non-invasive ventilatory support [ ]High-Flow   [ ]Acute  [ ]Chronic [ ]Hypoxic  [ ]Hypercarbic [ ]Other  [ ]Other organ failure     LABS:                        13.3   13.05 )-----------( 221      ( 28 Dec 2023 04:39 )             39.5   12-28    137  |  104  |  19  ----------------------------<  151<H>  4.0   |  20<L>  |  0.93    Ca    9.2      28 Dec 2023 04:38    TPro  6.7  /  Alb  4.2  /  TBili  0.5  /  DBili  x   /  AST  18  /  ALT  11  /  AlkPhos  53  12-27  PT/INR - ( 27 Dec 2023 10:04 )   PT: 11.5 sec;   INR: 1.05 ratio         PTT - ( 27 Dec 2023 10:04 )  PTT:40.1 sec    Urinalysis Basic - ( 28 Dec 2023 04:38 )    Color: x / Appearance: x / SG: x / pH: x  Gluc: 151 mg/dL / Ketone: x  / Bili: x / Urobili: x   Blood: x / Protein: x / Nitrite: x   Leuk Esterase: x / RBC: x / WBC x   Sq Epi: x / Non Sq Epi: x / Bacteria: x      RADIOLOGY & ADDITIONAL STUDIES:  < from: CT Head No Cont (12.28.23 @ 07:59) >    ACC: [id number] EXAM:  CT BRAIN   ORDERED BY:  JOSE BAI     PROCEDURE DATE:  12/28/2023          INTERPRETATION:  Exam Date: 12/28/2023 7:59 AM    CT head without IV contrast    CLINICAL INFORMATION:  stroke Admitting Dxs: I62.9 WEAKNESS    TECHNIQUE: Contiguous axial sections were obtained through the head.     Coronal and sagittal reformats were obtained.    COMPARISON: 12/27/2023    FINDINGS:    Acute hemorrhage in the left thalamus with surrounding vasogenic edema as   well as extension into the ventricular system appears unchanged as   compared to the prior exam. Mild left to right subfalcine shift is   unchanged. No new areas of hemorrhage. Hydrocephalus appears unchanged.   Severe generalized cerebral volume loss.  IMPRESSION:    No interval change.    --- End of Report ---            MO DALEY MD; Attending Radiologist  This document has been electronically signed. Dec 28 2023  8:41AM    < end of copied text >      Protein Calorie Malnutrition Present: [ ]mild [ ]moderate [ ]severe [ ]underweight [ ]morbid obesity  https://www.andeal.org/vault/2440/web/files/ONC/Table_Clinical%20Characteristics%20to%20Document%20Malnutrition-White%20JV%20et%20al%202012.pdf    Height (cm): 188 (10-12-23 @ 18:20), 188 (10-11-23 @ 18:18), 188 (08-25-23 @ 15:17)  Weight (kg): 70.9 (12-27-23 @ 08:28), 117.9 (10-11-23 @ 18:18), 81.6 (08-25-23 @ 15:17)  BMI (kg/m2): 20.1 (12-27-23 @ 08:28), 33.4 (10-12-23 @ 18:20), 33.4 (10-11-23 @ 18:18)    [x ]PPSV2 < or = 30%  [ ]significant weight loss [x ]poor nutritional intake [ ]anasarca[ ]Artificial Nutrition    Other REFERRALS:  [ ]Hospice  [ ]Child Life  [ x]Social Work  [ ]Case management [ ]Holistic Therapy     Goals of Care Document: Indication for Geriatrics and Palliative Care Services/INTERVAL HPI: goals of care  SUBJECTIVE AND OBJECTIVE: Patient seen and examined. Able to say he is "fine" and says "no" when asked about pain. not able to participate in S&S eval    OVERNIGHT EVENTS: no acute overnight events. repeat CT stable    DNR on chart:DNI  DNI      Allergies    procainamide (Other (Severe))    Intolerances    MEDICATIONS  (STANDING):  ascorbic acid 500 milliGRAM(s) Oral daily  cefTRIAXone   IVPB 1000 milliGRAM(s) IV Intermittent every 24 hours  dextrose 5%. 1000 milliLiter(s) (50 mL/Hr) IV Continuous <Continuous>  dextrose 5%. 1000 milliLiter(s) (100 mL/Hr) IV Continuous <Continuous>  dextrose 50% Injectable 25 Gram(s) IV Push once  dextrose 50% Injectable 12.5 Gram(s) IV Push once  dextrose 50% Injectable 25 Gram(s) IV Push once  digoxin     Tablet 125 MICROGram(s) Oral daily  glucagon  Injectable 1 milliGRAM(s) IntraMuscular once  insulin lispro (ADMELOG) corrective regimen sliding scale   SubCutaneous every 6 hours  multivitamin 1 Tablet(s) Oral daily  niCARdipine Infusion 5 mG/Hr (25 mL/Hr) IV Continuous <Continuous>    MEDICATIONS  (PRN):  dextrose Oral Gel 15 Gram(s) Oral once PRN Blood Glucose LESS THAN 70 milliGRAM(s)/deciliter      ITEMS UNCHECKED ARE NOT PRESENT    PRESENT SYMPTOMS: [ x]Unable to self-report - see [ ] CPOT [x ] PAINADS [x ] RDOS  Source if other than patient:  [ ]Family   [ ]Team     Pain:  [ ]yes [x ]no  QOL impact -   Location -                    Aggravating factors -  Quality -  Radiation -  Timing-  Severity (0-10 scale):  Minimal acceptable level (0-10 scale):     CPOT:    https://www.sccm.org/getattachment/gvv48n08-5i3p-8m9e-0n1g-2473j1347z3t/Critical-Care-Pain-Observation-Tool-(CPOT)    Dyspnea:                           [ ]Mild [ ]Moderate [ ]Severe  Anxiety:                             [ ]Mild [ ]Moderate [ ]Severe  Fatigue:                             [ ]Mild [ ]Moderate [ ]Severe  Nausea:                             [ ]Mild [ ]Moderate [ ]Severe  Loss of appetite:              [ ]Mild [ ]Moderate [ ]Severe  Constipation:                    [ ]Mild [ ]Moderate [ ]Severe    PCSSQ[Palliative Care Spiritual Screening Question]   Severity (0-10):  Score of 4 or > indicate consideration of Chaplaincy referral.  Chaplaincy Referral: [ ] yes [ ] refused [ ] following [ x] Deferred     Caregiver [place]? : [ x] yes [ ] no [ ] Deferred [ ] Declined             Social work referral [ ] Patient & Family Centered Care Referral [ x]     Anticipatory Grief present?:  [ ] yes [ x] no  [ ] Deferred                  Social work referral [ ] Chaplaincy Referral[ ]      Other Symptoms:  [x ]All other review of systems negative   [ ]Unable to obtain due to poor mentation    Palliative Performance Status Version 2:       20  %      http://npcrc.org/files/news/palliative_performance_scale_ppsv2.pdf  PHYSICAL EXAM:  Vital Signs Last 24 Hrs  T(C): 37.2 (28 Dec 2023 12:00), Max: 37.2 (28 Dec 2023 12:00)  T(F): 98.9 (28 Dec 2023 12:00), Max: 98.9 (28 Dec 2023 12:00)  HR: 102 (28 Dec 2023 12:45) (70 - 102)  BP: 133/62 (28 Dec 2023 12:45) (102/52 - 184/77)  BP(mean): 89 (28 Dec 2023 12:45) (74 - 111)  RR: 23 (28 Dec 2023 12:45) (16 - 34)  SpO2: 99% (28 Dec 2023 12:45) (93% - 99%)    Parameters below as of 28 Dec 2023 12:45  Patient On (Oxygen Delivery Method): room air     I&O's Summary    27 Dec 2023 07:01  -  28 Dec 2023 07:00  --------------------------------------------------------  IN: 0 mL / OUT: 700 mL / NET: -700 mL       GENERAL: [ ]Cachexia    [ ]Alert  [ ]Oriented x   [ x]Lethargic  [ ]Unarousable  [ ]Verbal  [ ]Non-Verbal  Behavioral:   [ ]Anxiety  [ ]Delirium [ ]Agitation [ ]Other  HEENT:  [ ]Normal   [ x]Dry mouth   [ ]ET Tube/Trach  [ ]Oral lesions  PULMONARY:   [x ]Clear [ ]Tachypnea  [ ]Audible excessive secretions   [ ]Rhonchi        [ ]Right [ ]Left [ ]Bilateral  [ ]Crackles        [ ]Right [ ]Left [ ]Bilateral  [ ]Wheezing     [ ]Right [ ]Left [ ]Bilateral  [ ]Diminished BS [ ] Right [ ]Left [ ]Bilateral  CARDIOVASCULAR:    [ ]Regular [x ]Irregular [ ]Tachy  [ ]Jose J [ ]Murmur [ ]Other  GASTROINTESTINAL:  [x ]Soft  [ ]Distended   [ ]+BS  [ x]Non tender [ ]Tender  [ ]Other [ ]PEG [ ]OGT/ NGT   Last BM:   GENITOURINARY:  [ ]Normal [ x]Incontinent   [ ]Oliguria/Anuria   [ ]Palmer  MUSCULOSKELETAL:   [ ]Normal   [x ]Weakness  [x ]Bed/Wheelchair bound [ ]Edema  NEUROLOGIC:   [ ]No focal deficits  [ x] Cognitive impairment  [ x] Dysphagia [ ]Dysarthria [ ] Paresis [ ]Other   SKIN:   [ ]Normal  [ ]Rash  [ ]Other  [ ]Pressure ulcer(s) [ ]y [ ]n present on admission    CRITICAL CARE:  [ ]Shock Present  [ ]Septic [ ]Cardiogenic [ ]Neurologic [ ]Hypovolemic  [ ]Vasopressors [ ]Inotropes  [ ]Respiratory failure present [ ]Mechanical Ventilation [ ]Non-invasive ventilatory support [ ]High-Flow   [ ]Acute  [ ]Chronic [ ]Hypoxic  [ ]Hypercarbic [ ]Other  [ ]Other organ failure     LABS:                        13.3   13.05 )-----------( 221      ( 28 Dec 2023 04:39 )             39.5   12-28    137  |  104  |  19  ----------------------------<  151<H>  4.0   |  20<L>  |  0.93    Ca    9.2      28 Dec 2023 04:38    TPro  6.7  /  Alb  4.2  /  TBili  0.5  /  DBili  x   /  AST  18  /  ALT  11  /  AlkPhos  53  12-27  PT/INR - ( 27 Dec 2023 10:04 )   PT: 11.5 sec;   INR: 1.05 ratio         PTT - ( 27 Dec 2023 10:04 )  PTT:40.1 sec    Urinalysis Basic - ( 28 Dec 2023 04:38 )    Color: x / Appearance: x / SG: x / pH: x  Gluc: 151 mg/dL / Ketone: x  / Bili: x / Urobili: x   Blood: x / Protein: x / Nitrite: x   Leuk Esterase: x / RBC: x / WBC x   Sq Epi: x / Non Sq Epi: x / Bacteria: x      RADIOLOGY & ADDITIONAL STUDIES:  < from: CT Head No Cont (12.28.23 @ 07:59) >    ACC: [id number] EXAM:  CT BRAIN   ORDERED BY:  JOSE BAI     PROCEDURE DATE:  12/28/2023          INTERPRETATION:  Exam Date: 12/28/2023 7:59 AM    CT head without IV contrast    CLINICAL INFORMATION:  stroke Admitting Dxs: I62.9 WEAKNESS    TECHNIQUE: Contiguous axial sections were obtained through the head.     Coronal and sagittal reformats were obtained.    COMPARISON: 12/27/2023    FINDINGS:    Acute hemorrhage in the left thalamus with surrounding vasogenic edema as   well as extension into the ventricular system appears unchanged as   compared to the prior exam. Mild left to right subfalcine shift is   unchanged. No new areas of hemorrhage. Hydrocephalus appears unchanged.   Severe generalized cerebral volume loss.  IMPRESSION:    No interval change.    --- End of Report ---            MO DALEY MD; Attending Radiologist  This document has been electronically signed. Dec 28 2023  8:41AM    < end of copied text >      Protein Calorie Malnutrition Present: [ ]mild [ ]moderate [ ]severe [ ]underweight [ ]morbid obesity  https://www.andeal.org/vault/2440/web/files/ONC/Table_Clinical%20Characteristics%20to%20Document%20Malnutrition-White%20JV%20et%20al%202012.pdf    Height (cm): 188 (10-12-23 @ 18:20), 188 (10-11-23 @ 18:18), 188 (08-25-23 @ 15:17)  Weight (kg): 70.9 (12-27-23 @ 08:28), 117.9 (10-11-23 @ 18:18), 81.6 (08-25-23 @ 15:17)  BMI (kg/m2): 20.1 (12-27-23 @ 08:28), 33.4 (10-12-23 @ 18:20), 33.4 (10-11-23 @ 18:18)    [x ]PPSV2 < or = 30%  [ ]significant weight loss [x ]poor nutritional intake [ ]anasarca[ ]Artificial Nutrition    Other REFERRALS:  [ ]Hospice  [ ]Child Life  [ x]Social Work  [ ]Case management [ ]Holistic Therapy     Goals of Care Document:

## 2023-12-28 NOTE — OCCUPATIONAL THERAPY INITIAL EVALUATION ADULT - LIVES WITH, PROFILE
Per chart review and conversation with daughter pt lives in apt with elevator access. Pt has 24/7 aides that assists with all ADLs and mobility

## 2023-12-28 NOTE — CONSULT NOTE ADULT - SUBJECTIVE AND OBJECTIVE BOX
Wound Surgery Consult Note:    HPI:  CHERY AMEZCUA, 89y (01-Dec-1926) M w/ PMHx significant for A.fib s/p PPM on Eliquis digoxin & metoprolol, HTN, HLD, DM, BPH w/ indwelling urinary catheter, macular degeneration presents to the ED due to R sided weakness and aphasia. Stroke code called. LKW 3 AM 12/27/2023. Patient was awake and spoke to his overnight home nursing aid at his apartment in [facility]. Later in the morning at 6 AM when his morning nursing aid came to help him out of bed, it was noted that his right side was weak and he was not speaking to the nursing aid. Aid became concerned and called EMS. Patient took his Eliquis the same day. CT head done in the ED revealed a L thalamic hemorrhage with intraventricular extension. CTA was without AVM or aneurysm. Patient's PT, PTT & INR were elevated and he was given Kcentra to reverse anticoagulation from his Eliquis. On exam patient was attempting to answer questions with grunts and moans. He expressed understanding by following verbal commands. Patient's nursing aid arrived who explained that he is normally A&Ox 2 to self and location. He requires 24 hrs home nursing assistance and is not able to get out of bed on his own. He walks with a walker. Aid is unsure of overnight events though it was reported to her by the overnight aid that the patient was up and able to talk and walk at 3 AM the same day. Comprehensive ROS unobtainable due to patient's aphasia.    NIHSS: 12  PreMRS: 3  Patient was not a tenecteplase candidate as he presented outside the time window and hemorrhage was identified on CT  Patient was not a thrombectomy candidate as no LVO was identified on imaging. (27 Dec 2023 14:11)    Request for wound care consult for sacral/bilateral buttocks skin injury received from nursing. Mr. Amezcua was encountered on an alternating air with low air loss surface. He was seen with clinical nurse. He is incontinent of stool and urine at this time. He required assistance to turn/shift in bed. His extreme immobility, inactivity, incontinence of stool and urine and poor nutritional status all contribute to his high risk for pressure injury development and hinder healing.  His daughter was at the bedside and stated that he has had the discoloration on his sacrum/buttocks for the past few months and it comes and goes, gets better and than worse.    PAST MEDICAL & SURGICAL HISTORY:  HLD (hyperlipidemia)  Pacemaker  AF (atrial fibrillation), on Eliquis  HTN (hypertension)  DM (diabetes mellitus)  Macular degeneration  BPH (benign prostatic hypertrophy)  S/P knee replacement, bilateral    REVIEW OF SYSTEMS  unable to obtain due to patient's mental status    MEDICATIONS  (STANDING):  ascorbic acid 500 milliGRAM(s) Oral daily  cefTRIAXone   IVPB 1000 milliGRAM(s) IV Intermittent every 24 hours  dextrose 5%. 1000 milliLiter(s) (50 mL/Hr) IV Continuous <Continuous>  dextrose 5%. 1000 milliLiter(s) (100 mL/Hr) IV Continuous <Continuous>  dextrose 50% Injectable 25 Gram(s) IV Push once  dextrose 50% Injectable 12.5 Gram(s) IV Push once  dextrose 50% Injectable 25 Gram(s) IV Push once  digoxin     Tablet 125 MICROGram(s) Oral daily  glucagon  Injectable 1 milliGRAM(s) IntraMuscular once  insulin lispro (ADMELOG) corrective regimen sliding scale   SubCutaneous every 6 hours  multivitamin 1 Tablet(s) Oral daily  niCARdipine Infusion 5 mG/Hr (25 mL/Hr) IV Continuous <Continuous>    MEDICATIONS  (PRN):  dextrose Oral Gel 15 Gram(s) Oral once PRN Blood Glucose LESS THAN 70 milliGRAM(s)/deciliter    Allergies    procainamide (Other (Severe))    Intolerances    SOCIAL HISTORY:  unable to obtain due to patient's mental status    FAMILY HISTORY:  No pertinent family history in first degree relatives    Vital Signs Last 24 Hrs  T(C): 36.8 (28 Dec 2023 06:00), Max: 36.8 (27 Dec 2023 19:00)  T(F): 98.3 (28 Dec 2023 06:00), Max: 98.3 (28 Dec 2023 06:00)  HR: 98 (28 Dec 2023 11:30) (70 - 102)  BP: 138/59 (28 Dec 2023 11:30) (102/52 - 184/77)  BP(mean): 94 (28 Dec 2023 11:15) (74 - 111)  RR: 28 (28 Dec 2023 11:30) (16 - 34)  SpO2: 96% (28 Dec 2023 11:30) (93% - 99%)    Parameters below as of 28 Dec 2023 11:01  Patient On (Oxygen Delivery Method): room air    Physical Exam:  General: obtunded  Ophthamology: sclera clear  ENMT: moist mucous membranes, trachea midline  Respiratory: equal chest rise with respirations  Gastrointestinal: soft NT/ND  Neurology: nonverbal, not following commands  Psych: obtunded  Musculoskeletal: no contractures  Vascular: BLE edema equal  Skin:  Sacral/bilateral buttocks with deep maroon discolored intact skin in and around the gluteal cleft L 8cm X W 10cm x D none, small opening on Left buttock with maroon wound bed, open edges, scant serosanguinous drainage L 0.5cm x W 0.5cm x D 0.1cm  RIght heel with small dry, firm, fixed eschar, L 0.3cm x W 0.3cm x D unknown, no drainage  No odor, erythema, increased warmth, tenderness, induration, fluctuance    LABS:  12-28    137  |  104  |  19  ----------------------------<  151<H>  4.0   |  20<L>  |  0.93    Ca    9.2      28 Dec 2023 04:38    TPro  6.7  /  Alb  4.2  /  TBili  0.5  /  DBili  x   /  AST  18  /  ALT  11  /  AlkPhos  53  12-27                          13.3   13.05 )-----------( 221      ( 28 Dec 2023 04:39 )             39.5     PT/INR - ( 27 Dec 2023 10:04 )   PT: 11.5 sec;   INR: 1.05 ratio         PTT - ( 27 Dec 2023 10:04 )  PTT:40.1 sec  Urinalysis Basic - ( 28 Dec 2023 04:38 )    Color: x / Appearance: x / SG: x / pH: x  Gluc: 151 mg/dL / Ketone: x  / Bili: x / Urobili: x   Blood: x / Protein: x / Nitrite: x   Leuk Esterase: x / RBC: x / WBC x   Sq Epi: x / Non Sq Epi: x / Bacteria: x         Wound Surgery Consult Note:    HPI:  CHERY AMEZCUA, 89y (01-Dec-1926) M w/ PMHx significant for A.fib s/p PPM on Eliquis digoxin & metoprolol, HTN, HLD, DM, BPH w/ indwelling urinary catheter, macular degeneration presents to the ED due to R sided weakness and aphasia. Stroke code called. LKW 3 AM 12/27/2023. Patient was awake and spoke to his overnight home nursing aid at his apartment in [place] [facility]. Later in the morning at 6 AM when his morning nursing aid came to help him out of bed, it was noted that his right side was weak and he was not speaking to the nursing aid. Aid became concerned and called EMS. Patient took his Eliquis the same day. CT head done in the ED revealed a L thalamic hemorrhage with intraventricular extension. CTA was without AVM or aneurysm. Patient's PT, PTT & INR were elevated and he was given Kcentra to reverse anticoagulation from his Eliquis. On exam patient was attempting to answer questions with grunts and moans. He expressed understanding by following verbal commands. Patient's nursing aid arrived who explained that he is normally A&Ox 2 to self and location. He requires 24 hrs home nursing assistance and is not able to get out of bed on his own. He walks with a walker. Aid is unsure of overnight events though it was reported to her by the overnight aid that the patient was up and able to talk and walk at 3 AM the same day. Comprehensive ROS unobtainable due to patient's aphasia.    NIHSS: 12  PreMRS: 3  Patient was not a tenecteplase candidate as he presented outside the time window and hemorrhage was identified on CT  Patient was not a thrombectomy candidate as no LVO was identified on imaging. (27 Dec 2023 14:11)    Request for wound care consult for sacral/bilateral buttocks skin injury received from nursing. Mr. Amezcua was encountered on an alternating air with low air loss surface. He was seen with clinical nurse. He is incontinent of stool and urine at this time. He required assistance to turn/shift in bed. His extreme immobility, inactivity, incontinence of stool and urine and poor nutritional status all contribute to his high risk for pressure injury development and hinder healing.  His daughter was at the bedside and stated that he has had the discoloration on his sacrum/buttocks for the past few months and it comes and goes, gets better and than worse.    PAST MEDICAL & SURGICAL HISTORY:  HLD (hyperlipidemia)  Pacemaker  AF (atrial fibrillation), on Eliquis  HTN (hypertension)  DM (diabetes mellitus)  Macular degeneration  BPH (benign prostatic hypertrophy)  S/P knee replacement, bilateral    REVIEW OF SYSTEMS  unable to obtain due to patient's mental status    MEDICATIONS  (STANDING):  ascorbic acid 500 milliGRAM(s) Oral daily  cefTRIAXone   IVPB 1000 milliGRAM(s) IV Intermittent every 24 hours  dextrose 5%. 1000 milliLiter(s) (50 mL/Hr) IV Continuous <Continuous>  dextrose 5%. 1000 milliLiter(s) (100 mL/Hr) IV Continuous <Continuous>  dextrose 50% Injectable 25 Gram(s) IV Push once  dextrose 50% Injectable 12.5 Gram(s) IV Push once  dextrose 50% Injectable 25 Gram(s) IV Push once  digoxin     Tablet 125 MICROGram(s) Oral daily  glucagon  Injectable 1 milliGRAM(s) IntraMuscular once  insulin lispro (ADMELOG) corrective regimen sliding scale   SubCutaneous every 6 hours  multivitamin 1 Tablet(s) Oral daily  niCARdipine Infusion 5 mG/Hr (25 mL/Hr) IV Continuous <Continuous>    MEDICATIONS  (PRN):  dextrose Oral Gel 15 Gram(s) Oral once PRN Blood Glucose LESS THAN 70 milliGRAM(s)/deciliter    Allergies    procainamide (Other (Severe))    Intolerances    SOCIAL HISTORY:  unable to obtain due to patient's mental status    FAMILY HISTORY:  No pertinent family history in first degree relatives    Vital Signs Last 24 Hrs  T(C): 36.8 (28 Dec 2023 06:00), Max: 36.8 (27 Dec 2023 19:00)  T(F): 98.3 (28 Dec 2023 06:00), Max: 98.3 (28 Dec 2023 06:00)  HR: 98 (28 Dec 2023 11:30) (70 - 102)  BP: 138/59 (28 Dec 2023 11:30) (102/52 - 184/77)  BP(mean): 94 (28 Dec 2023 11:15) (74 - 111)  RR: 28 (28 Dec 2023 11:30) (16 - 34)  SpO2: 96% (28 Dec 2023 11:30) (93% - 99%)    Parameters below as of 28 Dec 2023 11:01  Patient On (Oxygen Delivery Method): room air    Physical Exam:  General: obtunded  Ophthamology: sclera clear  ENMT: moist mucous membranes, trachea midline  Respiratory: equal chest rise with respirations  Gastrointestinal: soft NT/ND  Neurology: nonverbal, not following commands  Psych: obtunded  Musculoskeletal: no contractures  Vascular: BLE edema equal  Skin:  Sacral/bilateral buttocks with deep maroon discolored intact skin in and around the gluteal cleft L 8cm X W 10cm x D none, small opening on Left buttock with maroon wound bed, open edges, scant serosanguinous drainage L 0.5cm x W 0.5cm x D 0.1cm  RIght heel with small dry, firm, fixed eschar, L 0.3cm x W 0.3cm x D unknown, no drainage  No odor, erythema, increased warmth, tenderness, induration, fluctuance    LABS:  12-28    137  |  104  |  19  ----------------------------<  151<H>  4.0   |  20<L>  |  0.93    Ca    9.2      28 Dec 2023 04:38    TPro  6.7  /  Alb  4.2  /  TBili  0.5  /  DBili  x   /  AST  18  /  ALT  11  /  AlkPhos  53  12-27                          13.3   13.05 )-----------( 221      ( 28 Dec 2023 04:39 )             39.5     PT/INR - ( 27 Dec 2023 10:04 )   PT: 11.5 sec;   INR: 1.05 ratio         PTT - ( 27 Dec 2023 10:04 )  PTT:40.1 sec  Urinalysis Basic - ( 28 Dec 2023 04:38 )    Color: x / Appearance: x / SG: x / pH: x  Gluc: 151 mg/dL / Ketone: x  / Bili: x / Urobili: x   Blood: x / Protein: x / Nitrite: x   Leuk Esterase: x / RBC: x / WBC x   Sq Epi: x / Non Sq Epi: x / Bacteria: x

## 2023-12-28 NOTE — PROGRESS NOTE ADULT - ASSESSMENT
89yo M with hx of afib on AC, HTN, DM, BPH, HLD who presented with unresponsiveness this morning. Found to have L thalamic hemorrhage. Palliative consulted for C

## 2023-12-28 NOTE — PROGRESS NOTE ADULT - ASSESSMENT
89y M WELL KNOWN TO ME FROM PREVIOUS MULTIPLE ADMISSIONS AT [facility]  ptn presents with aphasia and R sided weakness  Stroke code called. LKW 3 AM 12/27/2023. Patient was awake and spoke to his overnight home nursing aide at his apartment in [facility]. Later in the morning at 6 AM when this morning nursing aide came to help him out of bed, it was noted that his right side was weak and he was not speaking to the nursing aide. The aide became concerned and called EMS. Patient took his Eliquis the same day.   CT head done in the ED revealed a L thalamic hemorrhage with intraventricular extension. CTA was without AVM or aneurysm. Patient's PT, PTT & INR were elevated and he was given Kcentra to reverse anticoagulation from Eliquis.   On exam patient was attempting to answer questions with grunts and moans. He expressed understanding by following verbal commands. Patient's nursing aide arrived who explained that he is normally A&Ox 2 to self and location. He requires 24 hrs home nursing assistance and is not able to get out of bed on his own. He walks with a walker. Aide is unsure of overnight events.  though it was reported to her by the overnight aide that the patient was up and able to talk and walk at 3 AM the same day.   Comprehensive ROS unobtainable due to patient's aphasia.    PMHx significant for A.fib s/p PPM on Eliquis digoxin & metoprolol, HTN, HLD, DM, BPH w/ indwelling urinary catheter, macular degeneration       NIHSS: 12  Patient was not a tenecteplase candidate as he presented outside the time window and hemorrhage was identified on CT  Patient was not a thrombectomy candidate as no LVO was identified on imaging.    Acute onset R hemiparesis with expressive aphasia. Localized to L cerebral hemisphere. CT head reveals L thalamic hemorrhage with intraventricular extension without AVM or aneurysm on CTA.     Ptn admitted to NEURO service  As per Neuro: Per location, history of HTN and elevated BP on arrival suspect hypertensive bleed.     ICH score: 3    Stroke acute management:  - s/p Kcentra to reverse Eliquis  - Frequent neuro-checks q2h and VS q2h; STAT CTH for change in neuro exam  - Permissive HTN up to 140/100 for 24-48h from symptom onset followed by gradual normotension over 2-3 days, titrate Cardene drip as needed for goal BP  - NPO unless passes dysphagia screen; swallow eval if fails  - DVT ppx: SCDs - pending decision to re-start Eliquis 48 hours after initial presentation depending on stability scans    Secondary prevention of stroke:  - Hold AC/AP 48 hrs pending stability CT scans  - Atorvastatin 80 mg PO daily (long-term goal LDL < 70)  - Tight glucose control (long-term goal HgbA1c < 6%)  - ISS for glucose control  - Rehabilitation: PT/OT/S+S    Stroke workup:  - CT head 4 hrs & 24 hrs from initial presentation to assess bleed stability  - MRI brain w/o contrast  - Telemetry to monitor for arrhythmia  - Check HgbA1C, fasting lipid panel, utox  ID- high suspicion of aspiration PNA 2/2 dysphagia. would get CXR, start Zosyn. ID called  Afib- cardiology called Continue home Digoxin 125 for A.fib  - ptn has h/o chronic UTIs, indwelling patel, would check UA, Cx. Latest admission for UTI w metabolic encephalopathy in 10/2023  GOC- ptn is DNR/DNI , family consdiering hospice   89y M WELL KNOWN TO ME FROM PREVIOUS MULTIPLE ADMISSIONS AT [facility]  ptn presents with aphasia and R sided weakness  Stroke code called. LKW 3 AM 12/27/2023. Patient was awake and spoke to his overnight home nursing aide at his apartment in [facility]. Later in the morning at 6 AM when this morning nursing aide came to help him out of bed, it was noted that his right side was weak and he was not speaking to the nursing aide. The aide became concerned and called EMS. Patient took his Eliquis the same day.   CT head done in the ED revealed a L thalamic hemorrhage with intraventricular extension. CTA was without AVM or aneurysm. Patient's PT, PTT & INR were elevated and he was given Kcentra to reverse anticoagulation from Eliquis.   On exam patient was attempting to answer questions with grunts and moans. He expressed understanding by following verbal commands. Patient's nursing aide arrived who explained that he is normally A&Ox 2 to self and location. He requires 24 hrs home nursing assistance and is not able to get out of bed on his own. He walks with a walker. Aide is unsure of overnight events.  though it was reported to her by the overnight aide that the patient was up and able to talk and walk at 3 AM the same day.   Comprehensive ROS unobtainable due to patient's aphasia.    PMHx significant for A.fib s/p PPM on Eliquis digoxin & metoprolol, HTN, HLD, DM, BPH w/ indwelling urinary catheter, macular degeneration       NIHSS: 12  Patient was not a tenecteplase candidate as he presented outside the time window and hemorrhage was identified on CT  Patient was not a thrombectomy candidate as no LVO was identified on imaging.    Acute onset R hemiparesis with expressive aphasia. Localized to L cerebral hemisphere. CT head reveals L thalamic hemorrhage with intraventricular extension without AVM or aneurysm on CTA.     Ptn admitted to NEURO service  As per Neuro: Per location, history of HTN and elevated BP on arrival suspect hypertensive bleed.     ICH score: 3    Stroke acute management:  - s/p Kcentra to reverse Eliquis  - Frequent neuro-checks q2h and VS q2h; STAT CTH for change in neuro exam  - Permissive HTN up to 140/100 for 24-48h from symptom onset followed by gradual normotension over 2-3 days, titrate Cardene drip as needed for goal BP  - NPO unless passes dysphagia screen; swallow eval if fails  - DVT ppx: SCDs - pending decision to re-start Eliquis 48 hours after initial presentation depending on stability scans    Secondary prevention of stroke:  - Hold AC/AP 48 hrs pending stability CT scans  - Atorvastatin 80 mg PO daily (long-term goal LDL < 70)  - Tight glucose control (long-term goal HgbA1c < 6%)  - ISS for glucose control  - Rehabilitation: PT/OT/S+S    Stroke workup:  - CT head 4 hrs & 24 hrs from initial presentation to assess bleed stability  - MRI brain w/o contrast  - Telemetry to monitor for arrhythmia  - Check HgbA1C, fasting lipid panel, utox  ID- high suspicion of aspiration PNA 2/2 dysphagia. would get CXR, start Zosyn. ID called  Afib- cardiology called Continue home Digoxin 125 for A.fib  - ptn has h/o chronic UTIs, indwelling patel, would check UA, Cx. Latest admission for UTI w metabolic encephalopathy in 10/2023  GOC- ptn is DNR/DNI , family consdiering hospice

## 2023-12-28 NOTE — SWALLOW BEDSIDE ASSESSMENT ADULT - SWALLOW EVAL: DIAGNOSIS
89 y/o male hx of dementia found to have L thalamic hemorrhage with intraventricular extension. Pt presents with insufficient mentation to support safe PO intake. Open mouth posture at rest. Swallow function c/b reduced oral grading, significantly reduced bolus manipulation, and minimal attempts to orally transfer bolus; pharyngeal swallow trigger only appreciated with ice chip trial, otherwise, PO (puree/ thickened liquid) had to be suctioned from oral cavity due to oral holding. Not a candidate for a PO diet at this time. 89 y/o male hx of dementia found to have L thalamic hemorrhage with intraventricular extension. Pt presents with insufficient mentation to support safe PO intake. Open mouth posture at rest. Swallow function c/b reduced oral grading, significantly reduced bolus manipulation, and minimal attempts to orally transfer bolus; pharyngeal swallow trigger only appreciated with ice chip trial, otherwise, PO (puree/ thickened liquid) had to be suctioned from oral cavity due to oral holding. Not a candidate for a PO diet at this time.

## 2023-12-28 NOTE — PROGRESS NOTE ADULT - CONVERSATION DETAILS
Met with daughter at bedside, patient unable to participate reliably in conversation  She states he looks better today than yesterday, but speech therapist in room and patient not alert enough to participate, food needed to be suctioned, and recommendation is NPO. Daughter states neurology team said patients can make recovery from thalamic bleeds and there is recommendation for an NGT.  I expressed that although an NGT may give us some more time, a thalamic bleed in the setting of a 89yo with underlying dementia does still pose significant risk and we do not anticipate recovery back to previous baseline. Expressed that family should consider choices of NGT vs. palliative (no artificial nutrition) with a hospice disposition. Briefly mentioned what a symptom directed approach could look like at home vs. in the hospital.   Family to consider this option and let neurology team know their thoughts- I expressed that it is reasonable to have an honest conversation with family about choices and what they think patient would want and not feel rushed to make any decisions.  Ongoing availability assured. Discussion also held briefly via phone with Mary's  who is a physician, who understands concern regarding prognosis and options for care in either scenario- ongoing medical vs. transition of focus.

## 2023-12-28 NOTE — OCCUPATIONAL THERAPY INITIAL EVALUATION ADULT - PERTINENT HX OF CURRENT PROBLEM, REHAB EVAL
89y (01-Dec-1926) M w/ PMHx significant for A.fib s/p PPM on Eliquis digoxin & metoprolol, HTN, HLD, DM, BPH w/ indwelling urinary catheter, macular degeneration presents to the ED due to R sided weakness and aphasia. Stroke code called. LKW 3 AM 12/27/2023. Patient was awake and spoke to his overnight home nursing aid at his apartment in [facility]. Later in the morning at 6 AM when his morning nursing aid came to help him out of bed, it was noted that his right side was weak and he was not speaking to the nursing aid. Aid became concerned and called EMS. Patient took his Eliquis the same day. CT head done in the ED revealed a L thalamic hemorrhage with intraventricular extension. CTA was without AVM or aneurysm. Patient's PT, PTT & INR were elevated and he was given Kcentra to reverse anticoagulation from his Eliquis. On exam patient was attempting to answer questions with grunts and moans. He expressed understanding by following verbal commands. Patient's nursing aid arrived who explained that he is normally A&Ox 2 to self and location. He requires 24 hrs home nursing assistance and is not able to get out of bed on his own. He walks with a walker. Aid is unsure of overnight events though it was reported to her by the overnight aid that the patient was up and able to talk and walk at 3 AM the same day. Comprehensive ROS unobtainable due to patient's aphasia. 89y (01-Dec-1926) M w/ PMHx significant for A.fib s/p PPM on Eliquis digoxin & metoprolol, HTN, HLD, DM, BPH w/ indwelling urinary catheter, macular degeneration presents to the ED due to R sided weakness and aphasia. Stroke code called. LKW 3 AM 12/27/2023. Patient was awake and spoke to his overnight home nursing aid at his apartment in [facility]. Later in the morning at 6 AM when his morning nursing aid came to help him out of bed, it was noted that his right side was weak and he was not speaking to the nursing aid. Aid became concerned and called EMS. Patient took his Eliquis the same day. CT head done in the ED revealed a L thalamic hemorrhage with intraventricular extension. CTA was without AVM or aneurysm. Patient's PT, PTT & INR were elevated and he was given Kcentra to reverse anticoagulation from his Eliquis. On exam patient was attempting to answer questions with grunts and moans. He expressed understanding by following verbal commands. Patient's nursing aid arrived who explained that he is normally A&Ox 2 to self and location. He requires 24 hrs home nursing assistance and is not able to get out of bed on his own. He walks with a walker. Aid is unsure of overnight events though it was reported to her by the overnight aid that the patient was up and able to talk and walk at 3 AM the same day. Comprehensive ROS unobtainable due to patient's aphasia.

## 2023-12-28 NOTE — PROGRESS NOTE ADULT - PROBLEM SELECTOR PLAN 5
we will continue to follow  discussed with stroke team.    [phone] we will continue to follow  discussed with stroke team.    [phone]

## 2023-12-28 NOTE — OCCUPATIONAL THERAPY INITIAL EVALUATION ADULT - VISUAL ACUITY
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

## 2023-12-28 NOTE — PHYSICAL THERAPY INITIAL EVALUATION ADULT - PERTINENT HX OF CURRENT PROBLEM, REHAB EVAL
PMHx: A-fib s/p PPM on Eliquis digoxin & metoprolol, HTN, HLD, DM, BPH with indwelling urinary catheter, macular degeneration. presents to the ED due to R sided weakness and aphasia. LKW 3AM 12/27/2023. Patient was awake and spoke to his overnight home nursing aid. LAt 6 AM when his morning nursing aid came to help him out of bed, it was noted that his R side was weak and he was not speaking to the nursing aid. Aid  called EMS. Pt took his Eliquis the same day. CTH: L thalamic hemorrhage with intraventricular extension. CTA: without AVM or aneurysm. Patient's PT, PTT & INR were elevated and he was given Kcentra to reverse anticoagulation from Eliquis. On exam patient was attempting to answer questions with grunts and moans. He expressed understanding by following verbal commands. Pt's nursing aid explained that he is normally A&Ox 2 to self and location. He requires 24hrs home nursing assistance and is not able to get out of bed on his own, walks with a walker. Aid is unsure of overnight events though it was reported to her by the overnight aid that the patient was up and able to talk & walk at 3AM the same day. NIHSS: 12. Pt not a tenecteplase candidate as he presented outside the time window and hemorrhage was identified on CT.Patient was not a thrombectomy candidate as no LVO was identified on imaging.    CT brain: Acute L thalamic hemorrhage with intraventricular extension. Similar minimal rightward midline shift since 8/25/2023, with no effacement of the basal cisterns. Mass effect upon the posterior left lateral ventricle. The ventricles are otherwise similar in size to 8/25/2023. Similar-appearing 1.2x1.2cm (since 9/26/2021) extra-axial mass within the left anterolateral foramen magnum cistern. Mild mass effect upon the cervicomedullary junction. This may represent a meningioma.  CTA brain: No flow-limiting stenosis or vascular aneurysm. No evidence for AVM. Please note that this does not exclude the presence of a micro-AVM, which may be compressed by hemorrhage.  CTA neck: No flow-limiting stenosis, evidence for arterial dissection, or vascular aneurysm.

## 2023-12-28 NOTE — OCCUPATIONAL THERAPY INITIAL EVALUATION ADULT - RANGE OF MOTION EXAMINATION, UPPER EXTREMITY
L shoulder AROM limited 0-90/Left UE Active ROM was WFL (within functional limits)/Right UE Passive ROM was WFL  (within functional limits)

## 2023-12-28 NOTE — CONSULT NOTE ADULT - ASSESSMENT
Impression:    Sacral/bilateral Buttocks deep tissue damage present on admission  RIght heel unstageable pressure injury present on admission  Incontinence of bowel and bladder  Incontinence Dermatitis    Recommend:  1.) topical therapy: sacral/buttock injury - cleanse with incontinence cleanser, pat dry, apply Shanae ointment BID and PRN for incontinent episodes  Bilateral heels keep clean and dry, apply cavilon skin protectant daily  2.) Incontinence Management - incontinence cleanser, pads, pericare BID  3.) Maintain on an alternating air with low air loss surface  4.) Turn and reposition Q 2 hours  5.) Nutrition optimization - please add Barak  6.) Offload heels/feet with complete cair air fluidized boots; ensure that the soles of the feet are not resting on the foot board of the bed.    Care as per medicine. Will not actively follow but will remain available. Please recall for new issues or deterioration.  Upon [facility] [address] [place] [address] [phone]  Thank you for this consult  Seen and discussed with clinical nurse  Jane Christy, LEONA-C, CWOCN via TEAMS Impression:    Sacral/bilateral Buttocks deep tissue damage present on admission  RIght heel unstageable pressure injury present on admission  Incontinence of bowel and bladder  Incontinence Dermatitis    Recommend:  1.) topical therapy: sacral/buttock injury - cleanse with incontinence cleanser, pat dry, apply Shanae ointment BID and PRN for incontinent episodes  Bilateral heels keep clean and dry, apply cavilon skin protectant daily  2.) Incontinence Management - incontinence cleanser, pads, pericare BID  3.) Maintain on an alternating air with low air loss surface  4.) Turn and reposition Q 2 hours  5.) Nutrition optimization - please add Barak  6.) Offload heels/feet with complete cair air fluidized boots; ensure that the soles of the feet are not resting on the foot board of the bed.    Care as per medicine. Will not actively follow but will remain available. Please recall for new issues or deterioration.  Upon discharge f/[facility] at [facility] [address] [place] [address] [phone]  Thank you for this consult  Seen and discussed with clinical nurse  Jane Christy, LEONA-C, CWOCN via TEAMS

## 2023-12-28 NOTE — PROGRESS NOTE ADULT - SUBJECTIVE AND OBJECTIVE BOX
CARDIOLOGY FOLLOW UP - Dr. Siddiqi  Date of Service: 12/28/2023  CC: no events    Review of Systems:  Constitutional: No fever, weight loss, or fatigue  Respiratory: No cough, wheezing, or hemoptysis, no shortness of breath  Cardiovascular: No chest pain, palpitations, passing out, dizziness, or leg swelling  Gastrointestinal: No abd or epigastric pain. No nausea, vomiting, or hematemesis; no diarrhea or consiptaiton, no melena or hematochezia  Vascular: No edema     TELEMETRY:    PHYSICAL EXAM:  T(C): 37.2 (12-28-23 @ 12:00), Max: 37.2 (12-28-23 @ 12:00)  HR: 95 (12-28-23 @ 12:00) (70 - 102)  BP: 128/62 (12-28-23 @ 12:00) (102/52 - 184/77)  RR: 23 (12-28-23 @ 12:00) (16 - 34)  SpO2: 98% (12-28-23 @ 12:00) (93% - 99%)  Wt(kg): --  I&O's Summary    27 Dec 2023 07:01  -  28 Dec 2023 07:00  --------------------------------------------------------  IN: 0 mL / OUT: 700 mL / NET: -700 mL        Appearance: Normal	  Cardiovascular: Normal S1 S2,RRR, No JVD, No murmurs  Respiratory: Lungs clear to auscultation	  Gastrointestinal:  Soft, Non-tender, + BS	  Extremities: Normal range of motion, No clubbing, cyanosis or edema  Vascular: Peripheral pulses palpable 2+ bilaterally       Home Medications:  acetaminophen 325 mg oral tablet: 2 tab(s) orally every 6 hours As needed Mild Pain (1 - 3) (27 Dec 2023 09:26)  Crestor 20 mg oral tablet: 1 tab(s) orally once a day (27 Dec 2023 09:26)  digoxin 125 mcg (0.125 mg) oral tablet: 1 tab(s) orally once a day (27 Dec 2023 09:26)  Eliquis 5 mg oral tablet: 1 tab(s) orally 2 times a day (27 Dec 2023 09:26)  enalapril 5 mg oral tablet: 1 tab(s) orally once a day (27 Dec 2023 09:26)  fenofibrate 145 mg oral tablet: 1 tab(s) orally once a day (27 Dec 2023 09:26)  Januvia 50 mg oral tablet: 1 tab(s) orally once a day (27 Dec 2023 09:26)  latanoprost 0.005% ophthalmic solution: 1 drop(s) to each affected eye once a day (in the evening) (27 Dec 2023 09:26)  metFORMIN 750 mg oral tablet, extended release: 1 tab(s) orally once a day (27 Dec 2023 09:26)  metoprolol succinate 50 mg oral tablet, extended release: 1 tab(s) orally once a day (27 Dec 2023 09:26)        MEDICATIONS  (STANDING):  ascorbic acid 500 milliGRAM(s) Oral daily  cefTRIAXone   IVPB 1000 milliGRAM(s) IV Intermittent every 24 hours  dextrose 5%. 1000 milliLiter(s) (50 mL/Hr) IV Continuous <Continuous>  dextrose 5%. 1000 milliLiter(s) (100 mL/Hr) IV Continuous <Continuous>  dextrose 50% Injectable 25 Gram(s) IV Push once  dextrose 50% Injectable 25 Gram(s) IV Push once  dextrose 50% Injectable 12.5 Gram(s) IV Push once  digoxin     Tablet 125 MICROGram(s) Oral daily  glucagon  Injectable 1 milliGRAM(s) IntraMuscular once  insulin lispro (ADMELOG) corrective regimen sliding scale   SubCutaneous every 6 hours  multivitamin 1 Tablet(s) Oral daily  niCARdipine Infusion 5 mG/Hr (25 mL/Hr) IV Continuous <Continuous>        EKG:  RADIOLOGY:  DIAGNOSTIC TESTING:  [ ] Echocardiogram:  [ ] Catherterization:  [ ] Stress Test:  OTHER:     LABS:	 	                          13.3   13.05 )-----------( 221      ( 28 Dec 2023 04:39 )             39.5     12-28    137  |  104  |  19  ----------------------------<  151<H>  4.0   |  20<L>  |  0.93    Ca    9.2      28 Dec 2023 04:38    TPro  6.7  /  Alb  4.2  /  TBili  0.5  /  DBili  x   /  AST  18  /  ALT  11  /  AlkPhos  53  12-27      PT/INR - ( 27 Dec 2023 10:04 )   PT: 11.5 sec;   INR: 1.05 ratio         PTT - ( 27 Dec 2023 10:04 )  PTT:40.1 sec    CARDIAC MARKERS:

## 2023-12-28 NOTE — PROGRESS NOTE ADULT - PROBLEM SELECTOR PLAN 2
[facility] narrative above  failed S&S eval  family to decide re: trial of NGT/artificial nutrition. would not want PEG.  family not ready to make decision at time of my eval and requesting time to speak as a family to decide what is in patients best interest. [facility] narrative above  failed S&S eval  family to decide re: trial of NGT/artificial nutrition. would not want PEG.  family not ready to make decision at time of my eval and requesting time to speak as a family to decide what is in patients best interest.

## 2023-12-28 NOTE — PHYSICAL THERAPY INITIAL EVALUATION ADULT - FOLLOWS COMMANDS/ANSWERS QUESTIONS, REHAB EVAL
simple commands with tactile cues and hand over hand assist/25% of the time simple commands with tactile cues and hand over hand assist/50% of the time

## 2023-12-28 NOTE — PHYSICAL THERAPY INITIAL EVALUATION ADULT - GENERAL OBSERVATIONS, REHAB EVAL
received supine with head of bed elevated, head rotated L, mouth breathing, RUE elevated on pillow and daughter at bedside

## 2023-12-28 NOTE — PROGRESS NOTE ADULT - PROBLEM SELECTOR PLAN 3
minimally verbal- able to say he is "fine" and say "no" regarding presence of pain  not able to swallow  moving LUE and LLE, minimal movement of RUE and RLE  PT/OT evaluations

## 2023-12-28 NOTE — SWALLOW BEDSIDE ASSESSMENT ADULT - ADDITIONAL RECOMMENDATIONS
Maintain good oral care   Will f/u for formal speech/ language evaluation once patient with improved ability to participate/ able to sustain adequate alertness - staff to anticipate pt's wants and needs at this time   Will f/u for swallow re-evaluation pending improved mentation, as clinically appropriate

## 2023-12-28 NOTE — SWALLOW BEDSIDE ASSESSMENT ADULT - SLP GENERAL OBSERVATIONS
Pt encountered in bed, awake but not consistently alert, on room air. VSS. Oriented to self when provided with choices. Inconsistently responding to simple yes/no questions with yes/no vocal approximations. Not consistently following directives.

## 2023-12-28 NOTE — SWALLOW BEDSIDE ASSESSMENT ADULT - COMMENTS
Patient's PT, PTT & INR were elevated and he was given Kcentra to reverse anticoagulation from his Eliquis. On exam patient was attempting to answer questions with grunts and moans. He expressed understanding by following verbal commands. Patient's nursing aid arrived who explained that he is normally A&Ox 2 to self and location. He requires 24 hrs home nursing assistance and is not able to get out of bed on his own. He walks with a walker. Aid is unsure of overnight events though it was reported to her by the overnight aid that the patient was up and able to talk and walk at 3 AM the same day. Comprehensive ROS unobtainable due to patient's aphasia.  NIHSS: 12. Patient was not a tenecteplase candidate as he presented outside the time window and hemorrhage was identified on CT. Patient was not a thrombectomy candidate as no LVO was identified on imaging. No acute NSx intervention.    Palliative on board -> DNR/DNI, MOLST in chart. limited medical interventions; discussed that medical decision making will be needed based on functional status: i.e. mobility and ability to swallow which will be assessed over coming days.    Speech/ swallow hx: Pt seen for bedside swallow evaluation on 10/14/23 with recommendations for a regular diet with thin liquids. Failed dysphagia screen in the ED 2/2 lethargy.

## 2023-12-28 NOTE — PROGRESS NOTE ADULT - PROBLEM SELECTOR PLAN 4
DNR/DNI, limited interventions  [facility] narrative above- family to consider trial of NGT  discussed alternatives to this including home hospice vs. PCU vs. inpatient hospice (based on candidacy)  MOL in chart

## 2023-12-29 NOTE — DIETITIAN INITIAL EVALUATION ADULT - PHYSCIAL ASSESSMENT
Visual findings noted, nutrition-focused physical examination not appropriate in setting of comfort care measures

## 2023-12-29 NOTE — DIETITIAN INITIAL EVALUATION ADULT - OTHER INFO
Aide reported pt's  lbs on 08/2023 admission.  Dosing wt: 156.3 lbs (12-27)  Wt history per chart: 170 lbs (10-20), 170 lbs (08-16), 170 lbs (01/12/23, stated). Possible 8.1% wt loss x 2 months based on dosing wt. RD to continue to monitor weight trends as able/available.     Additional nutrition-related concerns:  - Palliative consulted 12/28. Per neurology note today, pt DNR/DNI and full comfort measures only, family does not want NGT  - ordered for admelog ISS for glycemic management in-house. BG readings since admission = 135-266 mg/dl.  - ordered for a multivitamin and Vitamin C but not being administered secondary to no access.  - ordered for IV potassium chloride secondary to low level today

## 2023-12-29 NOTE — PROGRESS NOTE ADULT - ASSESSMENT
CHERY AMEZCUA, 89y (01-Dec-1926) M w/ PMHx significant for A.fib s/p PPM on Eliquis digoxin & metoprolol, HTN, HLD, DM, BPH w/ indwelling urinary catheter, macular degeneration presents to the ED due to R sided weakness and aphasia. S/P Kcentra.     Impression: L thalamic hemorrhage with intraventricular extension etiology chronic HTN vs AC use.     NEURO: Remains globally aphasic,  goal SBP < 140 Physical therapy/OT: pending GOC    ANTITHROMBOTIC THERAPY: none due to hemorrhage    PULMONARY:  protecting airway, saturating well on O2, on Zosyn for probable aspiration PNA    CARDIOVASCULAR: check TTE, cardiac monitoring                              SBP goal: <140    GASTROINTESTINAL:  dysphagia screen- failed, family does not want NGT       Diet: NPO    RENAL: BUN/Cr stable, good urine output, hypokalemic s/p supplementation     Na Goal: Greater than 135     Palmer: Y, chronic    HEMATOLOGY: H/H stable, Platelets 198     DVT ppx: venodynes, chemical held due to hemorrhage    ID: febrile to 101/8F , leukocytosis likely due to aspiration PNA started on Zosyn, UA : positive.     OTHER: Plan of care discussed with family at bedside, Palliative consult appreciate, GOC pending, patient DNR/DNI.     DISPOSITION: pending GOC discussion      CORE MEASURES:        Admission NIHSS: 12     TPA: [] YES [x] NO      LDL/HDL: pending     Depression Screen: NA     Statin Therapy: N     Dysphagia Screen: [] PASS [x] FAIL     Smoking [] YES [] NO      Afib [x] YES [] NO     Stroke Education [x] YES [] NO to family    Obtain screening lower extremity venous ultrasound in patients who meet 1 or more of the following criteria as patient is high risk for DVT/PE on admission:   [] History of DVT/PE  []Hypercoagulable states (Factor V Leiden, Cancer, OCP, etc. )  []Prolonged immobility (hemiplegia/hemiparesis/post operative or any other extended immobilization)  [] Transferred from outside facility (Rehab or Long term care)  [] Age </= to 50. CHERY AMEZCUA, 89y (01-Dec-1926) M w/ PMHx significant for A.fib s/p PPM on Eliquis digoxin & metoprolol, HTN, HLD, DM, BPH w/ indwelling urinary catheter, macular degeneration presents to the ED due to R sided weakness and aphasia. S/P Kcentra.     Impression: L thalamic hemorrhage with intraventricular extension etiology chronic HTN vs AC use.     NEURO: Remains globally aphasic,  goal SBP < 140 Physical therapy/OT: pending [facility]    ANTITHROMBOTIC THERAPY: none due to hemorrhage    PULMONARY:  protecting airway, saturating well on O2, on Zosyn for probable aspiration PNA    CARDIOVASCULAR: check TTE, cardiac monitoring                              SBP goal: <140    GASTROINTESTINAL:  dysphagia screen- failed, family does not want NGT       Diet: NPO    RENAL: BUN/Cr stable, good urine output, hypokalemic s/p supplementation     Na Goal: Greater than 135     Palmer: Y, chronic    HEMATOLOGY: H/H stable, Platelets 198     DVT ppx: venodynes, chemical held due to hemorrhage    ID: febrile to 101/8F , leukocytosis likely due to aspiration PNA started on Zosyn, UA : positive.     OTHER: Plan of care discussed with family at bedside, Palliative consult appreciate, patient DNR/DNI. As per daughter, HCP, full comfort measures only.    DISPOSITION: Comfort measures only      CORE MEASURES:        Admission NIHSS: 12     TPA: [] YES [x] NO      LDL/HDL: pending     Depression Screen: NA     Statin Therapy: N     Dysphagia Screen: [] PASS [x] FAIL     Smoking [] YES [] NO      Afib [x] YES [] NO     Stroke Education [x] YES [] NO to family    Obtain screening lower extremity venous ultrasound in patients who meet 1 or more of the following criteria as patient is high risk for DVT/PE on admission:   [] History of DVT/PE  []Hypercoagulable states (Factor V Leiden, Cancer, OCP, etc. )  []Prolonged immobility (hemiplegia/hemiparesis/post operative or any other extended immobilization)  [] Transferred from outside facility (Rehab or Long term care)  [] Age </= to 50. CHERY AMEZCUA, 89y (01-Dec-1926) M w/ PMHx significant for A.fib s/p PPM on Eliquis digoxin & metoprolol, HTN, HLD, DM, BPH w/ indwelling urinary catheter, macular degeneration presents to the ED due to R sided weakness and aphasia. S/P Kcentra.     Impression: L thalamic hemorrhage with intraventricular extension etiology chronic HTN vs AC use.     NEURO: Remains globally aphasic,  goal SBP < 140 Physical therapy/OT: pending [facility]    ANTITHROMBOTIC THERAPY: none due to hemorrhage    PULMONARY:  protecting airway, saturating well on O2, on Zosyn for probable aspiration PNA    CARDIOVASCULAR: check TTE, cardiac monitoring                              SBP goal: <140    GASTROINTESTINAL:  dysphagia screen- failed, family does not want NGT       Diet: NPO    RENAL: BUN/Cr stable, good urine output, hypokalemic s/p supplementation     Na Goal: Greater than 135     Palmer: Y, chronic    HEMATOLOGY: H/H stable, Platelets 198     DVT ppx: venodynes, chemical held due to hemorrhage    ID: febrile to 101/8F , leukocytosis likely due to aspiration PNA started on Zosyn, UA : positive.     OTHER: Plan of care discussed with family at bedside, Palliative consult appreciate, patient DNR/DNI. As per daughter, HCP, full comfort measures only.    DISPOSITION: Comfort measures only      CORE MEASURES:        Admission NIHSS: 12     TPA: [] YES [x] NO      LDL/HDL: pending     Depression Screen: NA     Statin Therapy: N     Dysphagia Screen: [] PASS [x] FAIL     Smoking [] YES [] NO      Afib [x] YES [] NO     Stroke Education [x] YES [] NO to family    Obtain screening lower extremity venous ultrasound in patients who meet 1 or more of the following criteria as patient is high risk for DVT/PE on admission:   [] History of DVT/PE  []Hypercoagulable states (Factor V Leiden, Cancer, OCP, etc. )  []Prolonged immobility (hemiplegia/hemiparesis/post operative or any other extended immobilization)  [] Transferred from outside facility (Rehab or Long term care)  [] Age </= to 50.

## 2023-12-29 NOTE — DIETITIAN INITIAL EVALUATION ADULT - REASON INDICATOR FOR ASSESSMENT
RD consult for Pressure Injury Stage 2 or > and MST Score 2 or >.  Source: previous RD notes, medical record. Pt's daughter Mary at bedside, however deferred interview in setting of plan for palliative care/comfort care only. Chart reviewed, events noted.  RD consult for Pressure Injury Stage 2 or > and MST Score 2 or >.  Source: previous RD notes, medical record. Pt's daughter Mary at bedside, however deferred interview in setting of plan for palliative care/comfort care only. Pt lethargic and aphasic, unable to participate in interview. Chart reviewed, events noted.

## 2023-12-29 NOTE — PROGRESS NOTE ADULT - ASSESSMENT
Partially impaired: cannot see medication labels or newsprint, but can see obstacles in path, and the surrounding layout; can count fingers at arm's length 89yo M with hx of afib on AC, HTN, DM, BPH, HLD who presented with unresponsiveness this morning. Found to have L thalamic hemorrhage. Palliative consulted for C

## 2023-12-29 NOTE — DIETITIAN INITIAL EVALUATION ADULT - ORAL INTAKE PTA/DIET HISTORY
Pt known to this RD from previous admission (08/2023). Aide had reported pt with good PO intake, following a generally healthy diet, and NKFA.

## 2023-12-29 NOTE — DIETITIAN INITIAL EVALUATION ADULT - PERTINENT MEDS FT
MEDICATIONS  (STANDING):  ascorbic acid 500 milliGRAM(s) Oral daily  dextrose 5%. 1000 milliLiter(s) (100 mL/Hr) IV Continuous <Continuous>  dextrose 5%. 1000 milliLiter(s) (50 mL/Hr) IV Continuous <Continuous>  dextrose 50% Injectable 25 Gram(s) IV Push once  dextrose 50% Injectable 12.5 Gram(s) IV Push once  dextrose 50% Injectable 25 Gram(s) IV Push once  digoxin     Tablet 125 MICROGram(s) Oral daily  glucagon  Injectable 1 milliGRAM(s) IntraMuscular once  glycopyrrolate Injectable 0.4 milliGRAM(s) IV Push every 6 hours  insulin lispro (ADMELOG) corrective regimen sliding scale   SubCutaneous every 6 hours  multivitamin 1 Tablet(s) Oral daily  niCARdipine Infusion 5 mG/Hr (25 mL/Hr) IV Continuous <Continuous>  piperacillin/tazobactam IVPB.. 3.375 Gram(s) IV Intermittent every 8 hours  potassium chloride  10 mEq/100 mL IVPB 10 milliEquivalent(s) IV Intermittent every 1 hour    MEDICATIONS  (PRN):  bisacodyl Suppository 10 milliGRAM(s) Rectal daily PRN Constipation  dextrose Oral Gel 15 Gram(s) Oral once PRN Blood Glucose LESS THAN 70 milliGRAM(s)/deciliter  LORazepam   Injectable 0.5 milliGRAM(s) IV Push every 1 hour PRN Agitation  morphine  - Injectable 0.5 milliGRAM(s) IV Push every 1 hour PRN Moderate Pain (4 - 6)  morphine  - Injectable 1 milliGRAM(s) IV Push every 1 hour PRN Severe Pain (7 - 10)

## 2023-12-29 NOTE — PROGRESS NOTE ADULT - CONVERSATION DETAILS
Met with daughter at bedside today, she states that the family has agreed on a symptom directed approach and would like to consider transfer to PCU.   We discussed what care would look like in the PCU, and she was in agreement to stop IVF and abx as she does not want to prolong patients suffering. She states family will be visiting over the weekend and they are aware he isnt doing well.   We briefly discussed inpatient hospice as a possibility, especially as she resides in [place], if he is stable enough for transfer to a facility closer to her home. For now, patient will transfer to PCU when bed available and further decisions regarding disposition will be pending clinical course. She was amenable to utilization of medications for pain, shortness of breath, secretions or any other distressing symptoms. Emotional support provided. contact information given for PCU for when patient transfers. Met with daughter at bedside today, she states that the family has agreed on a symptom directed approach and would like to consider transfer to PCU.   We discussed what care would look like in the PCU, and she was in agreement to stop IVF and abx as she does not want to prolong patients suffering. She states family will be visiting over the weekend and they are aware he isnt doing well.   We briefly discussed inpatient hospice as a possibility, especially as she resides in [place], if he is stable enough for transfer to a facility closer to her home. For now, patient will transfer to PCU when bed available and further decisions regarding disposition will be pending clinical course. She was amenable to utilization of medications for pain, shortness of breath, secretions or any other distressing symptoms. Emotional support provided. contact information given for PCU for when patient transfers.

## 2023-12-29 NOTE — PROGRESS NOTE ADULT - ASSESSMENT
89y M WELL KNOWN TO ME FROM PREVIOUS MULTIPLE ADMISSIONS AT [facility]  ptn presents with aphasia and R sided weakness  Stroke code called. LKW 3 AM 12/27/2023. Patient was awake and spoke to his overnight home nursing aide at his apartment in [facility]. Later in the morning at 6 AM when this morning nursing aide came to help him out of bed, it was noted that his right side was weak and he was not speaking to the nursing aide. The aide became concerned and called EMS. Patient took his Eliquis the same day.   CT head done in the ED revealed a L thalamic hemorrhage with intraventricular extension. CTA was without AVM or aneurysm. Patient's PT, PTT & INR were elevated and he was given Kcentra to reverse anticoagulation from Eliquis.   On exam patient was attempting to answer questions with grunts and moans. He expressed understanding by following verbal commands. Patient's nursing aide arrived who explained that he is normally A&Ox 2 to self and location. He requires 24 hrs home nursing assistance and is not able to get out of bed on his own. He walks with a walker. Aide is unsure of overnight events.  though it was reported to her by the overnight aide that the patient was up and able to talk and walk at 3 AM the same day.   Comprehensive ROS unobtainable due to patient's aphasia.    PMHx significant for A.fib s/p PPM on Eliquis digoxin & metoprolol, HTN, HLD, DM, BPH w/ indwelling urinary catheter, macular degeneration       NIHSS: 12  Patient was not a tenecteplase candidate as he presented outside the time window and hemorrhage was identified on CT  Patient was not a thrombectomy candidate as no LVO was identified on imaging.    Acute onset R hemiparesis with expressive aphasia. Localized to L cerebral hemisphere. CT head reveals L thalamic hemorrhage with intraventricular extension without AVM or aneurysm on CTA.     Ptn admitted to NEURO service  seen by palliative care, on comfort care, awaiting an inptn hospice bed  DNR/DNI  ID- high suspicion of aspiration PNA 2/2 dysphagia. would get CXR, start Zosyn. ID called  Afib- cardiology called Continue home Digoxin 125 for A.fib  - ptn has h/o chronic UTIs, indwelling patel, would check UA, Cx. Latest admission for UTI w metabolic encephalopathy in 10/2023  GOC- ptn is DNR/DNI ,    89y M WELL KNOWN TO ME FROM PREVIOUS MULTIPLE ADMISSIONS AT [facility]  ptn presents with aphasia and R sided weakness  Stroke code called. LKW 3 AM 12/27/2023. Patient was awake and spoke to his overnight home nursing aide at his apartment in [facility]. Later in the morning at 6 AM when this morning nursing aide came to help him out of bed, it was noted that his right side was weak and he was not speaking to the nursing aide. The aide became concerned and called EMS. Patient took his Eliquis the same day.   CT head done in the ED revealed a L thalamic hemorrhage with intraventricular extension. CTA was without AVM or aneurysm. Patient's PT, PTT & INR were elevated and he was given Kcentra to reverse anticoagulation from Eliquis.   On exam patient was attempting to answer questions with grunts and moans. He expressed understanding by following verbal commands. Patient's nursing aide arrived who explained that he is normally A&Ox 2 to self and location. He requires 24 hrs home nursing assistance and is not able to get out of bed on his own. He walks with a walker. Aide is unsure of overnight events.  though it was reported to her by the overnight aide that the patient was up and able to talk and walk at 3 AM the same day.   Comprehensive ROS unobtainable due to patient's aphasia.    PMHx significant for A.fib s/p PPM on Eliquis digoxin & metoprolol, HTN, HLD, DM, BPH w/ indwelling urinary catheter, macular degeneration       NIHSS: 12  Patient was not a tenecteplase candidate as he presented outside the time window and hemorrhage was identified on CT  Patient was not a thrombectomy candidate as no LVO was identified on imaging.    Acute onset R hemiparesis with expressive aphasia. Localized to L cerebral hemisphere. CT head reveals L thalamic hemorrhage with intraventricular extension without AVM or aneurysm on CTA.     Ptn admitted to NEURO service  seen by palliative care, on comfort care, awaiting an inptn hospice bed  DNR/DNI  ID- high suspicion of aspiration PNA 2/2 dysphagia. would get CXR, start Zosyn. ID called  Afib- cardiology called Continue home Digoxin 125 for A.fib  - ptn has h/o chronic UTIs, indwelling patel, would check UA, Cx. Latest admission for UTI w metabolic encephalopathy in 10/2023  GOC- ptn is DNR/DNI ,

## 2023-12-29 NOTE — DIETITIAN INITIAL EVALUATION ADULT - REASON FOR ADMISSION
Non-traumatic intracranial hemorrhage    Per chart, pt is a 89 year old (01-Dec-1926) Male with a PMH significant for A.fib s/p PPM on Eliquis digoxin & metoprolol, HTN, HLD, DM, BPH w/ indwelling urinary catheter, macular degeneration presents to the ED due to R sided weakness and aphasia. Impression: L thalamic hemorrhage with intraventricular extension etiology chronic HTN vs AC use.  Non-traumatic intracranial hemorrhage    Per chart, pt is a 89 year old (01-Dec-1926) Male with a PMH significant for A.fib s/p PPM on Eliquis digoxin & metoprolol, HTN, HLD, DM, BPH w/ indwelling urinary catheter, macular degeneration presents to the ED due to R sided weakness and aphasia. Impression: L thalamic hemorrhage with intraventricular extension etiology chronic HTN vs AC use. As per daughter, HCP, full comfort measures only.

## 2023-12-29 NOTE — DIETITIAN INITIAL EVALUATION ADULT - PERTINENT LABORATORY DATA
12-29    144  |  115<H>  |  27<H>  ----------------------------<  202<H>  3.1<L>   |  13<L>  |  1.12    Ca    6.9<L>      29 Dec 2023 05:59  Phos  3.8     12-28  Mg     2.0     12-28    POCT Blood Glucose.: 243 mg/dL (12-29-23 @ 05:11)  A1C with Estimated Average Glucose Result: 6.0 % (12-28-23 @ 04:39)  A1C with Estimated Average Glucose Result: 6.8 % (08-26-23 @ 07:10)   12-29    144  |  115<H>  |  27<H>  ----------------------------<  202<H>  3.1<L>   |  13<L>  |  1.12    Ca    6.9<L>      29 Dec 2023 05:59  Phos  3.8     12-28  Mg     2.0     12-28    CAPILLARY BLOOD GLUCOSE  POCT Blood Glucose.: 243 mg/dL (29 Dec 2023 05:11)  POCT Blood Glucose.: 266 mg/dL (28 Dec 2023 23:10)  POCT Blood Glucose.: 204 mg/dL (28 Dec 2023 16:48)  POCT Blood Glucose.: 192 mg/dL (28 Dec 2023 11:51)    A1C with Estimated Average Glucose Result: 6.0 % (12-28-23 @ 04:39)  A1C with Estimated Average Glucose Result: 6.8 % (08-26-23 @ 07:10)

## 2023-12-29 NOTE — PROGRESS NOTE ADULT - PROBLEM SELECTOR PLAN 2
GOC narrative above  failed S&S eval  no plan for NGT or artificial nutrition at this time  start IV glycopyrrolate 0.4mg q6h ATC given audible secretions  stop IVF

## 2023-12-29 NOTE — DIETITIAN INITIAL EVALUATION ADULT - ADD RECOMMEND
1) RD remains available for diet/EN recommendations if/when appropriate. If pleasure feeds warranted, recommend no therapeutic restrictions.  2) If feasible and appropriate per GOC, can consider continuation of multivitamin and Vitamin C to promote wound healing.  3) RD remains available upon request and will follow-up per protocol.

## 2023-12-29 NOTE — PROGRESS NOTE ADULT - SUBJECTIVE AND OBJECTIVE BOX
CARDIOLOGY FOLLOW UP - Dr. Siddiqi  DATE OF SERVICE: 12/29/23    CC  No acute cv events     REVIEW OF SYSTEMS:  Unable to obtain    PHYSICAL EXAM:  T(C): 37.8 (12-29-23 @ 08:00), Max: 38.8 (12-28-23 @ 20:00)  HR: 108 (12-29-23 @ 14:00) (93 - 117)  BP: 110/56 (12-29-23 @ 14:00) (110/56 - 145/63)  RR: 34 (12-29-23 @ 14:00) (14 - 47)  SpO2: 100% (12-29-23 @ 14:00) (92% - 100%)  Wt(kg): --  I&O's Summary      Appearance: Elderly male, lethargic 	  Cardiovascular: Normal S1 S2,RRR, No JVD, No murmurs  Respiratory: Lungs clear to auscultation b/l   Gastrointestinal:  Soft, Non-tender, + BS	  Extremities: Normal range of motion, No clubbing, cyanosis or edema      Home Medications:  acetaminophen 325 mg oral tablet: 2 tab(s) orally every 6 hours As needed Mild Pain (1 - 3) (27 Dec 2023 09:26)  Crestor 20 mg oral tablet: 1 tab(s) orally once a day (27 Dec 2023 09:26)  digoxin 125 mcg (0.125 mg) oral tablet: 1 tab(s) orally once a day (27 Dec 2023 09:26)  Eliquis 5 mg oral tablet: 1 tab(s) orally 2 times a day (27 Dec 2023 09:26)  enalapril 5 mg oral tablet: 1 tab(s) orally once a day (27 Dec 2023 09:26)  fenofibrate 145 mg oral tablet: 1 tab(s) orally once a day (27 Dec 2023 09:26)  Januvia 50 mg oral tablet: 1 tab(s) orally once a day (27 Dec 2023 09:26)  latanoprost 0.005% ophthalmic solution: 1 drop(s) to each affected eye once a day (in the evening) (27 Dec 2023 09:26)  metFORMIN 750 mg oral tablet, extended release: 1 tab(s) orally once a day (27 Dec 2023 09:26)  metoprolol succinate 50 mg oral tablet, extended release: 1 tab(s) orally once a day (27 Dec 2023 09:26)      MEDICATIONS  (STANDING):  acetaminophen   IVPB .. 1000 milliGRAM(s) IV Intermittent once  glycopyrrolate Injectable 0.4 milliGRAM(s) IV Push every 6 hours  metoprolol tartrate Injectable 5 milliGRAM(s) IV Push once  metoprolol tartrate Injectable 5 milliGRAM(s) IV Push every 6 hours      TELEMETRY: Afib 100-115	    ECG:  	  RADIOLOGY:   DIAGNOSTIC TESTING:  [ ] Echocardiogram:  [ ]  Catheterization:  [ ] Stress Test:    OTHER: 	    LABS:	 	    Troponin T, High Sensitivity Result: 32 ng/L [0 - 51] (12-27 @ 07:55)                          10.9   16.38 )-----------( 198      ( 29 Dec 2023 05:57 )             33.5     12-29    144  |  115<H>  |  27<H>  ----------------------------<  202<H>  3.1<L>   |  13<L>  |  1.12    Ca    6.9<L>      29 Dec 2023 05:59  Phos  3.8     12-28  Mg     2.0     12-28

## 2023-12-29 NOTE — PROGRESS NOTE ADULT - SUBJECTIVE AND OBJECTIVE BOX
THE PATIENT WAS SEEN AND EXAMINED BY ME WITH THE HOUSESTAFF AND STROKE TEAM DURING MORNING ROUNDS.   HPI:  CHERY AMEZCUA, 89y (01-Dec-1926) M w/ PMHx significant for A.fib s/p PPM on Eliquis digoxin & metoprolol, HTN, HLD, DM, BPH w/ indwelling urinary catheter, macular degeneration presents to the ED due to R sided weakness and aphasia. Stroke code called. LKW 3 AM 12/27/2023. Patient was awake and spoke to his overnight home nursing aid at his apartment in [facility]. Later in the morning at 6 AM when his morning nursing aid came to help him out of bed, it was noted that his right side was weak and he was not speaking to the nursing aid. Aid became concerned and called EMS. Patient took his Eliquis the same day. CT head done in the ED revealed a L thalamic hemorrhage with intraventricular extension. CTA was without AVM or aneurysm. Patient's PT, PTT & INR were elevated and he was given Kcentra to reverse anticoagulation from his Eliquis. On exam patient was attempting to answer questions with grunts and moans. He expressed understanding by following verbal commands. Patient's nursing aid arrived who explained that he is normally A&Ox 2 to self and location. He requires 24 hrs home nursing assistance and is not able to get out of bed on his own. He walks with a walker. Aid is unsure of overnight events though it was reported to her by the overnight aid that the patient was up and able to talk and walk at 3 AM the same day. Comprehensive ROS unobtainable due to patient's aphasia.    NIHSS: 12  PreMRS: 3  Patient was not a tenecteplase candidate as he presented outside the time window and hemorrhage was identified on CT  Patient was not a thrombectomy candidate as no LVO was identified on imaging.        SUBJECTIVE: febrile overnight    ascorbic acid 500 milliGRAM(s) Oral daily  dextrose 5%. 1000 milliLiter(s) IV Continuous <Continuous>  dextrose 5%. 1000 milliLiter(s) IV Continuous <Continuous>  dextrose 50% Injectable 25 Gram(s) IV Push once  dextrose 50% Injectable 12.5 Gram(s) IV Push once  dextrose 50% Injectable 25 Gram(s) IV Push once  dextrose Oral Gel 15 Gram(s) Oral once PRN  digoxin     Tablet 125 MICROGram(s) Oral daily  glucagon  Injectable 1 milliGRAM(s) IntraMuscular once  insulin lispro (ADMELOG) corrective regimen sliding scale   SubCutaneous every 6 hours  multivitamin 1 Tablet(s) Oral daily  niCARdipine Infusion 5 mG/Hr IV Continuous <Continuous>  piperacillin/tazobactam IVPB.. 3.375 Gram(s) IV Intermittent every 8 hours  potassium chloride  10 mEq/100 mL IVPB 10 milliEquivalent(s) IV Intermittent every 1 hour  sodium chloride 0.9%. 1000 milliLiter(s) IV Continuous <Continuous>      PHYSICAL EXAM:   Vital Signs Last 24 Hrs  T(C): 37.6 (29 Dec 2023 04:00), Max: 38.8 (28 Dec 2023 20:00)  T(F): 99.6 (29 Dec 2023 04:00), Max: 101.8 (28 Dec 2023 20:00)  HR: 107 (29 Dec 2023 07:45) (90 - 114)  BP: 122/58 (29 Dec 2023 07:45) (112/54 - 149/65)  BP(mean): 84 (29 Dec 2023 07:45) (75 - 94)  RR: 33 (29 Dec 2023 07:45) (14 - 47)  SpO2: 100% (29 Dec 2023 07:45) (92% - 100%)    Parameters below as of 28 Dec 2023 21:30  Patient On (Oxygen Delivery Method): room air        General: No acute distress  HEENT:  [facility]  Abdomen: Soft, nontender, nondistended   Extremities: No edema    NEUROLOGICAL EXAM:  Mental status: Eyes closed, no verbal output, does not follow commands  Cranial Nerves: right facial asymmetry, severe dysarthria  Motor exam: right hemiplegia, drift LUE/LLE  Sensation: Intact to light touch   Coordination/ Gait: gait not assessed    LABS:                        10.9   16.38 )-----------( 198      ( 29 Dec 2023 05:57 )             33.5    12-29    144  |  115<H>  |  27<H>  ----------------------------<  202<H>  3.1<L>   |  13<L>  |  1.12    Ca    6.9<L>      29 Dec 2023 05:59  Phos  3.8     12-28  Mg     2.0     12-28    PT/INR - ( 27 Dec 2023 10:04 )   PT: 11.5 sec;   INR: 1.05 ratio         PTT - ( 27 Dec 2023 10:04 )  PTT:40.1 sec      IMAGING: Reviewed by me.   12/27/23:  CT BRAIN:  No significant change compared with earlier the same day in   left thalamic capsular hematoma with intraventricular extension.   Ventricles are stable in size. No evidence of rehemorrhage    CT brain:  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    Similar minimal rightward midline shift since 8/25/2023, with no   effacement of the basal cisterns.    Mass effect upon the posterior left lateral ventricle. The ventricles are   otherwise similar in size to 8/25/2023.    Similar-appearing 1.2 x 1.2 cm (since 9/26/2021) extra-axial mass within   the left anterolateral foramen magnum cistern. Mild mass effect upon the   cervicomedullary junction. This may represent a meningioma.    CTA brain:  No flow-limiting stenosis or vascular aneurysm.    No evidence for AVM. Please note that this does not exclude the presence   of a micro-AVM, which may be compressed by hemorrhage.    CTA neck:  No flow-limiting stenosis, evidence for arterial dissection, or vascular   aneurysm.   THE PATIENT WAS SEEN AND EXAMINED BY ME WITH THE HOUSESTAFF AND STROKE TEAM DURING MORNING ROUNDS.   HPI:  CHERY AMEZCUA, 89y (01-Dec-1926) M w/ PMHx significant for A.fib s/p PPM on Eliquis digoxin & metoprolol, HTN, HLD, DM, BPH w/ indwelling urinary catheter, macular degeneration presents to the ED due to R sided weakness and aphasia. Stroke code called. LKW 3 AM 12/27/2023. Patient was awake and spoke to his overnight home nursing aid at his apartment in [facility]. Later in the morning at 6 AM when his morning nursing aid came to help him out of bed, it was noted that his right side was weak and he was not speaking to the nursing aid. Aid became concerned and called EMS. Patient took his Eliquis the same day. CT head done in the ED revealed a L thalamic hemorrhage with intraventricular extension. CTA was without AVM or aneurysm. Patient's PT, PTT & INR were elevated and he was given Kcentra to reverse anticoagulation from his Eliquis. On exam patient was attempting to answer questions with grunts and moans. He expressed understanding by following verbal commands. Patient's nursing aid arrived who explained that he is normally A&Ox 2 to self and location. He requires 24 hrs home nursing assistance and is not able to get out of bed on his own. He walks with a walker. Aid is unsure of overnight events though it was reported to her by the overnight aid that the patient was up and able to talk and walk at 3 AM the same day. Comprehensive ROS unobtainable due to patient's aphasia.    NIHSS: 12  PreMRS: 3  Patient was not a tenecteplase candidate as he presented outside the time window and hemorrhage was identified on CT  Patient was not a thrombectomy candidate as no LVO was identified on imaging.        SUBJECTIVE: febrile overnight    ascorbic acid 500 milliGRAM(s) Oral daily  dextrose 5%. 1000 milliLiter(s) IV Continuous <Continuous>  dextrose 5%. 1000 milliLiter(s) IV Continuous <Continuous>  dextrose 50% Injectable 25 Gram(s) IV Push once  dextrose 50% Injectable 12.5 Gram(s) IV Push once  dextrose 50% Injectable 25 Gram(s) IV Push once  dextrose Oral Gel 15 Gram(s) Oral once PRN  digoxin     Tablet 125 MICROGram(s) Oral daily  glucagon  Injectable 1 milliGRAM(s) IntraMuscular once  insulin lispro (ADMELOG) corrective regimen sliding scale   SubCutaneous every 6 hours  multivitamin 1 Tablet(s) Oral daily  niCARdipine Infusion 5 mG/Hr IV Continuous <Continuous>  piperacillin/tazobactam IVPB.. 3.375 Gram(s) IV Intermittent every 8 hours  potassium chloride  10 mEq/100 mL IVPB 10 milliEquivalent(s) IV Intermittent every 1 hour  sodium chloride 0.9%. 1000 milliLiter(s) IV Continuous <Continuous>      PHYSICAL EXAM:   Vital Signs Last 24 Hrs  T(C): 37.6 (29 Dec 2023 04:00), Max: 38.8 (28 Dec 2023 20:00)  T(F): 99.6 (29 Dec 2023 04:00), Max: 101.8 (28 Dec 2023 20:00)  HR: 107 (29 Dec 2023 07:45) (90 - 114)  BP: 122/58 (29 Dec 2023 07:45) (112/54 - 149/65)  BP(mean): 84 (29 Dec 2023 07:45) (75 - 94)  RR: 33 (29 Dec 2023 07:45) (14 - 47)  SpO2: 100% (29 Dec 2023 07:45) (92% - 100%)    Parameters below as of 28 Dec 2023 21:30  Patient On (Oxygen Delivery Method): room air        General: No acute distress  HEENT:  [facility]  Abdomen: Soft, nontender, nondistended   Extremities: No edema    NEUROLOGICAL EXAM:  Mental status: Eyes closed, no verbal output, does not follow commands  Cranial Nerves: right facial asymmetry, severe dysarthria  Motor exam: right hemiplegia, drift LUE/LLE  Sensation: Intact to light touch   Coordination/ Gait: gait not assessed    LABS:                        10.9   16.38 )-----------( 198      ( 29 Dec 2023 05:57 )             33.5    12-29    144  |  115<H>  |  27<H>  ----------------------------<  202<H>  3.1<L>   |  13<L>  |  1.12    Ca    6.9<L>      29 Dec 2023 05:59  Phos  3.8     12-28  Mg     2.0     12-28    PT/INR - ( 27 Dec 2023 10:04 )   PT: 11.5 sec;   INR: 1.05 ratio         PTT - ( 27 Dec 2023 10:04 )  PTT:40.1 sec      IMAGING: Reviewed by me.   12/27/23:  CT BRAIN:  No significant change compared with earlier the same day in   left thalamic capsular hematoma with intraventricular extension.   Ventricles are stable in size. No evidence of rehemorrhage    CT brain:  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    Similar minimal rightward midline shift since 8/25/2023, with no   effacement of the basal cisterns.    Mass effect upon the posterior left lateral ventricle. The ventricles are   otherwise similar in size to 8/25/2023.    Similar-appearing 1.2 x 1.2 cm (since 9/26/2021) extra-axial mass within   the left anterolateral foramen magnum cistern. Mild mass effect upon the   cervicomedullary junction. This may represent a meningioma.    CTA brain:  No flow-limiting stenosis or vascular aneurysm.    No evidence for AVM. Please note that this does not exclude the presence   of a micro-AVM, which may be compressed by hemorrhage.    CTA neck:  No flow-limiting stenosis, evidence for arterial dissection, or vascular   aneurysm.

## 2023-12-29 NOTE — DIETITIAN INITIAL EVALUATION ADULT - SIGNS/SYMPTOMS
pt with pressure injuries, and L thalamic hemorrhage  >5% wt loss x 1 month, severe fat/moderate muscle depletion

## 2023-12-29 NOTE — DIETITIAN INITIAL EVALUATION ADULT - NSFNSADHERENCEPTAFT_GEN_A_CORE
Hx DM noted. Per H&P, pt prescribed Januvia and Metformin PTA. A1c 6% suggestive of good glycemic management vs inadequate PO intake.

## 2023-12-29 NOTE — PROGRESS NOTE ADULT - SUBJECTIVE AND OBJECTIVE BOX
Indication for Geriatrics and Palliative Care Services/INTERVAL HPI:  SUBJECTIVE AND OBJECTIVE:    OVERNIGHT EVENTS:    DNR on chart:DNI  DNI      Allergies    procainamide (Other (Severe))    Intolerances    MEDICATIONS  (STANDING):  glycopyrrolate Injectable 0.4 milliGRAM(s) IV Push every 6 hours  potassium chloride  10 mEq/100 mL IVPB 10 milliEquivalent(s) IV Intermittent every 1 hour    MEDICATIONS  (PRN):  bisacodyl Suppository 10 milliGRAM(s) Rectal daily PRN Constipation  hydrALAZINE Injectable 5 milliGRAM(s) IV Push every 6 hours PRN SBP > 160  LORazepam   Injectable 0.5 milliGRAM(s) IV Push every 1 hour PRN Agitation  metoprolol tartrate Injectable 5 milliGRAM(s) IV Push every 6 hours PRN sustained HR > 120  morphine  - Injectable 0.5 milliGRAM(s) IV Push every 1 hour PRN Moderate Pain (4 - 6)  morphine  - Injectable 1 milliGRAM(s) IV Push every 1 hour PRN Severe Pain (7 - 10)      ITEMS UNCHECKED ARE NOT PRESENT    PRESENT SYMPTOMS: [ ]Unable to self-report - see [ ] CPOT [ ] PAINADS [ ] RDOS  Source if other than patient:  [ ]Family   [ ]Team     Pain:  [ ]yes [ ]no  QOL impact -   Location -                    Aggravating factors -  Quality -  Radiation -  Timing-  Severity (0-10 scale):  Minimal acceptable level (0-10 scale):     CPOT:    https://www.[facility].org/getattachment/[email or web address]/Critical-Care-Pain-Observation-Tool-(CPOT)    Dyspnea:                           [ ]Mild [ ]Moderate [ ]Severe  Anxiety:                             [ ]Mild [ ]Moderate [ ]Severe  Fatigue:                             [ ]Mild [ ]Moderate [ ]Severe  Nausea:                             [ ]Mild [ ]Moderate [ ]Severe  Loss of appetite:              [ ]Mild [ ]Moderate [ ]Severe  Constipation:                    [ ]Mild [ ]Moderate [ ]Severe    PCSSQ[Palliative Care Spiritual Screening Question]   Severity (0-10):  Score of 4 or > indicate consideration of Chaplaincy referral.  Chaplaincy Referral: [ ] yes [ ] refused [ ] following [ ] Deferred     Caregiver [place]? : [ ] yes [ ] no [ ] Deferred [ ] Declined             Social work referral [ ] Patient & Family Centered Care Referral [ ]     Anticipatory Grief present?:  [ ] yes [ ] no  [ ] Deferred                  Social work referral [ ] Chaplaincy Referral[ ]      Other Symptoms:  [ ]All other review of systems negative   [ ]Unable to obtain due to poor mentation    Palliative Performance Status Version 2:         %      [email or web address]  PHYSICAL EXAM:  Vital Signs Last 24 Hrs  T(C): 37.8 (29 Dec 2023 08:00), Max: 38.8 (28 Dec 2023 20:00)  T(F): 100 (29 Dec 2023 08:00), Max: 101.8 (28 Dec 2023 20:00)  HR: 112 (29 Dec 2023 11:15) (93 - 117)  BP: 121/56 (29 Dec 2023 11:15) (112/54 - 145/63)  BP(mean): 81 (29 Dec 2023 11:15) (75 - 94)  RR: 37 (29 Dec 2023 11:15) (14 - 47)  SpO2: 99% (29 Dec 2023 11:15) (92% - 100%)    Parameters below as of 29 Dec 2023 10:00  Patient On (Oxygen Delivery Method): nasal cannula  O2 Flow (L/min): 2   I&O's Summary     GENERAL: [ ]Cachexia    [ ]Alert  [ ]Oriented x   [ ]Lethargic  [ ]Unarousable  [ ]Verbal  [ ]Non-Verbal  Behavioral:   [ ]Anxiety  [ ]Delirium [ ]Agitation [ ]Other  HEENT:  [ ]Normal   [ ]Dry mouth   [ ]ET Tube/Trach  [ ]Oral lesions  PULMONARY:   [ ]Clear [ ]Tachypnea  [ ]Audible excessive secretions   [ ]Rhonchi        [ ]Right [ ]Left [ ]Bilateral  [ ]Crackles        [ ]Right [ ]Left [ ]Bilateral  [ ]Wheezing     [ ]Right [ ]Left [ ]Bilateral  [ ]Diminished BS [ ] Right [ ]Left [ ]Bilateral  CARDIOVASCULAR:    [ ]Regular [ ]Irregular [ ]Tachy  [ ]Jose J [ ]Murmur [ ]Other  GASTROINTESTINAL:  [ ]Soft  [ ]Distended   [ ]+BS  [ ]Non tender [ ]Tender  [ ]Other [ ]PEG [ ]OGT/ NGT   Last BM:   GENITOURINARY:  [ ]Normal [ ]Incontinent   [ ]Oliguria/Anuria   [ ]Palmer  MUSCULOSKELETAL:   [ ]Normal   [ ]Weakness  [ ]Bed/Wheelchair bound [ ]Edema  NEUROLOGIC:   [ ]No focal deficits  [ ] Cognitive impairment  [ ] Dysphagia [ ]Dysarthria [ ] Paresis [ ]Other   SKIN:   [ ]Normal  [ ]Rash  [ ]Other  [ ]Pressure ulcer(s) [ ]y [ ]n present on admission    CRITICAL CARE:  [ ]Shock Present  [ ]Septic [ ]Cardiogenic [ ]Neurologic [ ]Hypovolemic  [ ]Vasopressors [ ]Inotropes  [ ]Respiratory failure present [ ]Mechanical Ventilation [ ]Non-invasive ventilatory support [ ]High-Flow   [ ]Acute  [ ]Chronic [ ]Hypoxic  [ ]Hypercarbic [ ]Other  [ ]Other organ failure     LABS:                        10.9   16.38 )-----------( 198      ( 29 Dec 2023 05:57 )             33.5   12-29    144  |  115<H>  |  27<H>  ----------------------------<  202<H>  3.1<L>   |  13<L>  |  1.12    Ca    6.9<L>      29 Dec 2023 05:59  Phos  3.8     12-28  Mg     2.0     12-28        Urinalysis Basic - ( 29 Dec 2023 05:59 )    Color: x / Appearance: x / SG: x / pH: x  Gluc: 202 mg/dL / Ketone: x  / Bili: x / Urobili: x   Blood: x / Protein: x / Nitrite: x   Leuk Esterase: x / RBC: x / WBC x   Sq Epi: x / Non Sq Epi: x / Bacteria: x      RADIOLOGY & ADDITIONAL STUDIES:    Protein Calorie Malnutrition Present: [ ]mild [ ]moderate [ ]severe [ ]underweight [ ]morbid obesity  https://www.andeal.org/vault/3867/web/files/ONC/Table_Clinical%20Characteristics%20to%20Document%20Malnutrition-White%20JV%20et%20al%202012.pdf    Height (cm): 177.8 (12-28-23 @ 21:15), 188 (10-12-23 @ 18:20), 188 (10-11-23 @ 18:18)  Weight (kg): 70.9 (12-27-23 @ 08:28), 117.9 (10-11-23 @ 18:18), 81.6 (08-25-23 @ 15:17)  BMI (kg/m2): 22.4 (12-28-23 @ 21:15), 20.1 (12-27-23 @ 08:28), 33.4 (10-12-23 @ 18:20)    [ ]PPSV2 < or = 30%  [ ]significant weight loss [ ]poor nutritional intake [ ]anasarca[ ]Artificial Nutrition    Other REFERRALS:  [ ]Hospice  [ ]Child Life  [ ]Social Work  [ ]Case management [ ]Holistic Therapy     Goals of Care Document: Indication for Geriatrics and Palliative Care Services/INTERVAL HPI:  SUBJECTIVE AND OBJECTIVE:    OVERNIGHT EVENTS:    DNR on chart:DNI  DNI      Allergies    procainamide (Other (Severe))    Intolerances    MEDICATIONS  (STANDING):  glycopyrrolate Injectable 0.4 milliGRAM(s) IV Push every 6 hours  potassium chloride  10 mEq/100 mL IVPB 10 milliEquivalent(s) IV Intermittent every 1 hour    MEDICATIONS  (PRN):  bisacodyl Suppository 10 milliGRAM(s) Rectal daily PRN Constipation  hydrALAZINE Injectable 5 milliGRAM(s) IV Push every 6 hours PRN SBP > 160  LORazepam   Injectable 0.5 milliGRAM(s) IV Push every 1 hour PRN Agitation  metoprolol tartrate Injectable 5 milliGRAM(s) IV Push every 6 hours PRN sustained HR > 120  morphine  - Injectable 0.5 milliGRAM(s) IV Push every 1 hour PRN Moderate Pain (4 - 6)  morphine  - Injectable 1 milliGRAM(s) IV Push every 1 hour PRN Severe Pain (7 - 10)      ITEMS UNCHECKED ARE NOT PRESENT    PRESENT SYMPTOMS: [ ]Unable to self-report - see [ ] CPOT [ ] PAINADS [ ] RDOS  Source if other than patient:  [ ]Family   [ ]Team     Pain:  [ ]yes [ ]no  QOL impact -   Location -                    Aggravating factors -  Quality -  Radiation -  Timing-  Severity (0-10 scale):  Minimal acceptable level (0-10 scale):     CPOT:    https://www.[facility].org/getattachment/[email or web address]/Critical-Care-Pain-Observation-Tool-(CPOT)    Dyspnea:                           [ ]Mild [ ]Moderate [ ]Severe  Anxiety:                             [ ]Mild [ ]Moderate [ ]Severe  Fatigue:                             [ ]Mild [ ]Moderate [ ]Severe  Nausea:                             [ ]Mild [ ]Moderate [ ]Severe  Loss of appetite:              [ ]Mild [ ]Moderate [ ]Severe  Constipation:                    [ ]Mild [ ]Moderate [ ]Severe    PCSSQ[Palliative Care Spiritual Screening Question]   Severity (0-10):  Score of 4 or > indicate consideration of Chaplaincy referral.  Chaplaincy Referral: [ ] yes [ ] refused [ ] following [ ] Deferred     Caregiver [place]? : [ ] yes [ ] no [ ] Deferred [ ] Declined             Social work referral [ ] Patient & Family Centered Care Referral [ ]     Anticipatory Grief present?:  [ ] yes [ ] no  [ ] Deferred                  Social work referral [ ] Chaplaincy Referral[ ]      Other Symptoms:  [ ]All other review of systems negative   [ ]Unable to obtain due to poor mentation    Palliative Performance Status Version 2:         %      [email or web address]  PHYSICAL EXAM:  Vital Signs Last 24 Hrs  T(C): 37.8 (29 Dec 2023 08:00), Max: 38.8 (28 Dec 2023 20:00)  T(F): 100 (29 Dec 2023 08:00), Max: 101.8 (28 Dec 2023 20:00)  HR: 112 (29 Dec 2023 11:15) (93 - 117)  BP: 121/56 (29 Dec 2023 11:15) (112/54 - 145/63)  BP(mean): 81 (29 Dec 2023 11:15) (75 - 94)  RR: 37 (29 Dec 2023 11:15) (14 - 47)  SpO2: 99% (29 Dec 2023 11:15) (92% - 100%)    Parameters below as of 29 Dec 2023 10:00  Patient On (Oxygen Delivery Method): nasal cannula  O2 Flow (L/min): 2   I&O's Summary     GENERAL: [ ]Cachexia    [ ]Alert  [ ]Oriented x   [ ]Lethargic  [ ]Unarousable  [ ]Verbal  [ ]Non-Verbal  Behavioral:   [ ]Anxiety  [ ]Delirium [ ]Agitation [ ]Other  HEENT:  [ ]Normal   [ ]Dry mouth   [ ]ET Tube/Trach  [ ]Oral lesions  PULMONARY:   [ ]Clear [ ]Tachypnea  [ ]Audible excessive secretions   [ ]Rhonchi        [ ]Right [ ]Left [ ]Bilateral  [ ]Crackles        [ ]Right [ ]Left [ ]Bilateral  [ ]Wheezing     [ ]Right [ ]Left [ ]Bilateral  [ ]Diminished BS [ ] Right [ ]Left [ ]Bilateral  CARDIOVASCULAR:    [ ]Regular [ ]Irregular [ ]Tachy  [ ]Jose J [ ]Murmur [ ]Other  GASTROINTESTINAL:  [ ]Soft  [ ]Distended   [ ]+BS  [ ]Non tender [ ]Tender  [ ]Other [ ]PEG [ ]OGT/ NGT   Last BM:   GENITOURINARY:  [ ]Normal [ ]Incontinent   [ ]Oliguria/Anuria   [ ]Palmer  MUSCULOSKELETAL:   [ ]Normal   [ ]Weakness  [ ]Bed/Wheelchair bound [ ]Edema  NEUROLOGIC:   [ ]No focal deficits  [ ] Cognitive impairment  [ ] Dysphagia [ ]Dysarthria [ ] Paresis [ ]Other   SKIN:   [ ]Normal  [ ]Rash  [ ]Other  [ ]Pressure ulcer(s) [ ]y [ ]n present on admission    CRITICAL CARE:  [ ]Shock Present  [ ]Septic [ ]Cardiogenic [ ]Neurologic [ ]Hypovolemic  [ ]Vasopressors [ ]Inotropes  [ ]Respiratory failure present [ ]Mechanical Ventilation [ ]Non-invasive ventilatory support [ ]High-Flow   [ ]Acute  [ ]Chronic [ ]Hypoxic  [ ]Hypercarbic [ ]Other  [ ]Other organ failure     LABS:                        10.9   16.38 )-----------( 198      ( 29 Dec 2023 05:57 )             33.5   12-29    144  |  115<H>  |  27<H>  ----------------------------<  202<H>  3.1<L>   |  13<L>  |  1.12    Ca    6.9<L>      29 Dec 2023 05:59  Phos  3.8     12-28  Mg     2.0     12-28        Urinalysis Basic - ( 29 Dec 2023 05:59 )    Color: x / Appearance: x / SG: x / pH: x  Gluc: 202 mg/dL / Ketone: x  / Bili: x / Urobili: x   Blood: x / Protein: x / Nitrite: x   Leuk Esterase: x / RBC: x / WBC x   Sq Epi: x / Non Sq Epi: x / Bacteria: x      RADIOLOGY & ADDITIONAL STUDIES:    Protein Calorie Malnutrition Present: [ ]mild [ ]moderate [ ]severe [ ]underweight [ ]morbid obesity  https://www.andeal.org/vault/7641/web/files/ONC/Table_Clinical%20Characteristics%20to%20Document%20Malnutrition-White%20JV%20et%20al%202012.pdf    Height (cm): 177.8 (12-28-23 @ 21:15), 188 (10-12-23 @ 18:20), 188 (10-11-23 @ 18:18)  Weight (kg): 70.9 (12-27-23 @ 08:28), 117.9 (10-11-23 @ 18:18), 81.6 (08-25-23 @ 15:17)  BMI (kg/m2): 22.4 (12-28-23 @ 21:15), 20.1 (12-27-23 @ 08:28), 33.4 (10-12-23 @ 18:20)    [ ]PPSV2 < or = 30%  [ ]significant weight loss [ ]poor nutritional intake [ ]anasarca[ ]Artificial Nutrition    Other REFERRALS:  [ ]Hospice  [ ]Child Life  [ ]Social Work  [ ]Case management [ ]Holistic Therapy     Goals of Care Document: Indication for Geriatrics and Palliative Care Services/INTERVAL HPI: goals of care  SUBJECTIVE AND OBJECTIVE: Patient seen and examined, unable to participate. concern for pna and started on abx by primary team. not as awake or following commands like yesterday. appears to be clinically declining.     OVERNIGHT EVENTS: desaturation, concern for PNA    DNR on chart:DNI  DNI      Allergies    procainamide (Other (Severe))    Intolerances    MEDICATIONS  (STANDING):  glycopyrrolate Injectable 0.4 milliGRAM(s) IV Push every 6 hours  potassium chloride  10 mEq/100 mL IVPB 10 milliEquivalent(s) IV Intermittent every 1 hour    MEDICATIONS  (PRN):  bisacodyl Suppository 10 milliGRAM(s) Rectal daily PRN Constipation  hydrALAZINE Injectable 5 milliGRAM(s) IV Push every 6 hours PRN SBP > 160  LORazepam   Injectable 0.5 milliGRAM(s) IV Push every 1 hour PRN Agitation  metoprolol tartrate Injectable 5 milliGRAM(s) IV Push every 6 hours PRN sustained HR > 120  morphine  - Injectable 0.5 milliGRAM(s) IV Push every 1 hour PRN Moderate Pain (4 - 6)  morphine  - Injectable 1 milliGRAM(s) IV Push every 1 hour PRN Severe Pain (7 - 10)      ITEMS UNCHECKED ARE NOT PRESENT    PRESENT SYMPTOMS: [ x]Unable to self-report - see [ ] CPOT [x ] PAINADS [x ] RDOS  Source if other than patient:  [ ]Family   [ ]Team     Pain:  [ ]yes [ ]no  QOL impact -   Location -                    Aggravating factors -  Quality -  Radiation -  Timing-  Severity (0-10 scale):  Minimal acceptable level (0-10 scale):     CPOT:    https://www.[facility].org/getattachment/[email or web address]/Critical-Care-Pain-Observation-Tool-(CPOT)    Dyspnea:                           [ ]Mild [ ]Moderate [ ]Severe  Anxiety:                             [ ]Mild [ ]Moderate [ ]Severe  Fatigue:                             [ ]Mild [ ]Moderate [ ]Severe  Nausea:                             [ ]Mild [ ]Moderate [ ]Severe  Loss of appetite:              [ ]Mild [ ]Moderate [ ]Severe  Constipation:                    [ ]Mild [ ]Moderate [ ]Severe    PCSSQ[Palliative Care Spiritual Screening Question]   Severity (0-10):  Score of 4 or > indicate consideration of Chaplaincy referral.  Chaplaincy Referral: [x ] yes [ ] refused [ ] following [ ] Deferred     Caregiver [place]? : [ ] yes [ ] no [ ] Deferred [ ] Declined             Social work referral [ x] Patient & Family Centered Care Referral [ ]     Anticipatory Grief present?:  [ x] yes [ ] no  [ ] Deferred                  Social work referral [ ] Chaplaincy Referral[ x]      Other Symptoms:  [ ]All other review of systems negative   [x ]Unable to obtain due to poor mentation    Palliative Performance Status Version 2:      10   %      http://npcrc.org/files/news/palliative_performance_scale_[email or web address].pdf  PHYSICAL EXAM:  Vital Signs Last 24 Hrs  T(C): 37.8 (29 Dec 2023 08:00), Max: 38.8 (28 Dec 2023 20:00)  T(F): 100 (29 Dec 2023 08:00), Max: 101.8 (28 Dec 2023 20:00)  HR: 112 (29 Dec 2023 11:15) (93 - 117)  BP: 121/56 (29 Dec 2023 11:15) (112/54 - 145/63)  BP(mean): 81 (29 Dec 2023 11:15) (75 - 94)  RR: 37 (29 Dec 2023 11:15) (14 - 47)  SpO2: 99% (29 Dec 2023 11:15) (92% - 100%)    Parameters below as of 29 Dec 2023 10:00  Patient On (Oxygen Delivery Method): nasal cannula  O2 Flow (L/min): 2   I&O's Summary     GENERAL: [ ]Cachexia    [ ]Alert  [ ]Oriented x   [x ]Lethargic  [ ]Unarousable  [ ]Verbal  [ ]Non-Verbal  Behavioral:   [ ]Anxiety  [ ]Delirium [ ]Agitation [ ]Other  HEENT:  [ ]Normal   [x ]Dry mouth   [ ]ET Tube/Trach  [ ]Oral lesions  PULMONARY:   [ ]Clear [x ]Tachypnea  [x ]Audible excessive secretions   [ ]Rhonchi        [ ]Right [ ]Left [ ]Bilateral  [ ]Crackles        [ ]Right [ ]Left [ ]Bilateral  [ ]Wheezing     [ ]Right [ ]Left [ ]Bilateral  [ ]Diminished BS [ ] Right [ ]Left [ ]Bilateral  CARDIOVASCULAR:    [ ]Regular [x ]Irregular [ ]Tachy  [ ]Jose J [ ]Murmur [ ]Other  GASTROINTESTINAL:  [x ]Soft  [ ]Distended   [ ]+BS  [x ]Non tender [ ]Tender  [ ]Other [ ]PEG [ ]OGT/ NGT   Last BM:   GENITOURINARY:  [ ]Normal [x ]Incontinent   [ ]Oliguria/Anuria   [ ]Palmer  MUSCULOSKELETAL:   [ ]Normal   [ ]Weakness  [x ]Bed/Wheelchair bound [ ]Edema  NEUROLOGIC:   [ ]No focal deficits  [ ] Cognitive impairment  [x ] Dysphagia [ ]Dysarthria [ ] Paresis [ ]Other   SKIN:   [ ]Normal  [ ]Rash  [ ]Other  [ ]Pressure ulcer(s) [ ]y [ ]n present on admission    CRITICAL CARE:  [ ]Shock Present  [ ]Septic [ ]Cardiogenic [ ]Neurologic [ ]Hypovolemic  [ ]Vasopressors [ ]Inotropes  [ ]Respiratory failure present [ ]Mechanical Ventilation [ ]Non-invasive ventilatory support [ ]High-Flow   [ ]Acute  [ ]Chronic [ ]Hypoxic  [ ]Hypercarbic [ ]Other  [ ]Other organ failure     LABS:                        10.9   16.38 )-----------( 198      ( 29 Dec 2023 05:57 )             33.5   12-29    144  |  115<H>  |  27<H>  ----------------------------<  202<H>  3.1<L>   |  13<L>  |  1.12    Ca    6.9<L>      29 Dec 2023 05:59  Phos  3.8     12-28  Mg     2.0     12-28        Urinalysis Basic - ( 29 Dec 2023 05:59 )    Color: x / Appearance: x / SG: x / pH: x  Gluc: 202 mg/dL / Ketone: x  / Bili: x / Urobili: x   Blood: x / Protein: x / Nitrite: x   Leuk Esterase: x / RBC: x / WBC x   Sq Epi: x / Non Sq Epi: x / Bacteria: x      RADIOLOGY & ADDITIONAL STUDIES: no new imaging    Protein Calorie Malnutrition Present: [ ]mild [ ]moderate [ ]severe [ ]underweight [ ]morbid obesity  https://www.andeal.org/vault/2259/web/files/ONC/Table_Clinical%20Characteristics%20to%20Document%20Malnutrition-White%20JV%20et%20al%612545.pdf    Height (cm): 177.8 (12-28-23 @ 21:15), 188 (10-12-23 @ 18:20), 188 (10-11-23 @ 18:18)  Weight (kg): 70.9 (12-27-23 @ 08:28), 117.9 (10-11-23 @ 18:18), 81.6 (08-25-23 @ 15:17)  BMI (kg/m2): 22.4 (12-28-23 @ 21:15), 20.1 (12-27-23 @ 08:28), 33.4 (10-12-23 @ 18:20)    [x ]PPSV2 < or = 30%  [ ]significant weight loss [x ]poor nutritional intake [ ]anasarca[ ]Artificial Nutrition    Other REFERRALS:  [ ]Hospice  [ ]Child Life  [x ]Social Work  [ ]Case management [ ]Holistic Therapy     Goals of Care Document: Indication for Geriatrics and Palliative Care Services/INTERVAL HPI: goals of care  SUBJECTIVE AND OBJECTIVE: Patient seen and examined, unable to participate. concern for pna and started on abx by primary team. not as awake or following commands like yesterday. appears to be clinically declining.     OVERNIGHT EVENTS: desaturation, concern for PNA    DNR on chart:DNI  DNI      Allergies    procainamide (Other (Severe))    Intolerances    MEDICATIONS  (STANDING):  glycopyrrolate Injectable 0.4 milliGRAM(s) IV Push every 6 hours  potassium chloride  10 mEq/100 mL IVPB 10 milliEquivalent(s) IV Intermittent every 1 hour    MEDICATIONS  (PRN):  bisacodyl Suppository 10 milliGRAM(s) Rectal daily PRN Constipation  hydrALAZINE Injectable 5 milliGRAM(s) IV Push every 6 hours PRN SBP > 160  LORazepam   Injectable 0.5 milliGRAM(s) IV Push every 1 hour PRN Agitation  metoprolol tartrate Injectable 5 milliGRAM(s) IV Push every 6 hours PRN sustained HR > 120  morphine  - Injectable 0.5 milliGRAM(s) IV Push every 1 hour PRN Moderate Pain (4 - 6)  morphine  - Injectable 1 milliGRAM(s) IV Push every 1 hour PRN Severe Pain (7 - 10)      ITEMS UNCHECKED ARE NOT PRESENT    PRESENT SYMPTOMS: [ x]Unable to self-report - see [ ] CPOT [x ] PAINADS [x ] RDOS  Source if other than patient:  [ ]Family   [ ]Team     Pain:  [ ]yes [ ]no  QOL impact -   Location -                    Aggravating factors -  Quality -  Radiation -  Timing-  Severity (0-10 scale):  Minimal acceptable level (0-10 scale):     CPOT:    https://www.[facility].org/getattachment/[email or web address]/Critical-Care-Pain-Observation-Tool-(CPOT)    Dyspnea:                           [ ]Mild [ ]Moderate [ ]Severe  Anxiety:                             [ ]Mild [ ]Moderate [ ]Severe  Fatigue:                             [ ]Mild [ ]Moderate [ ]Severe  Nausea:                             [ ]Mild [ ]Moderate [ ]Severe  Loss of appetite:              [ ]Mild [ ]Moderate [ ]Severe  Constipation:                    [ ]Mild [ ]Moderate [ ]Severe    PCSSQ[Palliative Care Spiritual Screening Question]   Severity (0-10):  Score of 4 or > indicate consideration of Chaplaincy referral.  Chaplaincy Referral: [x ] yes [ ] refused [ ] following [ ] Deferred     Caregiver [place]? : [ ] yes [ ] no [ ] Deferred [ ] Declined             Social work referral [ x] Patient & Family Centered Care Referral [ ]     Anticipatory Grief present?:  [ x] yes [ ] no  [ ] Deferred                  Social work referral [ ] Chaplaincy Referral[ x]      Other Symptoms:  [ ]All other review of systems negative   [x ]Unable to obtain due to poor mentation    Palliative Performance Status Version 2:      10   %      http://npcrc.org/files/news/palliative_performance_scale_[email or web address].pdf  PHYSICAL EXAM:  Vital Signs Last 24 Hrs  T(C): 37.8 (29 Dec 2023 08:00), Max: 38.8 (28 Dec 2023 20:00)  T(F): 100 (29 Dec 2023 08:00), Max: 101.8 (28 Dec 2023 20:00)  HR: 112 (29 Dec 2023 11:15) (93 - 117)  BP: 121/56 (29 Dec 2023 11:15) (112/54 - 145/63)  BP(mean): 81 (29 Dec 2023 11:15) (75 - 94)  RR: 37 (29 Dec 2023 11:15) (14 - 47)  SpO2: 99% (29 Dec 2023 11:15) (92% - 100%)    Parameters below as of 29 Dec 2023 10:00  Patient On (Oxygen Delivery Method): nasal cannula  O2 Flow (L/min): 2   I&O's Summary     GENERAL: [ ]Cachexia    [ ]Alert  [ ]Oriented x   [x ]Lethargic  [ ]Unarousable  [ ]Verbal  [ ]Non-Verbal  Behavioral:   [ ]Anxiety  [ ]Delirium [ ]Agitation [ ]Other  HEENT:  [ ]Normal   [x ]Dry mouth   [ ]ET Tube/Trach  [ ]Oral lesions  PULMONARY:   [ ]Clear [x ]Tachypnea  [x ]Audible excessive secretions   [ ]Rhonchi        [ ]Right [ ]Left [ ]Bilateral  [ ]Crackles        [ ]Right [ ]Left [ ]Bilateral  [ ]Wheezing     [ ]Right [ ]Left [ ]Bilateral  [ ]Diminished BS [ ] Right [ ]Left [ ]Bilateral  CARDIOVASCULAR:    [ ]Regular [x ]Irregular [ ]Tachy  [ ]Jose J [ ]Murmur [ ]Other  GASTROINTESTINAL:  [x ]Soft  [ ]Distended   [ ]+BS  [x ]Non tender [ ]Tender  [ ]Other [ ]PEG [ ]OGT/ NGT   Last BM:   GENITOURINARY:  [ ]Normal [x ]Incontinent   [ ]Oliguria/Anuria   [ ]Palmer  MUSCULOSKELETAL:   [ ]Normal   [ ]Weakness  [x ]Bed/Wheelchair bound [ ]Edema  NEUROLOGIC:   [ ]No focal deficits  [ ] Cognitive impairment  [x ] Dysphagia [ ]Dysarthria [ ] Paresis [ ]Other   SKIN:   [ ]Normal  [ ]Rash  [ ]Other  [ ]Pressure ulcer(s) [ ]y [ ]n present on admission    CRITICAL CARE:  [ ]Shock Present  [ ]Septic [ ]Cardiogenic [ ]Neurologic [ ]Hypovolemic  [ ]Vasopressors [ ]Inotropes  [ ]Respiratory failure present [ ]Mechanical Ventilation [ ]Non-invasive ventilatory support [ ]High-Flow   [ ]Acute  [ ]Chronic [ ]Hypoxic  [ ]Hypercarbic [ ]Other  [ ]Other organ failure     LABS:                        10.9   16.38 )-----------( 198      ( 29 Dec 2023 05:57 )             33.5   12-29    144  |  115<H>  |  27<H>  ----------------------------<  202<H>  3.1<L>   |  13<L>  |  1.12    Ca    6.9<L>      29 Dec 2023 05:59  Phos  3.8     12-28  Mg     2.0     12-28        Urinalysis Basic - ( 29 Dec 2023 05:59 )    Color: x / Appearance: x / SG: x / pH: x  Gluc: 202 mg/dL / Ketone: x  / Bili: x / Urobili: x   Blood: x / Protein: x / Nitrite: x   Leuk Esterase: x / RBC: x / WBC x   Sq Epi: x / Non Sq Epi: x / Bacteria: x      RADIOLOGY & ADDITIONAL STUDIES: no new imaging    Protein Calorie Malnutrition Present: [ ]mild [ ]moderate [ ]severe [ ]underweight [ ]morbid obesity  https://www.andeal.org/vault/3484/web/files/ONC/Table_Clinical%[email or web address]    Height (cm): 177.8 (12-28-23 @ 21:15), 188 (10-12-23 @ 18:20), 188 (10-11-23 @ 18:18)  Weight (kg): 70.9 (12-27-23 @ 08:28), 117.9 (10-11-23 @ 18:18), 81.6 (08-25-23 @ 15:17)  BMI (kg/m2): 22.4 (12-28-23 @ 21:15), 20.1 (12-27-23 @ 08:28), 33.4 (10-12-23 @ 18:20)    [x ]PPSV2 < or = 30%  [ ]significant weight loss [x ]poor nutritional intake [ ]anasarca[ ]Artificial Nutrition    Other REFERRALS:  [ ]Hospice  [ ]Child Life  [x ]Social Work  [ ]Case management [ ]Holistic Therapy     Goals of Care Document:

## 2023-12-29 NOTE — PROGRESS NOTE ADULT - SUBJECTIVE AND OBJECTIVE BOX
Patient is a 89y old  Male who presents with a chief complaint of L thalamic hemorrhage (29 Dec 2023 14:50)      SUBJECTIVE / OVERNIGHT EVENTS: ptn is lethargic    MEDICATIONS  (STANDING):  acetaminophen   IVPB .. 1000 milliGRAM(s) IV Intermittent once  glycopyrrolate Injectable 0.4 milliGRAM(s) IV Push every 6 hours  metoprolol tartrate Injectable 5 milliGRAM(s) IV Push once  metoprolol tartrate Injectable 5 milliGRAM(s) IV Push every 6 hours    MEDICATIONS  (PRN):  bisacodyl Suppository 10 milliGRAM(s) Rectal daily PRN Constipation  hydrALAZINE Injectable 5 milliGRAM(s) IV Push every 6 hours PRN SBP > 160  LORazepam   Injectable 0.5 milliGRAM(s) IV Push every 1 hour PRN Agitation  morphine  - Injectable 1 milliGRAM(s) IV Push every 1 hour PRN dyspnea  morphine  - Injectable 0.5 milliGRAM(s) IV Push every 1 hour PRN Moderate Pain (4 - 6)  morphine  - Injectable 1 milliGRAM(s) IV Push every 1 hour PRN Severe Pain (7 - 10)      Vital Signs Last 24 Hrs  T(F): 98.3 (12-29-23 @ 19:30), Max: 100 (12-29-23 @ 08:00)  HR: 102 (12-29-23 @ 19:30) (93 - 117)  BP: 145/63 (12-29-23 @ 19:30) (110/56 - 145/63)  RR: 28 (12-29-23 @ 19:30) (14 - 47)  SpO2: 100% (12-29-23 @ 19:30) (92% - 100%)  Telemetry:   CAPILLARY BLOOD GLUCOSE      POCT Blood Glucose.: 243 mg/dL (29 Dec 2023 05:11)  POCT Blood Glucose.: 266 mg/dL (28 Dec 2023 23:10)    I&O's Summary    29 Dec 2023 07:01  -  29 Dec 2023 20:27  --------------------------------------------------------  IN: 0 mL / OUT: 600 mL / NET: -600 mL        PHYSICAL EXAM:  GENERAL: NAD, well-developed  HEAD:  Atraumatic, Normocephalic  EYES: EOMI, PERRLA, conjunctiva and sclera clear  NECK: Supple, No JVD  CHEST/LUNG: Clear to auscultation bilaterally; No wheeze  HEART: Regular rate and rhythm; No murmurs, rubs, or gallops  ABDOMEN: Soft, Nontender, Nondistended; Bowel sounds present  EXTREMITIES:  2+ Peripheral Pulses, No clubbing, cyanosis, or edema  PSYCH: AAOx3  NEUROLOGY: non-focal  SKIN: No rashes or lesions    LABS:                        10.9   16.38 )-----------( 198      ( 29 Dec 2023 05:57 )             33.5     12-29    144  |  115<H>  |  27<H>  ----------------------------<  202<H>  3.1<L>   |  13<L>  |  1.12    Ca    6.9<L>      29 Dec 2023 05:59  Phos  3.8     12-28  Mg     2.0     12-28            Urinalysis Basic - ( 29 Dec 2023 05:59 )    Color: x / Appearance: x / SG: x / pH: x  Gluc: 202 mg/dL / Ketone: x  / Bili: x / Urobili: x   Blood: x / Protein: x / Nitrite: x   Leuk Esterase: x / RBC: x / WBC x   Sq Epi: x / Non Sq Epi: x / Bacteria: x        RADIOLOGY & ADDITIONAL TESTS:    Imaging Personally Reviewed:    Consultant(s) Notes Reviewed:      Care Discussed with Consultants/Other Providers:

## 2023-12-29 NOTE — DIETITIAN INITIAL EVALUATION ADULT - PATIENT MEETS CRITERIA FOR MALNUTRITION
Nursing Notes:   Emelina Chavez LPN  10/29/2018  2:04 PM  Unsigned  Patient presents to clinic for medication refills.  Emelina Enciso ....................  10/29/2018   2:04 PM      SUBJECTIVE:   Juana Farrell is a 55 year old female who presents to clinic today for the following health issues:  Here today for annual review and due for refills. Due for labs.  Due for colonoscopy and will consider after the first of the year.  Used chantix to quit smoking and did quit for about 3 months but gained 10# so started smoking again.     HPI    Patient Active Problem List   Diagnosis     Bunion     Chronic tension headaches     Generalized anxiety disorder     Hypertension     Nicotine addiction     Past Surgical History:   Procedure Laterality Date      SECTION      2 C-Sections, Two ectopic pregnancies; with one the left tube removed     COLONOSCOPY      within normal limits.     HYSTERECTOMY TOTAL ABDOMINAL  2009    Total abdominal hysterectomy by Dr. 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 Carter     Uterine ablation         Social History   Substance Use Topics     Smoking status: Current Every Day Smoker     Packs/day: 0.50     Years: 15.00     Types: Cigarettes     Smokeless tobacco: Never Used     Alcohol use No      Comment: Alcoholic Drinks/day: rare     Family History   Problem Relation Age of Onset     Cancer Father      Cancer,Abernathy's polyposis syndrome with father dying early of colon cancer.   at age 41 of Abernathy's polyposis with resulting colon cancer     Colon Cancer Brother      Colon Cancer Brother      Other - See Comments Other FHx     Family history of Abernathy's polyposis syndrome, negative genetic testing     Diabetes Maternal Grandmother      Diabetes,FHMaternal diabetes in the family         Current Outpatient Prescriptions  "  Medication Sig Dispense Refill     DULoxetine (CYMBALTA) 60 MG EC capsule TAKE 1 CAPSULE BY MOUTH ONCE DAILY 90 capsule 4     fluticasone (FLONASE) 50 MCG/ACT spray Spray 2 sprays into both nostrils daily 1 Bottle 0     lisinopril-hydrochlorothiazide (PRINZIDE/ZESTORETIC) 20-25 MG per tablet Take 1 tablet by mouth daily 90 tablet 4     neomycin-polymyxin-hydrocortisone (CORTISPORIN) 3.5-82077-7 otic suspension Place 4 drops into both ears 3 times daily 10 mL 4     [DISCONTINUED] DULoxetine (CYMBALTA) 60 MG EC capsule TAKE 1 CAPSULE BY MOUTH ONCE DAILY 90 capsule 0     [DISCONTINUED] lisinopril-hydrochlorothiazide (PRINZIDE/ZESTORETIC) 20-25 MG per tablet TAKE 1 TABLET BY MOUTH ONCE DAILY 90 tablet 0     Allergies   Allergen Reactions     Morphine Hives     Itchy rash at time of birth     Paroxetine Nausea       Review of Systems   HENT: Positive for ear discharge.         Recurrent drainage from ear canals, uses to get drops and wants refills.    Breasts:  negative.    Psychiatric/Behavioral: Negative.         OBJECTIVE:     /90  Pulse 64  Temp 96.6  F (35.9  C)  Ht 5' 3.5\" (1.613 m)  Wt 167 lb 12.8 oz (76.1 kg)  Breastfeeding? No  BMI 29.26 kg/m2  Body mass index is 29.26 kg/(m^2).   Wt Readings from Last 3 Encounters:   10/29/18 167 lb 12.8 oz (76.1 kg)   06/16/18 164 lb (74.4 kg)   06/22/17 157 lb (71.2 kg)       Physical Exam   Constitutional: She appears well-developed. No distress.   HENT:   Left Ear: External ear normal.   Right ear canal with moist debris and mild redness and edema of canal.    Neck: Normal range of motion. No thyromegaly present.   Cardiovascular: Normal rate and regular rhythm.    Pulmonary/Chest: Effort normal and breath sounds normal. She has no wheezes. She exhibits no tenderness.   Genitourinary:   Genitourinary Comments: Deferred   Lymphadenopathy:     She has no cervical adenopathy.   Neurological: She is alert.   Skin: Skin is warm and dry.   Psychiatric: She has a " normal mood and affect.   Nursing note and vitals reviewed.      Diagnostic Test Results: Pending.    ASSESSMENT/PLAN:         ICD-10-CM    1. Health maintenance examination Z00.00    2. Generalized anxiety disorder F41.1 DULoxetine (CYMBALTA) 60 MG EC capsule   3. Essential hypertension I10 lisinopril-hydrochlorothiazide (PRINZIDE/ZESTORETIC) 20-25 MG per tablet     CBC W PLT No Diff     Comprehensive Metabolic Panel     Lipid Panel     CBC W PLT No Diff     Comprehensive Metabolic Panel     Lipid Panel   4. Encounter for screening mammogram for breast cancer Z12.31 MA Screening Digital Bilateral   5. Ear drainage, bilateral H92.13 neomycin-polymyxin-hydrocortisone (CORTISPORIN) 3.5-15860-2 otic suspension       Plan:  Recommend colonoscopy and she will schedule when ready. DOES not carry gene for Abernathy as siblings and father did.   Mammogram due.  Refuses immunizations.  Labs done and refills completed.      Sanam Rivers MD  [facility]    Portions of this dictation were created using the Dragon Nuance voice recognition system. Proofreading was completed but there may be errors in text.     yes

## 2023-12-29 NOTE — PROGRESS NOTE ADULT - ASSESSMENT
A/p  89y/o M h/o AFib (on Eliquis), HTN, HLD, DM, PPM , indwelling urinary catheter present w his aide 2/2 lethargy and UTI.      #AF, s/p ppm  -Elevated rates on tele  -Cont iv metoprolol as pt unable to take po  -a/c on hold    #HTN  -sp cardene gtt  -mgmt per neuro     #ICH  -mgmt per neuro sx  -ac on hold      palliative care f/u         A/p  89y/o M h/o AFib (on Eliquis), HTN, HLD, DM, PPM , indwelling urinary catheter present w his aide 2/2 lethargy and UTI.      #AF, s/p ppm  -Elevated rates on tele  -Cont iv metoprolol as pt unable to take po  -a/c on hold    #HTN  -sp cardene gtt  -mgmt per neuro     #ICH  -mgmt per neuro sx  -ac on hold      palliative care f/u

## 2023-12-29 NOTE — PROGRESS NOTE ADULT - TREATMENT GUIDELINE COMMENT
continue current level of care  family to consider artificial nutrition trial vs. transition in focus of care- they were not ready to make decision at time of my evaluation as there are several children involved in decision making.
transfer to PCU when bed available

## 2023-12-29 NOTE — PROGRESS NOTE ADULT - PROBLEM SELECTOR PLAN 4
DNR/DNI, no artificial nutrition, symptom directed approach  GOC narrative above- plan to transfer to PCU when bed available    In the event of symptoms, recommend the following:  Morphine 0.5mg q1h prn for moderate pain  Morphine 1mg q1h prn for severe pain  Morphine 1mg q1h prn for dyspnea  Ativan 0.5mg q1h prn for anxiety, agitation  glycopyrrolate ATC as above  dulcolax NH PRN daily DNR/DNI, no artificial nutrition, symptom directed approach  GOC narrative above- plan to transfer to PCU when bed available    In the event of symptoms, recommend the following:  Morphine 0.5mg q1h prn for moderate pain  Morphine 1mg q1h prn for severe pain  Morphine 1mg q1h prn for dyspnea  Ativan 0.5mg q1h prn for anxiety, agitation  glycopyrrolate ATC as above  dulcolax DE PRN daily

## 2023-12-29 NOTE — PROGRESS NOTE ADULT - TIME BILLING
agree with above  #AF, s/p ppm  -Elevated rates on tele  -Cont iv metoprolol as pt unable to take po  -a/c on hold    #HTN  -sp cardene gtt  -mgmt per neuro     #ICH  -mgmt per neuro sx  -ac on hold

## 2023-12-29 NOTE — PROGRESS NOTE ADULT - PROBLEM SELECTOR PLAN 5
we will continue to follow, awaiting transfer to PCU  discussed with stroke team.    [phone] we will continue to follow, awaiting transfer to PCU  discussed with stroke team.    [phone]

## 2023-12-29 NOTE — DIETITIAN INITIAL EVALUATION ADULT - NSFNSPHYEXAMSKINFT_GEN_A_CORE
Per wound care note 12/28:  -Sacral/bilateral Buttocks deep tissue damage present on admission  -Right heel unstageable pressure injury present on admission

## 2023-12-30 NOTE — PROGRESS NOTE ADULT - SUBJECTIVE AND OBJECTIVE BOX
CARDIOLOGY FOLLOW UP - Dr. Siddiqi  Date of Service: 12/30/2023  CC: no events    Review of Systems:  Constitutional: No fever, weight loss, or fatigue  Respiratory: No cough, wheezing, or hemoptysis, no shortness of breath  Cardiovascular: No chest pain, palpitations, passing out, dizziness, or leg swelling  Gastrointestinal: No abd or epigastric pain. No nausea, vomiting, or hematemesis; no diarrhea or consiptaiton, no melena or hematochezia  Vascular: No edema     TELEMETRY:    PHYSICAL EXAM:  T(C): 36.8 (12-29-23 @ 19:30), Max: 37.8 (12-29-23 @ 08:00)  HR: 102 (12-29-23 @ 19:30) (102 - 117)  BP: 145/63 (12-29-23 @ 19:30) (110/56 - 145/63)  RR: 28 (12-29-23 @ 19:30) (25 - 44)  SpO2: 100% (12-29-23 @ 19:30) (99% - 100%)  Wt(kg): --  I&O's Summary    29 Dec 2023 07:01  -  30 Dec 2023 07:00  --------------------------------------------------------  IN: 0 mL / OUT: 600 mL / NET: -600 mL        Appearance: Normal	  Cardiovascular: Normal S1 S2,RRR, No JVD, No murmurs  Respiratory: Lungs clear to auscultation	  Gastrointestinal:  Soft, Non-tender, + BS	  Extremities: Normal range of motion, No clubbing, cyanosis or edema  Vascular: Peripheral pulses palpable 2+ bilaterally       Home Medications:  acetaminophen 325 mg oral tablet: 2 tab(s) orally every 6 hours As needed Mild Pain (1 - 3) (27 Dec 2023 09:26)  Crestor 20 mg oral tablet: 1 tab(s) orally once a day (27 Dec 2023 09:26)  digoxin 125 mcg (0.125 mg) oral tablet: 1 tab(s) orally once a day (27 Dec 2023 09:26)  Eliquis 5 mg oral tablet: 1 tab(s) orally 2 times a day (27 Dec 2023 09:26)  enalapril 5 mg oral tablet: 1 tab(s) orally once a day (27 Dec 2023 09:26)  fenofibrate 145 mg oral tablet: 1 tab(s) orally once a day (27 Dec 2023 09:26)  Januvia 50 mg oral tablet: 1 tab(s) orally once a day (27 Dec 2023 09:26)  latanoprost 0.005% ophthalmic solution: 1 drop(s) to each affected eye once a day (in the evening) (27 Dec 2023 09:26)  metFORMIN 750 mg oral tablet, extended release: 1 tab(s) orally once a day (27 Dec 2023 09:26)  metoprolol succinate 50 mg oral tablet, extended release: 1 tab(s) orally once a day (27 Dec 2023 09:26)        MEDICATIONS  (STANDING):  acetaminophen   IVPB .. 1000 milliGRAM(s) IV Intermittent once  glycopyrrolate Injectable 0.4 milliGRAM(s) IV Push every 6 hours  metoprolol tartrate Injectable 5 milliGRAM(s) IV Push once  metoprolol tartrate Injectable 5 milliGRAM(s) IV Push every 6 hours        EKG:  RADIOLOGY:  DIAGNOSTIC TESTING:  [ ] Echocardiogram:  [ ] Catherterization:  [ ] Stress Test:  OTHER:     LABS:	 	                          10.9   16.38 )-----------( 198      ( 29 Dec 2023 05:57 )             33.5     12-29    144  |  115<H>  |  27<H>  ----------------------------<  202<H>  3.1<L>   |  13<L>  |  1.12    Ca    6.9<L>      29 Dec 2023 05:59  Phos  3.8     12-28  Mg     2.0     12-28            CARDIAC MARKERS:

## 2023-12-30 NOTE — CHART NOTE - NSCHARTNOTEFT_GEN_A_CORE
Chart note for documentation purposes.   12/27/23: CT BRAIN showed   CT brain:  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    Similar minimal rightward midline shift since 8/25/2023, with no   effacement of the basal cisterns.    Mass effect upon the posterior left lateral ventricle. The ventricles are   otherwise similar in size to 8/25/2023.    Similar-appearing 1.2 x 1.2 cm (since 9/26/2021) extra-axial mass within   the left anterolateral foramen magnum cistern. Mild mass effect upon the   cervicomedullary junction. This may represent a meningioma.    Patient had L thalamic hemorrhage with intraventricular extension he was monitored in stroke unit prior to his transfer to PCU in setting of cerebral edema with mass effect and brain compression due to hemorrhage.
4h rpt CTH reviewed. Appears grossly stable. No indication for neurosurgical intervention. Patient admitted to stroke neurology.   -Can obtain a long interval CTH in the AM for the purposes of starting DVT chemoPPX, if otherwise indicated. If long interval CTH in AM is stable, no c/i to DVT PPX 24h later after long interval CT

## 2023-12-30 NOTE — PROGRESS NOTE ADULT - ASSESSMENT
89y M WELL KNOWN TO ME FROM PREVIOUS MULTIPLE ADMISSIONS AT [facility]  ptn presents with aphasia and R sided weakness  Stroke code called. LKW 3 AM 12/27/2023. Patient was awake and spoke to his overnight home nursing aide at his apartment in [facility]. Later in the morning at 6 AM when this morning nursing aide came to help him out of bed, it was noted that his right side was weak and he was not speaking to the nursing aide. The aide became concerned and called EMS. Patient took his Eliquis the same day.   CT head done in the ED revealed a L thalamic hemorrhage with intraventricular extension. CTA was without AVM or aneurysm. Patient's PT, PTT & INR were elevated and he was given Kcentra to reverse anticoagulation from Eliquis.   On exam patient was attempting to answer questions with grunts and moans. He expressed understanding by following verbal commands. Patient's nursing aide arrived who explained that he is normally A&Ox 2 to self and location. He requires 24 hrs home nursing assistance and is not able to get out of bed on his own. He walks with a walker. Aide is unsure of overnight events.  though it was reported to her by the overnight aide that the patient was up and able to talk and walk at 3 AM the same day.   Comprehensive ROS unobtainable due to patient's aphasia.    PMHx significant for A.fib s/p PPM on Eliquis digoxin & metoprolol, HTN, HLD, DM, BPH w/ indwelling urinary catheter, macular degeneration       NIHSS: 12  Patient was not a tenecteplase candidate as he presented outside the time window and hemorrhage was identified on CT  Patient was not a thrombectomy candidate as no LVO was identified on imaging.    Acute onset R hemiparesis with expressive aphasia. Localized to L cerebral hemisphere. CT head reveals L thalamic hemorrhage with intraventricular extension without AVM or aneurysm on CTA.     Ptn admitted to NEURO service  seen by palliative care, on comfort care, awaiting an inptn hospice bed  DNR/DNI  on comfort meds  on anti HTN IV meds  family at bedside  awaiting transfer to PCU when bed available     89y M WELL KNOWN TO ME FROM PREVIOUS MULTIPLE ADMISSIONS AT [facility]  ptn presents with aphasia and R sided weakness  Stroke code called. LKW 3 AM 12/27/2023. Patient was awake and spoke to his overnight home nursing aide at his apartment in [facility]. Later in the morning at 6 AM when this morning nursing aide came to help him out of bed, it was noted that his right side was weak and he was not speaking to the nursing aide. The aide became concerned and called EMS. Patient took his Eliquis the same day.   CT head done in the ED revealed a L thalamic hemorrhage with intraventricular extension. CTA was without AVM or aneurysm. Patient's PT, PTT & INR were elevated and he was given Kcentra to reverse anticoagulation from Eliquis.   On exam patient was attempting to answer questions with grunts and moans. He expressed understanding by following verbal commands. Patient's nursing aide arrived who explained that he is normally A&Ox 2 to self and location. He requires 24 hrs home nursing assistance and is not able to get out of bed on his own. He walks with a walker. Aide is unsure of overnight events.  though it was reported to her by the overnight aide that the patient was up and able to talk and walk at 3 AM the same day.   Comprehensive ROS unobtainable due to patient's aphasia.    PMHx significant for A.fib s/p PPM on Eliquis digoxin & metoprolol, HTN, HLD, DM, BPH w/ indwelling urinary catheter, macular degeneration       NIHSS: 12  Patient was not a tenecteplase candidate as he presented outside the time window and hemorrhage was identified on CT  Patient was not a thrombectomy candidate as no LVO was identified on imaging.    Acute onset R hemiparesis with expressive aphasia. Localized to L cerebral hemisphere. CT head reveals L thalamic hemorrhage with intraventricular extension without AVM or aneurysm on CTA.     Ptn admitted to NEURO service  seen by palliative care, on comfort care, awaiting an inptn hospice bed  DNR/DNI  on comfort meds  on anti HTN IV meds  family at bedside  awaiting transfer to PCU when bed available

## 2023-12-30 NOTE — PROGRESS NOTE ADULT - PROBLEM SELECTOR PLAN 5
we will continue to follow, awaiting transfer to PCU  discussed with stroke team.    [phone] we will continue to follow, awaiting transfer to PCU  discussed with stroke team.    [phone] Spoke with children Mary and Dillon at bedside about symptom management.   Emotional support provided to family    Transfer to PCU, pending bed availability    In the event of newly developing, evolving, or worsening symptoms, please contact the Palliative Medicine team via pager (if the patient is at [facility] #[phone] or if the patient is at [facility] #[phone]) The Geriatric and Palliative Medicine service has coverage 24 hours a day/ 7 days a week to provide medical recommendations regarding symptom management needs via telephone. Spoke with children Mary and Dillon at bedside about symptom management.   Emotional support provided to family    Transfer to PCU, pending bed availability    In the event of newly developing, evolving, or worsening symptoms, please contact the Palliative Medicine team via pager (if the patient is at [facility] #[phone] or if the patient is at [facility] #[phone]) The Geriatric and Palliative Medicine service has coverage 24 hours a day/ 7 days a week to provide medical recommendations regarding symptom management needs via telephone.

## 2023-12-30 NOTE — PROGRESS NOTE ADULT - PROBLEM SELECTOR PLAN 3
not able to swallow, ability to follow commands diminished today - Patient with tachypnea and abdominal breathing during encounter; asked bedside RN to administer prn dose of morphine for dyspnea   - IV Morphine 1mg q1 PRN dyspnea

## 2023-12-30 NOTE — PROGRESS NOTE ADULT - ASSESSMENT
89y (01-Dec-1926) M w/ PMHx significant for A.fib s/p PPM on Eliquis digoxin & metoprolol, HTN, HLD, DM, BPH w/ indwelling urinary catheter, macular degeneration presents to the ED due to R sided weakness and aphasia. S/P Kcentra.     Impression: L thalamic hemorrhage with intraventricular extension etiology chronic HTN vs AC use.     NEURO: Remains globally aphasic, denies any pain, goal SBP < 140 Physical therapy/OT: deferred as not within GOC.    ANTITHROMBOTIC THERAPY: none due to hemorrhage    PULMONARY:  protecting airway, saturating well off oxygen, PRN Oxygen as needed     CARDIOVASCULAR: no need for cardiac monitoring                              SBP goal: <140    GASTROINTESTINAL:  dysphagia screen- failed, family does not want NGT, comfort feeds as tolerated      Diet: NPO    RENAL: continue patel     Na Goal: Greater than 135     Patel: Y, chronic    HEMATOLOGY: no signs of bleeding     DVT ppx: venodynes, chemical held due to hemorrhage    ID: febrile to 101/8F on 12/29 has been afebrile since, initially started zosyn for possible aspiration PNA and now discontinued as not within GOC.    OTHER: Plan of care discussed with family at bedside, Palliative consult appreciated, patient DNR/DNI. As per daughter, HCP, full comfort measures only.    DISPOSITION: PCU when bed available       CORE MEASURES:        Admission NIHSS: 12     TPA: [] YES [x] NO      LDL/HDL: 47/37     Depression Screen: NA     Statin Therapy: N     Dysphagia Screen: [] PASS [x] FAIL     Smoking [] YES [] NO      Afib [x] YES [] NO     Stroke Education [x] YES [] NO to family    Obtain screening lower extremity venous ultrasound in patients who meet 1 or more of the following criteria as patient is high risk for DVT/PE on admission:   [] History of DVT/PE  []Hypercoagulable states (Factor V Leiden, Cancer, OCP, etc. )  []Prolonged immobility (hemiplegia/hemiparesis/post operative or any other extended immobilization)  [] Transferred from outside facility (Rehab or Long term care)  [] Age </= to 50.

## 2023-12-30 NOTE — PROGRESS NOTE ADULT - SUBJECTIVE AND OBJECTIVE BOX
THE PATIENT WAS SEEN AND EXAMINED BY ME WITH THE HOUSESTAFF AND STROKE TEAM DURING MORNING ROUNDS.   HPI:  CHERY AMEZCUA, 89y (01-Dec-1926) M w/ PMHx significant for A.fib s/p PPM on Eliquis digoxin & metoprolol, HTN, HLD, DM, BPH w/ indwelling urinary catheter, macular degeneration presented to the ED due to R sided weakness and aphasia. Stroke code called. LKW 3 AM 12/27/2023. Patient was awake and spoke to his overnight home nursing aid at his apartment in [facility]. Later in the morning at 6 AM when his morning nursing aid came to help him out of bed, it was noted that his right side was weak and he was not speaking to the nursing aid. Aid became concerned and called EMS. Patient took his Eliquis the same day. CT head done in the ED revealed a L thalamic hemorrhage with intraventricular extension. CTA was without AVM or aneurysm. Patient's PT, PTT & INR were elevated and he was given Kcentra to reverse anticoagulation from his Eliquis. On exam patient was attempting to answer questions with grunts and moans. He expressed understanding by following verbal commands. Patient's nursing aid arrived who explained that he is normally A&Ox 2 to self and location. He requires 24 hrs home nursing assistance and is not able to get out of bed on his own. He walks with a walker. Aid is unsure of overnight events though it was reported to her by the overnight aid that the patient was up and able to talk and walk at 3 AM the same day. Comprehensive ROS unobtainable due to patient's aphasia.    NIHSS: 12  PreMRS: 3  Patient was not a tenecteplase candidate as he presented outside the time window and hemorrhage was identified on CT  Patient was not a thrombectomy candidate as no LVO was identified on imaging.    SUBJECTIVE: No events overnight.  No new neurologic complaints.  denies any pain. ROS reported negative unless otherwise noted.    acetaminophen   IVPB .. 1000 milliGRAM(s) IV Intermittent once  bisacodyl Suppository 10 milliGRAM(s) Rectal daily PRN  glycopyrrolate Injectable 0.4 milliGRAM(s) IV Push every 6 hours  hydrALAZINE Injectable 5 milliGRAM(s) IV Push every 6 hours PRN  LORazepam   Injectable 0.5 milliGRAM(s) IV Push every 1 hour PRN  metoprolol tartrate Injectable 5 milliGRAM(s) IV Push once  metoprolol tartrate Injectable 5 milliGRAM(s) IV Push every 6 hours  morphine  - Injectable 1 milliGRAM(s) IV Push every 1 hour PRN  morphine  - Injectable 0.5 milliGRAM(s) IV Push every 1 hour PRN  morphine  - Injectable 1 milliGRAM(s) IV Push every 1 hour PRN      PHYSICAL EXAM:   Vital Signs Last 24 Hrs  T(C): 36.8 (29 Dec 2023 19:30), Max: 36.8 (29 Dec 2023 19:30)  T(F): 98.3 (29 Dec 2023 19:30), Max: 98.3 (29 Dec 2023 19:30)  HR: 102 (29 Dec 2023 19:30) (102 - 114)  BP: 145/63 (29 Dec 2023 19:30) (110/56 - 145/63)  BP(mean): 80 (29 Dec 2023 14:00) (76 - 85)  RR: 28 (29 Dec 2023 19:30) (28 - 44)  SpO2: 100% (29 Dec 2023 19:30) (99% - 100%)    Parameters below as of 29 Dec 2023 19:30  Patient On (Oxygen Delivery Method): room air  O2 Flow (L/min): 3    exam limited as patient is comfort measures only  General: No acute distress  HEENT:  H  Abdomen: Soft, nontender, nondistended   Extremities: No edema    NEUROLOGICAL EXAM:  Mental status: awake, alert, speaks 1-2 words, does not follow commands  Cranial Nerves: right facial asymmetry, severe dysarthria  Motor exam: right hemiplegia, drift LUE/LLE  Sensation: Intact to light touch   Coordination/ Gait: gait not assessed      LABS:                        10.9   16.38 )-----------( 198      ( 29 Dec 2023 05:57 )             33.5    12-29    144  |  115<H>  |  27<H>  ----------------------------<  202<H>  3.1<L>   |  13<L>  |  1.12    Ca    6.9<L>      29 Dec 2023 05:59  Phos  3.8     12-28  Mg     2.0     12-28          IMAGING: Reviewed by me.     12/27/23:  CT BRAIN:  No significant change compared with earlier the same day in   left thalamic capsular hematoma with intraventricular extension.   Ventricles are stable in size. No evidence of rehemorrhage    CT brain:  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    Similar minimal rightward midline shift since 8/25/2023, with no   effacement of the basal cisterns.    Mass effect upon the posterior left lateral ventricle. The ventricles are   otherwise similar in size to 8/25/2023.    Similar-appearing 1.2 x 1.2 cm (since 9/26/2021) extra-axial mass within   the left anterolateral foramen magnum cistern. Mild mass effect upon the   cervicomedullary junction. This may represent a meningioma.    CTA brain:  No flow-limiting stenosis or vascular aneurysm.    No evidence for AVM. Please note that this does not exclude the presence   of a micro-AVM, which may be compressed by hemorrhage.    CTA neck:  No flow-limiting stenosis, evidence for arterial dissection, or vascular   aneurysm.       THE PATIENT WAS SEEN AND EXAMINED BY ME WITH THE HOUSESTAFF AND STROKE TEAM DURING MORNING ROUNDS.   HPI:  CHERY AMEZCUA, 89y (01-Dec-1926) M w/ PMHx significant for A.fib s/p PPM on Eliquis digoxin & metoprolol, HTN, HLD, DM, BPH w/ indwelling urinary catheter, macular degeneration presented to the ED due to R sided weakness and aphasia. Stroke code called. LKW 3 AM 12/27/2023. Patient was awake and spoke to his overnight home nursing aid at his apartment in [facility]. Later in the morning at 6 AM when his morning nursing aid came to help him out of bed, it was noted that his right side was weak and he was not speaking to the nursing aid. Aid became concerned and called EMS. Patient took his Eliquis the same day. CT head done in the ED revealed a L thalamic hemorrhage with intraventricular extension. CTA was without AVM or aneurysm. Patient's PT, PTT & INR were elevated and he was given Kcentra to reverse anticoagulation from his Eliquis. On exam patient was attempting to answer questions with grunts and moans. He expressed understanding by following verbal commands. Patient's nursing aid arrived who explained that he is normally A&Ox 2 to self and location. He requires 24 hrs home nursing assistance and is not able to get out of bed on his own. He walks with a walker. Aid is unsure of overnight events though it was reported to her by the overnight aid that the patient was up and able to talk and walk at 3 AM the same day. Comprehensive ROS unobtainable due to patient's aphasia.    NIHSS: 12  PreMRS: 3  Patient was not a tenecteplase candidate as he presented outside the time window and hemorrhage was identified on CT  Patient was not a thrombectomy candidate as no LVO was identified on imaging.    SUBJECTIVE: No events overnight.  No new neurologic complaints.  denies any pain. ROS reported negative unless otherwise noted.    acetaminophen   IVPB .. 1000 milliGRAM(s) IV Intermittent once  bisacodyl Suppository 10 milliGRAM(s) Rectal daily PRN  glycopyrrolate Injectable 0.4 milliGRAM(s) IV Push every 6 hours  hydrALAZINE Injectable 5 milliGRAM(s) IV Push every 6 hours PRN  LORazepam   Injectable 0.5 milliGRAM(s) IV Push every 1 hour PRN  metoprolol tartrate Injectable 5 milliGRAM(s) IV Push once  metoprolol tartrate Injectable 5 milliGRAM(s) IV Push every 6 hours  morphine  - Injectable 1 milliGRAM(s) IV Push every 1 hour PRN  morphine  - Injectable 0.5 milliGRAM(s) IV Push every 1 hour PRN  morphine  - Injectable 1 milliGRAM(s) IV Push every 1 hour PRN      PHYSICAL EXAM:   Vital Signs Last 24 Hrs  T(C): 36.8 (29 Dec 2023 19:30), Max: 36.8 (29 Dec 2023 19:30)  T(F): 98.3 (29 Dec 2023 19:30), Max: 98.3 (29 Dec 2023 19:30)  HR: 102 (29 Dec 2023 19:30) (102 - 114)  BP: 145/63 (29 Dec 2023 19:30) (110/56 - 145/63)  BP(mean): 80 (29 Dec 2023 14:00) (76 - 85)  RR: 28 (29 Dec 2023 19:30) (28 - 44)  SpO2: 100% (29 Dec 2023 19:30) (99% - 100%)    Parameters below as of 29 Dec 2023 19:30  Patient On (Oxygen Delivery Method): room air  O2 Flow (L/min): 3    exam limited as patient is comfort measures only  General: No acute distress  HEENT:  H  Abdomen: Soft, nontender, nondistended   Extremities: No edema    NEUROLOGICAL EXAM:  Mental status: awake, alert, speaks 1-2 words, does not follow commands  Cranial Nerves: right facial asymmetry, severe dysarthria  Motor exam: right hemiplegia, drift LUE/LLE  Sensation: Intact to light touch   Coordination/ Gait: gait not assessed      LABS:                        10.9   16.38 )-----------( 198      ( 29 Dec 2023 05:57 )             33.5    12-29    144  |  115<H>  |  27<H>  ----------------------------<  202<H>  3.1<L>   |  13<L>  |  1.12    Ca    6.9<L>      29 Dec 2023 05:59  Phos  3.8     12-28  Mg     2.0     12-28          IMAGING: Reviewed by me.     12/27/23:  CT BRAIN:  No significant change compared with earlier the same day in   left thalamic capsular hematoma with intraventricular extension.   Ventricles are stable in size. No evidence of rehemorrhage    CT brain:  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    Similar minimal rightward midline shift since 8/25/2023, with no   effacement of the basal cisterns.    Mass effect upon the posterior left lateral ventricle. The ventricles are   otherwise similar in size to 8/25/2023.    Similar-appearing 1.2 x 1.2 cm (since 9/26/2021) extra-axial mass within   the left anterolateral foramen magnum cistern. Mild mass effect upon the   cervicomedullary junction. This may represent a meningioma.    CTA brain:  No flow-limiting stenosis or vascular aneurysm.    No evidence for AVM. Please note that this does not exclude the presence   of a micro-AVM, which may be compressed by hemorrhage.    CTA neck:  No flow-limiting stenosis, evidence for arterial dissection, or vascular   aneurysm.

## 2023-12-30 NOTE — PROGRESS NOTE ADULT - ASSESSMENT
89yo M with hx of afib on AC, HTN, DM, BPH, HLD who presented with unresponsiveness this morning. Found to have L thalamic hemorrhage. Palliative consulted for C 89yo M with hx of afib on AC, HTN, DM, BPH, HLD who presented with unresponsiveness this morning. Found to have L thalamic hemorrhage.   Palliative following for symptom management for end of life care

## 2023-12-30 NOTE — PROGRESS NOTE ADULT - ASSESSMENT
A/p  89y/o M h/o AFib (on Eliquis), HTN, HLD, DM, PPM , indwelling urinary catheter present w his aide 2/2 lethargy and UTI.      #AF, s/p ppm  -Elevated rates on tele  -increase iv metoprolol to 7.5mg as pt unable to take po  -a/c on hold    #HTN  -sp cardene gtt  -mgmt per neuro     #ICH  -mgmt per neuro sx  -ac on hold      palliative care f/u    35 minutes spent on total encounter; more than 50% of the visit was spent counseling and/or coordinating care by the attending physician.

## 2023-12-30 NOTE — PROGRESS NOTE ADULT - SUBJECTIVE AND OBJECTIVE BOX
Patient is a 89y old  Male who presents with a chief complaint of L thalamic hemorrhage (30 Dec 2023 17:11)      SUBJECTIVE / OVERNIGHT EVENTS: on antiHTN and comfort meds    MEDICATIONS  (STANDING):  glycopyrrolate Injectable 0.4 milliGRAM(s) IV Push every 6 hours  metoprolol tartrate Injectable 5 milliGRAM(s) IV Push once  metoprolol tartrate Injectable 5 milliGRAM(s) IV Push every 6 hours    MEDICATIONS  (PRN):  bisacodyl Suppository 10 milliGRAM(s) Rectal daily PRN Constipation  hydrALAZINE Injectable 5 milliGRAM(s) IV Push every 6 hours PRN SBP > 160  LORazepam   Injectable 0.5 milliGRAM(s) IV Push every 1 hour PRN Agitation  morphine  - Injectable 1 milliGRAM(s) IV Push every 1 hour PRN dyspnea  morphine  - Injectable 0.5 milliGRAM(s) IV Push every 1 hour PRN Moderate Pain (4 - 6)  morphine  - Injectable 1 milliGRAM(s) IV Push every 1 hour PRN Severe Pain (7 - 10)      Vital Signs Last 24 Hrs  T(F): 98.9 (12-30-23 @ 20:00), Max: 100.5 (12-30-23 @ 08:30)  HR: 104 (12-30-23 @ 20:00) (91 - 110)  BP: 183/82 (12-30-23 @ 20:00) (115/55 - 183/82)  RR: 18 (12-30-23 @ 20:00) (18 - 30)  SpO2: 93% (12-30-23 @ 20:00) (93% - 95%)  Telemetry:   CAPILLARY BLOOD GLUCOSE        I&O's Summary    29 Dec 2023 07:01  -  30 Dec 2023 07:00  --------------------------------------------------------  IN: 0 mL / OUT: 600 mL / NET: -600 mL    30 Dec 2023 07:01  -  30 Dec 2023 20:49  --------------------------------------------------------  IN: 0 mL / OUT: 700 mL / NET: -700 mL        PHYSICAL EXAM:  GENERAL: NAD, well-developed  HEAD:  Atraumatic, Normocephalic  EYES: EOMI, PERRLA, conjunctiva and sclera clear  NECK: Supple, No JVD  CHEST/LUNG: Clear to auscultation bilaterally; No wheeze  HEART: Regular rate and rhythm; No murmurs, rubs, or gallops  ABDOMEN: Soft, Nontender, Nondistended; Bowel sounds present  EXTREMITIES:  2+ Peripheral Pulses, No clubbing, cyanosis, or edema  PSYCH: AAOx3  NEUROLOGY: non-focal  SKIN: No rashes or lesions    LABS:                        10.9   16.38 )-----------( 198      ( 29 Dec 2023 05:57 )             33.5     12-29    144  |  115<H>  |  27<H>  ----------------------------<  202<H>  3.1<L>   |  13<L>  |  1.12    Ca    6.9<L>      29 Dec 2023 05:59            Urinalysis Basic - ( 29 Dec 2023 05:59 )    Color: x / Appearance: x / SG: x / pH: x  Gluc: 202 mg/dL / Ketone: x  / Bili: x / Urobili: x   Blood: x / Protein: x / Nitrite: x   Leuk Esterase: x / RBC: x / WBC x   Sq Epi: x / Non Sq Epi: x / Bacteria: x        RADIOLOGY & ADDITIONAL TESTS:    Imaging Personally Reviewed:    Consultant(s) Notes Reviewed:      Care Discussed with Consultants/Other Providers:

## 2023-12-30 NOTE — PROGRESS NOTE ADULT - SUBJECTIVE AND OBJECTIVE BOX
Indication for Geriatrics and Palliative Care Services/INTERVAL HPI: goals of care/symptom management   SUBJECTIVE AND OBJECTIVE: Patient seen and examined, unable to participate. concern for pna and started on abx by primary team. not as awake or following commands like yesterday. appears to be clinically declining.     OVERNIGHT EVENTS: desaturation, concern for PNA    DNR on chart:DNI  DNI      Allergies    procainamide (Other (Severe))    Intolerances    MEDICATIONS  (STANDING):  glycopyrrolate Injectable 0.4 milliGRAM(s) IV Push every 6 hours  potassium chloride  10 mEq/100 mL IVPB 10 milliEquivalent(s) IV Intermittent every 1 hour    MEDICATIONS  (PRN):  bisacodyl Suppository 10 milliGRAM(s) Rectal daily PRN Constipation  hydrALAZINE Injectable 5 milliGRAM(s) IV Push every 6 hours PRN SBP > 160  LORazepam   Injectable 0.5 milliGRAM(s) IV Push every 1 hour PRN Agitation  metoprolol tartrate Injectable 5 milliGRAM(s) IV Push every 6 hours PRN sustained HR > 120  morphine  - Injectable 0.5 milliGRAM(s) IV Push every 1 hour PRN Moderate Pain (4 - 6)  morphine  - Injectable 1 milliGRAM(s) IV Push every 1 hour PRN Severe Pain (7 - 10)      ITEMS UNCHECKED ARE NOT PRESENT    PRESENT SYMPTOMS: [ x]Unable to self-report - see [ ] CPOT [x ] PAINADS [x ] RDOS  Source if other than patient:  [ ]Family   [ ]Team     Pain:  [ ]yes [ ]no  QOL impact -   Location -                    Aggravating factors -  Quality -  Radiation -  Timing-  Severity (0-10 scale):  Minimal acceptable level (0-10 scale):     CPOT:    https://www.sccm.org/getattachment/[email or web address]/Critical-Care-Pain-Observation-Tool-(CPOT)    Dyspnea:                           [ ]Mild [ ]Moderate [ ]Severe  Anxiety:                             [ ]Mild [ ]Moderate [ ]Severe  Fatigue:                             [ ]Mild [ ]Moderate [ ]Severe  Nausea:                             [ ]Mild [ ]Moderate [ ]Severe  Loss of appetite:              [ ]Mild [ ]Moderate [ ]Severe  Constipation:                    [ ]Mild [ ]Moderate [ ]Severe    PCSSQ[Palliative Care Spiritual Screening Question]   Severity (0-10):  Score of 4 or > indicate consideration of Chaplaincy referral.  Chaplaincy Referral: [x ] yes [ ] refused [ ] following [ ] Deferred     Caregiver [place]? : [ ] yes [ ] no [ ] Deferred [ ] Declined             Social work referral [ x] Patient & Family Centered Care Referral [ ]     Anticipatory Grief present?:  [ x] yes [ ] no  [ ] Deferred                  Social work referral [ ] Chaplaincy Referral[ x]      Other Symptoms:  [ ]All other review of systems negative   [x ]Unable to obtain due to poor mentation    Palliative Performance Status Version 2:      10   %      http://npcrc.org/files/news/palliative_performance_scale_ppsv2.pdf  PHYSICAL EXAM:  Vital Signs Last 24 Hrs  T(C): 37.8 (29 Dec 2023 08:00), Max: 38.8 (28 Dec 2023 20:00)  T(F): 100 (29 Dec 2023 08:00), Max: 101.8 (28 Dec 2023 20:00)  HR: 112 (29 Dec 2023 11:15) (93 - 117)  BP: 121/56 (29 Dec 2023 11:15) (112/54 - 145/63)  BP(mean): 81 (29 Dec 2023 11:15) (75 - 94)  RR: 37 (29 Dec 2023 11:15) (14 - 47)  SpO2: 99% (29 Dec 2023 11:15) (92% - 100%)    Parameters below as of 29 Dec 2023 10:00  Patient On (Oxygen Delivery Method): nasal cannula  O2 Flow (L/min): 2   I&O's Summary     GENERAL: [ ]Cachexia    [ ]Alert  [ ]Oriented x   [x ]Lethargic  [ ]Unarousable  [ ]Verbal  [ ]Non-Verbal  Behavioral:   [ ]Anxiety  [ ]Delirium [ ]Agitation [ ]Other  HEENT:  [ ]Normal   [x ]Dry mouth   [ ]ET Tube/Trach  [ ]Oral lesions  PULMONARY:   [ ]Clear [x ]Tachypnea  [x ]Audible excessive secretions   [ ]Rhonchi        [ ]Right [ ]Left [ ]Bilateral  [ ]Crackles        [ ]Right [ ]Left [ ]Bilateral  [ ]Wheezing     [ ]Right [ ]Left [ ]Bilateral  [ ]Diminished BS [ ] Right [ ]Left [ ]Bilateral  CARDIOVASCULAR:    [ ]Regular [x ]Irregular [ ]Tachy  [ ]Jose J [ ]Murmur [ ]Other  GASTROINTESTINAL:  [x ]Soft  [ ]Distended   [ ]+BS  [x ]Non tender [ ]Tender  [ ]Other [ ]PEG [ ]OGT/ NGT   Last BM:   GENITOURINARY:  [ ]Normal [x ]Incontinent   [ ]Oliguria/Anuria   [ ]Palmer  MUSCULOSKELETAL:   [ ]Normal   [ ]Weakness  [x ]Bed/Wheelchair bound [ ]Edema  NEUROLOGIC:   [ ]No focal deficits  [ ] Cognitive impairment  [x ] Dysphagia [ ]Dysarthria [ ] Paresis [ ]Other   SKIN:   [ ]Normal  [ ]Rash  [ ]Other  [ ]Pressure ulcer(s) [ ]y [ ]n present on admission    CRITICAL CARE:  [ ]Shock Present  [ ]Septic [ ]Cardiogenic [ ]Neurologic [ ]Hypovolemic  [ ]Vasopressors [ ]Inotropes  [ ]Respiratory failure present [ ]Mechanical Ventilation [ ]Non-invasive ventilatory support [ ]High-Flow   [ ]Acute  [ ]Chronic [ ]Hypoxic  [ ]Hypercarbic [ ]Other  [ ]Other organ failure     LABS:                        10.9   16.38 )-----------( 198      ( 29 Dec 2023 05:57 )             33.5   12-29    144  |  115<H>  |  27<H>  ----------------------------<  202<H>  3.1<L>   |  13<L>  |  1.12    Ca    6.9<L>      29 Dec 2023 05:59  Phos  3.8     12-28  Mg     2.0     12-28        Urinalysis Basic - ( 29 Dec 2023 05:59 )    Color: x / Appearance: x / SG: x / pH: x  Gluc: 202 mg/dL / Ketone: x  / Bili: x / Urobili: x   Blood: x / Protein: x / Nitrite: x   Leuk Esterase: x / RBC: x / WBC x   Sq Epi: x / Non Sq Epi: x / Bacteria: x      RADIOLOGY & ADDITIONAL STUDIES: no new imaging    Protein Calorie Malnutrition Present: [ ]mild [ ]moderate [ ]severe [ ]underweight [ ]morbid obesity  https://www.andeal.org/vault/1639/web/files/ONC/Table_Clinical%20Characteristics%20to%20Document%20Malnutrition-White%20JV%20et%20al%898880.pdf    Height (cm): 177.8 (12-28-23 @ 21:15), 188 (10-12-23 @ 18:20), 188 (10-11-23 @ 18:18)  Weight (kg): 70.9 (12-27-23 @ 08:28), 117.9 (10-11-23 @ 18:18), 81.6 (08-25-23 @ 15:17)  BMI (kg/m2): 22.4 (12-28-23 @ 21:15), 20.1 (12-27-23 @ 08:28), 33.4 (10-12-23 @ 18:20)    [x ]PPSV2 < or = 30%  [ ]significant weight loss [x ]poor nutritional intake [ ]anasarca[ ]Artificial Nutrition    Other REFERRALS:  [ ]Hospice  [ ]Child Life  [x ]Social Work  [ ]Case management [ ]Holistic Therapy     Goals of Care Document: Indication for Geriatrics and Palliative Care Services/INTERVAL HPI: goals of care/symptom management   SUBJECTIVE AND OBJECTIVE: Patient seen and examined, unable to participate. concern for pna and started on abx by primary team. not as awake or following commands like yesterday. appears to be clinically declining.     OVERNIGHT EVENTS: desaturation, concern for PNA    DNR on chart:DNI  DNI      Allergies    procainamide (Other (Severe))    Intolerances    MEDICATIONS  (STANDING):  glycopyrrolate Injectable 0.4 milliGRAM(s) IV Push every 6 hours  potassium chloride  10 mEq/100 mL IVPB 10 milliEquivalent(s) IV Intermittent every 1 hour    MEDICATIONS  (PRN):  bisacodyl Suppository 10 milliGRAM(s) Rectal daily PRN Constipation  hydrALAZINE Injectable 5 milliGRAM(s) IV Push every 6 hours PRN SBP > 160  LORazepam   Injectable 0.5 milliGRAM(s) IV Push every 1 hour PRN Agitation  metoprolol tartrate Injectable 5 milliGRAM(s) IV Push every 6 hours PRN sustained HR > 120  morphine  - Injectable 0.5 milliGRAM(s) IV Push every 1 hour PRN Moderate Pain (4 - 6)  morphine  - Injectable 1 milliGRAM(s) IV Push every 1 hour PRN Severe Pain (7 - 10)      ITEMS UNCHECKED ARE NOT PRESENT    PRESENT SYMPTOMS: [ x]Unable to self-report - see [ ] CPOT [x ] PAINADS [x ] RDOS  Source if other than patient:  [ ]Family   [ ]Team     Pain:  [ ]yes [ ]no  QOL impact -   Location -                    Aggravating factors -  Quality -  Radiation -  Timing-  Severity (0-10 scale):  Minimal acceptable level (0-10 scale):     CPOT:    https://www.sccm.org/getattachment/[email or web address]-[email or web address]/Critical-Care-Pain-Observation-Tool-(CPOT)    Dyspnea:                           [ ]Mild [ ]Moderate [ ]Severe  Anxiety:                             [ ]Mild [ ]Moderate [ ]Severe  Fatigue:                             [ ]Mild [ ]Moderate [ ]Severe  Nausea:                             [ ]Mild [ ]Moderate [ ]Severe  Loss of appetite:              [ ]Mild [ ]Moderate [ ]Severe  Constipation:                    [ ]Mild [ ]Moderate [ ]Severe    PCSSQ[Palliative Care Spiritual Screening Question]   Severity (0-10):  Score of 4 or > indicate consideration of Chaplaincy referral.  Chaplaincy Referral: [x ] yes [ ] refused [ ] following [ ] Deferred     Caregiver [place]? : [ ] yes [ ] no [ ] Deferred [ ] Declined             Social work referral [ x] Patient & Family Centered Care Referral [ ]     Anticipatory Grief present?:  [ x] yes [ ] no  [ ] Deferred                  Social work referral [ ] Chaplaincy Referral[ x]      Other Symptoms:  [ ]All other review of systems negative   [x ]Unable to obtain due to poor mentation    Palliative Performance Status Version 2:      10   %      http://npcrc.org/files/news/palliative_performance_scale_ppsv2.pdf  PHYSICAL EXAM:  Vital Signs Last 24 Hrs  T(C): 37.8 (29 Dec 2023 08:00), Max: 38.8 (28 Dec 2023 20:00)  T(F): 100 (29 Dec 2023 08:00), Max: 101.8 (28 Dec 2023 20:00)  HR: 112 (29 Dec 2023 11:15) (93 - 117)  BP: 121/56 (29 Dec 2023 11:15) (112/54 - 145/63)  BP(mean): 81 (29 Dec 2023 11:15) (75 - 94)  RR: 37 (29 Dec 2023 11:15) (14 - 47)  SpO2: 99% (29 Dec 2023 11:15) (92% - 100%)    Parameters below as of 29 Dec 2023 10:00  Patient On (Oxygen Delivery Method): nasal cannula  O2 Flow (L/min): 2   I&O's Summary     GENERAL: [ ]Cachexia    [ ]Alert  [ ]Oriented x   [x ]Lethargic  [ ]Unarousable  [ ]Verbal  [ ]Non-Verbal  Behavioral:   [ ]Anxiety  [ ]Delirium [ ]Agitation [ ]Other  HEENT:  [ ]Normal   [x ]Dry mouth   [ ]ET Tube/Trach  [ ]Oral lesions  PULMONARY:   [ ]Clear [x ]Tachypnea  [x ]Audible excessive secretions   [ ]Rhonchi        [ ]Right [ ]Left [ ]Bilateral  [ ]Crackles        [ ]Right [ ]Left [ ]Bilateral  [ ]Wheezing     [ ]Right [ ]Left [ ]Bilateral  [ ]Diminished BS [ ] Right [ ]Left [ ]Bilateral  CARDIOVASCULAR:    [ ]Regular [x ]Irregular [ ]Tachy  [ ]Jose J [ ]Murmur [ ]Other  GASTROINTESTINAL:  [x ]Soft  [ ]Distended   [ ]+BS  [x ]Non tender [ ]Tender  [ ]Other [ ]PEG [ ]OGT/ NGT   Last BM:   GENITOURINARY:  [ ]Normal [x ]Incontinent   [ ]Oliguria/Anuria   [ ]Palmer  MUSCULOSKELETAL:   [ ]Normal   [ ]Weakness  [x ]Bed/Wheelchair bound [ ]Edema  NEUROLOGIC:   [ ]No focal deficits  [ ] Cognitive impairment  [x ] Dysphagia [ ]Dysarthria [ ] Paresis [ ]Other   SKIN:   [ ]Normal  [ ]Rash  [ ]Other  [ ]Pressure ulcer(s) [ ]y [ ]n present on admission    CRITICAL CARE:  [ ]Shock Present  [ ]Septic [ ]Cardiogenic [ ]Neurologic [ ]Hypovolemic  [ ]Vasopressors [ ]Inotropes  [ ]Respiratory failure present [ ]Mechanical Ventilation [ ]Non-invasive ventilatory support [ ]High-Flow   [ ]Acute  [ ]Chronic [ ]Hypoxic  [ ]Hypercarbic [ ]Other  [ ]Other organ failure     LABS:                        10.9   16.38 )-----------( 198      ( 29 Dec 2023 05:57 )             33.5   12-29    144  |  115<H>  |  27<H>  ----------------------------<  202<H>  3.1<L>   |  13<L>  |  1.12    Ca    6.9<L>      29 Dec 2023 05:59  Phos  3.8     12-28  Mg     2.0     12-28        Urinalysis Basic - ( 29 Dec 2023 05:59 )    Color: x / Appearance: x / SG: x / pH: x  Gluc: 202 mg/dL / Ketone: x  / Bili: x / Urobili: x   Blood: x / Protein: x / Nitrite: x   Leuk Esterase: x / RBC: x / WBC x   Sq Epi: x / Non Sq Epi: x / Bacteria: x      RADIOLOGY & ADDITIONAL STUDIES: no new imaging    Protein Calorie Malnutrition Present: [ ]mild [ ]moderate [ ]severe [ ]underweight [ ]morbid obesity  https://www.andeal.org/vault/0618/web/files/ONC/Table_Clinical%20Characteristics%20to%20Document%20Malnutrition-White%20JV%20et%20al%488767.pdf    Height (cm): 177.8 (12-28-23 @ 21:15), 188 (10-12-23 @ 18:20), 188 (10-11-23 @ 18:18)  Weight (kg): 70.9 (12-27-23 @ 08:28), 117.9 (10-11-23 @ 18:18), 81.6 (08-25-23 @ 15:17)  BMI (kg/m2): 22.4 (12-28-23 @ 21:15), 20.1 (12-27-23 @ 08:28), 33.4 (10-12-23 @ 18:20)    [x ]PPSV2 < or = 30%  [ ]significant weight loss [x ]poor nutritional intake [ ]anasarca[ ]Artificial Nutrition    Other REFERRALS:  [ ]Hospice  [ ]Child Life  [x ]Social Work  [ ]Case management [ ]Holistic Therapy     Goals of Care Document: Indication for Geriatrics and Palliative Care Services/INTERVAL HPI: goals of care/symptom management   SUBJECTIVE AND OBJECTIVE: Patient seen and examined, unable to participate. Patient nonverbal but awake, tracks with eyes, squeezing providers hand when spoken to during encoutner.     Interval events:  Patient spiked fever of 100.5 this AM.    DNR on chart:DNI  DNI      Allergies    procainamide (Other (Severe))    Intolerances    MEDICATIONS  (STANDING):  glycopyrrolate Injectable 0.4 milliGRAM(s) IV Push every 6 hours  metoprolol tartrate Injectable 5 milliGRAM(s) IV Push once  metoprolol tartrate Injectable 5 milliGRAM(s) IV Push every 6 hours  scopolamine 1 mG/72 Hr(s) Patch 1 Patch Transdermal once    MEDICATIONS  (PRN):  bisacodyl Suppository 10 milliGRAM(s) Rectal daily PRN Constipation  hydrALAZINE Injectable 5 milliGRAM(s) IV Push every 6 hours PRN SBP > 160  LORazepam   Injectable 0.5 milliGRAM(s) IV Push every 1 hour PRN Agitation  morphine  - Injectable 1 milliGRAM(s) IV Push every 1 hour PRN dyspnea  morphine  - Injectable 0.5 milliGRAM(s) IV Push every 1 hour PRN Moderate Pain (4 - 6)  morphine  - Injectable 1 milliGRAM(s) IV Push every 1 hour PRN Severe Pain (7 - 10)      ITEMS UNCHECKED ARE NOT PRESENT    PRESENT SYMPTOMS: [ x]Unable to self-report - see [ ] CPOT [x ] PAINADS [x ] RDOS  Source if other than patient:  [ ]Family   [ ]Team     Pain:  [ ]yes [ ]no  QOL impact -   Location -                    Aggravating factors -  Quality -  Radiation -  Timing-  Severity (0-10 scale):  Minimal acceptable level (0-10 scale):     CPOT:    https://www.sccm.org/getattachment/[email or web address]-9n8y-[email or web address]/Critical-Care-Pain-Observation-Tool-(CPOT)    Dyspnea:                           [ ]Mild [ ]Moderate [ ]Severe  Anxiety:                             [ ]Mild [ ]Moderate [ ]Severe  Fatigue:                             [ ]Mild [ ]Moderate [ ]Severe  Nausea:                             [ ]Mild [ ]Moderate [ ]Severe  Loss of appetite:              [ ]Mild [ ]Moderate [ ]Severe  Constipation:                    [ ]Mild [ ]Moderate [ ]Severe    PCSSQ[Palliative Care Spiritual Screening Question]   Severity (0-10):  Score of 4 or > indicate consideration of Chaplaincy referral.  Chaplaincy Referral: [x ] yes [ ] refused [ ] following [ ] Deferred     Caregiver [place]? : [ ] yes [ ] no [ ] Deferred [ ] Declined             Social work referral [ x] Patient & Family Centered Care Referral [ ]     Anticipatory Grief present?:  [ x] yes [ ] no  [ ] Deferred                  Social work referral [ ] Chaplaincy Referral[ x]      Other Symptoms:  [ ]All other review of systems negative   [x ]Unable to obtain due to poor mentation    Palliative Performance Status Version 2:      10   %      http://npcrc.org/files/news/palliative_performance_scale_ppsv2.pdf    PHYSICAL EXAM:  Vital Signs Last 24 Hrs  T(C): 37.4 (30 Dec 2023 09:37), Max: 38.1 (30 Dec 2023 08:30)  T(F): 99.4 (30 Dec 2023 09:37), Max: 100.5 (30 Dec 2023 08:30)  HR: 91 (30 Dec 2023 09:37) (91 - 110)  BP: 115/55 (30 Dec 2023 09:37) (115/55 - 161/73)  BP(mean): 79 (30 Dec 2023 09:37) (79 - 105)  RR: 18 (30 Dec 2023 09:37) (18 - 30)  SpO2: 95% (30 Dec 2023 09:37) (95% - 100%)    Parameters below as of 30 Dec 2023 09:37  Patient On (Oxygen Delivery Method): room air  O2 Flow (L/min): 3     GENERAL: Awake , tracks with eyes intermittently   [ ]Alert  [ ]Oriented x   [ ]Lethargic  [ ]Unarousable  [ ]Verbal  [ x]Non-Verbal  Behavioral:   [ ]Anxiety  [ ]Delirium [ ]Agitation [x ]Other-encephalopathy   HEENT:  [ ]Normal   [x ]Dry mouth   [ ]ET Tube/Trach  [ ]Oral lesions  PULMONARY:   [ ]Clear [x ]Tachypnea  [x ]Audible excessive secretions   [ ]Rhonchi        [ ]Right [ ]Left [ ]Bilateral  [ ]Crackles        [ ]Right [ ]Left [ ]Bilateral  [ ]Wheezing     [ ]Right [ ]Left [ ]Bilateral  [ ]Diminished BS [ ] Right [ ]Left [ ]Bilateral  CARDIOVASCULAR:    [ ]Regular [x ]Irregular [ ]Tachy  [ ]Jose J [ ]Murmur [ ]Other  GASTROINTESTINAL:  [x ]Soft  [ ]Distended   [ ]+BS  [x ]Non tender [ ]Tender  [ ]Other [ ]PEG [ ]OGT/ NGT   Last BM:   GENITOURINARY:  [ ]Normal [x ]Incontinent   [ ]Oliguria/Anuria   [ ]Palmer  MUSCULOSKELETAL:   [ ]Normal   [ ]Weakness  [x ]Bed/Wheelchair bound [ ]Edema  NEUROLOGIC:   [ ]No focal deficits  [ ] Cognitive impairment  [x ] Dysphagia [ ]Dysarthria [ ] Paresis [ ]Other   SKIN: See nursing skin assessment for further details   [ ]Normal  [ ]Rash  [ ]Other  [ ]Pressure ulcer(s) [ ]y [ ]n present on admission    CRITICAL CARE:  [ ]Shock Present  [ ]Septic [ ]Cardiogenic [ ]Neurologic [ ]Hypovolemic  [ ]Vasopressors [ ]Inotropes  [ ]Respiratory failure present [ ]Mechanical Ventilation [ ]Non-invasive ventilatory support [ ]High-Flow   [ ]Acute  [ ]Chronic [ ]Hypoxic  [ ]Hypercarbic [ ]Other  [ ]Other organ failure     LABS:                        10.9   16.38 )-----------( 198      ( 29 Dec 2023 05:57 )             33.5   12-29    144  |  115<H>  |  27<H>  ----------------------------<  202<H>  3.1<L>   |  13<L>  |  1.12    Ca    6.9<L>      29 Dec 2023 05:59  Phos  3.8     12-28  Mg     2.0     12-28        Urinalysis Basic - ( 29 Dec 2023 05:59 )    Color: x / Appearance: x / SG: x / pH: x  Gluc: 202 mg/dL / Ketone: x  / Bili: x / Urobili: x   Blood: x / Protein: x / Nitrite: x   Leuk Esterase: x / RBC: x / WBC x   Sq Epi: x / Non Sq Epi: x / Bacteria: x      RADIOLOGY & ADDITIONAL STUDIES: no new imaging    Protein Calorie Malnutrition Present: [ ]mild [ ]moderate [ ]severe [ ]underweight [ ]morbid obesity  https://www.andeal.org/vault/2440/web/files/ONC/Table_Clinical%20Characteristics%20to%20Document%20Malnutrition-White%20JV%20et%20al%202012.pdf    Height (cm): 177.8 (12-28-23 @ 21:15), 188 (10-12-23 @ 18:20), 188 (10-11-23 @ 18:18)  Weight (kg): 70.9 (12-27-23 @ 08:28), 117.9 (10-11-23 @ 18:18), 81.6 (08-25-23 @ 15:17)  BMI (kg/m2): 22.4 (12-28-23 @ 21:15), 20.1 (12-27-23 @ 08:28), 33.4 (10-12-23 @ 18:20)    [x ]PPSV2 < or = 30%  [ ]significant weight loss [x ]poor nutritional intake [ ]anasarca[ ]Artificial Nutrition    Other REFERRALS:  [ ]Hospice  [ ]Child Life  [x ]Social Work  [ ]Case management [ ]Holistic Therapy      Indication for Geriatrics and Palliative Care Services/INTERVAL HPI: goals of care/symptom management   SUBJECTIVE AND OBJECTIVE: Patient seen and examined, unable to participate. Patient nonverbal but awake, tracks with eyes, squeezing providers hand when spoken to during encoutner.     Interval events:  Patient spiked fever of 100.5 this AM.    DNR on chart:DNI  DNI      Allergies    procainamide (Other (Severe))    Intolerances    MEDICATIONS  (STANDING):  glycopyrrolate Injectable 0.4 milliGRAM(s) IV Push every 6 hours  metoprolol tartrate Injectable 5 milliGRAM(s) IV Push once  metoprolol tartrate Injectable 5 milliGRAM(s) IV Push every 6 hours  scopolamine 1 mG/72 Hr(s) Patch 1 Patch Transdermal once    MEDICATIONS  (PRN):  bisacodyl Suppository 10 milliGRAM(s) Rectal daily PRN Constipation  hydrALAZINE Injectable 5 milliGRAM(s) IV Push every 6 hours PRN SBP > 160  LORazepam   Injectable 0.5 milliGRAM(s) IV Push every 1 hour PRN Agitation  morphine  - Injectable 1 milliGRAM(s) IV Push every 1 hour PRN dyspnea  morphine  - Injectable 0.5 milliGRAM(s) IV Push every 1 hour PRN Moderate Pain (4 - 6)  morphine  - Injectable 1 milliGRAM(s) IV Push every 1 hour PRN Severe Pain (7 - 10)      ITEMS UNCHECKED ARE NOT PRESENT    PRESENT SYMPTOMS: [ x]Unable to self-report - see [ ] CPOT [x ] PAINADS [x ] RDOS  Source if other than patient:  [ ]Family   [ ]Team     Pain:  [ ]yes [ ]no  QOL impact -   Location -                    Aggravating factors -  Quality -  Radiation -  Timing-  Severity (0-10 scale):  Minimal acceptable level (0-10 scale):     CPOT:    https://www.sccm.org/getattachment/[email or web address]-[email or web address]-0286c7974d7c/Critical-Care-Pain-Observation-Tool-(CPOT)    Dyspnea:                           [ ]Mild [ ]Moderate [ ]Severe  Anxiety:                             [ ]Mild [ ]Moderate [ ]Severe  Fatigue:                             [ ]Mild [ ]Moderate [ ]Severe  Nausea:                             [ ]Mild [ ]Moderate [ ]Severe  Loss of appetite:              [ ]Mild [ ]Moderate [ ]Severe  Constipation:                    [ ]Mild [ ]Moderate [ ]Severe    PCSSQ[Palliative Care Spiritual Screening Question]   Severity (0-10):  Score of 4 or > indicate consideration of Chaplaincy referral.  Chaplaincy Referral: [x ] yes [ ] refused [ ] following [ ] Deferred     Caregiver [place]? : [ ] yes [ ] no [ ] Deferred [ ] Declined             Social work referral [ x] Patient & Family Centered Care Referral [ ]     Anticipatory Grief present?:  [ x] yes [ ] no  [ ] Deferred                  Social work referral [ ] Chaplaincy Referral[ x]      Other Symptoms:  [ ]All other review of systems negative   [x ]Unable to obtain due to poor mentation    Palliative Performance Status Version 2:      10   %      http://npcrc.org/files/news/palliative_performance_scale_ppsv2.pdf    PHYSICAL EXAM:  Vital Signs Last 24 Hrs  T(C): 37.4 (30 Dec 2023 09:37), Max: 38.1 (30 Dec 2023 08:30)  T(F): 99.4 (30 Dec 2023 09:37), Max: 100.5 (30 Dec 2023 08:30)  HR: 91 (30 Dec 2023 09:37) (91 - 110)  BP: 115/55 (30 Dec 2023 09:37) (115/55 - 161/73)  BP(mean): 79 (30 Dec 2023 09:37) (79 - 105)  RR: 18 (30 Dec 2023 09:37) (18 - 30)  SpO2: 95% (30 Dec 2023 09:37) (95% - 100%)    Parameters below as of 30 Dec 2023 09:37  Patient On (Oxygen Delivery Method): room air  O2 Flow (L/min): 3     GENERAL: Awake , tracks with eyes intermittently   [ ]Alert  [ ]Oriented x   [ ]Lethargic  [ ]Unarousable  [ ]Verbal  [ x]Non-Verbal  Behavioral:   [ ]Anxiety  [ ]Delirium [ ]Agitation [x ]Other-encephalopathy   HEENT:  [ ]Normal   [x ]Dry mouth   [ ]ET Tube/Trach  [ ]Oral lesions  PULMONARY:   [ ]Clear [x ]Tachypnea  [x ]Audible excessive secretions   [ ]Rhonchi        [ ]Right [ ]Left [ ]Bilateral  [ ]Crackles        [ ]Right [ ]Left [ ]Bilateral  [ ]Wheezing     [ ]Right [ ]Left [ ]Bilateral  [ ]Diminished BS [ ] Right [ ]Left [ ]Bilateral  CARDIOVASCULAR:    [ ]Regular [x ]Irregular [ ]Tachy  [ ]Jose J [ ]Murmur [ ]Other  GASTROINTESTINAL:  [x ]Soft  [ ]Distended   [ ]+BS  [x ]Non tender [ ]Tender  [ ]Other [ ]PEG [ ]OGT/ NGT   Last BM:   GENITOURINARY:  [ ]Normal [x ]Incontinent   [ ]Oliguria/Anuria   [ ]Palmer  MUSCULOSKELETAL:   [ ]Normal   [ ]Weakness  [x ]Bed/Wheelchair bound [ ]Edema  NEUROLOGIC:   [ ]No focal deficits  [ ] Cognitive impairment  [x ] Dysphagia [ ]Dysarthria [ ] Paresis [ ]Other   SKIN: See nursing skin assessment for further details   [ ]Normal  [ ]Rash  [ ]Other  [ ]Pressure ulcer(s) [ ]y [ ]n present on admission    CRITICAL CARE:  [ ]Shock Present  [ ]Septic [ ]Cardiogenic [ ]Neurologic [ ]Hypovolemic  [ ]Vasopressors [ ]Inotropes  [ ]Respiratory failure present [ ]Mechanical Ventilation [ ]Non-invasive ventilatory support [ ]High-Flow   [ ]Acute  [ ]Chronic [ ]Hypoxic  [ ]Hypercarbic [ ]Other  [ ]Other organ failure     LABS:                        10.9   16.38 )-----------( 198      ( 29 Dec 2023 05:57 )             33.5   12-29    144  |  115<H>  |  27<H>  ----------------------------<  202<H>  3.1<L>   |  13<L>  |  1.12    Ca    6.9<L>      29 Dec 2023 05:59  Phos  3.8     12-28  Mg     2.0     12-28        Urinalysis Basic - ( 29 Dec 2023 05:59 )    Color: x / Appearance: x / SG: x / pH: x  Gluc: 202 mg/dL / Ketone: x  / Bili: x / Urobili: x   Blood: x / Protein: x / Nitrite: x   Leuk Esterase: x / RBC: x / WBC x   Sq Epi: x / Non Sq Epi: x / Bacteria: x      RADIOLOGY & ADDITIONAL STUDIES: no new imaging    Protein Calorie Malnutrition Present: [ ]mild [ ]moderate [ ]severe [ ]underweight [ ]morbid obesity  [email or web address]    Height (cm): 177.8 (12-28-23 @ 21:15), 188 (10-12-23 @ 18:20), 188 (10-11-23 @ 18:18)  Weight (kg): 70.9 (12-27-23 @ 08:28), 117.9 (10-11-23 @ 18:18), 81.6 (08-25-23 @ 15:17)  BMI (kg/m2): 22.4 (12-28-23 @ 21:15), 20.1 (12-27-23 @ 08:28), 33.4 (10-12-23 @ 18:20)    [x ]PPSV2 < or = 30%  [ ]significant weight loss [x ]poor nutritional intake [ ]anasarca[ ]Artificial Nutrition    Other REFERRALS:  [ ]Hospice  [ ]Child Life  [x ]Social Work  [ ]Case management [ ]Holistic Therapy

## 2023-12-30 NOTE — PROGRESS NOTE ADULT - CRITICAL CARE ATTENDING COMMENT
seen in stroke unit with stroke team  David Mancuso MD  Vascular Neurology  Office: [phone] seen in stroke unit with stroke team  David Mancuso MD  Vascular Neurology  Office: [phone]

## 2023-12-30 NOTE — PROGRESS NOTE ADULT - PROBLEM SELECTOR PLAN 2
GOC narrative above  failed S&S eval  no plan for NGT or artificial nutrition at this time  start IV glycopyrrolate 0.4mg q6h ATC given audible secretions  stop IVF - IV Robinul 0.4mg q6 ATC  - If refractory, will consider scopolamine patch   - Turn and position.

## 2023-12-30 NOTE — PROGRESS NOTE ADULT - PROBLEM SELECTOR PLAN 4
DNR/DNI, no artificial nutrition, symptom directed approach  GOC narrative above- plan to transfer to PCU when bed available    In the event of symptoms, recommend the following:  Morphine 0.5mg q1h prn for moderate pain  Morphine 1mg q1h prn for severe pain  Morphine 1mg q1h prn for dyspnea  Ativan 0.5mg q1h prn for anxiety, agitation  glycopyrrolate ATC as above  dulcolax WI PRN daily DNR/DNI, no artificial nutrition, symptom directed approach  GOC narrative above- plan to transfer to PCU when bed available    In the event of symptoms, recommend the following:  Morphine 0.5mg q1h prn for moderate pain  Morphine 1mg q1h prn for severe pain  Morphine 1mg q1h prn for dyspnea  Ativan 0.5mg q1h prn for anxiety, agitation  glycopyrrolate ATC as above  dulcolax IN PRN daily PPS 10%, requires total care  home with 24 hour PTA

## 2023-12-30 NOTE — PROGRESS NOTE ADULT - PROBLEM SELECTOR PLAN 1
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 Similar minimal rightward midline shift since 8/25/2023, with no effacement of the basal cisterns.Mass effect upon the posterior left lateral ventricle. The ventricles are otherwise similar in size to 8/25/2023.  - supportive care with symptom management as below

## 2023-12-31 NOTE — PROGRESS NOTE ADULT - SUBJECTIVE AND OBJECTIVE BOX
Patient is a 89y old  Male who presents with a chief complaint of L thalamic hemorrhage (31 Dec 2023 11:30)      SUBJECTIVE / OVERNIGHT EVENTS: ptn transferred to PCU, on comfort care    MEDICATIONS  (STANDING):  glycopyrrolate Injectable 0.4 milliGRAM(s) IV Push every 6 hours  metoprolol tartrate Injectable 5 milliGRAM(s) IV Push every 6 hours  scopolamine 1 mG/72 Hr(s) Patch 1 Patch Transdermal every 72 hours    MEDICATIONS  (PRN):  acetaminophen  Suppository .. 650 milliGRAM(s) Rectal every 6 hours PRN Temp greater or equal to 38C (100.4F), Mild Pain (1 - 3)  bisacodyl Suppository 10 milliGRAM(s) Rectal daily PRN Constipation  hydrALAZINE Injectable 5 milliGRAM(s) IV Push every 6 hours PRN SBP > 160  HYDROmorphone  Injectable 1 milliGRAM(s) IV Push every 1 hour PRN Severe Pain (7 - 10)  HYDROmorphone  Injectable 1 milliGRAM(s) IV Push every 1 hour PRN SOB/dyspnea  HYDROmorphone  Injectable 0.5 milliGRAM(s) IV Push every 1 hour PRN Moderate Pain (4 - 6)  LORazepam   Injectable 1 milliGRAM(s) IV Push every 1 hour PRN Agitation      Vital Signs Last 24 Hrs  T(F): 98.3 (12-31-23 @ 07:08), Max: 98.9 (12-30-23 @ 20:00)  HR: 83 (12-31-23 @ 07:08) (83 - 104)  BP: 136/99 (12-31-23 @ 07:08) (136/99 - 183/82)  RR: 20 (12-31-23 @ 07:08) (18 - 20)  SpO2: 90% (12-31-23 @ 07:08) (90% - 94%)  Telemetry:   CAPILLARY BLOOD GLUCOSE        I&O's Summary    30 Dec 2023 07:01  -  31 Dec 2023 07:00  --------------------------------------------------------  IN: 0 mL / OUT: 1200 mL / NET: -1200 mL    31 Dec 2023 07:01  -  31 Dec 2023 17:15  --------------------------------------------------------  IN: 0 mL / OUT: 300 mL / NET: -300 mL        PHYSICAL EXAM:  GENERAL: NAD, well-developed  HEAD:  Atraumatic, Normocephalic  EYES: EOMI, PERRLA, conjunctiva and sclera clear  NECK: Supple, No JVD  CHEST/LUNG: Clear to auscultation bilaterally; No wheeze  HEART: Regular rate and rhythm; No murmurs, rubs, or gallops  ABDOMEN: Soft, Nontender, Nondistended; Bowel sounds present  EXTREMITIES:  2+ Peripheral Pulses, No clubbing, cyanosis, or edema  PSYCH: AAOx3  NEUROLOGY: non-focal  SKIN: No rashes or lesions    LABS:                    RADIOLOGY & ADDITIONAL TESTS:    Imaging Personally Reviewed:    Consultant(s) Notes Reviewed:      Care Discussed with Consultants/Other Providers:

## 2023-12-31 NOTE — CONSULT NOTE ADULT - SUBJECTIVE AND OBJECTIVE BOX
Patient is a 89y old  Male who presents with a chief complaint of R sided weakness    HPI:  CHERY AMEZCUA, 89y (01-Dec-1926) M w/ PMHx significant for A.fib s/p PPM on Eliquis digoxin & metoprolol, HTN, HLD, DM, BPH w/ indwelling urinary catheter, macular degeneration presents to the ED due to R sided weakness and aphasia. LKW 3 AM 12/27/2023. Patient was awake and spoke to his overnight home nursing aid at his apartment in [facility]. Later in the morning at 6 AM when his morning nursing aid came to help him out of bed, it was noted that his right side was weak and he was not speaking to the nursing aid. Aid became concerned and called EMS. Patient took his Eliquis the same day. CT head done in the ED revealed a L thalamic hemorrhage with intraventricular extension. CTA was without AVM or aneurysm. Patient's nursing aid arrived who explained that he is normally A&Ox 2 to self and location. Patient was monitored conservatively without neurosurgical intervention. Patients labs reveal WBC increasing from iintial 10.8 to 13.85, to 16.38 and patient developed a fever on 12/28 with Tmax 101.8 and again on 12.30 with tmax 100.5 along with tachycardia and tachypnea with worsening SpO2. Further las show a Ua with large leukocyte esterase, greater than 998 wbc, and many bacteria, H/H 10.9/33.5 which is a decrease from 13.3/39.8, Cr of 1.12, Probnp 2780 and negative RVP. Repeat CT head shows that the bleed is stable.     REVIEW OF SYSTEMS  pending full examination      prior hospital charts reviewed [V]  primary team notes reviewed [V]  other consultant notes reviewed [V]    PAST MEDICAL & SURGICAL HISTORY:  HLD (hyperlipidemia)      Pacemaker      AF (atrial fibrillation)  on Eliquis      HTN (hypertension)      DM (diabetes mellitus)      Macular degeneration      BPH (benign prostatic hypertrophy)      S/P knee replacement, bilateral          SOCIAL HISTORY:  - Denied smoking/vaping/alcohol/recreational drug use    FAMILY HISTORY:  No pertinent family history in first degree relatives        Allergies  procainamide (Other (Severe))        ANTIMICROBIALS:      ANTIMICROBIALS (past 90 days):  MEDICATIONS  (STANDING):  cefTRIAXone   IVPB   100 mL/Hr IV Intermittent (12-27-23 @ 21:50)    cefTRIAXone   IVPB   100 mL/Hr IV Intermittent (12-28-23 @ 11:56)    piperacillin/tazobactam IVPB.   200 mL/Hr IV Intermittent (12-28-23 @ 23:06)    piperacillin/tazobactam IVPB.-   25 mL/Hr IV Intermittent (12-29-23 @ 00:58)    piperacillin/tazobactam IVPB.-   25 mL/Hr IV Intermittent (12-29-23 @ 05:23)        OTHER MEDS:   MEDICATIONS  (STANDING):  acetaminophen  Suppository .. 650 every 6 hours PRN  bisacodyl Suppository 10 daily PRN  glycopyrrolate Injectable 0.4 every 6 hours  hydrALAZINE Injectable 5 every 6 hours PRN  LORazepam   Injectable 0.5 every 1 hour PRN  metoprolol tartrate Injectable 5 once  metoprolol tartrate Injectable 5 every 6 hours  morphine  - Injectable 0.5 every 1 hour PRN  morphine  - Injectable 1 every 1 hour PRN  morphine  - Injectable 1 every 1 hour PRN  morphine  - Injectable 2 once  scopolamine 1 mG/72 Hr(s) Patch 1 every 72 hours      VITALS:  Vital Signs Last 24 Hrs  T(F): 98.3 (12-31-23 @ 07:08), Max: 101.8 (12-28-23 @ 20:00)    Vital Signs Last 24 Hrs  HR: 83 (12-31-23 @ 07:08) (83 - 104)  BP: 136/99 (12-31-23 @ 07:08) (136/99 - 183/82)  RR: 20 (12-31-23 @ 07:08)  SpO2: 90% (12-31-23 @ 07:08) (90% - 94%)  Wt(kg): --    EXAM:  pending full examination      Labs:            WBC Trend:  WBC Count: 16.38 (12-29-23 @ 05:57)  WBC Count: 14.51 (12-28-23 @ 20:47)  WBC Count: 13.05 (12-28-23 @ 04:39)  WBC Count: 10.80 (12-27-23 @ 07:55)      Auto Neutrophil #: 7.69 K/uL (12-27-23 @ 07:55)  Auto Neutrophil #: 7.93 K/uL (10-15-23 @ 07:14)  Auto Neutrophil #: 10.21 K/uL (10-12-23 @ 19:21)  Band Neutrophils %: 5.1 % (10-12-23 @ 19:21)  Auto Neutrophil #: 15.35 K/uL (08-25-23 @ 17:53)      Creatine Trend:  Creatinine: 1.12 (12-29)  Creatinine: 1.36 (12-28)  Creatinine: 0.93 (12-28)  Creatinine: 0.85 (12-27)      Liver Biochemical Testing Trend:  Alanine Aminotransferase (ALT/SGPT): 11 (12-27)  Alanine Aminotransferase (ALT/SGPT): 17 (10-15)  Alanine Aminotransferase (ALT/SGPT): 11 (10-12)  Alanine Aminotransferase (ALT/SGPT): 13 (08-25)  Aspartate Aminotransferase (AST/SGOT): 18 (12-27-23 @ 07:55)  Aspartate Aminotransferase (AST/SGOT): 24 (10-15-23 @ 07:11)  Aspartate Aminotransferase (AST/SGOT): 10 (10-12-23 @ 19:21)  Aspartate Aminotransferase (AST/SGOT): 17 (08-25-23 @ 17:53)  Bilirubin Total: 0.5 (12-27)  Bilirubin Total: 0.4 (10-15)  Bilirubin Total: 0.6 (10-12)  Bilirubin Total: 0.4 (08-25)      Trend LDH    MICROBIOLOGY:  MRSA PCR Result.: NotDetec (08-28-23 @ 20:12)    Culture - Blood (collected 28 Dec 2023 20:27)  Source: .Blood Blood  Preliminary Report:    No growth at 48 Hours    Culture - Blood (collected 28 Dec 2023 20:15)  Source: .Blood Blood  Preliminary Report:    No growth at 48 Hours    Culture - Urine (collected 27 Dec 2023 20:37)  Source: Catheterized Catheterized  Final Report:    <10,000 CFU/mL Normal Urogenital Shannan    Culture - Urine (collected 12 Oct 2023 21:02)  Source: Clean Catch Clean Catch (Midstream)  Final Report:    <10,000 CFU/mL Normal Urogenital Shannan    Culture - Blood (collected 12 Oct 2023 18:35)  Source: .Blood Blood-Peripheral  Final Report:    No growth at 5 days    Culture - Blood (collected 12 Oct 2023 18:30)  Source: .Blood Blood-Peripheral  Final Report:    No growth at 5 days    Culture - Blood (collected 25 Aug 2023 23:00)  Source: .Blood Blood  Final Report:    No growth at 5 days    Culture - Urine (collected 25 Aug 2023 20:29)  Source: Clean Catch Clean Catch (Midstream)  Final Report:    <10,000 CFU/mL Normal Urogenital Shannan    Culture - Blood (collected 25 Aug 2023 18:05)  Source: .Blood Blood-Peripheral  Final Report:    No growth at 5 days    Culture - Blood (collected 25 Aug 2023 17:50)  Source: .Blood Blood-Peripheral  Final Report:    No growth at 5 days    Rapid RVP Result: NotDetec (12-28 @ 20:47)    Troponin T, High Sensitivity Result: 32 (12-27)    Lactate, Blood: 1.4 (12-28 @ 20:47)    A1C with Estimated Average Glucose Result: 6.0 % (12-28-23 @ 04:39)  A1C with Estimated Average Glucose Result: 6.8 % (08-26-23 @ 07:10)      RADIOLOGY:  < from: CT Brain Stroke Protocol (12.27.23 @ 08:05) >  IMPRESSION:    CT brain:  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    Similar minimal rightward midline shift since 8/25/2023, with no   effacement of the basal cisterns.    Mass effect upon the posterior left lateral ventricle. The ventricles are   otherwise similar in size to 8/25/2023.    Similar-appearing 1.2 x 1.2 cm (since 9/26/2021) extra-axial mass within   the left anterolateral foramen magnum cistern. Mild mass effect upon the   cervicomedullary junction. This may represent a meningioma.    CTA brain:  No flow-limiting stenosis or vascular aneurysm.    No evidence for AVM. Please note that this does not exclude thepresence   of a micro-AVM, which may be compressed by hemorrhage.    CTA neck:  No flow-limiting stenosis, evidence for arterial dissection, or vascular   aneurysm.       Patient is a 89y old  Male who presents with a chief complaint of R sided weakness    HPI:  CHERY AMEZCUA, 89y (01-Dec-1926) M w/ PMHx significant for A.fib s/p PPM on Eliquis digoxin & metoprolol, HTN, HLD, DM, BPH w/ indwelling urinary catheter, macular degeneration presents to the ED due to R sided weakness and aphasia. LKW 3 AM 12/27/2023. Patient was awake and spoke to his overnight home nursing aid at his apartment in [facility]. Later in the morning at 6 AM when his morning nursing aid came to help him out of bed, it was noted that his right side was weak and he was not speaking to the nursing aid. Aid became concerned and called EMS. Patient took his Eliquis the same day. CT head done in the ED revealed a L thalamic hemorrhage with intraventricular extension. CTA was without AVM or aneurysm. Patient's nursing aid arrived who explained that he is normally A&Ox 2 to self and location. Patient was monitored conservatively without neurosurgical intervention. Patients labs reveal WBC increasing from iintial 10.8 to 13.85, to 16.38 and patient developed a fever on 12/28 with Tmax 101.8 and again on 12.30 with tmax 100.5 along with tachycardia and tachypnea with worsening SpO2. Further las show a Ua with large leukocyte esterase, greater than 998 wbc, and many bacteria, H/H 10.9/33.5 which is a decrease from 13.3/39.8, Cr of 1.12, Probnp 2780 and negative RVP. Repeat CT head shows that the bleed is stable.     REVIEW OF SYSTEMS  pending full examination      prior hospital charts reviewed [V]  primary team notes reviewed [V]  other consultant notes reviewed [V]    PAST MEDICAL & SURGICAL HISTORY:  HLD (hyperlipidemia)      Pacemaker      AF (atrial fibrillation)  on Eliquis      HTN (hypertension)      DM (diabetes mellitus)      Macular degeneration      BPH (benign prostatic hypertrophy)      S/P knee replacement, bilateral          SOCIAL HISTORY:  - Denied smoking/vaping/alcohol/recreational drug use    FAMILY HISTORY:  No pertinent family history in first degree relatives        Allergies  procainamide (Other (Severe))        ANTIMICROBIALS:      ANTIMICROBIALS (past 90 days):  MEDICATIONS  (STANDING):  cefTRIAXone   IVPB   100 mL/Hr IV Intermittent (12-27-23 @ 21:50)    cefTRIAXone   IVPB   100 mL/Hr IV Intermittent (12-28-23 @ 11:56)    piperacillin/tazobactam IVPB.   200 mL/Hr IV Intermittent (12-28-23 @ 23:06)    piperacillin/tazobactam IVPB.-   25 mL/Hr IV Intermittent (12-29-23 @ 00:58)    piperacillin/tazobactam IVPB.-   25 mL/Hr IV Intermittent (12-29-23 @ 05:23)        OTHER MEDS:   MEDICATIONS  (STANDING):  acetaminophen  Suppository .. 650 every 6 hours PRN  bisacodyl Suppository 10 daily PRN  glycopyrrolate Injectable 0.4 every 6 hours  hydrALAZINE Injectable 5 every 6 hours PRN  LORazepam   Injectable 0.5 every 1 hour PRN  metoprolol tartrate Injectable 5 once  metoprolol tartrate Injectable 5 every 6 hours  morphine  - Injectable 0.5 every 1 hour PRN  morphine  - Injectable 1 every 1 hour PRN  morphine  - Injectable 1 every 1 hour PRN  morphine  - Injectable 2 once  scopolamine 1 mG/72 Hr(s) Patch 1 every 72 hours      VITALS:  Vital Signs Last 24 Hrs  T(F): 98.3 (12-31-23 @ 07:08), Max: 101.8 (12-28-23 @ 20:00)    Vital Signs Last 24 Hrs  HR: 83 (12-31-23 @ 07:08) (83 - 104)  BP: 136/99 (12-31-23 @ 07:08) (136/99 - 183/82)  RR: 20 (12-31-23 @ 07:08)  SpO2: 90% (12-31-23 @ 07:08) (90% - 94%)  Wt(kg): --    EXAM:  pending full examination      Labs:            WBC Trend:  WBC Count: 16.38 (12-29-23 @ 05:57)  WBC Count: 14.51 (12-28-23 @ 20:47)  WBC Count: 13.05 (12-28-23 @ 04:39)  WBC Count: 10.80 (12-27-23 @ 07:55)      Auto Neutrophil #: 7.69 K/uL (12-27-23 @ 07:55)  Auto Neutrophil #: 7.93 K/uL (10-15-23 @ 07:14)  Auto Neutrophil #: 10.21 K/uL (10-12-23 @ 19:21)  Band Neutrophils %: 5.1 % (10-12-23 @ 19:21)  Auto Neutrophil #: 15.35 K/uL (08-25-23 @ 17:53)      Creatine Trend:  Creatinine: 1.12 (12-29)  Creatinine: 1.36 (12-28)  Creatinine: 0.93 (12-28)  Creatinine: 0.85 (12-27)      Liver Biochemical Testing Trend:  Alanine Aminotransferase (ALT/SGPT): 11 (12-27)  Alanine Aminotransferase (ALT/SGPT): 17 (10-15)  Alanine Aminotransferase (ALT/SGPT): 11 (10-12)  Alanine Aminotransferase (ALT/SGPT): 13 (08-25)  Aspartate Aminotransferase (AST/SGOT): 18 (12-27-23 @ 07:55)  Aspartate Aminotransferase (AST/SGOT): 24 (10-15-23 @ 07:11)  Aspartate Aminotransferase (AST/SGOT): 10 (10-12-23 @ 19:21)  Aspartate Aminotransferase (AST/SGOT): 17 (08-25-23 @ 17:53)  Bilirubin Total: 0.5 (12-27)  Bilirubin Total: 0.4 (10-15)  Bilirubin Total: 0.6 (10-12)  Bilirubin Total: 0.4 (08-25)      Trend LDH    MICROBIOLOGY:  MRSA PCR Result.: NotDetec (08-28-23 @ 20:12)    Culture - Blood (collected 28 Dec 2023 20:27)  Source: .Blood Blood  Preliminary Report:    No growth at 48 Hours    Culture - Blood (collected 28 Dec 2023 20:15)  Source: .Blood Blood  Preliminary Report:    No growth at 48 Hours    Culture - Urine (collected 27 Dec 2023 20:37)  Source: Catheterized Catheterized  Final Report:    <10,000 CFU/mL Normal Urogenital Shannan    Culture - Urine (collected 12 Oct 2023 21:02)  Source: Clean Catch Clean Catch (Midstream)  Final Report:    <10,000 CFU/mL Normal Urogenital Shannan    Culture - Blood (collected 12 Oct 2023 18:35)  Source: .Blood Blood-Peripheral  Final Report:    No growth at 5 days    Culture - Blood (collected 12 Oct 2023 18:30)  Source: .Blood Blood-Peripheral  Final Report:    No growth at 5 days    Culture - Blood (collected 25 Aug 2023 23:00)  Source: .Blood Blood  Final Report:    No growth at 5 days    Culture - Urine (collected 25 Aug 2023 20:29)  Source: Clean Catch Clean Catch (Midstream)  Final Report:    <10,000 CFU/mL Normal Urogenital Shannan    Culture - Blood (collected 25 Aug 2023 18:05)  Source: .Blood Blood-Peripheral  Final Report:    No growth at 5 days    Culture - Blood (collected 25 Aug 2023 17:50)  Source: .Blood Blood-Peripheral  Final Report:    No growth at 5 days    Rapid RVP Result: NotDetec (12-28 @ 20:47)    Troponin T, High Sensitivity Result: 32 (12-27)    Lactate, Blood: 1.4 (12-28 @ 20:47)    A1C with Estimated Average Glucose Result: 6.0 % (12-28-23 @ 04:39)  A1C with Estimated Average Glucose Result: 6.8 % (08-26-23 @ 07:10)      RADIOLOGY:  < from: CT Brain Stroke Protocol (12.27.23 @ 08:05) >  IMPRESSION:    CT brain:  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    Similar minimal rightward midline shift since 8/25/2023, with no   effacement of the basal cisterns.    Mass effect upon the posterior left lateral ventricle. The ventricles are   otherwise similar in size to 8/25/2023.    Similar-appearing 1.2 x 1.2 cm (since 9/26/2021) extra-axial mass within   the left anterolateral foramen magnum cistern. Mild mass effect upon the   cervicomedullary junction. This may represent a meningioma.    CTA brain:  No flow-limiting stenosis or vascular aneurysm.    No evidence for AVM. Please note that this does not exclude thepresence   of a micro-AVM, which may be compressed by hemorrhage.    CTA neck:  No flow-limiting stenosis, evidence for arterial dissection, or vascular   aneurysm.

## 2023-12-31 NOTE — PROGRESS NOTE ADULT - ATTENDING COMMENTS
Patient was seen and evaluated with Dr. Mendez, Palliative Medicine Fellow, during bedside rounds.   Agree with above findings and recommendations, edited documentation as appropriate to reflect the plan of care discussed with patient and myself with the following additions:    89yo M with hx of afib on AC, HTN, DM, BPH, HLD who presented with unresponsiveness this morning. Found to have L thalamic hemorrhage.   Patient transferred to PCU for symptom management of end of life care and further disposition planning    In past 24 hours (7AM-7AM), received IV Morphine 0.5mg x1 and IV Morphine 1mg x1.   During repositioning, patient became dyspneic but with no improvement with IV Morphine 1mg and IV Ativan 0.5mg; additional dose of IV Morphine 2mg x1 and IV Ativan 1mg x1 given afterwards with only mild improvement of tachypnea     > D/C IV Morphine as tachypnea refractory to increasing doses  > Start IV Dilaudid 0.5mg q1 PRN moderate pain  > Start IV Dilaudid 1mg q1 PRN severe pain/ dyspnea   > Increase to IV Ativan 1mg q1 PRN anxiety/agitation/ refractory dyspnea  > Start Scopolamine Patch as secretions refractory to IV Robinul ATC     Hospice transition to be addressed if clinical condition permits.  Requires PCU care for ongoing titration of palliative regimen.   Rest of Management as above  Family updated on PCU plan of care  Discussed care plan with RN Patient was seen and evaluated with Dr. Mendez, Palliative Medicine Fellow, during bedside rounds.   Agree with above findings and recommendations, edited documentation as appropriate to reflect the plan of care discussed with patient and myself with the following additions:    89yo M with hx of afib on AC, HTN, DM, BPH, HLD who presented with unresponsiveness this morning. Found to have L thalamic hemorrhage.   Patient transferred to PCU for symptom management of end of life care and further disposition planning    In past 24 hours (7AM-7AM), received IV Morphine 0.5mg x1 and IV Morphine 1mg x1.   During repositioning, patient became dyspneic but with no improvement with IV Morphine 1mg and IV Ativan 0.5mg; additional dose of IV Morphine 2mg x1 and IV Ativan 1mg x1 given afterwards with only mild improvement of tachypnea     > D/C IV Morphine as tachypnea refractory to increasing doses  > Start IV Dilaudid 0.5mg q1 PRN moderate pain  > Start IV Dilaudid 1mg q1 PRN severe pain/ dyspnea   > Increase to IV Ativan 1mg q1 PRN anxiety/agitation/ refractory dyspnea  > Start Scopolamine Patch as secretions refractory to IV Robinul ATC     Hospice transition to be addressed if clinical condition permits.  Requires PCU care for ongoing titration of palliative regimen.   Rest of Management as above  Family updated on PCU plan of care  Discussed care plan with RN

## 2023-12-31 NOTE — CONSULT NOTE ADULT - CONSULT REASON
sacral/bilateral buttock and Right heel pressure injuries Present on admission
HTN, Afib
fever
ICH
Stroke code
ich
goals of care, brain bleed

## 2023-12-31 NOTE — PROGRESS NOTE ADULT - SUBJECTIVE AND OBJECTIVE BOX
GAP TEAM PALLIATIVE CARE UNIT PROGRESS NOTE:      [  ] Patient on hospice program.    INDICATION FOR PALLIATIVE CARE UNIT SERVICES/Interval HPI: Patient seen and examined, unable to participate. Patient nonverbal but intermittently awake, tracks with eyes. He is on ATC Robinul. He received morphine x1 0.5mg for mod pain and 1mg x1 for SOB. Also received PRN doses of hydralazine for elevated BP. This morning patient was turned to alleviate pressure but also was dyspneic. Received x1 dose of 0.5mg IV ativan, 1mg IV morphine, 2mg IV morphine, and will try 1mg IV ativan to control dyspnea. Family updated at bedside.    OVERNIGHT EVENTS:    DNR on chart: DNI  DNI      Allergies    procainamide (Other (Severe))    Intolerances    MEDICATIONS  (STANDING):  glycopyrrolate Injectable 0.4 milliGRAM(s) IV Push every 6 hours  LORazepam   Injectable 1 milliGRAM(s) IV Push once  metoprolol tartrate Injectable 5 milliGRAM(s) IV Push once  metoprolol tartrate Injectable 5 milliGRAM(s) IV Push every 6 hours  scopolamine 1 mG/72 Hr(s) Patch 1 Patch Transdermal every 72 hours    MEDICATIONS  (PRN):  acetaminophen  Suppository .. 650 milliGRAM(s) Rectal every 6 hours PRN Temp greater or equal to 38C (100.4F), Mild Pain (1 - 3)  bisacodyl Suppository 10 milliGRAM(s) Rectal daily PRN Constipation  hydrALAZINE Injectable 5 milliGRAM(s) IV Push every 6 hours PRN SBP > 160  LORazepam   Injectable 0.5 milliGRAM(s) IV Push every 1 hour PRN Agitation  morphine  - Injectable 1 milliGRAM(s) IV Push every 1 hour PRN dyspnea  morphine  - Injectable 0.5 milliGRAM(s) IV Push every 1 hour PRN Moderate Pain (4 - 6)  morphine  - Injectable 1 milliGRAM(s) IV Push every 1 hour PRN Severe Pain (7 - 10)    ITEMS UNCHECKED ARE NOT PRESENT    PRESENT SYMPTOMS: [ x]Unable to self-report see PAINAD, RDOS below  Source if other than patient:  [ ]Family   [ ]Team     Pain: [ ] yes [ ] no  QOL impact -   Location -                    Aggravating factors -  Quality -  Radiation -  Timing-  Severity (0-10 scale):  Minimal acceptable level (0-10 scale):     Dyspnea:                           [ ]Mild [ ]Moderate [ ]Severe  Anxiety:                             [ ]Mild [ ]Moderate [ ]Severe  Fatigue:                             [ ]Mild [ ]Moderate [ ]Severe  Nausea:                             [ ]Mild [ ]Moderate [ ]Severe  Loss of appetite:              [ ]Mild [ ]Moderate [ ]Severe  Constipation:                    [ ]Mild [ ]Moderate [ ]Severe    PCSSQ [Palliative Care Spiritual Screening Question]   Severity (0-10):  Score of 4 or > indicate consideration of Chaplaincy referral.  Chaplaincy Referral: [x ] yes [ ] refused [ ] following [ ] Deferred     Caregiver [place]? : [ ] yes [ ] no [ ] Deferred [ ] Declined             Social work referral [ x] Patient & Family Centered Care Referral [ ]     Anticipatory Grief present?:  [ x] yes [ ] no  [ ] Deferred                  Social work referral [ ] Chaplaincy Referral[ x]      Other Symptoms:  [ ]All other review of systems negative   [x ]Unable to obtain due to poor mentation      PHYSICAL EXAM:   Vital Signs Last 24 Hrs  T(C): 36.8 (31 Dec 2023 07:08), Max: 37.2 (30 Dec 2023 20:00)  T(F): 98.3 (31 Dec 2023 07:08), Max: 98.9 (30 Dec 2023 20:00)  HR: 83 (31 Dec 2023 07:08) (83 - 104)  BP: 136/99 (31 Dec 2023 07:08) (136/99 - 183/82)  BP(mean): --  RR: 20 (31 Dec 2023 07:08) (18 - 20)  SpO2: 90% (31 Dec 2023 07:08) (90% - 94%)    Parameters below as of 31 Dec 2023 07:08  Patient On (Oxygen Delivery Method): room air     I&O's Summary    30 Dec 2023 07:01  -  31 Dec 2023 07:00  --------------------------------------------------------  IN: 0 mL / OUT: 1200 mL / NET: -1200 mL      GENERAL: intermittently awake and tracks with eyes  [ ]Alert  [ ]Oriented x   [ ]Lethargic  [ ]Unarousable  [ ]Verbal  [ x]Non-Verbal  Behavioral:   [ ]Anxiety  [ ]Delirium [ ]Agitation [x ]Other-encephalopathy   HEENT:  [ ]Normal   [x ]Dry mouth   [ ]ET Tube/Trach  [ ]Oral lesions  PULMONARY:   [ ]Clear [x ]Tachypnea  [x ]Audible excessive secretions   [ ]Rhonchi        [ ]Right [ ]Left [ ]Bilateral  [ ]Crackles        [ ]Right [ ]Left [ ]Bilateral  [ ]Wheezing     [ ]Right [ ]Left [ ]Bilateral  [ ]Diminished BS [ ] Right [ ]Left [ ]Bilateral  CARDIOVASCULAR:    [ ]Regular [x ]Irregular [ ]Tachy  [ ]Jose J [ ]Murmur [ ]Other  GASTROINTESTINAL:  [x ]Soft  [ ]Distended   [ ]+BS  [x ]Non tender [ ]Tender  [ ]Other [ ]PEG [ ]OGT/ NGT   Last BM: 12/31/23  GENITOURINARY:  [ ]Normal [x ]Incontinent   [ ]Oliguria/Anuria   [ ]Palmer  MUSCULOSKELETAL:   [ ]Normal   [ ]Weakness  [x ]Bed/Wheelchair bound [ ]Edema  NEUROLOGIC:   [ ]No focal deficits  [ ] Cognitive impairment  [x ] Dysphagia [ ]Dysarthria [ ] Paresis [ ]Other   SKIN: See nursing skin assessment for further details   [ ]Normal  [ ]Rash  [ ]Other  [ ]Pressure ulcer(s) [ ]y [ ]n present on admission    CRITICAL CARE:  [ ]Shock Present  [ ]Septic [ ]Cardiogenic [ ]Neurologic [ ]Hypovolemic  [ ]Vasopressors [ ]Inotropes  [ ]Respiratory failure present [ ]Mechanical Ventilation [ ]Non-invasive ventilatory support [ ]High-Flow   [ ]Acute  [ ]Chronic [ ]Hypoxic  [ ]Hypercarbic [ ]Other  [ ]Other organ failure       LABS: none new    RADIOLOGY & ADDITIONAL STUDIES: none new    PROTEIN CALORIE MALNUTRITION: [ ] mild [ ] moderate [ ] severe  [ ] underweight [ ] morbid obesity    https://www.andeal.org/vault/1940/web/files/ONC/Table_Clinical%20Characteristics%20to%20Document%20Malnutrition-White%20JV%20et%20al%202012.pdf    Height (cm): 177.8 (12-28-23 @ 21:15), 188 (10-12-23 @ 18:20), 188 (10-11-23 @ 18:18)  Weight (kg): 70.9 (12-27-23 @ 08:28), 117.9 (10-11-23 @ 18:18), 81.6 (08-25-23 @ 15:17)  BMI (kg/m2): 22.4 (12-28-23 @ 21:15), 20.1 (12-27-23 @ 08:28), 33.4 (10-12-23 @ 18:20)    [x ] PPSV2 < or = 30% [ ] significant weight loss [x ] poor nutritional intake [ ] anasarca   Artificial Nutrition [ ]     Other REFERRALS:    [ ] Hospice  [ ]Child Life  [x ]Social Work  [ ]Case management [ ]Holistic Therapy [ ] Physical Therapy [ ] Dietary         Palliative Performance Scale:  http://npcrc.org/files/news/palliative_performance_scale_ppsv2.pdf  (Ctrl +  left click to view)  Respiratory Distress Observation Tool:  https://[email or web address].net/handouts/hen/Respiratory_Distress_Observation_Scale.pdf (Ctrl +  left click to view)  PAINAD Score:  http://[email or web address]/cog/painad.pdf (Ctrl +  left click to view)   GAP TEAM PALLIATIVE CARE UNIT PROGRESS NOTE:      [  ] Patient on hospice program.    INDICATION FOR PALLIATIVE CARE UNIT SERVICES/Interval HPI: Patient seen and examined, unable to participate. Patient nonverbal but intermittently awake, tracks with eyes. He is on ATC Robinul. He received morphine x1 0.5mg for mod pain and 1mg x1 for SOB. Also received PRN doses of hydralazine for elevated BP. This morning patient was turned to alleviate pressure but also was dyspneic. Received x1 dose of 0.5mg IV ativan, 1mg IV morphine, 2mg IV morphine, and will try 1mg IV ativan to control dyspnea. Family updated at bedside.    OVERNIGHT EVENTS:    DNR on chart: DNI  DNI      Allergies    procainamide (Other (Severe))    Intolerances    MEDICATIONS  (STANDING):  glycopyrrolate Injectable 0.4 milliGRAM(s) IV Push every 6 hours  LORazepam   Injectable 1 milliGRAM(s) IV Push once  metoprolol tartrate Injectable 5 milliGRAM(s) IV Push once  metoprolol tartrate Injectable 5 milliGRAM(s) IV Push every 6 hours  scopolamine 1 mG/72 Hr(s) Patch 1 Patch Transdermal every 72 hours    MEDICATIONS  (PRN):  acetaminophen  Suppository .. 650 milliGRAM(s) Rectal every 6 hours PRN Temp greater or equal to 38C (100.4F), Mild Pain (1 - 3)  bisacodyl Suppository 10 milliGRAM(s) Rectal daily PRN Constipation  hydrALAZINE Injectable 5 milliGRAM(s) IV Push every 6 hours PRN SBP > 160  LORazepam   Injectable 0.5 milliGRAM(s) IV Push every 1 hour PRN Agitation  morphine  - Injectable 1 milliGRAM(s) IV Push every 1 hour PRN dyspnea  morphine  - Injectable 0.5 milliGRAM(s) IV Push every 1 hour PRN Moderate Pain (4 - 6)  morphine  - Injectable 1 milliGRAM(s) IV Push every 1 hour PRN Severe Pain (7 - 10)    ITEMS UNCHECKED ARE NOT PRESENT    PRESENT SYMPTOMS: [ x]Unable to self-report see PAINAD, RDOS below  Source if other than patient:  [ ]Family   [ ]Team     Pain: [ ] yes [ ] no  QOL impact -   Location -                    Aggravating factors -  Quality -  Radiation -  Timing-  Severity (0-10 scale):  Minimal acceptable level (0-10 scale):     Dyspnea:                           [ ]Mild [ ]Moderate [ ]Severe  Anxiety:                             [ ]Mild [ ]Moderate [ ]Severe  Fatigue:                             [ ]Mild [ ]Moderate [ ]Severe  Nausea:                             [ ]Mild [ ]Moderate [ ]Severe  Loss of appetite:              [ ]Mild [ ]Moderate [ ]Severe  Constipation:                    [ ]Mild [ ]Moderate [ ]Severe    PCSSQ [Palliative Care Spiritual Screening Question]   Severity (0-10):  Score of 4 or > indicate consideration of Chaplaincy referral.  Chaplaincy Referral: [x ] yes [ ] refused [ ] following [ ] Deferred     Caregiver [place]? : [ ] yes [ ] no [ ] Deferred [ ] Declined             Social work referral [ x] Patient & Family Centered Care Referral [ ]     Anticipatory Grief present?:  [ x] yes [ ] no  [ ] Deferred                  Social work referral [ ] Chaplaincy Referral[ x]      Other Symptoms:  [ ]All other review of systems negative   [x ]Unable to obtain due to poor mentation      PHYSICAL EXAM:   Vital Signs Last 24 Hrs  T(C): 36.8 (31 Dec 2023 07:08), Max: 37.2 (30 Dec 2023 20:00)  T(F): 98.3 (31 Dec 2023 07:08), Max: 98.9 (30 Dec 2023 20:00)  HR: 83 (31 Dec 2023 07:08) (83 - 104)  BP: 136/99 (31 Dec 2023 07:08) (136/99 - 183/82)  BP(mean): --  RR: 20 (31 Dec 2023 07:08) (18 - 20)  SpO2: 90% (31 Dec 2023 07:08) (90% - 94%)    Parameters below as of 31 Dec 2023 07:08  Patient On (Oxygen Delivery Method): room air     I&O's Summary    30 Dec 2023 07:01  -  31 Dec 2023 07:00  --------------------------------------------------------  IN: 0 mL / OUT: 1200 mL / NET: -1200 mL      GENERAL: intermittently awake and tracks with eyes  [ ]Alert  [ ]Oriented x   [ ]Lethargic  [ ]Unarousable  [ ]Verbal  [ x]Non-Verbal  Behavioral:   [ ]Anxiety  [ ]Delirium [ ]Agitation [x ]Other-encephalopathy   HEENT:  [ ]Normal   [x ]Dry mouth   [ ]ET Tube/Trach  [ ]Oral lesions  PULMONARY:   [ ]Clear [x ]Tachypnea  [x ]Audible excessive secretions   [ ]Rhonchi        [ ]Right [ ]Left [ ]Bilateral  [ ]Crackles        [ ]Right [ ]Left [ ]Bilateral  [ ]Wheezing     [ ]Right [ ]Left [ ]Bilateral  [ ]Diminished BS [ ] Right [ ]Left [ ]Bilateral  CARDIOVASCULAR:    [ ]Regular [x ]Irregular [ ]Tachy  [ ]Jose J [ ]Murmur [ ]Other  GASTROINTESTINAL:  [x ]Soft  [ ]Distended   [ ]+BS  [x ]Non tender [ ]Tender  [ ]Other [ ]PEG [ ]OGT/ NGT   Last BM: 12/31/23  GENITOURINARY:  [ ]Normal [x ]Incontinent   [ ]Oliguria/Anuria   [ ]Palmer  MUSCULOSKELETAL:   [ ]Normal   [ ]Weakness  [x ]Bed/Wheelchair bound [ ]Edema  NEUROLOGIC:   [ ]No focal deficits  [ ] Cognitive impairment  [x ] Dysphagia [ ]Dysarthria [ ] Paresis [ ]Other   SKIN: See nursing skin assessment for further details   [ ]Normal  [ ]Rash  [ ]Other  [ ]Pressure ulcer(s) [ ]y [ ]n present on admission    CRITICAL CARE:  [ ]Shock Present  [ ]Septic [ ]Cardiogenic [ ]Neurologic [ ]Hypovolemic  [ ]Vasopressors [ ]Inotropes  [ ]Respiratory failure present [ ]Mechanical Ventilation [ ]Non-invasive ventilatory support [ ]High-Flow   [ ]Acute  [ ]Chronic [ ]Hypoxic  [ ]Hypercarbic [ ]Other  [ ]Other organ failure       LABS: none new    RADIOLOGY & ADDITIONAL STUDIES: none new    PROTEIN CALORIE MALNUTRITION: [ ] mild [ ] moderate [ ] severe  [ ] underweight [ ] morbid obesity    https://www.andeal.org/vault/2640/web/files/ONC/Table_Clinical%20Characteristics%20to%20Document%20Malnutrition-White%20JV%20et%20al%202012.pdf    Height (cm): 177.8 (12-28-23 @ 21:15), 188 (10-12-23 @ 18:20), 188 (10-11-23 @ 18:18)  Weight (kg): 70.9 (12-27-23 @ 08:28), 117.9 (10-11-23 @ 18:18), 81.6 (08-25-23 @ 15:17)  BMI (kg/m2): 22.4 (12-28-23 @ 21:15), 20.1 (12-27-23 @ 08:28), 33.4 (10-12-23 @ 18:20)    [x ] PPSV2 < or = 30% [ ] significant weight loss [x ] poor nutritional intake [ ] anasarca   Artificial Nutrition [ ]     Other REFERRALS:    [ ] Hospice  [ ]Child Life  [x ]Social Work  [ ]Case management [ ]Holistic Therapy [ ] Physical Therapy [ ] Dietary         Palliative Performance Scale:  http://npcrc.org/files/news/palliative_performance_scale_ppsv2.pdf  (Ctrl +  left click to view)  Respiratory Distress Observation Tool:  https://[email or web address].net/handouts/hen/Respiratory_Distress_Observation_Scale.pdf (Ctrl +  left click to view)  PAINAD Score:  [email or web address]://[email or web address]/cog/painad.[email or web address] (Ctrl +  left click to view)   GAP TEAM PALLIATIVE CARE UNIT PROGRESS NOTE:      [  ] Patient on hospice program.    INDICATION FOR PALLIATIVE CARE UNIT SERVICES/Interval HPI: Patient seen and examined, unable to participate. Patient nonverbal but intermittently awake, tracks with eyes. He is on ATC Robinul. He received morphine x1 0.5mg for mod pain and 1mg x1 for SOB. Also received PRN doses of hydralazine for elevated BP. This morning patient was turned to alleviate pressure but also was dyspneic. Received x1 dose of 0.5mg IV ativan, 1mg IV morphine, 2mg IV morphine, and will try 1mg IV ativan to control dyspnea. Family updated at bedside.    OVERNIGHT EVENTS:    DNR on chart: DNI  DNI      Allergies    procainamide (Other (Severe))    Intolerances    MEDICATIONS  (STANDING):  glycopyrrolate Injectable 0.4 milliGRAM(s) IV Push every 6 hours  LORazepam   Injectable 1 milliGRAM(s) IV Push once  metoprolol tartrate Injectable 5 milliGRAM(s) IV Push once  metoprolol tartrate Injectable 5 milliGRAM(s) IV Push every 6 hours  scopolamine 1 mG/72 Hr(s) Patch 1 Patch Transdermal every 72 hours    MEDICATIONS  (PRN):  acetaminophen  Suppository .. 650 milliGRAM(s) Rectal every 6 hours PRN Temp greater or equal to 38C (100.4F), Mild Pain (1 - 3)  bisacodyl Suppository 10 milliGRAM(s) Rectal daily PRN Constipation  hydrALAZINE Injectable 5 milliGRAM(s) IV Push every 6 hours PRN SBP > 160  LORazepam   Injectable 0.5 milliGRAM(s) IV Push every 1 hour PRN Agitation  morphine  - Injectable 1 milliGRAM(s) IV Push every 1 hour PRN dyspnea  morphine  - Injectable 0.5 milliGRAM(s) IV Push every 1 hour PRN Moderate Pain (4 - 6)  morphine  - Injectable 1 milliGRAM(s) IV Push every 1 hour PRN Severe Pain (7 - 10)    ITEMS UNCHECKED ARE NOT PRESENT    PRESENT SYMPTOMS: [ x]Unable to self-report see PAINAD, RDOS below  Source if other than patient:  [ ]Family   [ ]Team     Pain: [ ] yes [ ] no  QOL impact -   Location -                    Aggravating factors -  Quality -  Radiation -  Timing-  Severity (0-10 scale):  Minimal acceptable level (0-10 scale):     Dyspnea:                           [ ]Mild [ ]Moderate [ ]Severe  Anxiety:                             [ ]Mild [ ]Moderate [ ]Severe  Fatigue:                             [ ]Mild [ ]Moderate [ ]Severe  Nausea:                             [ ]Mild [ ]Moderate [ ]Severe  Loss of appetite:              [ ]Mild [ ]Moderate [ ]Severe  Constipation:                    [ ]Mild [ ]Moderate [ ]Severe    PCSSQ [Palliative Care Spiritual Screening Question]   Severity (0-10):  Score of 4 or > indicate consideration of Chaplaincy referral.  Chaplaincy Referral: [x ] yes [ ] refused [ ] following [ ] Deferred     Caregiver [place]? : [ ] yes [ ] no [ ] Deferred [ ] Declined             Social work referral [ x] Patient & Family Centered Care Referral [ ]     Anticipatory Grief present?:  [ x] yes [ ] no  [ ] Deferred                  Social work referral [ ] Chaplaincy Referral[ x]      Other Symptoms:  [ ]All other review of systems negative   [x ]Unable to obtain due to poor mentation      PHYSICAL EXAM:   Vital Signs Last 24 Hrs  T(C): 36.8 (31 Dec 2023 07:08), Max: 37.2 (30 Dec 2023 20:00)  T(F): 98.3 (31 Dec 2023 07:08), Max: 98.9 (30 Dec 2023 20:00)  HR: 83 (31 Dec 2023 07:08) (83 - 104)  BP: 136/99 (31 Dec 2023 07:08) (136/99 - 183/82)  BP(mean): --  RR: 20 (31 Dec 2023 07:08) (18 - 20)  SpO2: 90% (31 Dec 2023 07:08) (90% - 94%)    Parameters below as of 31 Dec 2023 07:08  Patient On (Oxygen Delivery Method): room air     I&O's Summary    30 Dec 2023 07:01  -  31 Dec 2023 07:00  --------------------------------------------------------  IN: 0 mL / OUT: 1200 mL / NET: -1200 mL      GENERAL: intermittently awake and tracks with eyes  [ ]Alert  [ ]Oriented x   [ ]Lethargic  [ ]Unarousable  [ ]Verbal  [ x]Non-Verbal  Behavioral:   [ ]Anxiety  [ ]Delirium [ ]Agitation [x ]Other-encephalopathy   HEENT:  [ ]Normal   [x ]Dry mouth   [ ]ET Tube/Trach  [ ]Oral lesions  PULMONARY:   [ ]Clear [x ]Tachypnea  [x ]Audible excessive secretions   [ ]Rhonchi        [ ]Right [ ]Left [ ]Bilateral  [ ]Crackles        [ ]Right [ ]Left [ ]Bilateral  [ ]Wheezing     [ ]Right [ ]Left [ ]Bilateral  [ ]Diminished BS [ ] Right [ ]Left [ ]Bilateral  CARDIOVASCULAR:    [ ]Regular [x ]Irregular [ ]Tachy  [ ]Jose J [ ]Murmur [ ]Other  GASTROINTESTINAL:  [x ]Soft  [ ]Distended   [ ]+BS  [x ]Non tender [ ]Tender  [ ]Other [ ]PEG [ ]OGT/ NGT   Last BM: 12/31/23  GENITOURINARY:  [ ]Normal [x ]Incontinent   [ ]Oliguria/Anuria   [ ]Palmer  MUSCULOSKELETAL:   [ ]Normal   [ ]Weakness  [x ]Bed/Wheelchair bound [ ]Edema  NEUROLOGIC:   [ ]No focal deficits  [ ] Cognitive impairment  [x ] Dysphagia [ ]Dysarthria [ ] Paresis [x ]Other -encephalopathy  SKIN: See nursing skin assessment for further details   [ ]Normal  [ ]Rash  [ ]Other  [ ]Pressure ulcer(s) [ ]y [ ]n present on admission    CRITICAL CARE:  [ ]Shock Present  [ ]Septic [ ]Cardiogenic [ ]Neurologic [ ]Hypovolemic  [ ]Vasopressors [ ]Inotropes  [ ]Respiratory failure present [ ]Mechanical Ventilation [ ]Non-invasive ventilatory support [ ]High-Flow   [ ]Acute  [ ]Chronic [ ]Hypoxic  [ ]Hypercarbic [ ]Other  [ ]Other organ failure       LABS: none new    RADIOLOGY & ADDITIONAL STUDIES: none new    PROTEIN CALORIE MALNUTRITION: [ ] mild [ ] moderate [ ] severe  [ ] underweight [ ] morbid obesity    https://www.andeal.org/vault/0470/web/files/ONC/Table_Clinical%20Characteristics%20to%20Document%20Malnutrition-White%20JV%20et%20al%202012.pdf    Height (cm): 177.8 (12-28-23 @ 21:15), 188 (10-12-23 @ 18:20), 188 (10-11-23 @ 18:18)  Weight (kg): 70.9 (12-27-23 @ 08:28), 117.9 (10-11-23 @ 18:18), 81.6 (08-25-23 @ 15:17)  BMI (kg/m2): 22.4 (12-28-23 @ 21:15), 20.1 (12-27-23 @ 08:28), 33.4 (10-12-23 @ 18:20)    [x ] PPSV2 < or = 30% [ ] significant weight loss [x ] poor nutritional intake [ ] anasarca   Artificial Nutrition [ ]     Other REFERRALS:    [ ] Hospice  [ ]Child Life  [x ]Social Work  [ ]Case management [ ]Holistic Therapy [ ] Physical Therapy [ ] Dietary         Palliative Performance Scale:  http://npcrc.org/files/news/palliative_performance_scale_ppsv2.pdf  (Ctrl +  left click to view)  Respiratory Distress Observation Tool:  https://[email or web address].[email or web address]/handouts/hen/Respiratory_Distress_Observation_Scale.pdf (Ctrl +  left click to view)  PAINAD Score:  http://[email or web address].[facility]/cog/painad.pdf (Ctrl +  left click to view)   GAP TEAM PALLIATIVE CARE UNIT PROGRESS NOTE:      [  ] Patient on hospice program.    INDICATION FOR PALLIATIVE CARE UNIT SERVICES/Interval HPI: Patient seen and examined, unable to participate. Patient nonverbal but intermittently awake, tracks with eyes. He is on ATC Robinul. He received morphine x1 0.5mg for mod pain and 1mg x1 for SOB. Also received PRN doses of hydralazine for elevated BP. This morning patient was turned to alleviate pressure but also was dyspneic. Received x1 dose of 0.5mg IV ativan, 1mg IV morphine, 2mg IV morphine, and will try 1mg IV ativan to control dyspnea. Family updated at bedside.    OVERNIGHT EVENTS:    DNR on chart: DNI  DNI      Allergies    procainamide (Other (Severe))    Intolerances    MEDICATIONS  (STANDING):  glycopyrrolate Injectable 0.4 milliGRAM(s) IV Push every 6 hours  LORazepam   Injectable 1 milliGRAM(s) IV Push once  metoprolol tartrate Injectable 5 milliGRAM(s) IV Push once  metoprolol tartrate Injectable 5 milliGRAM(s) IV Push every 6 hours  scopolamine 1 mG/72 Hr(s) Patch 1 Patch Transdermal every 72 hours    MEDICATIONS  (PRN):  acetaminophen  Suppository .. 650 milliGRAM(s) Rectal every 6 hours PRN Temp greater or equal to 38C (100.4F), Mild Pain (1 - 3)  bisacodyl Suppository 10 milliGRAM(s) Rectal daily PRN Constipation  hydrALAZINE Injectable 5 milliGRAM(s) IV Push every 6 hours PRN SBP > 160  LORazepam   Injectable 0.5 milliGRAM(s) IV Push every 1 hour PRN Agitation  morphine  - Injectable 1 milliGRAM(s) IV Push every 1 hour PRN dyspnea  morphine  - Injectable 0.5 milliGRAM(s) IV Push every 1 hour PRN Moderate Pain (4 - 6)  morphine  - Injectable 1 milliGRAM(s) IV Push every 1 hour PRN Severe Pain (7 - 10)    ITEMS UNCHECKED ARE NOT PRESENT    PRESENT SYMPTOMS: [ x]Unable to self-report see PAINAD, RDOS below  Source if other than patient:  [ ]Family   [ ]Team     Pain: [ ] yes [ ] no  QOL impact -   Location -                    Aggravating factors -  Quality -  Radiation -  Timing-  Severity (0-10 scale):  Minimal acceptable level (0-10 scale):     Dyspnea:                           [ ]Mild [ ]Moderate [ ]Severe  Anxiety:                             [ ]Mild [ ]Moderate [ ]Severe  Fatigue:                             [ ]Mild [ ]Moderate [ ]Severe  Nausea:                             [ ]Mild [ ]Moderate [ ]Severe  Loss of appetite:              [ ]Mild [ ]Moderate [ ]Severe  Constipation:                    [ ]Mild [ ]Moderate [ ]Severe    PCSSQ [Palliative Care Spiritual Screening Question]   Severity (0-10):  Score of 4 or > indicate consideration of Chaplaincy referral.  Chaplaincy Referral: [x ] yes [ ] refused [ ] following [ ] Deferred     Caregiver [place]? : [ ] yes [ ] no [ ] Deferred [ ] Declined             Social work referral [ x] Patient & Family Centered Care Referral [ ]     Anticipatory Grief present?:  [ x] yes [ ] no  [ ] Deferred                  Social work referral [ ] Chaplaincy Referral[ x]      Other Symptoms:  [ ]All other review of systems negative   [x ]Unable to obtain due to poor mentation      PHYSICAL EXAM:   Vital Signs Last 24 Hrs  T(C): 36.8 (31 Dec 2023 07:08), Max: 37.2 (30 Dec 2023 20:00)  T(F): 98.3 (31 Dec 2023 07:08), Max: 98.9 (30 Dec 2023 20:00)  HR: 83 (31 Dec 2023 07:08) (83 - 104)  BP: 136/99 (31 Dec 2023 07:08) (136/99 - 183/82)  BP(mean): --  RR: 20 (31 Dec 2023 07:08) (18 - 20)  SpO2: 90% (31 Dec 2023 07:08) (90% - 94%)    Parameters below as of 31 Dec 2023 07:08  Patient On (Oxygen Delivery Method): room air     I&O's Summary    30 Dec 2023 07:01  -  31 Dec 2023 07:00  --------------------------------------------------------  IN: 0 mL / OUT: 1200 mL / NET: -1200 mL      GENERAL: intermittently awake and tracks with eyes  [ ]Alert  [ ]Oriented x   [ ]Lethargic  [ ]Unarousable  [ ]Verbal  [ x]Non-Verbal  Behavioral:   [ ]Anxiety  [ ]Delirium [ ]Agitation [x ]Other-encephalopathy   HEENT:  [ ]Normal   [x ]Dry mouth   [ ]ET Tube/Trach  [ ]Oral lesions  PULMONARY:   [ ]Clear [x ]Tachypnea  [x ]Audible excessive secretions   [ ]Rhonchi        [ ]Right [ ]Left [ ]Bilateral  [ ]Crackles        [ ]Right [ ]Left [ ]Bilateral  [ ]Wheezing     [ ]Right [ ]Left [ ]Bilateral  [ ]Diminished BS [ ] Right [ ]Left [ ]Bilateral  CARDIOVASCULAR:    [ ]Regular [x ]Irregular [ ]Tachy  [ ]Jose J [ ]Murmur [ ]Other  GASTROINTESTINAL:  [x ]Soft  [ ]Distended   [ ]+BS  [x ]Non tender [ ]Tender  [ ]Other [ ]PEG [ ]OGT/ NGT   Last BM: 12/31/23  GENITOURINARY:  [ ]Normal [x ]Incontinent   [ ]Oliguria/Anuria   [ ]Palmer  MUSCULOSKELETAL:   [ ]Normal   [ ]Weakness  [x ]Bed/Wheelchair bound [ ]Edema  NEUROLOGIC:   [ ]No focal deficits  [ ] Cognitive impairment  [x ] Dysphagia [ ]Dysarthria [ ] Paresis [x ]Other -encephalopathy  SKIN: See nursing skin assessment for further details   [ ]Normal  [ ]Rash  [ ]Other  [ ]Pressure ulcer(s) [ ]y [ ]n present on admission    CRITICAL CARE:  [ ]Shock Present  [ ]Septic [ ]Cardiogenic [ ]Neurologic [ ]Hypovolemic  [ ]Vasopressors [ ]Inotropes  [ ]Respiratory failure present [ ]Mechanical Ventilation [ ]Non-invasive ventilatory support [ ]High-Flow   [ ]Acute  [ ]Chronic [ ]Hypoxic  [ ]Hypercarbic [ ]Other  [ ]Other organ failure       LABS: none new    RADIOLOGY & ADDITIONAL STUDIES: none new    PROTEIN CALORIE MALNUTRITION: [ ] mild [ ] moderate [ ] severe  [ ] underweight [ ] morbid obesity    https://www.andeal.org/vault/3700/web/files/ONC/Table_Clinical%20Characteristics%20to%20Document%20Malnutrition-White%20JV%20et%20al%202012.pdf    Height (cm): 177.8 (12-28-23 @ 21:15), 188 (10-12-23 @ 18:20), 188 (10-11-23 @ 18:18)  Weight (kg): 70.9 (12-27-23 @ 08:28), 117.9 (10-11-23 @ 18:18), 81.6 (08-25-23 @ 15:17)  BMI (kg/m2): 22.4 (12-28-23 @ 21:15), 20.1 (12-27-23 @ 08:28), 33.4 (10-12-23 @ 18:20)    [x ] PPSV2 < or = 30% [ ] significant weight loss [x ] poor nutritional intake [ ] anasarca   Artificial Nutrition [ ]     Other REFERRALS:    [ ] Hospice  [ ]Child Life  [x ]Social Work  [ ]Case management [ ]Holistic Therapy [ ] Physical Therapy [ ] Dietary         Palliative Performance Scale:  http://npcrc.org/files/news/palliative_performance_scale_ppsv2.pdf  (Ctrl +  left click to view)  Respiratory Distress Observation Tool:  https://homecareinformation.net/handouts/hen/Respiratory_Distress_Observation_Scale.pdf (Ctrl +  left click to view)  PAINAD Score:  http://geriatrictoolkit.[place].[facility]/cog/painad.pdf (Ctrl +  left click to view)

## 2023-12-31 NOTE — CONSULT NOTE ADULT - CONSULT REQUESTED DATE/TIME
28-Dec-2023 11:43
27-Dec-2023
27-Dec-2023 09:48
27-Dec-2023 10:07
31-Dec-2023 11:02
27-Dec-2023 15:00
27-Dec-2023 17:36

## 2023-12-31 NOTE — PROGRESS NOTE ADULT - ASSESSMENT
89y M WELL KNOWN TO ME FROM PREVIOUS MULTIPLE ADMISSIONS AT [facility]  ptn presents with aphasia and R sided weakness  Stroke code called. LKW 3 AM 12/27/2023. Patient was awake and spoke to his overnight home nursing aide at his apartment in [facility]. Later in the morning at 6 AM when this morning nursing aide came to help him out of bed, it was noted that his right side was weak and he was not speaking to the nursing aide. The aide became concerned and called EMS. Patient took his Eliquis the same day.   CT head done in the ED revealed a L thalamic hemorrhage with intraventricular extension. CTA was without AVM or aneurysm. Patient's PT, PTT & INR were elevated and he was given Kcentra to reverse anticoagulation from Eliquis.   On exam patient was attempting to answer questions with grunts and moans. He expressed understanding by following verbal commands. Patient's nursing aide arrived who explained that he is normally A&Ox 2 to self and location. He requires 24 hrs home nursing assistance and is not able to get out of bed on his own. He walks with a walker. Aide is unsure of overnight events.  though it was reported to her by the overnight aide that the patient was up and able to talk and walk at 3 AM the same day.   Comprehensive ROS unobtainable due to patient's aphasia.    PMHx significant for A.fib s/p PPM on Eliquis digoxin & metoprolol, HTN, HLD, DM, BPH w/ indwelling urinary catheter, macular degeneration       NIHSS: 12  Patient was not a tenecteplase candidate as he presented outside the time window and hemorrhage was identified on CT  Patient was not a thrombectomy candidate as no LVO was identified on imaging.    Acute onset R hemiparesis with expressive aphasia. Localized to L cerebral hemisphere. CT head reveals L thalamic hemorrhage with intraventricular extension without AVM or aneurysm on CTA.     Ptn transferred to PCU from  NEURO service  on comfort care, family at bedside  DNR/DNI  on comfort meds  on anti HTN IV meds       89y M WELL KNOWN TO ME FROM PREVIOUS MULTIPLE ADMISSIONS AT [facility]  ptn presents with aphasia and R sided weakness  Stroke code called. LKW 3 AM 12/27/2023. Patient was awake and spoke to his overnight home nursing aide at his apartment in [facility]. Later in the morning at 6 AM when this morning nursing aide came to help him out of bed, it was noted that his right side was weak and he was not speaking to the nursing aide. The aide became concerned and called EMS. Patient took his Eliquis the same day.   CT head done in the ED revealed a L thalamic hemorrhage with intraventricular extension. CTA was without AVM or aneurysm. Patient's PT, PTT & INR were elevated and he was given Kcentra to reverse anticoagulation from Eliquis.   On exam patient was attempting to answer questions with grunts and moans. He expressed understanding by following verbal commands. Patient's nursing aide arrived who explained that he is normally A&Ox 2 to self and location. He requires 24 hrs home nursing assistance and is not able to get out of bed on his own. He walks with a walker. Aide is unsure of overnight events.  though it was reported to her by the overnight aide that the patient was up and able to talk and walk at 3 AM the same day.   Comprehensive ROS unobtainable due to patient's aphasia.    PMHx significant for A.fib s/p PPM on Eliquis digoxin & metoprolol, HTN, HLD, DM, BPH w/ indwelling urinary catheter, macular degeneration       NIHSS: 12  Patient was not a tenecteplase candidate as he presented outside the time window and hemorrhage was identified on CT  Patient was not a thrombectomy candidate as no LVO was identified on imaging.    Acute onset R hemiparesis with expressive aphasia. Localized to L cerebral hemisphere. CT head reveals L thalamic hemorrhage with intraventricular extension without AVM or aneurysm on CTA.     Ptn transferred to PCU from  NEURO service  on comfort care, family at bedside  DNR/DNI  on comfort meds  on anti HTN IV meds

## 2023-12-31 NOTE — PROGRESS NOTE ADULT - PROBLEM SELECTOR PLAN 1
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 Similar minimal rightward midline shift since 8/25/2023, with no effacement of the basal cisterns.Mass effect upon the posterior left lateral ventricle. The ventricles are otherwise similar in size to 8/25/2023.  - supportive care with symptom management as below  - Dilaudid 0.5mg IV q1h PRN mod pain  - Dilaudid 1mg IV q1h PRN severe pain - CT Head 12/27 - Acute left thalamic hemorrhage with intraventricular extension. Similar minimal rightward midline shift since 8/25/2023, with no effacement of the basal cisterns.Mass effect upon the posterior left lateral ventricle. The ventricles are otherwise similar in size to 8/25/2023.  - supportive care with symptom management as below  - Start Dilaudid 0.5mg IV q1h PRN mod pain  - Start Dilaudid 1mg IV q1h PRN severe pain

## 2023-12-31 NOTE — CONSULT NOTE ADULT - ASSESSMENT
Impression:  CHERY AMEZCUA, 89y (01-Dec-1926) M w/ PMHx significant for A.fib s/p PPM on Eliquis digoxin & metoprolol, HTN, HLD, DM, BPH w/ indwelling urinary catheter, macular degeneration presents to the ED due to R sided weakness and aphasia. LKW 3 AM 12/27/2023. Patient was awake and spoke to his overnight home nursing aid at his apartment in [facility]. Later in the morning at 6 AM when his morning nursing aid came to help him out of bed, it was noted that his right side was weak and he was not speaking to the nursing aid. Aid became concerned and called EMS. Patient took his Eliquis the same day. CT head done in the ED revealed a L thalamic hemorrhage with intraventricular extension. CTA was without AVM or aneurysm. Patient's nursing aid arrived who explained that he is normally A&Ox 2 to self and location. Patient was monitored conservatively without neurosurgical intervention. Patients labs reveal WBC increasing from iintial 10.8 to 13.85, to 16.38 and patient developed a fever on 12/28 with Tmax 101.8 and again on 12.30 with tmax 100.5 along with tachycardia and tachypnea with worsening SpO2. Further las show a Ua with large leukocyte esterase, greater than 998 wbc, and many bacteria, H/H 10.9/33.5 which is a decrease from 13.3/39.8, Cr of 1.12, Probnp 2780 and negative RVP. Repeat CT head shows that the bleed is stable.     Antimicrobials:  Ceftriaxone 12/27 - 12/28  piperacillin/tazobactam 12/28 - current     Assessment:  *Pyrexia likely due to ascending UTI infection with UA showing large leukocyte esterase, greater than 998 wbc, and many bacteria, hx of BP with indwelling UCx, pyrexia could also be CNS induced but with UA findings unable to be sure   *leukocytosis likely multifactorial in reaction to hemorrhage and to above  *Tachypnea and worsening SpO2 patient previously on Eliquis which was reversed and currently without DVT prophylaxis may be concern for PE, also patient has elevated ProBNP  *L thalamic hemorrhage with intraventricular extension and L to R midline shift, bleed stable   *Acute anemia with H/H 10.9/33.5 which is a decrease from 13.3/39.8  *DM   *Afib    Recommendations: PLEASE DEFER ALL CHANGES IN PLAN UNTIL SIGNED BY ATTENDING. All recommendations are tentative pending Attending Attestation.  - continue piperacillin/tazobactam as patient continued to have fevers after 2 days of ceftriaxone, patient without culture hx of resistant bacteria  - trend fever and WBC curve to monitor the progression of infection   - if febrile again please repeat blood culture x2 and urine culture  - patient may benefit from repeat CTA of chest to rule out CTA  - drop in H/H work up per primary team   - brain hemorrhage per neurosurgery/neurology and primary team   - afib and elevated pro BNP per cardio and primary team     Mark Enrique DO, PGY-4   Infectious Disease Fellow  [facility] Preferred  After 5pm/weekends call [phone]  Impression:  CHERY AMEZCUA, 89y (01-Dec-1926) M w/ PMHx significant for A.fib s/p PPM on Eliquis digoxin & metoprolol, HTN, HLD, DM, BPH w/ indwelling urinary catheter, macular degeneration presents to the ED due to R sided weakness and aphasia. LKW 3 AM 12/27/2023. Patient was awake and spoke to his overnight home nursing aid at his apartment in [facility]. Later in the morning at 6 AM when his morning nursing aid came to help him out of bed, it was noted that his right side was weak and he was not speaking to the nursing aid. Aid became concerned and called EMS. Patient took his Eliquis the same day. CT head done in the ED revealed a L thalamic hemorrhage with intraventricular extension. CTA was without AVM or aneurysm. Patient's nursing aid arrived who explained that he is normally A&Ox 2 to self and location. Patient was monitored conservatively without neurosurgical intervention. Patients labs reveal WBC increasing from iintial 10.8 to 13.85, to 16.38 and patient developed a fever on 12/28 with Tmax 101.8 and again on 12.30 with tmax 100.5 along with tachycardia and tachypnea with worsening SpO2. Further las show a Ua with large leukocyte esterase, greater than 998 wbc, and many bacteria, H/H 10.9/33.5 which is a decrease from 13.3/39.8, Cr of 1.12, Probnp 2780 and negative RVP. Repeat CT head shows that the bleed is stable.     Antimicrobials:  Ceftriaxone 12/27 - 12/28  piperacillin/tazobactam 12/28 - current     Assessment:  *Pyrexia likely due to ascending UTI infection with UA showing large leukocyte esterase, greater than 998 wbc, and many bacteria, hx of BP with indwelling UCx, pyrexia could also be CNS induced but with UA findings unable to be sure   *leukocytosis likely multifactorial in reaction to hemorrhage and to above  *Tachypnea and worsening SpO2 patient previously on Eliquis which was reversed and currently without DVT prophylaxis may be concern for PE, also patient has elevated ProBNP  *L thalamic hemorrhage with intraventricular extension and L to R midline shift, bleed stable   *Acute anemia with H/H 10.9/33.5 which is a decrease from 13.3/39.8  *DM   *Afib    Recommendations: PLEASE DEFER ALL CHANGES IN PLAN UNTIL SIGNED BY ATTENDING. All recommendations are tentative pending Attending Attestation.  - continue piperacillin/tazobactam as patient continued to have fevers after 2 days of ceftriaxone, patient without culture hx of resistant bacteria  - trend fever and WBC curve to monitor the progression of infection   - if febrile again please repeat blood culture x2 and urine culture  - patient may benefit from repeat CTA of chest to rule out CTA  - drop in H/H work up per primary team   - brain hemorrhage per neurosurgery/neurology and primary team   - afib and elevated pro BNP per cardio and primary team     Mark Enrique DO, PGY-4   Infectious Disease Fellow  [facility] Preferred  After 5pm/weekends call [phone]

## 2023-12-31 NOTE — PROGRESS NOTE ADULT - PROBLEM SELECTOR PLAN 3
- Patient with tachypnea and abdominal breathing during encounter; patient received total 3mg morphine, 1.5mg IV ativan this morning; also received morphine 0.5mg x1 for mod pain and 1mg x1 for shortness of breath in past 24h prior to that  - Ativan 1mg q1h PRN agitation  - Dilaudid 1mg q1h PRN dyspnea

## 2023-12-31 NOTE — PROGRESS NOTE ADULT - ASSESSMENT
89yo M with hx of afib on AC, HTN, DM, BPH, HLD who presented with unresponsiveness this morning. Found to have L thalamic hemorrhage.   Palliative following for symptom management for end of life care 89yo M with hx of afib on AC, HTN, DM, BPH, HLD who presented with unresponsiveness this morning. Found to have L thalamic hemorrhage.   Palliative following for symptom management for end of life care

## 2024-01-01 VITALS — TEMPERATURE: 103 F

## 2024-01-01 DIAGNOSIS — R52 PAIN, UNSPECIFIED: ICD-10-CM

## 2024-01-01 DIAGNOSIS — G93.49 OTHER ENCEPHALOPATHY: ICD-10-CM

## 2024-01-01 DIAGNOSIS — R45.1 RESTLESSNESS AND AGITATION: ICD-10-CM

## 2024-01-01 LAB
CULTURE RESULTS: SIGNIFICANT CHANGE UP
SPECIMEN SOURCE: SIGNIFICANT CHANGE UP

## 2024-01-01 PROCEDURE — 83880 ASSAY OF NATRIURETIC PEPTIDE: CPT

## 2024-01-01 PROCEDURE — 83605 ASSAY OF LACTIC ACID: CPT

## 2024-01-01 PROCEDURE — 84540 ASSAY OF URINE/UREA-N: CPT

## 2024-01-01 PROCEDURE — 94640 AIRWAY INHALATION TREATMENT: CPT

## 2024-01-01 PROCEDURE — 84156 ASSAY OF PROTEIN URINE: CPT

## 2024-01-01 PROCEDURE — 99238 HOSP IP/OBS DSCHRG MGMT 30/<: CPT

## 2024-01-01 PROCEDURE — 80048 BASIC METABOLIC PNL TOTAL CA: CPT

## 2024-01-01 PROCEDURE — 96374 THER/PROPH/DIAG INJ IV PUSH: CPT

## 2024-01-01 PROCEDURE — 99233 SBSQ HOSP IP/OBS HIGH 50: CPT | Mod: GC

## 2024-01-01 PROCEDURE — 97162 PT EVAL MOD COMPLEX 30 MIN: CPT

## 2024-01-01 PROCEDURE — 84300 ASSAY OF URINE SODIUM: CPT

## 2024-01-01 PROCEDURE — 87040 BLOOD CULTURE FOR BACTERIA: CPT

## 2024-01-01 PROCEDURE — 82947 ASSAY GLUCOSE BLOOD QUANT: CPT

## 2024-01-01 PROCEDURE — 84100 ASSAY OF PHOSPHORUS: CPT

## 2024-01-01 PROCEDURE — 92610 EVALUATE SWALLOWING FUNCTION: CPT

## 2024-01-01 PROCEDURE — 82962 GLUCOSE BLOOD TEST: CPT

## 2024-01-01 PROCEDURE — 84295 ASSAY OF SERUM SODIUM: CPT

## 2024-01-01 PROCEDURE — 83735 ASSAY OF MAGNESIUM: CPT

## 2024-01-01 PROCEDURE — 36415 COLL VENOUS BLD VENIPUNCTURE: CPT

## 2024-01-01 PROCEDURE — 84132 ASSAY OF SERUM POTASSIUM: CPT

## 2024-01-01 PROCEDURE — 85025 COMPLETE CBC W/AUTO DIFF WBC: CPT

## 2024-01-01 PROCEDURE — 85014 HEMATOCRIT: CPT

## 2024-01-01 PROCEDURE — 83036 HEMOGLOBIN GLYCOSYLATED A1C: CPT

## 2024-01-01 PROCEDURE — 87086 URINE CULTURE/COLONY COUNT: CPT

## 2024-01-01 PROCEDURE — 82803 BLOOD GASES ANY COMBINATION: CPT

## 2024-01-01 PROCEDURE — 81001 URINALYSIS AUTO W/SCOPE: CPT

## 2024-01-01 PROCEDURE — 84133 ASSAY OF URINE POTASSIUM: CPT

## 2024-01-01 PROCEDURE — 70496 CT ANGIOGRAPHY HEAD: CPT | Mod: MA

## 2024-01-01 PROCEDURE — 83935 ASSAY OF URINE OSMOLALITY: CPT

## 2024-01-01 PROCEDURE — 85730 THROMBOPLASTIN TIME PARTIAL: CPT

## 2024-01-01 PROCEDURE — 82570 ASSAY OF URINE CREATININE: CPT

## 2024-01-01 PROCEDURE — 70450 CT HEAD/BRAIN W/O DYE: CPT | Mod: MA

## 2024-01-01 PROCEDURE — 71045 X-RAY EXAM CHEST 1 VIEW: CPT

## 2024-01-01 PROCEDURE — 0225U NFCT DS DNA&RNA 21 SARSCOV2: CPT

## 2024-01-01 PROCEDURE — 80053 COMPREHEN METABOLIC PANEL: CPT

## 2024-01-01 PROCEDURE — 80061 LIPID PANEL: CPT

## 2024-01-01 PROCEDURE — 97165 OT EVAL LOW COMPLEX 30 MIN: CPT

## 2024-01-01 PROCEDURE — 99285 EMERGENCY DEPT VISIT HI MDM: CPT | Mod: 25

## 2024-01-01 PROCEDURE — 70498 CT ANGIOGRAPHY NECK: CPT | Mod: MA

## 2024-01-01 PROCEDURE — 85610 PROTHROMBIN TIME: CPT

## 2024-01-01 PROCEDURE — 82330 ASSAY OF CALCIUM: CPT

## 2024-01-01 PROCEDURE — 85027 COMPLETE CBC AUTOMATED: CPT

## 2024-01-01 PROCEDURE — 82435 ASSAY OF BLOOD CHLORIDE: CPT

## 2024-01-01 PROCEDURE — 84484 ASSAY OF TROPONIN QUANT: CPT

## 2024-01-01 PROCEDURE — 85018 HEMOGLOBIN: CPT

## 2024-01-01 RX ORDER — DEXAMETHASONE 0.5 MG/5ML
4 ELIXIR ORAL ONCE
Refills: 0 | Status: COMPLETED | OUTPATIENT
Start: 2024-01-01 | End: 2024-01-01

## 2024-01-01 RX ORDER — ACETAMINOPHEN 500 MG
1000 TABLET ORAL ONCE
Refills: 0 | Status: COMPLETED | OUTPATIENT
Start: 2024-01-01 | End: 2024-01-01

## 2024-01-01 RX ORDER — HYDROMORPHONE HYDROCHLORIDE 2 MG/ML
0.5 INJECTION INTRAMUSCULAR; INTRAVENOUS; SUBCUTANEOUS EVERY 4 HOURS
Refills: 0 | Status: DISCONTINUED | OUTPATIENT
Start: 2024-01-01 | End: 2024-01-01

## 2024-01-01 RX ADMIN — ROBINUL 0.4 MILLIGRAM(S): 0.2 INJECTION INTRAMUSCULAR; INTRAVENOUS at 00:57

## 2024-01-01 RX ADMIN — Medication 650 MILLIGRAM(S): at 10:42

## 2024-01-01 RX ADMIN — HYDROMORPHONE HYDROCHLORIDE 1 MILLIGRAM(S): 2 INJECTION INTRAMUSCULAR; INTRAVENOUS; SUBCUTANEOUS at 15:20

## 2024-01-01 RX ADMIN — Medication 5 MILLIGRAM(S): at 17:22

## 2024-01-01 RX ADMIN — HYDROMORPHONE HYDROCHLORIDE 0.5 MILLIGRAM(S): 2 INJECTION INTRAMUSCULAR; INTRAVENOUS; SUBCUTANEOUS at 22:53

## 2024-01-01 RX ADMIN — Medication 1000 MILLIGRAM(S): at 17:16

## 2024-01-01 RX ADMIN — SCOPALAMINE 1 PATCH: 1 PATCH, EXTENDED RELEASE TRANSDERMAL at 09:10

## 2024-01-01 RX ADMIN — HYDROMORPHONE HYDROCHLORIDE 0.5 MILLIGRAM(S): 2 INJECTION INTRAMUSCULAR; INTRAVENOUS; SUBCUTANEOUS at 13:25

## 2024-01-01 RX ADMIN — HYDROMORPHONE HYDROCHLORIDE 0.5 MILLIGRAM(S): 2 INJECTION INTRAMUSCULAR; INTRAVENOUS; SUBCUTANEOUS at 17:56

## 2024-01-01 RX ADMIN — ROBINUL 0.4 MILLIGRAM(S): 0.2 INJECTION INTRAMUSCULAR; INTRAVENOUS at 17:41

## 2024-01-01 RX ADMIN — ROBINUL 0.4 MILLIGRAM(S): 0.2 INJECTION INTRAMUSCULAR; INTRAVENOUS at 05:57

## 2024-01-01 RX ADMIN — HYDROMORPHONE HYDROCHLORIDE 1 MILLIGRAM(S): 2 INJECTION INTRAMUSCULAR; INTRAVENOUS; SUBCUTANEOUS at 11:55

## 2024-01-01 RX ADMIN — HYDROMORPHONE HYDROCHLORIDE 1 MILLIGRAM(S): 2 INJECTION INTRAMUSCULAR; INTRAVENOUS; SUBCUTANEOUS at 06:03

## 2024-01-01 RX ADMIN — HYDROMORPHONE HYDROCHLORIDE 1 MILLIGRAM(S): 2 INJECTION INTRAMUSCULAR; INTRAVENOUS; SUBCUTANEOUS at 08:31

## 2024-01-01 RX ADMIN — SCOPALAMINE 1 PATCH: 1 PATCH, EXTENDED RELEASE TRANSDERMAL at 19:05

## 2024-01-01 RX ADMIN — HYDROMORPHONE HYDROCHLORIDE 1 MILLIGRAM(S): 2 INJECTION INTRAMUSCULAR; INTRAVENOUS; SUBCUTANEOUS at 18:42

## 2024-01-01 RX ADMIN — SCOPALAMINE 1 PATCH: 1 PATCH, EXTENDED RELEASE TRANSDERMAL at 19:20

## 2024-01-01 RX ADMIN — Medication 5 MILLIGRAM(S): at 05:57

## 2024-01-01 RX ADMIN — ROBINUL 0.4 MILLIGRAM(S): 0.2 INJECTION INTRAMUSCULAR; INTRAVENOUS at 23:12

## 2024-01-01 RX ADMIN — Medication 5 MILLIGRAM(S): at 06:02

## 2024-01-01 RX ADMIN — ROBINUL 0.4 MILLIGRAM(S): 0.2 INJECTION INTRAMUSCULAR; INTRAVENOUS at 06:03

## 2024-01-01 RX ADMIN — Medication 5 MILLIGRAM(S): at 11:26

## 2024-01-01 RX ADMIN — HYDROMORPHONE HYDROCHLORIDE 0.5 MILLIGRAM(S): 2 INJECTION INTRAMUSCULAR; INTRAVENOUS; SUBCUTANEOUS at 22:38

## 2024-01-01 RX ADMIN — HYDROMORPHONE HYDROCHLORIDE 0.5 MILLIGRAM(S): 2 INJECTION INTRAMUSCULAR; INTRAVENOUS; SUBCUTANEOUS at 13:10

## 2024-01-01 RX ADMIN — Medication 650 MILLIGRAM(S): at 08:27

## 2024-01-01 RX ADMIN — ROBINUL 0.4 MILLIGRAM(S): 0.2 INJECTION INTRAMUSCULAR; INTRAVENOUS at 11:26

## 2024-01-01 RX ADMIN — HYDROMORPHONE HYDROCHLORIDE 1 MILLIGRAM(S): 2 INJECTION INTRAMUSCULAR; INTRAVENOUS; SUBCUTANEOUS at 05:58

## 2024-01-01 RX ADMIN — ROBINUL 0.4 MILLIGRAM(S): 0.2 INJECTION INTRAMUSCULAR; INTRAVENOUS at 17:22

## 2024-01-01 RX ADMIN — Medication 650 MILLIGRAM(S): at 08:06

## 2024-01-01 RX ADMIN — HYDROMORPHONE HYDROCHLORIDE 1 MILLIGRAM(S): 2 INJECTION INTRAMUSCULAR; INTRAVENOUS; SUBCUTANEOUS at 08:34

## 2024-01-01 RX ADMIN — HYDROMORPHONE HYDROCHLORIDE 0.5 MILLIGRAM(S): 2 INJECTION INTRAMUSCULAR; INTRAVENOUS; SUBCUTANEOUS at 17:41

## 2024-01-01 RX ADMIN — Medication 4 MILLIGRAM(S): at 10:58

## 2024-01-01 RX ADMIN — Medication 5 MILLIGRAM(S): at 23:12

## 2024-01-01 RX ADMIN — ROBINUL 0.4 MILLIGRAM(S): 0.2 INJECTION INTRAMUSCULAR; INTRAVENOUS at 11:55

## 2024-01-01 RX ADMIN — Medication 400 MILLIGRAM(S): at 15:24

## 2024-01-01 RX ADMIN — SCOPALAMINE 1 PATCH: 1 PATCH, EXTENDED RELEASE TRANSDERMAL at 07:00

## 2024-01-01 RX ADMIN — SCOPALAMINE 1 PATCH: 1 PATCH, EXTENDED RELEASE TRANSDERMAL at 07:40

## 2024-01-01 NOTE — PROGRESS NOTE ADULT - ATTENDING COMMENTS
Patient was seen and evaluated with Dr. Mendez, Palliative Medicine Fellow, during bedside rounds.   Agree with above findings and recommendations, edited documentation as appropriate to reflect the plan of care discussed with patient and myself with the following additions:    89yo M with hx of afib on AC, HTN, DM, BPH, HLD who presented with unresponsiveness this morning. Found to have L thalamic hemorrhage.   Patient transferred to PCU for symptom management of end of life care and further disposition planning    This AM, patient febrile to 100.8   Patient lethargic.   Son Dillon at bedside today. Educated patient/family about EOL in terms of what to expect.  Questions answered.  Emotional support provided.    Requires PCU care for ongoing titration of palliative regimen for symptom management including IV Dilaudid and IV Ativan   Rest of Management as above  Hospice transition to be addressed if clinical condition permits.  Discussed care plan with RN Patient was seen and evaluated with Dr. Mendez, Palliative Medicine Fellow, during bedside rounds.   Agree with above findings and recommendations, edited documentation as appropriate to reflect the plan of care discussed with patient and myself with the following additions:    89yo M with hx of afib on AC, HTN, DM, BPH, HLD who presented with unresponsiveness this morning. Found to have L thalamic hemorrhage.   Patient transferred to PCU for symptom management of end of life care and further disposition planning    This AM, patient febrile to 100.8   Patient lethargic.   Son Dillon at bedside today. Educated patient/family about EOL in terms of what to expect.  Questions answered.  Emotional support provided.    Requires PCU care for ongoing titration of palliative regimen for symptom management including IV Dilaudid and IV Ativan   Rest of Management as above  Hospice transition to be addressed if clinical condition permits.  Discussed care plan with RN

## 2024-01-01 NOTE — PROGRESS NOTE ADULT - ASSESSMENT
89yo M with hx of afib on AC, HTN, DM, BPH, HLD who presented with unresponsiveness this morning. Found to have L thalamic hemorrhage.   Palliative following for symptom management for end of life care

## 2024-01-01 NOTE — PROGRESS NOTE ADULT - SUBJECTIVE AND OBJECTIVE BOX
GAP TEAM PALLIATIVE CARE UNIT PROGRESS NOTE:      [  ] Patient on hospice program.    INDICATION FOR PALLIATIVE CARE UNIT SERVICES/Interval HPI: symptom management at end of life care    OVERNIGHT EVENTS: Patient seen and examined, unable to participate. Patient nonverbal. He is on ATC Robinul. Patient received 1x dose of Dilaudid for severe pain, 2x doses of Dilaudid for dyspnea, 1x dose of ativan/morphine yesterday in addition due to tachypnea/agitation.      DNR on chart: DNI  DNI      Allergies    procainamide (Other (Severe))    Intolerances    MEDICATIONS  (STANDING):  glycopyrrolate Injectable 0.4 milliGRAM(s) IV Push every 6 hours  metoprolol tartrate Injectable 5 milliGRAM(s) IV Push every 6 hours  scopolamine 1 mG/72 Hr(s) Patch 1 Patch Transdermal every 72 hours    MEDICATIONS  (PRN):  acetaminophen  Suppository .. 650 milliGRAM(s) Rectal every 6 hours PRN Temp greater or equal to 38C (100.4F), Mild Pain (1 - 3)  bisacodyl Suppository 10 milliGRAM(s) Rectal daily PRN Constipation  hydrALAZINE Injectable 5 milliGRAM(s) IV Push every 6 hours PRN SBP > 160  HYDROmorphone  Injectable 0.5 milliGRAM(s) IV Push every 1 hour PRN Moderate Pain (4 - 6)  HYDROmorphone  Injectable 1 milliGRAM(s) IV Push every 1 hour PRN Severe Pain (7 - 10)  HYDROmorphone  Injectable 1 milliGRAM(s) IV Push every 1 hour PRN SOB/dyspnea  LORazepam   Injectable 1 milliGRAM(s) IV Push every 1 hour PRN Agitation    ITEMS UNCHECKED ARE NOT PRESENT    PRESENT SYMPTOMS: [ x]Unable to self-report see PAINAD, RDOS below  Source if other than patient:  [ ]Family   [ x]Team     Pain: [ ] yes [ ] no  QOL impact -   Location -                    Aggravating factors -  Quality -  Radiation -  Timing-  Severity (0-10 scale):  Minimal acceptable level (0-10 scale):     Dyspnea:                           [ ]Mild [ ]Moderate [ ]Severe  Anxiety:                             [ ]Mild [ ]Moderate [ ]Severe  Fatigue:                             [ ]Mild [ ]Moderate [ ]Severe  Nausea:                             [ ]Mild [ ]Moderate [ ]Severe  Loss of appetite:              [ ]Mild [ ]Moderate [ ]Severe  Constipation:                    [ ]Mild [ ]Moderate [ ]Severe    PCSSQ [Palliative Care Spiritual Screening Question]   Severity (0-10):  Score of 4 or > indicate consideration of Chaplaincy referral.  Chaplaincy Referral: [x ] yes [ ] refused [ ] following [ ] Deferred     Caregiver [place]? : [ ] yes [ ] no [ ] Deferred [ ] Declined             Social work referral [ x] Patient & Family Centered Care Referral [ ]     Anticipatory Grief present?:  [ x] yes [ ] no  [ ] Deferred                  Social work referral [ ] Chaplaincy Referral[ x]      Other Symptoms:  [ ]All other review of systems negative   [x ]Unable to obtain due to poor mentation    PHYSICAL EXAM:   Vital Signs Last 24 Hrs  T(C): 38.2 (01 Jan 2024 08:05), Max: 38.2 (01 Jan 2024 08:05)  T(F): 100.8 (01 Jan 2024 08:05), Max: 100.8 (01 Jan 2024 08:05)  HR: 142 (01 Jan 2024 08:05) (142 - 142)  BP: 118/73 (01 Jan 2024 08:05) (118/73 - 118/73)  BP(mean): --  RR: 22 (01 Jan 2024 08:05) (22 - 22)  SpO2: 85% (01 Jan 2024 08:05) (85% - 85%)    Parameters below as of 01 Jan 2024 08:05  Patient On (Oxygen Delivery Method): room air     I&O's Summary    31 Dec 2023 07:01  -  01 Jan 2024 07:00  --------------------------------------------------------  IN: 0 mL / OUT: 400 mL / NET: -400 mL      GENERAL:   [ ]Alert  [ ]Oriented x   [ x]Lethargic  [ ]Unarousable  [ ]Verbal  [ x]Non-Verbal  Behavioral:   [ ]Anxiety  [ ]Delirium [ ]Agitation [x ]Other-encephalopathy   HEENT:  [ ]Normal   [x ]Dry mouth   [ ]ET Tube/Trach  [ ]Oral lesions  PULMONARY:   [ ]Clear [x ]Tachypnea  [x ]Audible excessive secretions   [ ]Rhonchi        [ ]Right [ ]Left [ ]Bilateral  [ ]Crackles        [ ]Right [ ]Left [ ]Bilateral  [ ]Wheezing     [ ]Right [ ]Left [ ]Bilateral  [ ]Diminished BS [ ] Right [ ]Left [ ]Bilateral  CARDIOVASCULAR:    [ ]Regular [x ]Irregular [ ]Tachy  [ ]Jose J [ ]Murmur [ ]Other  GASTROINTESTINAL:  [x ]Soft  [ ]Distended   [ ]+BS  [x ]Non tender [ ]Tender  [ ]Other [ ]PEG [ ]OGT/ NGT   Last BM: 12/31/23  GENITOURINARY:  [ ]Normal [x ]Incontinent   [ ]Oliguria/Anuria   [ ]Palmer  MUSCULOSKELETAL:   [ ]Normal   [ ]Weakness  [x ]Bed/Wheelchair bound [ ]Edema  NEUROLOGIC:   [ ]No focal deficits  [ ] Cognitive impairment  [x ] Dysphagia [ ]Dysarthria [ ] Paresis [x ]Other -encephalopathy  SKIN: See nursing skin assessment for further details   [ ]Normal  [ ]Rash  [ ]Other  [ ]Pressure ulcer(s) [ ]y [ ]n present on admission    CRITICAL CARE:  [ ]Shock Present  [ ]Septic [ ]Cardiogenic [ ]Neurologic [ ]Hypovolemic  [ ]Vasopressors [ ]Inotropes  [ ]Respiratory failure present [ ]Mechanical Ventilation [ ]Non-invasive ventilatory support [ ]High-Flow   [ ]Acute  [ ]Chronic [ ]Hypoxic  [ ]Hypercarbic [ ]Other  [ ]Other organ failure       LABS: none new    RADIOLOGY & ADDITIONAL STUDIES: none new  PROTEIN CALORIE MALNUTRITION: [ ] mild [ ] moderate [ ] severe  [ ] underweight [ ] morbid obesity    https://www.andeal.org/vault/3740/web/files/ONC/Table_Clinical%20Characteristics%20to%20Document%20Malnutrition-White%20JV%20et%20al%202012.pdf    Height (cm): 177.8 (12-28-23 @ 21:15), 188 (10-12-23 @ 18:20), 188 (10-11-23 @ 18:18)  Weight (kg): 70.9 (12-27-23 @ 08:28), 117.9 (10-11-23 @ 18:18), 81.6 (08-25-23 @ 15:17)  BMI (kg/m2): 22.4 (12-28-23 @ 21:15), 20.1 (12-27-23 @ 08:28), 33.4 (10-12-23 @ 18:20)    [x ] PPSV2 < or = 30% [ ] significant weight loss [ ] poor nutritional intake [ ] anasarca   Artificial Nutrition [ ]     Other REFERRALS:    [ ] Hospice  [ ]Child Life  [x ]Social Work  [ ]Case management [ ]Holistic Therapy [ ] Physical Therapy [ ] Dietary         Palliative Performance Scale:  http://npcrc.org/files/news/palliative_performance_scale_ppsv2.pdf  (Ctrl +  left click to view)  Respiratory Distress Observation Tool:  https://homecareinformation.net/handouts/hen/Respiratory_Distress_Observation_Scale.pdf (Ctrl +  left click to view)  PAINAD Score:  http://geriatrictoolkit.[place].[facility]/cog/painad.pdf (Ctrl +  left click to view)   GAP TEAM PALLIATIVE CARE UNIT PROGRESS NOTE:      [  ] Patient on hospice program.    INDICATION FOR PALLIATIVE CARE UNIT SERVICES/Interval HPI: symptom management at end of life care    OVERNIGHT EVENTS: Patient seen and examined, unable to participate. Patient nonverbal. He is on ATC Robinul. Patient received 1x dose of Dilaudid for severe pain, 2x doses of Dilaudid for dyspnea, 1x dose of ativan/morphine yesterday in addition due to tachypnea/agitation.      DNR on chart: DNI  DNI      Allergies    procainamide (Other (Severe))    Intolerances    MEDICATIONS  (STANDING):  glycopyrrolate Injectable 0.4 milliGRAM(s) IV Push every 6 hours  metoprolol tartrate Injectable 5 milliGRAM(s) IV Push every 6 hours  scopolamine 1 mG/72 Hr(s) Patch 1 Patch Transdermal every 72 hours    MEDICATIONS  (PRN):  acetaminophen  Suppository .. 650 milliGRAM(s) Rectal every 6 hours PRN Temp greater or equal to 38C (100.4F), Mild Pain (1 - 3)  bisacodyl Suppository 10 milliGRAM(s) Rectal daily PRN Constipation  hydrALAZINE Injectable 5 milliGRAM(s) IV Push every 6 hours PRN SBP > 160  HYDROmorphone  Injectable 0.5 milliGRAM(s) IV Push every 1 hour PRN Moderate Pain (4 - 6)  HYDROmorphone  Injectable 1 milliGRAM(s) IV Push every 1 hour PRN Severe Pain (7 - 10)  HYDROmorphone  Injectable 1 milliGRAM(s) IV Push every 1 hour PRN SOB/dyspnea  LORazepam   Injectable 1 milliGRAM(s) IV Push every 1 hour PRN Agitation    ITEMS UNCHECKED ARE NOT PRESENT    PRESENT SYMPTOMS: [ x]Unable to self-report see PAINAD, RDOS below  Source if other than patient:  [ ]Family   [ x]Team     Pain: [ ] yes [ ] no  QOL impact -   Location -                    Aggravating factors -  Quality -  Radiation -  Timing-  Severity (0-10 scale):  Minimal acceptable level (0-10 scale):     Dyspnea:                           [ ]Mild [ ]Moderate [ ]Severe  Anxiety:                             [ ]Mild [ ]Moderate [ ]Severe  Fatigue:                             [ ]Mild [ ]Moderate [ ]Severe  Nausea:                             [ ]Mild [ ]Moderate [ ]Severe  Loss of appetite:              [ ]Mild [ ]Moderate [ ]Severe  Constipation:                    [ ]Mild [ ]Moderate [ ]Severe    PCSSQ [Palliative Care Spiritual Screening Question]   Severity (0-10):  Score of 4 or > indicate consideration of Chaplaincy referral.  Chaplaincy Referral: [x ] yes [ ] refused [ ] following [ ] Deferred     Caregiver [place]? : [ ] yes [ ] no [ ] Deferred [ ] Declined             Social work referral [ x] Patient & Family Centered Care Referral [ ]     Anticipatory Grief present?:  [ x] yes [ ] no  [ ] Deferred                  Social work referral [ ] Chaplaincy Referral[ x]      Other Symptoms:  [ ]All other review of systems negative   [x ]Unable to obtain due to poor mentation    PHYSICAL EXAM:   Vital Signs Last 24 Hrs  T(C): 38.2 (01 Jan 2024 08:05), Max: 38.2 (01 Jan 2024 08:05)  T(F): 100.8 (01 Jan 2024 08:05), Max: 100.8 (01 Jan 2024 08:05)  HR: 142 (01 Jan 2024 08:05) (142 - 142)  BP: 118/73 (01 Jan 2024 08:05) (118/73 - 118/73)  BP(mean): --  RR: 22 (01 Jan 2024 08:05) (22 - 22)  SpO2: 85% (01 Jan 2024 08:05) (85% - 85%)    Parameters below as of 01 Jan 2024 08:05  Patient On (Oxygen Delivery Method): room air     I&O's Summary    31 Dec 2023 07:01  -  01 Jan 2024 07:00  --------------------------------------------------------  IN: 0 mL / OUT: 400 mL / NET: -400 mL      GENERAL:   [ ]Alert  [ ]Oriented x   [ x]Lethargic  [ ]Unarousable  [ ]Verbal  [ x]Non-Verbal  Behavioral:   [ ]Anxiety  [ ]Delirium [ ]Agitation [x ]Other-encephalopathy   HEENT:  [ ]Normal   [x ]Dry mouth   [ ]ET Tube/Trach  [ ]Oral lesions  PULMONARY:   [ ]Clear [x ]Tachypnea  [x ]Audible excessive secretions   [ ]Rhonchi        [ ]Right [ ]Left [ ]Bilateral  [ ]Crackles        [ ]Right [ ]Left [ ]Bilateral  [ ]Wheezing     [ ]Right [ ]Left [ ]Bilateral  [ ]Diminished BS [ ] Right [ ]Left [ ]Bilateral  CARDIOVASCULAR:    [ ]Regular [x ]Irregular [ ]Tachy  [ ]Jose J [ ]Murmur [ ]Other  GASTROINTESTINAL:  [x ]Soft  [ ]Distended   [ ]+BS  [x ]Non tender [ ]Tender  [ ]Other [ ]PEG [ ]OGT/ NGT   Last BM: 12/31/23  GENITOURINARY:  [ ]Normal [x ]Incontinent   [ ]Oliguria/Anuria   [ ]Palmer  MUSCULOSKELETAL:   [ ]Normal   [ ]Weakness  [x ]Bed/Wheelchair bound [ ]Edema  NEUROLOGIC:   [ ]No focal deficits  [ ] Cognitive impairment  [x ] Dysphagia [ ]Dysarthria [ ] Paresis [x ]Other -encephalopathy  SKIN: See nursing skin assessment for further details   [ ]Normal  [ ]Rash  [ ]Other  [ ]Pressure ulcer(s) [ ]y [ ]n present on admission    CRITICAL CARE:  [ ]Shock Present  [ ]Septic [ ]Cardiogenic [ ]Neurologic [ ]Hypovolemic  [ ]Vasopressors [ ]Inotropes  [ ]Respiratory failure present [ ]Mechanical Ventilation [ ]Non-invasive ventilatory support [ ]High-Flow   [ ]Acute  [ ]Chronic [ ]Hypoxic  [ ]Hypercarbic [ ]Other  [ ]Other organ failure       LABS: none new    RADIOLOGY & ADDITIONAL STUDIES: none new  PROTEIN CALORIE MALNUTRITION: [ ] mild [ ] moderate [ ] severe  [ ] underweight [ ] morbid obesity    https://www.andeal.org/vault/7715/web/files/ONC/Table_Clinical%20Characteristics%20to%20Document%20Malnutrition-White%20JV%20et%20al%202012.pdf    Height (cm): 177.8 (12-28-23 @ 21:15), 188 (10-12-23 @ 18:20), 188 (10-11-23 @ 18:18)  Weight (kg): 70.9 (12-27-23 @ 08:28), 117.9 (10-11-23 @ 18:18), 81.6 (08-25-23 @ 15:17)  BMI (kg/m2): 22.4 (12-28-23 @ 21:15), 20.1 (12-27-23 @ 08:28), 33.4 (10-12-23 @ 18:20)    [x ] PPSV2 < or = 30% [ ] significant weight loss [ ] poor nutritional intake [ ] anasarca   Artificial Nutrition [ ]     Other REFERRALS:    [ ] Hospice  [ ]Child Life  [x ]Social Work  [ ]Case management [ ]Holistic Therapy [ ] Physical Therapy [ ] Dietary         Palliative Performance Scale:  http://npcrc.org/files/news/palliative_performance_scale_ppsv2.pdf  (Ctrl +  left click to view)  Respiratory Distress Observation Tool:  https://homecareinformation.net/handouts/hen/Respiratory_Distress_Observation_Scale.pdf (Ctrl +  left click to view)  PAINAD Score:  http://geriatrictoolkit.[place].[facility]/cog/painad.pdf (Ctrl +  left click to view)

## 2024-01-01 NOTE — PROGRESS NOTE ADULT - PROBLEM SELECTOR PLAN 3
- Ativan 1mg q1h PRN agitation - used 1 PRN dose of 0.5mg in past 24h 7am-7am  - Dilaudid 1mg q1h PRN dyspnea - used 2 PRN doses in past 24h 7am-7am

## 2024-01-01 NOTE — PROGRESS NOTE ADULT - PROBLEM SELECTOR PLAN 1
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 Similar minimal rightward midline shift since 8/25/2023, with no effacement of the basal cisterns.Mass effect upon the posterior left lateral ventricle. The ventricles are otherwise similar in size to 8/25/2023.  - supportive care with symptom management as below  - Continue Dilaudid 0.5mg IV q1h PRN mod pain  - Continue Dilaudid 1mg IV q1h PRN severe pain - used 1 PRN dose in past 24h 7am-7am

## 2024-01-02 NOTE — PROGRESS NOTE ADULT - PROBLEM SELECTOR PLAN 4
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 \"  Several areas with hair loss noted to pt's scalp. Pt reports intermittent itching. No fevers. PPS 10%, requires total care  home with 24 hour PTA - Ativan 1mg q1h PRN agitation - no PRN use in past 24h 7am-7am  - Dilaudid 1mg q1h PRN dyspnea - used 3 PRN doses in past 24h 7am-7am - IV Robinul 0.4mg q6 ATC  - Scopolamine patch  - Turn and position.

## 2024-01-02 NOTE — PROGRESS NOTE ADULT - TIME BILLING
minutes spent on total encounter. The necessity of the time spent during the encounter on this date of service was due to:     Total time spent including the following  [x ] Physical chart review and documentation   An extensive review of the physical chart, electronic health record, and documentation was conducted to obtain collateral information including but not limited to:   - Current inpatient records (ED, H&P, primary team, and consultants [IR])   - Inpatient values/results (CBC, CMP, CT)   - Prior inpatient records (prior GaP notes when he was admitted to [facility])   - Current or proposed treatment plans   - Pharmacotherapy review   [ x]discussion with the interdisciplinary palliative care team -RN, , clinicians, trainees,   [x ]discussion with the patient/family/decision maker  [x ]Physical Exam and/or review of systems   [x ]Formulation of assessment and plan   [x ]Evaluating for response to treatment and side effects of opioids or benzodiazepines.  [x ]Review of care coordination documentation. minutes spent on total encounter. The necessity of the time spent during the encounter on this date of service was due to:     Total time spent including the following  [x ] Physical chart review and documentation   An extensive review of the physical chart, electronic health record, and documentation was conducted to obtain collateral information including but not limited to:   - Current inpatient records (ED, H&P, primary team, and consultants [IR])   - Inpatient values/results (CBC, CMP, CT)   - Prior inpatient records (prior GaP notes when he was admitted to [facility])   - Current or proposed treatment plans   - Pharmacotherapy review   [ x]discussion with the interdisciplinary palliative care team -RN, , clinicians, trainees,   [x ]discussion with the patient/family/decision maker  [x ]Physical Exam and/or review of systems   [x ]Formulation of assessment and plan   [x ]Evaluating for response to treatment and side effects of opioids or benzodiazepines.  [x ]Review of care coordination documentation.

## 2024-01-02 NOTE — PROGRESS NOTE ADULT - NS ATTEST RISK PROBLEM GEN_ALL_CORE FT
safety/toxicity monitoring of intravenous opiates and/or  benzodiazepines in end stage disease process
safety/toxicity monitoring of intravenous opiates and/or  benzodiazepines in end stage disease process
safety/toxicity monitoring of intravenous opiates and/or  benzodiazepines in end stage disease process.
safety/toxicity monitoring of intravenous opiates and/or  benzodiazepines in end stage disease process.
1. Number and complexity of problems addressed for this patient:    1.1 Moderate (At least 1)  [ ] 1 or more chronic illnesses with exacerbation, progression, or side effects of treatment  [ ] 2 or more stable chronic illnesses  [ ] 1 undiagnosed new problem with uncertain prognosis  [ ] 1 acute illness with systemic symptoms  [ ] 1 acute complicated injury  1.2 High (At least 1)   [ ] 1 or more chronic illnesses with severe exacerbation, progression, or side effects of treatment  [x ] 1 acute or chronic illnesses or injuries that may pose a threat to life or bodily function    2. Amount and/or Complexity of Data that was Reviewed and Analyzed for this case:       Moderate (1 out of 3)       High (2 out of 3)  2.1. (Any combination of 3 of the following)   [x ] Prior External notes were reviewed  [ ] Each test result was reviewed (see "LABS" and "RADIOLOGY & ADDITIONAL STUDIES" above)  [x ] The following tests were ordered and/or reviewed (Only count 1 point for ordering or reviewing a unique test):  	[x ]CBC  	[x ] Chemistry   	[ ] Imaging   	[ ] Other:   [ ] Assessment requiring an independent historian   		Name of historian and relationship:   2.2  [x ] Personally review and interpretation of  image or testing   2.3  [ ] Discussion of management or test interpretation with external physician/other qualified health care professional\appropriate source (not separately reported)    3. Risk of Complications and/or Morbidity or Mortality of for this Patient’s Management:  3.1 Moderate risk of morbidity from additional diagnostic testing or treatment (At least 1):   [ ] Prescription drug management   [ ] Decision regarding minor surgery, treatment, or procedure with identified patient or procedure risk factors  [ ] Decision regarding elective major surgery, treatment, or procedure without identified patient or procedure risk factors   [ ] Diagnosis or treatment significantly limited by social determinants of health   [ ] Other:   3.2 High risk of morbidity from additional diagnostic testing or treatment (At least 1):   [ ] Drug therapy requiring intensive monitoring for toxicity   [ ] Decision regarding elective major surgery, treatment, or procedure with identified patient or procedure risk factors   [ ] Decision regarding emergency major surgery, treatment, or procedure   [ ] Decision regarding hospitalization or escalation of hospital-level of care  [ ] Decision not to resuscitate, not to intubate, or to de-escalate care because of poor prognosis   [ x] Decision to proceed or not with artificial nutrition   [ ] Parenteral controlled substance  [ ] Other:
1. Number and complexity of problems addressed for this patient:    1.1 Moderate (At least 1)  [ ] 1 or more chronic illnesses with exacerbation, progression, or side effects of treatment  [ ] 2 or more stable chronic illnesses  [ ] 1 undiagnosed new problem with uncertain prognosis  [ ] 1 acute illness with systemic symptoms  [ ] 1 acute complicated injury  1.2 High (At least 1)   [ ] 1 or more chronic illnesses with severe exacerbation, progression, or side effects of treatment  [ x] 1 acute or chronic illnesses or injuries that may pose a threat to life or bodily function    2. Amount and/or Complexity of Data that was Reviewed and Analyzed for this case:       Moderate (1 out of 3)       High (2 out of 3)  2.1. (Any combination of 3 of the following)   [ ] Prior External notes were reviewed  [ x] Each test result was reviewed (see "LABS" and "RADIOLOGY & ADDITIONAL STUDIES" above)  [ ] The following tests were ordered and/or reviewed (Only count 1 point for ordering or reviewing a unique test):  	[ ]CBC  	[ ] Chemistry   	[ ] Imaging   	[ ] Other:   [ ] Assessment requiring an independent historian   		Name of historian and relationship:   2.2  [x ] Personally review and interpretation of  image or testing   2.3  [ ] Discussion of management or test interpretation with external physician/other qualified health care professional\appropriate source (not separately reported)    3. Risk of Complications and/or Morbidity or Mortality of for this Patient’s Management:  3.1 Moderate risk of morbidity from additional diagnostic testing or treatment (At least 1):   [ ] Prescription drug management   [ ] Decision regarding minor surgery, treatment, or procedure with identified patient or procedure risk factors  [ ] Decision regarding elective major surgery, treatment, or procedure without identified patient or procedure risk factors   [ ] Diagnosis or treatment significantly limited by social determinants of health   [ ] Other:   3.2 High risk of morbidity from additional diagnostic testing or treatment (At least 1):   [x ] Drug therapy requiring intensive monitoring for toxicity   [ ] Decision regarding elective major surgery, treatment, or procedure with identified patient or procedure risk factors   [ ] Decision regarding emergency major surgery, treatment, or procedure   [ ] Decision regarding hospitalization or escalation of hospital-level of care  [ ] Decision not to resuscitate, not to intubate, or to de-escalate care because of poor prognosis   [ ] Decision to proceed or not with artificial nutrition   [ x] Parenteral controlled substance  [ ] Other:

## 2024-01-02 NOTE — PROGRESS NOTE ADULT - PAIN ASSESSMENT ADVANCED DEMENTIA: FACIAL EXPRESSION
Smiling or inexpressive
Smiling or inexpressive
Sad. Frightened. Frown.
Sad. Frightened. Frown.
Smiling or inexpressive
Sad. Frightened. Frown.

## 2024-01-02 NOTE — PROGRESS NOTE ADULT - PROBLEM SELECTOR PLAN 5
Family updated at bedside  Emotional support provided to family PPS 10%, requires total care  home with 24 hour PTA - Dilaudid 1mg q1h PRN dyspnea - used 3 PRN doses in past 24h 7am-7am  can also use ativan if dyspnea refractory to dilaudid

## 2024-01-02 NOTE — PROGRESS NOTE ADULT - PROVIDER SPECIALTY LIST ADULT
Cardiology
Internal Medicine
Palliative Care
Cardiology
Internal Medicine
Internal Medicine
Cardiology
Internal Medicine
Neurology
Neurology
Palliative Care

## 2024-01-02 NOTE — PROGRESS NOTE ADULT - ASSESSMENT
89yo M with hx of afib on AC, HTN, DM, BPH, HLD who presented with unresponsiveness this morning. Found to have L thalamic hemorrhage. Palliative following for symptom management for end of life care.

## 2024-01-02 NOTE — PROGRESS NOTE ADULT - PROBLEM SELECTOR PLAN 1
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 Similar minimal rightward midline shift since 8/25/2023, with no effacement of the basal cisterns.Mass effect upon the posterior left lateral ventricle. The ventricles are otherwise similar in size to 8/25/2023.  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 Similar minimal rightward midline shift since 8/25/2023, with no effacement of the basal cisterns.Mass effect upon the posterior left lateral ventricle. The ventricles are otherwise similar in size to 8/25/2023.  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 Similar minimal rightward midline shift since 8/25/2023, with no effacement of the basal cisterns.  Mass effect upon the posterior left lateral ventricle. The ventricles are otherwise similar in size to 8/25/2023.  - supportive care

## 2024-01-02 NOTE — PROGRESS NOTE ADULT - PAIN ASSESSMENT ADVANCED DEMENTIA: CONSOLABILITY
Distracted or reassured by voice or touch
Distracted or reassured by voice or touch
No need to console

## 2024-01-02 NOTE — PROGRESS NOTE ADULT - SUBJECTIVE AND OBJECTIVE BOX
GAP TEAM PALLIATIVE CARE UNIT PROGRESS NOTE:      [  ] Patient on hospice program.    INDICATION FOR PALLIATIVE CARE UNIT SERVICES/Interval HPI:  symptom management at end of life care      OVERNIGHT EVENTS: Patient seen and examined, unable to participate. Patient nonverbal. He is on ATC Robinul. Patient received 3x doses of Dilaudid for dyspnea in past 24h 7am-7am. BP 83/55 this AM.    DNR on chart: DNI  DNI      Allergies    procainamide (Other (Severe))    Intolerances    MEDICATIONS  (STANDING):  glycopyrrolate Injectable 0.4 milliGRAM(s) IV Push every 6 hours  metoprolol tartrate Injectable 5 milliGRAM(s) IV Push every 6 hours  scopolamine 1 mG/72 Hr(s) Patch 1 Patch Transdermal every 72 hours    MEDICATIONS  (PRN):  acetaminophen  Suppository .. 650 milliGRAM(s) Rectal every 6 hours PRN Temp greater or equal to 38C (100.4F), Mild Pain (1 - 3)  bisacodyl Suppository 10 milliGRAM(s) Rectal daily PRN Constipation  hydrALAZINE Injectable 5 milliGRAM(s) IV Push every 6 hours PRN SBP > 160  HYDROmorphone  Injectable 1 milliGRAM(s) IV Push every 1 hour PRN Severe Pain (7 - 10)  HYDROmorphone  Injectable 1 milliGRAM(s) IV Push every 1 hour PRN SOB/dyspnea  HYDROmorphone  Injectable 0.5 milliGRAM(s) IV Push every 1 hour PRN Moderate Pain (4 - 6)  LORazepam   Injectable 1 milliGRAM(s) IV Push every 1 hour PRN Agitation    ITEMS UNCHECKED ARE NOT PRESENT    PRESENT SYMPTOMS: [x ]Unable to self-report see PAINAD, RDOS below  Source if other than patient:  [ ]Family   [ x]Team     Pain: [ ] yes [ ] no  QOL impact -   Location -                    Aggravating factors -  Quality -  Radiation -  Timing-  Severity (0-10 scale):  Minimal acceptable level (0-10 scale):     Dyspnea:                           [ ]Mild [ ]Moderate [ ]Severe  Anxiety:                             [ ]Mild [ ]Moderate [ ]Severe  Fatigue:                             [ ]Mild [ ]Moderate [ ]Severe  Nausea:                             [ ]Mild [ ]Moderate [ ]Severe  Loss of appetite:              [ ]Mild [ ]Moderate [ ]Severe  Constipation:                    [ ]Mild [ ]Moderate [ ]Severe    PCSSQ [Palliative Care Spiritual Screening Question]   Severity (0-10):  Score of 4 or > indicate consideration of Chaplaincy referral.  Chaplaincy Referral: [x ] yes [ ] refused [ ] following [ ] Deferred     Caregiver [place]? : [ ] yes [ ] no [ ] Deferred [ ] Declined             Social work referral [ x] Patient & Family Centered Care Referral [ ]     Anticipatory Grief present?:  [ x] yes [ ] no  [ ] Deferred                  Social work referral [ ] Chaplaincy Referral[ x]    Other Symptoms:  [ ]All other review of systems negative   [x ]Unable to obtain due to poor mentation    PHYSICAL EXAM:   Vital Signs Last 24 Hrs  T(C): 39.5 (02 Jan 2024 07:51), Max: 39.5 (02 Jan 2024 07:51)  T(F): 103.1 (02 Jan 2024 07:51), Max: 103.1 (02 Jan 2024 07:51)  HR: 48 (02 Jan 2024 07:51) (48 - 48)  BP: 83/55 (02 Jan 2024 07:51) (83/55 - 83/55)  BP(mean): --  RR: 18 (02 Jan 2024 07:51) (18 - 18)  SpO2: 87% (02 Jan 2024 07:51) (87% - 87%)    Parameters below as of 02 Jan 2024 07:51  Patient On (Oxygen Delivery Method): room air     I&O's Summary    GENERAL:   [ ]Alert  [ ]Oriented x   [ x]Lethargic  [ ]Unarousable  [ ]Verbal  [ x]Non-Verbal  Behavioral:   [ ]Anxiety  [ ]Delirium [ ]Agitation [x ]Other-encephalopathy   HEENT:  [ ]Normal   [x ]Dry mouth   [ ]ET Tube/Trach  [ ]Oral lesions  PULMONARY:   [ ]Clear [x ]Tachypnea  [x ]Audible excessive secretions   [ ]Rhonchi        [ ]Right [ ]Left [ ]Bilateral  [ ]Crackles        [ ]Right [ ]Left [ ]Bilateral  [ ]Wheezing     [ ]Right [ ]Left [ ]Bilateral  [ ]Diminished BS [ ] Right [ ]Left [ ]Bilateral  CARDIOVASCULAR:    [ ]Regular [x ]Irregular [ ]Tachy  [ ]Jose J [ ]Murmur [ ]Other  GASTROINTESTINAL:  [x ]Soft  [ ]Distended   [ ]+BS  [x ]Non tender [ ]Tender  [ ]Other [ ]PEG [ ]OGT/ NGT   Last BM: 12/31/23  GENITOURINARY:  [ ]Normal [x ]Incontinent   [ ]Oliguria/Anuria   [ ]Palmer  MUSCULOSKELETAL:   [ ]Normal   [ ]Weakness  [x ]Bed/Wheelchair bound [ ]Edema  NEUROLOGIC:   [ ]No focal deficits  [ ] Cognitive impairment  [x ] Dysphagia [ ]Dysarthria [ ] Paresis [x ]Other -encephalopathy  SKIN: See nursing skin assessment for further details   [ ]Normal  [ ]Rash  [ ]Other  [ ]Pressure ulcer(s) [ ]y [ ]n present on admission    CRITICAL CARE:  [ ]Shock Present  [ ]Septic [ ]Cardiogenic [ ]Neurologic [ ]Hypovolemic  [ ]Vasopressors [ ]Inotropes  [ ]Respiratory failure present [ ]Mechanical Ventilation [ ]Non-invasive ventilatory support [ ]High-Flow   [ ]Acute  [ ]Chronic [ ]Hypoxic  [ ]Hypercarbic [ ]Other  [ ]Other organ failure     LABS: none new    RADIOLOGY & ADDITIONAL STUDIES: none new    PROTEIN CALORIE MALNUTRITION: [ ] mild [ ] moderate [ ] severe  [ ] underweight [ ] morbid obesity    https://www.andeal.org/vault/2440/web/files/ONC/Table_Clinical%20Characteristics%20to%20Document%20Malnutrition-White%20JV%20et%20al%625079.pdf    Height (cm): 177.8 (12-28-23 @ 21:15), 188 (10-12-23 @ 18:20), 188 (10-11-23 @ 18:18)  Weight (kg): 70.9 (12-27-23 @ 08:28), 117.9 (10-11-23 @ 18:18), 81.6 (08-25-23 @ 15:17)  BMI (kg/m2): 22.4 (12-28-23 @ 21:15), 20.1 (12-27-23 @ 08:28), 33.4 (10-12-23 @ 18:20)    [ x] PPSV2 < or = 30% [ ] significant weight loss [ ] poor nutritional intake [ ] anasarca   Artificial Nutrition [ ]     Other REFERRALS:    [ ] Hospice  [ ]Child Life  [x ]Social Work  [ ]Case management [ ]Holistic Therapy [ ] Physical Therapy [ ] Dietary         Palliative Performance Scale:  http://npcrc.org/files/news/palliative_performance_scale_ppsv2.pdf  (Ctrl +  left click to view)  Respiratory Distress Observation Tool:  https://homecareinformation.net/handouts/hen/Respiratory_Distress_Observation_Scale.pdf (Ctrl +  left click to view)  PAINAD Score:  http://geriatrictoolkit.[facility]/cog/painad.pdf (Ctrl +  left click to view)   GAP TEAM PALLIATIVE CARE UNIT PROGRESS NOTE:      [  ] Patient on hospice program.    INDICATION FOR PALLIATIVE CARE UNIT SERVICES/Interval HPI:  symptom management at end of life care      OVERNIGHT EVENTS: Patient seen and examined, unable to participate. Patient nonverbal. He is on ATC Robinul. Patient received 3x doses of Dilaudid for dyspnea in past 24h 7am-7am. BP 83/55 this AM.    DNR on chart: DNI  DNI      Allergies    procainamide (Other (Severe))    Intolerances    MEDICATIONS  (STANDING):  glycopyrrolate Injectable 0.4 milliGRAM(s) IV Push every 6 hours  metoprolol tartrate Injectable 5 milliGRAM(s) IV Push every 6 hours  scopolamine 1 mG/72 Hr(s) Patch 1 Patch Transdermal every 72 hours    MEDICATIONS  (PRN):  acetaminophen  Suppository .. 650 milliGRAM(s) Rectal every 6 hours PRN Temp greater or equal to 38C (100.4F), Mild Pain (1 - 3)  bisacodyl Suppository 10 milliGRAM(s) Rectal daily PRN Constipation  hydrALAZINE Injectable 5 milliGRAM(s) IV Push every 6 hours PRN SBP > 160  HYDROmorphone  Injectable 1 milliGRAM(s) IV Push every 1 hour PRN Severe Pain (7 - 10)  HYDROmorphone  Injectable 1 milliGRAM(s) IV Push every 1 hour PRN SOB/dyspnea  HYDROmorphone  Injectable 0.5 milliGRAM(s) IV Push every 1 hour PRN Moderate Pain (4 - 6)  LORazepam   Injectable 1 milliGRAM(s) IV Push every 1 hour PRN Agitation    ITEMS UNCHECKED ARE NOT PRESENT    PRESENT SYMPTOMS: [x ]Unable to self-report see PAINAD, RDOS below  Source if other than patient:  [ ]Family   [ x]Team     Pain: [ ] yes [ ] no  QOL impact -   Location -                    Aggravating factors -  Quality -  Radiation -  Timing-  Severity (0-10 scale):  Minimal acceptable level (0-10 scale):     Dyspnea:                           [ ]Mild [ ]Moderate [ ]Severe  Anxiety:                             [ ]Mild [ ]Moderate [ ]Severe  Fatigue:                             [ ]Mild [ ]Moderate [ ]Severe  Nausea:                             [ ]Mild [ ]Moderate [ ]Severe  Loss of appetite:              [ ]Mild [ ]Moderate [ ]Severe  Constipation:                    [ ]Mild [ ]Moderate [ ]Severe    PCSSQ [Palliative Care Spiritual Screening Question]   Severity (0-10):  Score of 4 or > indicate consideration of Chaplaincy referral.  Chaplaincy Referral: [x ] yes [ ] refused [ ] following [ ] Deferred     Caregiver [place]? : [ ] yes [ ] no [ ] Deferred [ ] Declined             Social work referral [ x] Patient & Family Centered Care Referral [ ]     Anticipatory Grief present?:  [ x] yes [ ] no  [ ] Deferred                  Social work referral [ ] Chaplaincy Referral[ x]    Other Symptoms:  [ ]All other review of systems negative   [x ]Unable to obtain due to poor mentation    PHYSICAL EXAM:   Vital Signs Last 24 Hrs  T(C): 39.5 (02 Jan 2024 07:51), Max: 39.5 (02 Jan 2024 07:51)  T(F): 103.1 (02 Jan 2024 07:51), Max: 103.1 (02 Jan 2024 07:51)  HR: 48 (02 Jan 2024 07:51) (48 - 48)  BP: 83/55 (02 Jan 2024 07:51) (83/55 - 83/55)  BP(mean): --  RR: 18 (02 Jan 2024 07:51) (18 - 18)  SpO2: 87% (02 Jan 2024 07:51) (87% - 87%)    Parameters below as of 02 Jan 2024 07:51  Patient On (Oxygen Delivery Method): room air     I&O's Summary    GENERAL:   [ ]Alert  [ ]Oriented x   [ x]Lethargic  [ ]Unarousable  [ ]Verbal  [ x]Non-Verbal  Behavioral:   [ ]Anxiety  [ ]Delirium [ ]Agitation [x ]Other-encephalopathy   HEENT:  [ ]Normal   [x ]Dry mouth   [ ]ET Tube/Trach  [ ]Oral lesions  PULMONARY:   [ ]Clear [x ]Tachypnea  [x ]Audible excessive secretions   [ ]Rhonchi        [ ]Right [ ]Left [ ]Bilateral  [ ]Crackles        [ ]Right [ ]Left [ ]Bilateral  [ ]Wheezing     [ ]Right [ ]Left [ ]Bilateral  [ ]Diminished BS [ ] Right [ ]Left [ ]Bilateral  CARDIOVASCULAR:    [ ]Regular [x ]Irregular [ ]Tachy  [ ]Jose J [ ]Murmur [ ]Other  GASTROINTESTINAL:  [x ]Soft  [ ]Distended   [ ]+BS  [x ]Non tender [ ]Tender  [ ]Other [ ]PEG [ ]OGT/ NGT   Last BM: 12/31/23  GENITOURINARY:  [ ]Normal [x ]Incontinent   [ ]Oliguria/Anuria   [ ]Palmer  MUSCULOSKELETAL:   [ ]Normal   [ ]Weakness  [x ]Bed/Wheelchair bound [ ]Edema  NEUROLOGIC:   [ ]No focal deficits  [ ] Cognitive impairment  [x ] Dysphagia [ ]Dysarthria [ ] Paresis [x ]Other -encephalopathy  SKIN: See nursing skin assessment for further details   [ ]Normal  [ ]Rash  [ ]Other  [ ]Pressure ulcer(s) [ ]y [ ]n present on admission    CRITICAL CARE:  [ ]Shock Present  [ ]Septic [ ]Cardiogenic [ ]Neurologic [ ]Hypovolemic  [ ]Vasopressors [ ]Inotropes  [ ]Respiratory failure present [ ]Mechanical Ventilation [ ]Non-invasive ventilatory support [ ]High-Flow   [ ]Acute  [ ]Chronic [ ]Hypoxic  [ ]Hypercarbic [ ]Other  [ ]Other organ failure     LABS: none new    RADIOLOGY & ADDITIONAL STUDIES: none new    PROTEIN CALORIE MALNUTRITION: [ ] mild [ ] moderate [ ] severe  [ ] underweight [ ] morbid obesity    https://www.andeal.org/vault/2440/web/files/ONC/Table_Clinical%20Characteristics%20to%20Document%20Malnutrition-White%20JV%20et%20al%683962.pdf    Height (cm): 177.8 (12-28-23 @ 21:15), 188 (10-12-23 @ 18:20), 188 (10-11-23 @ 18:18)  Weight (kg): 70.9 (12-27-23 @ 08:28), 117.9 (10-11-23 @ 18:18), 81.6 (08-25-23 @ 15:17)  BMI (kg/m2): 22.4 (12-28-23 @ 21:15), 20.1 (12-27-23 @ 08:28), 33.4 (10-12-23 @ 18:20)    [ x] PPSV2 < or = 30% [ ] significant weight loss [ ] poor nutritional intake [ ] anasarca   Artificial Nutrition [ ]     Other REFERRALS:    [ ] Hospice  [ ]Child Life  [x ]Social Work  [ ]Case management [ ]Holistic Therapy [ ] Physical Therapy [ ] Dietary         Palliative Performance Scale:  http://npcrc.org/files/news/palliative_performance_scale_ppsv2.pdf  (Ctrl +  left click to view)  Respiratory Distress Observation Tool:  https://[email or web address].net/handouts/hen/Respiratory_Distress_Observation_Scale.pdf (Ctrl +  left click to view)  PAINAD Score:  http://geriatrictoolkit.[facility]/cog/painad.pdf (Ctrl +  left click to view)   GAP TEAM PALLIATIVE CARE UNIT PROGRESS NOTE:      [  ] Patient on hospice program.    INDICATION FOR PALLIATIVE CARE UNIT SERVICES/Interval HPI:  symptom management at end of life care      OVERNIGHT EVENTS: Patient seen and examined, unable to participate. Patient nonverbal. He is on ATC Robinul. Patient received 3x doses of Dilaudid for dyspnea in past 24h 7am-7am. BP 83/55 this AM. Lower extremities felt cool to touch this morning.    DNR on chart: DNI  DNI      Allergies    procainamide (Other (Severe))    Intolerances    MEDICATIONS  (STANDING):  glycopyrrolate Injectable 0.4 milliGRAM(s) IV Push every 6 hours  metoprolol tartrate Injectable 5 milliGRAM(s) IV Push every 6 hours  scopolamine 1 mG/72 Hr(s) Patch 1 Patch Transdermal every 72 hours    MEDICATIONS  (PRN):  acetaminophen  Suppository .. 650 milliGRAM(s) Rectal every 6 hours PRN Temp greater or equal to 38C (100.4F), Mild Pain (1 - 3)  bisacodyl Suppository 10 milliGRAM(s) Rectal daily PRN Constipation  hydrALAZINE Injectable 5 milliGRAM(s) IV Push every 6 hours PRN SBP > 160  HYDROmorphone  Injectable 1 milliGRAM(s) IV Push every 1 hour PRN Severe Pain (7 - 10)  HYDROmorphone  Injectable 1 milliGRAM(s) IV Push every 1 hour PRN SOB/dyspnea  HYDROmorphone  Injectable 0.5 milliGRAM(s) IV Push every 1 hour PRN Moderate Pain (4 - 6)  LORazepam   Injectable 1 milliGRAM(s) IV Push every 1 hour PRN Agitation    ITEMS UNCHECKED ARE NOT PRESENT    PRESENT SYMPTOMS: [x ]Unable to self-report see PAINAD, RDOS below  Source if other than patient:  [ ]Family   [ x]Team     Pain: [ ] yes [ ] no  QOL impact -   Location -                    Aggravating factors -  Quality -  Radiation -  Timing-  Severity (0-10 scale):  Minimal acceptable level (0-10 scale):     Dyspnea:                           [ ]Mild [ ]Moderate [ ]Severe  Anxiety:                             [ ]Mild [ ]Moderate [ ]Severe  Fatigue:                             [ ]Mild [ ]Moderate [ ]Severe  Nausea:                             [ ]Mild [ ]Moderate [ ]Severe  Loss of appetite:              [ ]Mild [ ]Moderate [ ]Severe  Constipation:                    [ ]Mild [ ]Moderate [ ]Severe    PCSSQ [Palliative Care Spiritual Screening Question]   Severity (0-10):  Score of 4 or > indicate consideration of Chaplaincy referral.  Chaplaincy Referral: [x ] yes [ ] refused [ ] following [ ] Deferred     Caregiver [place]? : [ ] yes [ ] no [ ] Deferred [ ] Declined             Social work referral [ x] Patient & Family Centered Care Referral [ ]     Anticipatory Grief present?:  [ x] yes [ ] no  [ ] Deferred                  Social work referral [ ] Chaplaincy Referral[ x]    Other Symptoms:  [ ]All other review of systems negative   [x ]Unable to obtain due to poor mentation    PHYSICAL EXAM:   Vital Signs Last 24 Hrs  T(C): 39.5 (02 Jan 2024 07:51), Max: 39.5 (02 Jan 2024 07:51)  T(F): 103.1 (02 Jan 2024 07:51), Max: 103.1 (02 Jan 2024 07:51)  HR: 48 (02 Jan 2024 07:51) (48 - 48)  BP: 83/55 (02 Jan 2024 07:51) (83/55 - 83/55)  BP(mean): --  RR: 18 (02 Jan 2024 07:51) (18 - 18)  SpO2: 87% (02 Jan 2024 07:51) (87% - 87%)    Parameters below as of 02 Jan 2024 07:51  Patient On (Oxygen Delivery Method): room air     I&O's Summary    GENERAL:   [ ]Alert  [ ]Oriented x   [ x]Lethargic  [ ]Unarousable  [ ]Verbal  [ x]Non-Verbal  Behavioral:   [ ]Anxiety  [ ]Delirium [ ]Agitation [x ]Other-encephalopathy   HEENT:  [ ]Normal   [x ]Dry mouth   [ ]ET Tube/Trach  [ ]Oral lesions  PULMONARY:   [ ]Clear [x ]Tachypnea  [x ]Audible excessive secretions   [ ]Rhonchi        [ ]Right [ ]Left [ ]Bilateral  [ ]Crackles        [ ]Right [ ]Left [ ]Bilateral  [ ]Wheezing     [ ]Right [ ]Left [ ]Bilateral  [ ]Diminished BS [ ] Right [ ]Left [ ]Bilateral  CARDIOVASCULAR:    [ ]Regular [x ]Irregular [ ]Tachy  [ ]Jose J [ ]Murmur [ ]Other  GASTROINTESTINAL:  [x ]Soft  [ ]Distended   [ ]+BS  [x ]Non tender [ ]Tender  [ ]Other [ ]PEG [ ]OGT/ NGT   Last BM: 12/31/23  GENITOURINARY:  [ ]Normal [x ]Incontinent   [ ]Oliguria/Anuria   [ ]Palmer  MUSCULOSKELETAL:   [ ]Normal   [ ]Weakness  [x ]Bed/Wheelchair bound [ ]Edema  NEUROLOGIC:   [ ]No focal deficits  [ ] Cognitive impairment  [x ] Dysphagia [ ]Dysarthria [ ] Paresis [x ]Other -encephalopathy  SKIN: See nursing skin assessment for further details   [ ]Normal  [ ]Rash  [ ]Other  [ ]Pressure ulcer(s) [ ]y [ ]n present on admission    CRITICAL CARE:  [ ]Shock Present  [ ]Septic [ ]Cardiogenic [ ]Neurologic [ ]Hypovolemic  [ ]Vasopressors [ ]Inotropes  [ ]Respiratory failure present [ ]Mechanical Ventilation [ ]Non-invasive ventilatory support [ ]High-Flow   [ ]Acute  [ ]Chronic [ ]Hypoxic  [ ]Hypercarbic [ ]Other  [ ]Other organ failure     LABS: none new    RADIOLOGY & ADDITIONAL STUDIES: none new    PROTEIN CALORIE MALNUTRITION: [ ] mild [ ] moderate [ ] severe  [ ] underweight [ ] morbid obesity    https://www.andeal.org/vault/2440/web/files/ONC/Table_Clinical%20Characteristics%20to%20Document%20Malnutrition-White%20JV%20et%20al%063915.pdf    Height (cm): 177.8 (12-28-23 @ 21:15), 188 (10-12-23 @ 18:20), 188 (10-11-23 @ 18:18)  Weight (kg): 70.9 (12-27-23 @ 08:28), 117.9 (10-11-23 @ 18:18), 81.6 (08-25-23 @ 15:17)  BMI (kg/m2): 22.4 (12-28-23 @ 21:15), 20.1 (12-27-23 @ 08:28), 33.4 (10-12-23 @ 18:20)    [ x] PPSV2 < or = 30% [ ] significant weight loss [ ] poor nutritional intake [ ] anasarca   Artificial Nutrition [ ]     Other REFERRALS:    [ ] Hospice  [ ]Child Life  [x ]Social Work  [ ]Case management [ ]Holistic Therapy [ ] Physical Therapy [ ] Dietary         Palliative Performance Scale:  http://npcrc.org/files/news/palliative_performance_scale_ppsv2.pdf  (Ctrl +  left click to view)  Respiratory Distress Observation Tool:  https://[email or web address].[email or web address]/handouts/hen/Respiratory_Distress_Observation_Scale.pdf (Ctrl +  left click to view)  PAINAD Score:  http://geriatrictoolkit.[email or web address]/cog/painad.pdf (Ctrl +  left click to view)   GAP TEAM PALLIATIVE CARE UNIT PROGRESS NOTE:      [  ] Patient on hospice program.    INDICATION FOR PALLIATIVE CARE UNIT SERVICES/Interval HPI:  symptom management at end of life care      OVERNIGHT EVENTS: Patient seen and examined, unable to participate. Patient nonverbal. He is on ATC Robinul. Patient received 3x doses of Dilaudid for dyspnea in past 24h 7am-7am. BP 83/55 this AM. Lower extremities felt cool to touch this morning.    DNR on chart: DNI  DNI      Allergies    procainamide (Other (Severe))    Intolerances    MEDICATIONS  (STANDING):  glycopyrrolate Injectable 0.4 milliGRAM(s) IV Push every 6 hours  metoprolol tartrate Injectable 5 milliGRAM(s) IV Push every 6 hours  scopolamine 1 mG/72 Hr(s) Patch 1 Patch Transdermal every 72 hours    MEDICATIONS  (PRN):  acetaminophen  Suppository .. 650 milliGRAM(s) Rectal every 6 hours PRN Temp greater or equal to 38C (100.4F), Mild Pain (1 - 3)  bisacodyl Suppository 10 milliGRAM(s) Rectal daily PRN Constipation  hydrALAZINE Injectable 5 milliGRAM(s) IV Push every 6 hours PRN SBP > 160  HYDROmorphone  Injectable 1 milliGRAM(s) IV Push every 1 hour PRN Severe Pain (7 - 10)  HYDROmorphone  Injectable 1 milliGRAM(s) IV Push every 1 hour PRN SOB/dyspnea  HYDROmorphone  Injectable 0.5 milliGRAM(s) IV Push every 1 hour PRN Moderate Pain (4 - 6)  LORazepam   Injectable 1 milliGRAM(s) IV Push every 1 hour PRN Agitation    ITEMS UNCHECKED ARE NOT PRESENT    PRESENT SYMPTOMS: [x ]Unable to self-report see PAINAD, RDOS below  Source if other than patient:  [ ]Family   [ x]Team     Pain: [ ] yes [ ] no  QOL impact -   Location -                    Aggravating factors -  Quality -  Radiation -  Timing-  Severity (0-10 scale):  Minimal acceptable level (0-10 scale):     Dyspnea:                           [ ]Mild [ ]Moderate [ ]Severe  Anxiety:                             [ ]Mild [ ]Moderate [ ]Severe  Fatigue:                             [ ]Mild [ ]Moderate [ ]Severe  Nausea:                             [ ]Mild [ ]Moderate [ ]Severe  Loss of appetite:              [ ]Mild [ ]Moderate [ ]Severe  Constipation:                    [ ]Mild [ ]Moderate [ ]Severe    PCSSQ [Palliative Care Spiritual Screening Question]   Severity (0-10):  Score of 4 or > indicate consideration of Chaplaincy referral.  Chaplaincy Referral: [x ] yes [ ] refused [ ] following [ ] Deferred     Caregiver [place]? : [ ] yes [ ] no [ ] Deferred [ ] Declined             Social work referral [ x] Patient & Family Centered Care Referral [ ]     Anticipatory Grief present?:  [ x] yes [ ] no  [ ] Deferred                  Social work referral [ ] Chaplaincy Referral[ x]    Other Symptoms:  [ ]All other review of systems negative   [x ]Unable to obtain due to poor mentation    PHYSICAL EXAM:   Vital Signs Last 24 Hrs  T(C): 39.5 (02 Jan 2024 07:51), Max: 39.5 (02 Jan 2024 07:51)  T(F): 103.1 (02 Jan 2024 07:51), Max: 103.1 (02 Jan 2024 07:51)  HR: 48 (02 Jan 2024 07:51) (48 - 48)  BP: 83/55 (02 Jan 2024 07:51) (83/55 - 83/55)  BP(mean): --  RR: 18 (02 Jan 2024 07:51) (18 - 18)  SpO2: 87% (02 Jan 2024 07:51) (87% - 87%)    Parameters below as of 02 Jan 2024 07:51  Patient On (Oxygen Delivery Method): room air     I&O's Summary    GENERAL:   [ ]Alert  [ ]Oriented x   [ x]Lethargic  [ ]Unarousable  [ ]Verbal  [ x]Non-Verbal  Behavioral:   [ ]Anxiety  [ ]Delirium [ ]Agitation [x ]Other-encephalopathy   HEENT:  [ ]Normal   [x ]Dry mouth   [ ]ET Tube/Trach  [ ]Oral lesions  PULMONARY:   [ ]Clear [x ]Tachypnea  [x ]Audible excessive secretions   [ ]Rhonchi        [ ]Right [ ]Left [ ]Bilateral  [ ]Crackles        [ ]Right [ ]Left [ ]Bilateral  [ ]Wheezing     [ ]Right [ ]Left [ ]Bilateral  [ ]Diminished BS [ ] Right [ ]Left [ ]Bilateral  CARDIOVASCULAR:    [ ]Regular [x ]Irregular [ ]Tachy  [ ]Jose J [ ]Murmur [ ]Other  GASTROINTESTINAL:  [x ]Soft  [ ]Distended   [ ]+BS  [x ]Non tender [ ]Tender  [ ]Other [ ]PEG [ ]OGT/ NGT   Last BM: 12/31/23  GENITOURINARY:  [ ]Normal [x ]Incontinent   [ ]Oliguria/Anuria   [ ]Palmer  MUSCULOSKELETAL:   [ ]Normal   [ ]Weakness  [x ]Bed/Wheelchair bound [ ]Edema  NEUROLOGIC:   [ ]No focal deficits  [ ] Cognitive impairment  [x ] Dysphagia [ ]Dysarthria [ ] Paresis [x ]Other -encephalopathy  SKIN: See nursing skin assessment for further details   [ ]Normal  [ ]Rash  [ ]Other  [ ]Pressure ulcer(s) [ ]y [ ]n present on admission    CRITICAL CARE:  [ ]Shock Present  [ ]Septic [ ]Cardiogenic [ ]Neurologic [ ]Hypovolemic  [ ]Vasopressors [ ]Inotropes  [ ]Respiratory failure present [ ]Mechanical Ventilation [ ]Non-invasive ventilatory support [ ]High-Flow   [ ]Acute  [ ]Chronic [ ]Hypoxic  [ ]Hypercarbic [ ]Other  [ ]Other organ failure     LABS: none new    RADIOLOGY & ADDITIONAL STUDIES: none new    PROTEIN CALORIE MALNUTRITION: [ ] mild [ ] moderate [ ] severe  [ ] underweight [ ] morbid obesity    https://www.andeal.org/vault/2440/web/files/ONC/Table_Clinical%20Characteristics%20to%20Document%20Malnutrition-White%20JV%20et%20al%641519.pdf    Height (cm): 177.8 (12-28-23 @ 21:15), 188 (10-12-23 @ 18:20), 188 (10-11-23 @ 18:18)  Weight (kg): 70.9 (12-27-23 @ 08:28), 117.9 (10-11-23 @ 18:18), 81.6 (08-25-23 @ 15:17)  BMI (kg/m2): 22.4 (12-28-23 @ 21:15), 20.1 (12-27-23 @ 08:28), 33.4 (10-12-23 @ 18:20)    [ x] PPSV2 < or = 30% [ ] significant weight loss [ ] poor nutritional intake [ ] anasarca   Artificial Nutrition [ ]     Other REFERRALS:    [ ] Hospice  [ ]Child Life  [x ]Social Work  [ ]Case management [ ]Holistic Therapy [ ] Physical Therapy [ ] Dietary         Palliative Performance Scale:  http://npcrc.org/files/news/palliative_performance_scale_ppsv2.pdf  (Ctrl +  left click to view)  Respiratory Distress Observation Tool:  https://[email or web address]/handouts/hen/Respiratory_Distress_Observation_Scale.pdf (Ctrl +  left click to view)  PAINAD Score:  http://[email or web address]/cog/painad.pdf (Ctrl +  left click to view)   GAP TEAM PALLIATIVE CARE UNIT PROGRESS NOTE:      [  ] Patient on hospice program.    INDICATION FOR PALLIATIVE CARE UNIT SERVICES/Interval HPI:  symptom management at end of life care      OVERNIGHT EVENTS: Patient seen and examined, unable to participate. Patient nonverbal. He is on ATC Robinul. Patient received 3x doses of Dilaudid for dyspnea in past 24h 7am-7am. BP 83/55 this AM. Lower extremities felt cool to touch this morning.    DNR on chart: yes  DNI      Allergies    procainamide (Other (Severe))    Intolerances    MEDICATIONS  (STANDING):  glycopyrrolate Injectable 0.4 milliGRAM(s) IV Push every 6 hours  metoprolol tartrate Injectable 5 milliGRAM(s) IV Push every 6 hours  scopolamine 1 mG/72 Hr(s) Patch 1 Patch Transdermal every 72 hours    MEDICATIONS  (PRN):  acetaminophen  Suppository .. 650 milliGRAM(s) Rectal every 6 hours PRN Temp greater or equal to 38C (100.4F), Mild Pain (1 - 3)  bisacodyl Suppository 10 milliGRAM(s) Rectal daily PRN Constipation  hydrALAZINE Injectable 5 milliGRAM(s) IV Push every 6 hours PRN SBP > 160  HYDROmorphone  Injectable 1 milliGRAM(s) IV Push every 1 hour PRN Severe Pain (7 - 10)  HYDROmorphone  Injectable 1 milliGRAM(s) IV Push every 1 hour PRN SOB/dyspnea  HYDROmorphone  Injectable 0.5 milliGRAM(s) IV Push every 1 hour PRN Moderate Pain (4 - 6)  LORazepam   Injectable 1 milliGRAM(s) IV Push every 1 hour PRN Agitation    ITEMS UNCHECKED ARE NOT PRESENT    PRESENT SYMPTOMS: [x ]Unable to self-report see PAINAD, RDOS below  Source if other than patient:  [ ]Family   [ x]Team     Pain: [ ] yes [ ] no  QOL impact -   Location -                    Aggravating factors -  Quality -  Radiation -  Timing-  Severity (0-10 scale):  Minimal acceptable level (0-10 scale):     Dyspnea:                           [ ]Mild [ ]Moderate [ ]Severe  Anxiety:                             [ ]Mild [ ]Moderate [ ]Severe  Fatigue:                             [ ]Mild [ ]Moderate [ ]Severe  Nausea:                             [ ]Mild [ ]Moderate [ ]Severe  Loss of appetite:              [ ]Mild [ ]Moderate [ ]Severe  Constipation:                    [ ]Mild [ ]Moderate [ ]Severe    PCSSQ [Palliative Care Spiritual Screening Question]   Severity (0-10):  Score of 4 or > indicate consideration of Chaplaincy referral.  Chaplaincy Referral: [x ] yes [ ] refused [ ] following [ ] Deferred     Caregiver [place]? : [ ] yes [ ] no [ ] Deferred [ ] Declined             Social work referral [ x] Patient & Family Centered Care Referral [ ]     Anticipatory Grief present?:  [ x] yes [ ] no  [ ] Deferred                  Social work referral [ ] Chaplaincy Referral[ x]    Other Symptoms:  [ ]All other review of systems negative   [x ]Unable to obtain due to poor mentation    PHYSICAL EXAM:   Vital Signs Last 24 Hrs  T(C): 39.5 (02 Jan 2024 07:51), Max: 39.5 (02 Jan 2024 07:51)  T(F): 103.1 (02 Jan 2024 07:51), Max: 103.1 (02 Jan 2024 07:51)  HR: 48 (02 Jan 2024 07:51) (48 - 48)  BP: 83/55 (02 Jan 2024 07:51) (83/55 - 83/55)  BP(mean): --  RR: 18 (02 Jan 2024 07:51) (18 - 18)  SpO2: 87% (02 Jan 2024 07:51) (87% - 87%)    Parameters below as of 02 Jan 2024 07:51  Patient On (Oxygen Delivery Method): room air     I&O's Summary    GENERAL:   [ ]Alert  [ ]Oriented x   [ x]Lethargic  [ ]Unarousable  [ ]Verbal  [ x]Non-Verbal  Behavioral:   [ ]Anxiety  [ ]Delirium [ ]Agitation [x ]Other-encephalopathy   HEENT:  [ ]Normal   [x ]Dry mouth   [ ]ET Tube/Trach  [ ]Oral lesions  PULMONARY:   [ ]Clear [x ]Tachypnea  [x ]Audible excessive secretions   [ ]Rhonchi        [ ]Right [ ]Left [ ]Bilateral  [ ]Crackles        [ ]Right [ ]Left [ ]Bilateral  [ ]Wheezing     [ ]Right [ ]Left [ ]Bilateral  [ ]Diminished BS [ ] Right [ ]Left [ ]Bilateral  CARDIOVASCULAR:    [ ]Regular [x ]Irregular [ ]Tachy  [ ]Jose J [ ]Murmur [ ]Other  GASTROINTESTINAL:  [x ]Soft  [ ]Distended   [ ]+BS  [x ]Non tender [ ]Tender  [ ]Other [ ]PEG [ ]OGT/ NGT   Last BM: 12/31/23  GENITOURINARY:  [ ]Normal [x ]Incontinent   [ ]Oliguria/Anuria   [ ]Palmer  MUSCULOSKELETAL:   [ ]Normal   [ ]Weakness  [x ]Bed/Wheelchair bound [ ]Edema  NEUROLOGIC:   [ ]No focal deficits  [ ] Cognitive impairment  [x ] Dysphagia [ ]Dysarthria [ ] Paresis [x ]Other -encephalopathy  SKIN: See nursing skin assessment for further details   [ ]Normal  [ ]Rash  [ ]Other  [ ]Pressure ulcer(s) [ ]y [ ]n present on admission    CRITICAL CARE:  [ ]Shock Present  [ ]Septic [ ]Cardiogenic [ ]Neurologic [ ]Hypovolemic  [ ]Vasopressors [ ]Inotropes  [ ]Respiratory failure present [ ]Mechanical Ventilation [ ]Non-invasive ventilatory support [ ]High-Flow   [ ]Acute  [ ]Chronic [ ]Hypoxic  [ ]Hypercarbic [ ]Other  [ ]Other organ failure     LABS:                       10.9   16.38 )-----------( 198      ( 29 Dec 2023 05:57 )             33.5   12-29    144  |  115<H>  |  27<H>  ----------------------------<  202<H>  3.1<L>   |  13<L>  |  1.12    RADIOLOGY & ADDITIONAL STUDIES: < from: Xray Chest 1 View- PORTABLE-Urgent (Xray Chest 1 View- PORTABLE-Urgent .) (12.28.23 @ 20:40) >  IMPRESSION:  No focal consolidation.    --- End of Report ---          SHADY MUNOZ DO; Resident Radiologist  This document has been electronically signed.  DAVEY ZARAGOZA MD; Attending Interventional Radiologist  This document has been electronically signed. Dec 29 2023  1:50PM    < end of copied text >  < from: CT Head No Cont (12.28.23 @ 07:59) >  FINDINGS:    Acute hemorrhage in the left thalamus with surrounding vasogenic edema as   well as extension into the ventricular system appears unchanged as   compared to the prior exam. Mild left to right subfalcine shift is   unchanged. No new areas of hemorrhage. Hydrocephalus appears unchanged.   Severe generalized cerebral volume loss.  IMPRESSION:    No interval change.    --- End of Report ---            MO DALEY MD; Attending Radiologist  This document has been electronically signed. 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    Similar minimal rightward midline shift since 8/25/2023, with no   effacement of the basal cisterns.    Mass effect upon the posterior left lateral ventricle. The ventricles are   otherwise similar in size to 8/25/2023.    Similar-appearing 1.2 x 1.2 cm (since 9/26/2021) extra-axial mass within   the left anterolateral foramen magnum cistern. Mild mass effect upon the   cervicomedullary junction. This may represent a meningioma.    CTA brain:  No flow-limiting stenosis or vascular aneurysm.    No evidence for AVM. Please note that this does not exclude thepresence   of a micro-AVM, which may be compressed by hemorrhage.    CTA neck:  No flow-limiting stenosis, evidence for arterial dissection, or vascular   aneurysm.    Dr. Underwood discussed these findings with neurology consult team on   12/27/2023 8:18 AM with read back.    --- End of Report ---            SMITH UNDERWOOD MD; Attending Radiologist  This document has been electronically signed. Dec 27 2023  9:00AM    < end of copied text >    PROTEIN CALORIE MALNUTRITION: [ ] mild [ ] moderate [ ] severe  [ ] underweight [ ] morbid obesity    https://www.andeal.org/vault/2440/web/files/ONC/Table_Clinical%20Characteristics%20to%20Document%20Malnutrition-White%20JV%20et%20al%202012.pdf    Height (cm): 177.8 (12-28-23 @ 21:15), 188 (10-12-23 @ 18:20), 188 (10-11-23 @ 18:18)  Weight (kg): 70.9 (12-27-23 @ 08:28), 117.9 (10-11-23 @ 18:18), 81.6 (08-25-23 @ 15:17)  BMI (kg/m2): 22.4 (12-28-23 @ 21:15), 20.1 (12-27-23 @ 08:28), 33.4 (10-12-23 @ 18:20)    [ x] PPSV2 < or = 30% [ ] significant weight loss [ ] poor nutritional intake [ ] anasarca   Artificial Nutrition [ ]     Other REFERRALS:    [ ] Hospice  [ ]Child Life  [x ]Social Work  [ ]Case management [ ]Holistic Therapy [ ] Physical Therapy [ ] Dietary                                 Care Coordination Assessment 201    COGNITIVE/LEARNING 201  Mental Status    Answers: Unable to assess    ADMISSION HISTORY 201  Admitted From    Answers: Home    Functional Status Prior to Admission    Answers: Assistive equipment,  Answers: Assistive person    FINANCIAL 201  Does patient have financial concerns for discharge?    Answers: None    LIVING ARRANGEMENTS/SUPPORT 201  Housing Environment    Answers: Apartment    CAREGIVER CONTACT 201  Does the patient wish to identify a Caregiver?    Answers: Unable to assess    EMERGENCY CONTACTS OUTSIDE HOME 201  Emergency Contact    Answers: Emergency Contact Name Mary Jade,  Answers: Emergency Contact Phone # [phone],  Answers: Emergency Contact Relationship daughter    DISCHARGE PLANNING 201  Potential Discharge Plan and Services    Answers: Anticipated Needs Unclear at Present    SCREENING 201  Social Work Screen and Referral    Answers: Adjustment to Illness/Difficulty Coping    SUMMARY 201  Initial Clinical Summary    Notes: Social work reviewed chart of patient in Stroke Unit for psychosocial  assessment and support. As per H&P, patient is a 89y (01-Dec-1926) M w/ PMHx  significant for A.fib s/p PPM on Eliquis digoxin & metoprolol, HTN, HLD, DM,  BPH w/ indwelling urinary catheter, macular degeneration presents to the ED due  to R sided weakness and aphasia.         Patient unable to participate in assessment secondary to acuity of illness.  Patients daughter, Mary Jade (p:[phone]), was at bedside. LMSW  introduce self and explained role of social work in patients care.  Daughter  verbalized understanding. LMSW provided contact card and assured availability.  Daughter confirmed patients demographics.          Patient resides alone with 24-hour aides in an apartment in [place], [place].  Daughter reports she will remain as emergency contact and health care proxy.  LMSW requested copy of health care proxy for the chart. Daughter reports  patient is a DNR/DNI. Patient required assistance with ADLS and ambulation  prior to admission. Patient utilized a rollator prior to admission.        Patient's discharge needs are unclear at present and will be assessed when  appropriate. Medical team made aware. Patient with Medicare and AARP insurance.  Spiritual support remains available. Social work will continue to collaborate  with interdisciplinary team to assess needs.       Electronically signed by:  Candy Herrera  Electronically signed on:  2023-12-29  12:02      Palliative Performance Scale:  http://npcrc.org/files/news/palliative_performance_scale_ppsv2.pdf  (Ctrl +  left click to view)  Respiratory Distress Observation Tool:  https://homecareinformation.net/handouts/hen/Respiratory_Distress_Observation_Scale.pdf (Ctrl +  left click to view)  PAINAD Score:  http://geriatrictoolkit.[place].[facility] [place]/cog/painad.pdf (Ctrl +  left click to view)   GAP TEAM PALLIATIVE CARE UNIT PROGRESS NOTE:      [  ] Patient on hospice program.    INDICATION FOR PALLIATIVE CARE UNIT SERVICES/Interval HPI:  symptom management at end of life care      OVERNIGHT EVENTS: Patient seen and examined, unable to participate. Patient nonverbal. He is on ATC Robinul. Patient received 3x doses of Dilaudid for dyspnea in past 24h 7am-7am. BP 83/55 this AM. Lower extremities felt cool to touch this morning.    DNR on chart: yes  DNI      Allergies    procainamide (Other (Severe))    Intolerances    MEDICATIONS  (STANDING):  glycopyrrolate Injectable 0.4 milliGRAM(s) IV Push every 6 hours  metoprolol tartrate Injectable 5 milliGRAM(s) IV Push every 6 hours  scopolamine 1 mG/72 Hr(s) Patch 1 Patch Transdermal every 72 hours    MEDICATIONS  (PRN):  acetaminophen  Suppository .. 650 milliGRAM(s) Rectal every 6 hours PRN Temp greater or equal to 38C (100.4F), Mild Pain (1 - 3)  bisacodyl Suppository 10 milliGRAM(s) Rectal daily PRN Constipation  hydrALAZINE Injectable 5 milliGRAM(s) IV Push every 6 hours PRN SBP > 160  HYDROmorphone  Injectable 1 milliGRAM(s) IV Push every 1 hour PRN Severe Pain (7 - 10)  HYDROmorphone  Injectable 1 milliGRAM(s) IV Push every 1 hour PRN SOB/dyspnea  HYDROmorphone  Injectable 0.5 milliGRAM(s) IV Push every 1 hour PRN Moderate Pain (4 - 6)  LORazepam   Injectable 1 milliGRAM(s) IV Push every 1 hour PRN Agitation    ITEMS UNCHECKED ARE NOT PRESENT    PRESENT SYMPTOMS: [x ]Unable to self-report see PAINAD, RDOS below  Source if other than patient:  [ ]Family   [ x]Team     Pain: [ ] yes [ ] no  QOL impact -   Location -                    Aggravating factors -  Quality -  Radiation -  Timing-  Severity (0-10 scale):  Minimal acceptable level (0-10 scale):     Dyspnea:                           [ ]Mild [ ]Moderate [ ]Severe  Anxiety:                             [ ]Mild [ ]Moderate [ ]Severe  Fatigue:                             [ ]Mild [ ]Moderate [ ]Severe  Nausea:                             [ ]Mild [ ]Moderate [ ]Severe  Loss of appetite:              [ ]Mild [ ]Moderate [ ]Severe  Constipation:                    [ ]Mild [ ]Moderate [ ]Severe    PCSSQ [Palliative Care Spiritual Screening Question]   Severity (0-10):  Score of 4 or > indicate consideration of Chaplaincy referral.  Chaplaincy Referral: [x ] yes [ ] refused [ ] following [ ] Deferred     Caregiver [place]? : [ ] yes [ ] no [ ] Deferred [ ] Declined             Social work referral [ x] Patient & Family Centered Care Referral [ ]     Anticipatory Grief present?:  [ x] yes [ ] no  [ ] Deferred                  Social work referral [ ] Chaplaincy Referral[ x]    Other Symptoms:  [ ]All other review of systems negative   [x ]Unable to obtain due to poor mentation    PHYSICAL EXAM:   Vital Signs Last 24 Hrs  T(C): 39.5 (02 Jan 2024 07:51), Max: 39.5 (02 Jan 2024 07:51)  T(F): 103.1 (02 Jan 2024 07:51), Max: 103.1 (02 Jan 2024 07:51)  HR: 48 (02 Jan 2024 07:51) (48 - 48)  BP: 83/55 (02 Jan 2024 07:51) (83/55 - 83/55)  BP(mean): --  RR: 18 (02 Jan 2024 07:51) (18 - 18)  SpO2: 87% (02 Jan 2024 07:51) (87% - 87%)    Parameters below as of 02 Jan 2024 07:51  Patient On (Oxygen Delivery Method): room air     I&O's Summary    GENERAL:   [ ]Alert  [ ]Oriented x   [ x]Lethargic  [ ]Unarousable  [ ]Verbal  [ x]Non-Verbal  Behavioral:   [ ]Anxiety  [ ]Delirium [ ]Agitation [x ]Other-encephalopathy   HEENT:  [ ]Normal   [x ]Dry mouth   [ ]ET Tube/Trach  [ ]Oral lesions  PULMONARY:   [ ]Clear [x ]Tachypnea  [x ]Audible excessive secretions   [ ]Rhonchi        [ ]Right [ ]Left [ ]Bilateral  [ ]Crackles        [ ]Right [ ]Left [ ]Bilateral  [ ]Wheezing     [ ]Right [ ]Left [ ]Bilateral  [ ]Diminished BS [ ] Right [ ]Left [ ]Bilateral  CARDIOVASCULAR:    [ ]Regular [x ]Irregular [ ]Tachy  [ ]Jose J [ ]Murmur [ ]Other  GASTROINTESTINAL:  [x ]Soft  [ ]Distended   [ ]+BS  [x ]Non tender [ ]Tender  [ ]Other [ ]PEG [ ]OGT/ NGT   Last BM: 12/31/23  GENITOURINARY:  [ ]Normal [x ]Incontinent   [ ]Oliguria/Anuria   [ ]Palmer  MUSCULOSKELETAL:   [ ]Normal   [ ]Weakness  [x ]Bed/Wheelchair bound [ ]Edema  NEUROLOGIC:   [ ]No focal deficits  [ ] Cognitive impairment  [x ] Dysphagia [ ]Dysarthria [ ] Paresis [x ]Other -encephalopathy  SKIN: See nursing skin assessment for further details   [ ]Normal  [ ]Rash  [ ]Other  [ ]Pressure ulcer(s) [ ]y [ ]n present on admission    CRITICAL CARE:  [ ]Shock Present  [ ]Septic [ ]Cardiogenic [ ]Neurologic [ ]Hypovolemic  [ ]Vasopressors [ ]Inotropes  [ ]Respiratory failure present [ ]Mechanical Ventilation [ ]Non-invasive ventilatory support [ ]High-Flow   [ ]Acute  [ ]Chronic [ ]Hypoxic  [ ]Hypercarbic [ ]Other  [ ]Other organ failure     LABS:                       10.9   16.38 )-----------( 198      ( 29 Dec 2023 05:57 )             33.5   12-29    144  |  115<H>  |  27<H>  ----------------------------<  202<H>  3.1<L>   |  13<L>  |  1.12    RADIOLOGY & ADDITIONAL STUDIES: < from: Xray Chest 1 View- PORTABLE-Urgent (Xray Chest 1 View- PORTABLE-Urgent .) (12.28.23 @ 20:40) >  IMPRESSION:  No focal consolidation.    --- End of Report ---          SHADY MUNOZ DO; Resident Radiologist  This document has been electronically signed.  DAVEY ZARAGOZA MD; Attending Interventional Radiologist  This document has been electronically signed. Dec 29 2023  1:50PM    < end of copied text >  < from: CT Head No Cont (12.28.23 @ 07:59) >  FINDINGS:    Acute hemorrhage in the left thalamus with surrounding vasogenic edema as   well as extension into the ventricular system appears unchanged as   compared to the prior exam. Mild left to right subfalcine shift is   unchanged. No new areas of hemorrhage. Hydrocephalus appears unchanged.   Severe generalized cerebral volume loss.  IMPRESSION:    No interval change.    --- End of Report ---            MO DALEY MD; Attending Radiologist  This document has been electronically signed. 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    Similar minimal rightward midline shift since 8/25/2023, with no   effacement of the basal cisterns.    Mass effect upon the posterior left lateral ventricle. The ventricles are   otherwise similar in size to 8/25/2023.    Similar-appearing 1.2 x 1.2 cm (since 9/26/2021) extra-axial mass within   the left anterolateral foramen magnum cistern. Mild mass effect upon the   cervicomedullary junction. This may represent a meningioma.    CTA brain:  No flow-limiting stenosis or vascular aneurysm.    No evidence for AVM. Please note that this does not exclude thepresence   of a micro-AVM, which may be compressed by hemorrhage.    CTA neck:  No flow-limiting stenosis, evidence for arterial dissection, or vascular   aneurysm.    Dr. Underwood discussed these findings with neurology consult team on   12/27/2023 8:18 AM with read back.    --- End of Report ---            SMITH UNDERWOOD MD; Attending Radiologist  This document has been electronically signed. Dec 27 2023  9:00AM    < end of copied text >    PROTEIN CALORIE MALNUTRITION: [ ] mild [ ] moderate [ ] severe  [ ] underweight [ ] morbid obesity    https://www.andeal.org/vault/2440/web/files/ONC/Table_Clinical%20Characteristics%20to%20Document%20Malnutrition-White%20JV%20et%20al%202012.pdf    Height (cm): 177.8 (12-28-23 @ 21:15), 188 (10-12-23 @ 18:20), 188 (10-11-23 @ 18:18)  Weight (kg): 70.9 (12-27-23 @ 08:28), 117.9 (10-11-23 @ 18:18), 81.6 (08-25-23 @ 15:17)  BMI (kg/m2): 22.4 (12-28-23 @ 21:15), 20.1 (12-27-23 @ 08:28), 33.4 (10-12-23 @ 18:20)    [ x] PPSV2 < or = 30% [ ] significant weight loss [ ] poor nutritional intake [ ] anasarca   Artificial Nutrition [ ]     Other REFERRALS:    [ ] Hospice  [ ]Child Life  [x ]Social Work  [ ]Case management [ ]Holistic Therapy [ ] Physical Therapy [ ] Dietary                                 Care Coordination Assessment 201    COGNITIVE/LEARNING 201  Mental Status    Answers: Unable to assess    ADMISSION HISTORY 201  Admitted From    Answers: Home    Functional Status Prior to Admission    Answers: Assistive equipment,  Answers: Assistive person    FINANCIAL 201  Does patient have financial concerns for discharge?    Answers: None    LIVING ARRANGEMENTS/SUPPORT 201  Housing Environment    Answers: Apartment    CAREGIVER CONTACT 201  Does the patient wish to identify a Caregiver?    Answers: Unable to assess    EMERGENCY CONTACTS OUTSIDE HOME 201  Emergency Contact    Answers: Emergency Contact Name Mary Jade,  Answers: Emergency Contact Phone # [phone],  Answers: Emergency Contact Relationship daughter    DISCHARGE PLANNING 201  Potential Discharge Plan and Services    Answers: Anticipated Needs Unclear at Present    SCREENING 201  Social Work Screen and Referral    Answers: Adjustment to Illness/Difficulty Coping    SUMMARY 201  Initial Clinical Summary    Notes: Social work reviewed chart of patient in Stroke Unit for psychosocial  assessment and support. As per H&P, patient is a 89y (01-Dec-1926) M w/ PMHx  significant for A.fib s/p PPM on Eliquis digoxin & metoprolol, HTN, HLD, DM,  BPH w/ indwelling urinary catheter, macular degeneration presents to the ED due  to R sided weakness and aphasia.         Patient unable to participate in assessment secondary to acuity of illness.  Patients daughter, Mary Jade (p:[phone]), was at bedside. LMSW  introduce self and explained role of social work in patients care.  Daughter  verbalized understanding. LMSW provided contact card and assured availability.  Daughter confirmed patients demographics.          Patient resides alone with 24-hour aides in an apartment in [place], [place].  Daughter reports she will remain as emergency contact and health care proxy.  LMSW requested copy of health care proxy for the chart. Daughter reports  patient is a DNR/DNI. Patient required assistance with ADLS and ambulation  prior to admission. Patient utilized a rollator prior to admission.        Patient's discharge needs are unclear at present and will be assessed when  appropriate. Medical team made aware. Patient with Medicare and AARP insurance.  Spiritual support remains available. Social work will continue to collaborate  with interdisciplinary team to assess needs.       Electronically signed by:  Candy Herrera  Electronically signed on:  2023-12-29  12:02      Palliative Performance Scale:  http://npcrc.org/files/news/palliative_performance_scale_ppsv2.pdf  (Ctrl +  left click to view)  Respiratory Distress Observation Tool:  https://homecareinformation.net/handouts/hen/Respiratory_Distress_Observation_Scale.pdf (Ctrl +  left click to view)  PAINAD Score:  http://geriatrictoolkit.[place].[facility]/cog/painad.pdf (Ctrl +  left click to view)

## 2024-01-02 NOTE — PROGRESS NOTE ADULT - ATTENDING COMMENTS
89y M w/ PMHx  A.fib s/p PPM on Eliquis digoxin & metoprolol, HTN, HLD, DM, BPH w/ indwelling urinary catheter, macular degeneration found to have a L thalamic hemorrhage with intraventricular extension.  Patient not a candidate for neurosurgical intervention.  Was given K-centra for reversal of elquis.  His neurologic status deteriorated and there was no expectation of meaningful neurologic recovery.  Family has opted for comfort measures only and patient was transferred to PCU for management of sx and end of life care.    I have reviewed all documentation from prior primary team and consultants, as well as relevant imaging and laboratory data as this patient is new to me.    In the setting of parenteral controlled substance administration, clinical monitoring required for side effects such as respiratory depression, constipation and opioid induced neurotoxicity due to organ failure.    Patient assessment and plan discussed on interdisciplinary team rounds today.    Hospice transition to be addressed if clinical condition permits.

## 2024-01-02 NOTE — PROGRESS NOTE ADULT - RESPIRATORY DISTRESS OBSERVATION: HEART RATE
Less than 90 beats
90 – 109 beats
Less than 90 beats
90 – 109 beats
90 – 109 beats
Greater than or equal to 110 beats

## 2024-01-02 NOTE — PROGRESS NOTE ADULT - PAIN ASSESSMENT ADVANCED DEMENTIA: BREATHING
Occasional labored breathing. Short period of hyperventilation
Normal

## 2024-01-02 NOTE — PROGRESS NOTE ADULT - PROBLEM SELECTOR PLAN 6
Family updated at bedside  Emotional support provided to family Daughter updated at bedside  Emotional support provided to family significant hemorrhage with no expectation of meaningful recovery.   Supportive care.

## 2024-01-02 NOTE — PROGRESS NOTE ADULT - PROBLEM SELECTOR PLAN 3
- Ativan 1mg q1h PRN agitation - no PRN use in past 24h 7am-7am  - Dilaudid 1mg q1h PRN dyspnea - used 3 PRN doses in past 24h 7am-7am - IV Robinul 0.4mg q6 ATC  - Scopolamine patch  - Turn and position. - Continue Dilaudid 0.5mg IV q1h PRN mod pain - no PRN use in past 24h 7am-7am  - Continue Dilaudid 1mg IV q1h PRN severe pain - no PRN use in past 24h 7am-7am  - Will add Dilaudid 0.5mg IV q4h ATC based on usage for dyspnea below.    Bowel regimen while on opioids

## 2024-01-02 NOTE — PROGRESS NOTE ADULT - RESPIRATORY DISTRESS OBSERVATION: RESPIRATORY RATE
19 – 30 breaths
Greater than 30 breaths
19 – 30 breaths
Greater than 30 breaths
Greater than 30 breaths
Less than or equal to 18 breaths

## 2024-01-02 NOTE — PROGRESS NOTE ADULT - PAIN ASSESSMENT ADVANCED DEMENTIA: BODY LANGUAGE
Relaxed
Relaxed
Tense. Distressed pacing. Fidgeting.
Relaxed
Tense. Distressed pacing. Fidgeting.
Relaxed

## 2024-01-02 NOTE — PROGRESS NOTE ADULT - PROBLEM SELECTOR PLAN 2
- IV Robinul 0.4mg q6 ATC  - Scopolamine patch  - Turn and position. - Continue Dilaudid 0.5mg IV q1h PRN mod pain - no PRN use in past 24h 7am-7am  - Continue Dilaudid 1mg IV q1h PRN severe pain - no PRN use in past 24h 7am-7am  - Will add Dilaudid 0.5mg IV q4h ATC - Ativan 1mg q1h PRN agitation - no PRN use in past 24h 7am-7am

## 2024-01-02 NOTE — PROGRESS NOTE ADULT - REASON FOR ADMISSION
L thalamic hemorrhage

## 2024-01-03 NOTE — PROVIDER CONTACT NOTE (OTHER) - BACKGROUND
Acute L Thalamic Hemorrhage
Pt admitted for L thalamic hemorrhage.  Pt comfort measures.
Patient has DNR order
Acute L Thalamic Hemorrhage

## 2024-01-03 NOTE — PROVIDER CONTACT NOTE (OTHER) - SITUATION
Pt de sat to 85% sustaining for about 2 minutes, Pt has been on room air all night. 6L Nasal Cannula placed on pt
Pt febrile, rectal temperature 101.8, tachy up to 120, increased respiration rate up to 39.
Patient without spontaneous respirations or pulse
Pt admitted for L thalamic hemorrhage.  Pt comfort measures.  Vitals being assess q 12 hrs.  BP now elevated 161/73 and pt febrile 100.5 rectally.  Pt also restless/ appears uncomfortable & dyspneic.

## 2024-01-03 NOTE — PROVIDER CONTACT NOTE (OTHER) - ASSESSMENT
No response to external stimuli  No spontaneous respirations  No apical heart rate  Negative papillary response to light
Pt remains neurologically unchanged, global aphasia
Pt admitted for L thalamic hemorrhage.  Pt comfort measures.  Vitals being assess q 12 hrs.  BP now elevated 161/73 and pt febrile 100.5 rectally.  Pt also restless/ appears uncomfortable & dyspneic. Pt previously known to have infection / was being treated w/ antibiotics; as per families wishes antibiotics discontinued and comfort measures provided.
Pt remains neurologically unchanged, VSS

## 2024-01-03 NOTE — PROVIDER CONTACT NOTE (OTHER) - ACTION/TREATMENT ORDERED:
Provider made aware, Duonebs to be ordered. Will keep Nonrebreather at bedside
Medicate w/ 1000 mg Tylenol IVPB for comfort.  5 mg Hydralazine IVP for BP control.  Re-assess temp and BP in 1 hr. Continue comfort measures.
See patient expiration note
Provider made aware, temperature workup ordered and complete, assessed pt at bedside, continue to monitor and re check temperature

## 2024-01-03 NOTE — PROVIDER CONTACT NOTE (OTHER) - RECOMMENDATIONS
Medicate w/ 1000 mg Tylenol IVPB for comfort.  5 mg Hydralazine IVP for BP control.  Re-assess temp and BP in 1 hr. Continue comfort measures.
Notify Provider
Patient pronounced dead at 01:18 am. Daughter, Mary, called and notified, no autopsy at this time.
Notify Provider

## 2024-01-03 NOTE — DISCHARGE NOTE FOR THE EXPIRED PATIENT - HOSPITAL COURSE
89yo M with hx of afib on AC, HTN, DM, BPH, HLD who presented with unresponsiveness this morning. Found to have L thalamic hemorrhage. Palliative following for symptom management for end of life care. The patient  on 1/3/24 at 01:18.   89yo M with hx of afib on AC, HTN, DM, BPH, HLD who presented with unresponsiveness this morning. Found to have L thalamic hemorrhage. Palliative following for symptom management for end of life care. The patient  on 1/3/24 at 01:18.

## 2024-02-14 NOTE — PATIENT PROFILE ADULT - HAVE YOU EXPERIENCED VIOLENCE OR A TRAUMATIC EVENT?
[NL (0)] : extension 0 degrees [5___] : quadriceps 5[unfilled]/5 [Positive] : positive Araceli [Left] : left knee [All Views] : anteroposterior, lateral, skyline, and anteroposterior standing [There are no fractures, subluxations or dislocations. No significant abnormalities are seen] : There are no fractures, subluxations or dislocations. No significant abnormalities are seen [] : no extensor lag [[id number]] : flexion 130 degrees no

## 2024-10-01 NOTE — STROKE CODE NOTE - NIH STROKE SCALE: 6A. MOTOR LEG, LEFT, QM
Group Topic: Feeling Awareness/Expression   Group Date: 10/1/2024  Start Time: 1010  End Time: 1100  Facilitators: ROYAL Murrieta   Department: [facility]    Number of Participants: 6   Group Focus: other Values Discussion Cards  Treatment Modality: Other: Recreation Therapy  Interventions utilized were clarification, exploration, group exercise, and support  Purpose: coping skills, maladaptive thinking, feelings, irrational fears, communication skills, insight or knowledge, self-worth, self-care, relapse prevention strategies, and trigger / craving management    Name: Brian Thakur YOB: 2002   MR: [id number]      Facilitator: Recreational Therapist  Level of Participation: did not attend  Quality of Participation:  did not attend  Interactions with others:  did not attend  Mood/Affect:  did not attend  Triggers (if applicable): n/a  Cognition:  did not attend  Progress: None  Comments: pt problem is depressed mood.  Pt refuses to attend group at this time.  No handout to offer.  Plan: continue with services       (1) Drift; leg falls by the end of the 5-sec period but does not hit bed

## 2024-10-22 NOTE — PHYSICAL THERAPY INITIAL EVALUATION ADULT - SHORT TERM MEMORY, REHAB EVAL
intact Bed in lowest position, wheels locked, appropriate side rails in place/Call bell, personal items and telephone in reach/Instruct patient to call for assistance before getting out of bed or chair/Non-slip footwear when patient is out of bed/[place] to call system/Physically safe environment - no spills, clutter or unnecessary equipment/Purposeful Proactive Rounding/Room/bathroom lighting operational, light cord in reach

## 2025-01-27 NOTE — DISCHARGE NOTE NURSING/CASE MANAGEMENT/SOCIAL WORK - NSDCVIVACCINE_GEN_ALL_CORE_FT
Augmentin, take 1 tablet 2 times a day for 10 days.  Please increase your yogurt consumption or please take an over-the-counter probiotic for the duration of the antibiotic therapy.  Please give at least 2 hours between the yogurt/probiotic and Augmentin.  Please take Zyrtec for the duration of the antibiotic therapy.  Please call your primary care doctor next 5 to 7 days.  If worsening symptoms, please go to local emergency department for further evaluation.    Please follow up with your primary provider within one week if symptoms do not improve.  You may schedule an appointment online at [email or web address] or call [phone]. Go to the Emergency Department if symptoms significantly worsen or if you develop chest pain or shortness of breath.   Tdap , 2019/12/2 13:48 , Najma Dash (RN)
